# Patient Record
Sex: FEMALE | Race: WHITE | NOT HISPANIC OR LATINO
[De-identification: names, ages, dates, MRNs, and addresses within clinical notes are randomized per-mention and may not be internally consistent; named-entity substitution may affect disease eponyms.]

---

## 2017-01-10 ENCOUNTER — TRANSCRIPTION ENCOUNTER (OUTPATIENT)
Age: 52
End: 2017-01-10

## 2017-01-23 ENCOUNTER — APPOINTMENT (OUTPATIENT)
Dept: UROLOGY | Facility: CLINIC | Age: 52
End: 2017-01-23

## 2017-02-13 ENCOUNTER — APPOINTMENT (OUTPATIENT)
Dept: UROLOGY | Facility: CLINIC | Age: 52
End: 2017-02-13

## 2017-02-13 VITALS
SYSTOLIC BLOOD PRESSURE: 139 MMHG | RESPIRATION RATE: 17 BRPM | HEIGHT: 60 IN | BODY MASS INDEX: 16.88 KG/M2 | HEART RATE: 86 BPM | DIASTOLIC BLOOD PRESSURE: 91 MMHG | TEMPERATURE: 98 F | WEIGHT: 86 LBS

## 2017-02-13 DIAGNOSIS — Z78.9 OTHER SPECIFIED HEALTH STATUS: ICD-10-CM

## 2017-02-13 DIAGNOSIS — Z87.09 PERSONAL HISTORY OF OTHER DISEASES OF THE RESPIRATORY SYSTEM: ICD-10-CM

## 2017-02-13 DIAGNOSIS — R00.0 TACHYCARDIA, UNSPECIFIED: ICD-10-CM

## 2017-02-13 DIAGNOSIS — Z84.1 FAMILY HISTORY OF DISORDERS OF KIDNEY AND URETER: ICD-10-CM

## 2017-02-13 DIAGNOSIS — F17.200 NICOTINE DEPENDENCE, UNSPECIFIED, UNCOMPLICATED: ICD-10-CM

## 2017-02-15 ENCOUNTER — FORM ENCOUNTER (OUTPATIENT)
Age: 52
End: 2017-02-15

## 2017-02-16 ENCOUNTER — APPOINTMENT (OUTPATIENT)
Dept: CT IMAGING | Facility: IMAGING CENTER | Age: 52
End: 2017-02-16

## 2017-02-16 ENCOUNTER — OUTPATIENT (OUTPATIENT)
Dept: OUTPATIENT SERVICES | Facility: HOSPITAL | Age: 52
LOS: 1 days | End: 2017-02-16
Payer: MEDICAID

## 2017-02-16 DIAGNOSIS — N20.0 CALCULUS OF KIDNEY: ICD-10-CM

## 2017-02-16 LAB — BACTERIA UR CULT: NORMAL

## 2017-02-16 PROCEDURE — 74176 CT ABD & PELVIS W/O CONTRAST: CPT

## 2017-03-16 ENCOUNTER — OUTPATIENT (OUTPATIENT)
Dept: OUTPATIENT SERVICES | Facility: HOSPITAL | Age: 52
LOS: 1 days | End: 2017-03-16
Payer: MEDICAID

## 2017-03-16 VITALS
WEIGHT: 82.01 LBS | DIASTOLIC BLOOD PRESSURE: 78 MMHG | HEART RATE: 76 BPM | TEMPERATURE: 98 F | RESPIRATION RATE: 20 BRPM | HEIGHT: 60 IN | OXYGEN SATURATION: 98 % | SYSTOLIC BLOOD PRESSURE: 118 MMHG

## 2017-03-16 DIAGNOSIS — Z01.818 ENCOUNTER FOR OTHER PREPROCEDURAL EXAMINATION: ICD-10-CM

## 2017-03-16 DIAGNOSIS — N20.0 CALCULUS OF KIDNEY: ICD-10-CM

## 2017-03-16 DIAGNOSIS — Z98.891 HISTORY OF UTERINE SCAR FROM PREVIOUS SURGERY: Chronic | ICD-10-CM

## 2017-03-16 DIAGNOSIS — N20.1 CALCULUS OF URETER: ICD-10-CM

## 2017-03-16 DIAGNOSIS — Z90.710 ACQUIRED ABSENCE OF BOTH CERVIX AND UTERUS: Chronic | ICD-10-CM

## 2017-03-16 LAB
HCT VFR BLD CALC: 36.2 % — SIGNIFICANT CHANGE UP (ref 34.5–45)
HGB BLD-MCNC: 12.2 G/DL — SIGNIFICANT CHANGE UP (ref 11.5–15.5)
MCHC RBC-ENTMCNC: 33.7 GM/DL — SIGNIFICANT CHANGE UP (ref 32–36)
MCHC RBC-ENTMCNC: 34.8 PG — HIGH (ref 27–34)
MCV RBC AUTO: 103.1 FL — HIGH (ref 80–100)
PLATELET # BLD AUTO: 203 K/UL — SIGNIFICANT CHANGE UP (ref 150–400)
RBC # BLD: 3.51 M/UL — LOW (ref 3.8–5.2)
RBC # FLD: 13.7 % — SIGNIFICANT CHANGE UP (ref 10.3–14.5)
WBC # BLD: 3.53 K/UL — LOW (ref 3.8–10.5)
WBC # FLD AUTO: 3.53 K/UL — LOW (ref 3.8–10.5)

## 2017-03-16 PROCEDURE — 80048 BASIC METABOLIC PNL TOTAL CA: CPT

## 2017-03-16 PROCEDURE — G0463: CPT

## 2017-03-16 PROCEDURE — 85027 COMPLETE CBC AUTOMATED: CPT

## 2017-03-16 PROCEDURE — 80076 HEPATIC FUNCTION PANEL: CPT

## 2017-03-16 NOTE — H&P PST ADULT - NSANTHOSAYNRD_GEN_A_CORE
No. OBI screening performed.  STOP BANG Legend: 0-2 = LOW Risk; 3-4 = INTERMEDIATE Risk; 5-8 = HIGH Risk

## 2017-03-16 NOTE — H&P PST ADULT - HISTORY OF PRESENT ILLNESS
50 y/o F PMH cholelithiasis, renal calculi, presented to the ER with nausea, vomiting, hematuria and left flank pain.

## 2017-03-17 LAB
ALBUMIN SERPL ELPH-MCNC: 3.7 G/DL — SIGNIFICANT CHANGE UP (ref 3.3–5)
ALP SERPL-CCNC: 104 U/L — SIGNIFICANT CHANGE UP (ref 40–120)
ALT FLD-CCNC: 56 U/L — HIGH (ref 10–45)
ANION GAP SERPL CALC-SCNC: 22 MMOL/L — HIGH (ref 5–17)
AST SERPL-CCNC: 118 U/L — HIGH (ref 10–40)
BILIRUB DIRECT SERPL-MCNC: 0.2 MG/DL — SIGNIFICANT CHANGE UP (ref 0–0.2)
BILIRUB INDIRECT FLD-MCNC: 0.4 MG/DL — SIGNIFICANT CHANGE UP (ref 0.2–1)
BILIRUB SERPL-MCNC: 0.6 MG/DL — SIGNIFICANT CHANGE UP (ref 0.2–1.2)
BUN SERPL-MCNC: 8 MG/DL — SIGNIFICANT CHANGE UP (ref 7–23)
CALCIUM SERPL-MCNC: 9.5 MG/DL — SIGNIFICANT CHANGE UP (ref 8.4–10.5)
CHLORIDE SERPL-SCNC: 97 MMOL/L — SIGNIFICANT CHANGE UP (ref 96–108)
CO2 SERPL-SCNC: 18 MMOL/L — LOW (ref 22–31)
CREAT SERPL-MCNC: 0.6 MG/DL — SIGNIFICANT CHANGE UP (ref 0.5–1.3)
GLUCOSE SERPL-MCNC: 111 MG/DL — HIGH (ref 70–99)
POTASSIUM SERPL-MCNC: 3.4 MMOL/L — LOW (ref 3.5–5.3)
POTASSIUM SERPL-SCNC: 3.4 MMOL/L — LOW (ref 3.5–5.3)
PROT SERPL-MCNC: 7 G/DL — SIGNIFICANT CHANGE UP (ref 6–8.3)
SODIUM SERPL-SCNC: 137 MMOL/L — SIGNIFICANT CHANGE UP (ref 135–145)

## 2017-03-22 ENCOUNTER — INPATIENT (INPATIENT)
Facility: HOSPITAL | Age: 52
LOS: 1 days | Discharge: LEFT BEFORE DISCHARGE | End: 2017-03-24
Attending: INTERNAL MEDICINE | Admitting: INTERNAL MEDICINE
Payer: MEDICAID

## 2017-03-22 VITALS
WEIGHT: 130.07 LBS | DIASTOLIC BLOOD PRESSURE: 86 MMHG | HEIGHT: 66 IN | HEART RATE: 120 BPM | RESPIRATION RATE: 18 BRPM | OXYGEN SATURATION: 98 % | TEMPERATURE: 99 F | SYSTOLIC BLOOD PRESSURE: 127 MMHG

## 2017-03-22 DIAGNOSIS — Z98.891 HISTORY OF UTERINE SCAR FROM PREVIOUS SURGERY: Chronic | ICD-10-CM

## 2017-03-22 DIAGNOSIS — Z90.710 ACQUIRED ABSENCE OF BOTH CERVIX AND UTERUS: Chronic | ICD-10-CM

## 2017-03-22 LAB
ALBUMIN SERPL ELPH-MCNC: 3.8 G/DL — SIGNIFICANT CHANGE UP (ref 3.3–5)
ALP SERPL-CCNC: 129 U/L — HIGH (ref 40–120)
ALT FLD-CCNC: 75 U/L — SIGNIFICANT CHANGE UP (ref 12–78)
ANION GAP SERPL CALC-SCNC: 17 MMOL/L — SIGNIFICANT CHANGE UP (ref 5–17)
APTT BLD: 26.5 SEC — LOW (ref 27.5–37.4)
AST SERPL-CCNC: 153 U/L — HIGH (ref 15–37)
BASOPHILS # BLD AUTO: 0.1 K/UL — SIGNIFICANT CHANGE UP (ref 0–0.2)
BASOPHILS NFR BLD AUTO: 1.2 % — SIGNIFICANT CHANGE UP (ref 0–2)
BILIRUB SERPL-MCNC: 1.4 MG/DL — HIGH (ref 0.2–1.2)
BUN SERPL-MCNC: 4 MG/DL — LOW (ref 7–23)
CALCIUM SERPL-MCNC: 9.1 MG/DL — SIGNIFICANT CHANGE UP (ref 8.5–10.1)
CHLORIDE SERPL-SCNC: 96 MMOL/L — SIGNIFICANT CHANGE UP (ref 96–108)
CO2 SERPL-SCNC: 22 MMOL/L — SIGNIFICANT CHANGE UP (ref 22–31)
CREAT SERPL-MCNC: 0.77 MG/DL — SIGNIFICANT CHANGE UP (ref 0.5–1.3)
EOSINOPHIL # BLD AUTO: 0 K/UL — SIGNIFICANT CHANGE UP (ref 0–0.5)
EOSINOPHIL NFR BLD AUTO: 0.6 % — SIGNIFICANT CHANGE UP (ref 0–6)
ETHANOL SERPL-MCNC: <10 MG/DL — SIGNIFICANT CHANGE UP (ref 0–10)
GLUCOSE SERPL-MCNC: 184 MG/DL — HIGH (ref 70–99)
HCT VFR BLD CALC: 38.1 % — SIGNIFICANT CHANGE UP (ref 34.5–45)
HGB BLD-MCNC: 13 G/DL — SIGNIFICANT CHANGE UP (ref 11.5–15.5)
INR BLD: 1.06 RATIO — SIGNIFICANT CHANGE UP (ref 0.88–1.16)
LYMPHOCYTES # BLD AUTO: 1.8 K/UL — SIGNIFICANT CHANGE UP (ref 1–3.3)
LYMPHOCYTES # BLD AUTO: 25 % — SIGNIFICANT CHANGE UP (ref 13–44)
MCHC RBC-ENTMCNC: 34 GM/DL — SIGNIFICANT CHANGE UP (ref 32–36)
MCHC RBC-ENTMCNC: 35.8 PG — HIGH (ref 27–34)
MCV RBC AUTO: 105.2 FL — HIGH (ref 80–100)
MONOCYTES # BLD AUTO: 0.6 K/UL — SIGNIFICANT CHANGE UP (ref 0–0.9)
MONOCYTES NFR BLD AUTO: 8.2 % — SIGNIFICANT CHANGE UP (ref 2–14)
NEUTROPHILS # BLD AUTO: 4.7 K/UL — SIGNIFICANT CHANGE UP (ref 1.8–7.4)
NEUTROPHILS NFR BLD AUTO: 65 % — SIGNIFICANT CHANGE UP (ref 43–77)
PLATELET # BLD AUTO: 142 K/UL — LOW (ref 150–400)
POTASSIUM SERPL-MCNC: 3.2 MMOL/L — LOW (ref 3.5–5.3)
POTASSIUM SERPL-SCNC: 3.2 MMOL/L — LOW (ref 3.5–5.3)
PROT SERPL-MCNC: 7.7 GM/DL — SIGNIFICANT CHANGE UP (ref 6–8.3)
PROTHROM AB SERPL-ACNC: 11.5 SEC — SIGNIFICANT CHANGE UP (ref 9.8–12.7)
RBC # BLD: 3.62 M/UL — LOW (ref 3.8–5.2)
RBC # FLD: 13.2 % — SIGNIFICANT CHANGE UP (ref 10.3–14.5)
SODIUM SERPL-SCNC: 135 MMOL/L — SIGNIFICANT CHANGE UP (ref 135–145)
WBC # BLD: 7.3 K/UL — SIGNIFICANT CHANGE UP (ref 3.8–10.5)
WBC # FLD AUTO: 7.3 K/UL — SIGNIFICANT CHANGE UP (ref 3.8–10.5)

## 2017-03-22 PROCEDURE — 70450 CT HEAD/BRAIN W/O DYE: CPT | Mod: 26

## 2017-03-22 PROCEDURE — 99285 EMERGENCY DEPT VISIT HI MDM: CPT

## 2017-03-22 PROCEDURE — 93010 ELECTROCARDIOGRAM REPORT: CPT

## 2017-03-22 RX ORDER — SODIUM CHLORIDE 9 MG/ML
1000 INJECTION INTRAMUSCULAR; INTRAVENOUS; SUBCUTANEOUS ONCE
Qty: 0 | Refills: 0 | Status: COMPLETED | OUTPATIENT
Start: 2017-03-22 | End: 2017-03-22

## 2017-03-22 RX ORDER — ACETAMINOPHEN 500 MG
1000 TABLET ORAL ONCE
Qty: 0 | Refills: 0 | Status: DISCONTINUED | OUTPATIENT
Start: 2017-03-22 | End: 2017-03-22

## 2017-03-22 RX ADMIN — Medication 2 MILLIGRAM(S): at 23:03

## 2017-03-22 RX ADMIN — SODIUM CHLORIDE 1000 MILLILITER(S): 9 INJECTION INTRAMUSCULAR; INTRAVENOUS; SUBCUTANEOUS at 22:53

## 2017-03-22 NOTE — ED PROVIDER NOTE - OBJECTIVE STATEMENT
50 y/o female with PMHx of COPD presents to the ED BIBEMS for first time seizure PTA according to . Had a witnessed generalized tonic clonic seizure lasting about a minute with a 10 minute post ictal period. Pt states that she is an alcoholic. Last drink was this afternoon. Currently denies HA, CP, abd pain.

## 2017-03-22 NOTE — ED PROVIDER NOTE - NS ED MD SCRIBE ATTENDING SCRIBE SECTIONS
RESULTS/PROGRESS NOTE/VITAL SIGNS( Pullset)/HISTORY OF PRESENT ILLNESS/REVIEW OF SYSTEMS/DISPOSITION/PHYSICAL EXAM/PAST MEDICAL/SURGICAL/SOCIAL HISTORY

## 2017-03-23 ENCOUNTER — APPOINTMENT (OUTPATIENT)
Dept: UROLOGY | Facility: HOSPITAL | Age: 52
End: 2017-03-23

## 2017-03-23 DIAGNOSIS — Z29.9 ENCOUNTER FOR PROPHYLACTIC MEASURES, UNSPECIFIED: ICD-10-CM

## 2017-03-23 DIAGNOSIS — E87.6 HYPOKALEMIA: ICD-10-CM

## 2017-03-23 DIAGNOSIS — N20.0 CALCULUS OF KIDNEY: ICD-10-CM

## 2017-03-23 DIAGNOSIS — J44.9 CHRONIC OBSTRUCTIVE PULMONARY DISEASE, UNSPECIFIED: ICD-10-CM

## 2017-03-23 DIAGNOSIS — F10.239 ALCOHOL DEPENDENCE WITH WITHDRAWAL, UNSPECIFIED: ICD-10-CM

## 2017-03-23 DIAGNOSIS — K80.80 OTHER CHOLELITHIASIS WITHOUT OBSTRUCTION: ICD-10-CM

## 2017-03-23 DIAGNOSIS — F17.200 NICOTINE DEPENDENCE, UNSPECIFIED, UNCOMPLICATED: ICD-10-CM

## 2017-03-23 DIAGNOSIS — R74.0 NONSPECIFIC ELEVATION OF LEVELS OF TRANSAMINASE AND LACTIC ACID DEHYDROGENASE [LDH]: ICD-10-CM

## 2017-03-23 LAB
ALBUMIN SERPL ELPH-MCNC: 2.7 G/DL — LOW (ref 3.3–5)
ALP SERPL-CCNC: 90 U/L — SIGNIFICANT CHANGE UP (ref 40–120)
ALT FLD-CCNC: 52 U/L — SIGNIFICANT CHANGE UP (ref 12–78)
ANION GAP SERPL CALC-SCNC: 10 MMOL/L — SIGNIFICANT CHANGE UP (ref 5–17)
AST SERPL-CCNC: 83 U/L — HIGH (ref 15–37)
BASOPHILS # BLD AUTO: 0.1 K/UL — SIGNIFICANT CHANGE UP (ref 0–0.2)
BASOPHILS NFR BLD AUTO: 1.2 % — SIGNIFICANT CHANGE UP (ref 0–2)
BILIRUB DIRECT SERPL-MCNC: 0.6 MG/DL — HIGH (ref 0–0.2)
BILIRUB SERPL-MCNC: 1.3 MG/DL — HIGH (ref 0.2–1.2)
BUN SERPL-MCNC: 4 MG/DL — LOW (ref 7–23)
CALCIUM SERPL-MCNC: 7.8 MG/DL — LOW (ref 8.5–10.1)
CHLORIDE SERPL-SCNC: 108 MMOL/L — SIGNIFICANT CHANGE UP (ref 96–108)
CO2 SERPL-SCNC: 24 MMOL/L — SIGNIFICANT CHANGE UP (ref 22–31)
CREAT SERPL-MCNC: 0.56 MG/DL — SIGNIFICANT CHANGE UP (ref 0.5–1.3)
EOSINOPHIL # BLD AUTO: 0 K/UL — SIGNIFICANT CHANGE UP (ref 0–0.5)
EOSINOPHIL NFR BLD AUTO: 0.8 % — SIGNIFICANT CHANGE UP (ref 0–6)
GLUCOSE SERPL-MCNC: 90 MG/DL — SIGNIFICANT CHANGE UP (ref 70–99)
HAV IGM SER-ACNC: SIGNIFICANT CHANGE UP
HBV CORE IGM SER-ACNC: SIGNIFICANT CHANGE UP
HBV SURFACE AG SER-ACNC: SIGNIFICANT CHANGE UP
HCT VFR BLD CALC: 28.1 % — LOW (ref 34.5–45)
HCV AB S/CO SERPL IA: 0.31 S/CO — SIGNIFICANT CHANGE UP
HCV AB SERPL-IMP: SIGNIFICANT CHANGE UP
HGB BLD-MCNC: 9.5 G/DL — LOW (ref 11.5–15.5)
LIDOCAIN IGE QN: 82 U/L — SIGNIFICANT CHANGE UP (ref 73–393)
LYMPHOCYTES # BLD AUTO: 1.9 K/UL — SIGNIFICANT CHANGE UP (ref 1–3.3)
LYMPHOCYTES # BLD AUTO: 33.1 % — SIGNIFICANT CHANGE UP (ref 13–44)
MAGNESIUM SERPL-MCNC: 1.3 MG/DL — LOW (ref 1.8–2.4)
MCHC RBC-ENTMCNC: 33.9 GM/DL — SIGNIFICANT CHANGE UP (ref 32–36)
MCHC RBC-ENTMCNC: 35.6 PG — HIGH (ref 27–34)
MCV RBC AUTO: 105.2 FL — HIGH (ref 80–100)
MONOCYTES # BLD AUTO: 0.5 K/UL — SIGNIFICANT CHANGE UP (ref 0–0.9)
MONOCYTES NFR BLD AUTO: 8.2 % — SIGNIFICANT CHANGE UP (ref 2–14)
NEUTROPHILS # BLD AUTO: 3.3 K/UL — SIGNIFICANT CHANGE UP (ref 1.8–7.4)
NEUTROPHILS NFR BLD AUTO: 56.7 % — SIGNIFICANT CHANGE UP (ref 43–77)
PHOSPHATE SERPL-MCNC: 1.9 MG/DL — LOW (ref 2.5–4.5)
PLATELET # BLD AUTO: 110 K/UL — LOW (ref 150–400)
POTASSIUM SERPL-MCNC: 3.4 MMOL/L — LOW (ref 3.5–5.3)
POTASSIUM SERPL-SCNC: 3.4 MMOL/L — LOW (ref 3.5–5.3)
PROT SERPL-MCNC: 5.8 GM/DL — LOW (ref 6–8.3)
RBC # BLD: 2.67 M/UL — LOW (ref 3.8–5.2)
RBC # FLD: 13.2 % — SIGNIFICANT CHANGE UP (ref 10.3–14.5)
SODIUM SERPL-SCNC: 142 MMOL/L — SIGNIFICANT CHANGE UP (ref 135–145)
WBC # BLD: 5.7 K/UL — SIGNIFICANT CHANGE UP (ref 3.8–10.5)
WBC # FLD AUTO: 5.7 K/UL — SIGNIFICANT CHANGE UP (ref 3.8–10.5)

## 2017-03-23 PROCEDURE — 95812 EEG 41-60 MINUTES: CPT | Mod: 26

## 2017-03-23 PROCEDURE — 74176 CT ABD & PELVIS W/O CONTRAST: CPT | Mod: 26

## 2017-03-23 PROCEDURE — 99222 1ST HOSP IP/OBS MODERATE 55: CPT

## 2017-03-23 RX ORDER — IPRATROPIUM/ALBUTEROL SULFATE 18-103MCG
3 AEROSOL WITH ADAPTER (GRAM) INHALATION EVERY 6 HOURS
Qty: 0 | Refills: 0 | Status: DISCONTINUED | OUTPATIENT
Start: 2017-03-23 | End: 2017-03-24

## 2017-03-23 RX ORDER — FLUTICASONE PROPIONATE AND SALMETEROL 50; 250 UG/1; UG/1
1 POWDER ORAL; RESPIRATORY (INHALATION)
Qty: 0 | Refills: 0 | Status: DISCONTINUED | OUTPATIENT
Start: 2017-03-23 | End: 2017-03-24

## 2017-03-23 RX ORDER — THIAMINE MONONITRATE (VIT B1) 100 MG
100 TABLET ORAL ONCE
Qty: 0 | Refills: 0 | Status: COMPLETED | OUTPATIENT
Start: 2017-03-23 | End: 2017-03-23

## 2017-03-23 RX ORDER — THIAMINE MONONITRATE (VIT B1) 100 MG
100 TABLET ORAL DAILY
Qty: 0 | Refills: 0 | Status: DISCONTINUED | OUTPATIENT
Start: 2017-03-23 | End: 2017-03-24

## 2017-03-23 RX ORDER — SODIUM CHLORIDE 9 MG/ML
500 INJECTION INTRAMUSCULAR; INTRAVENOUS; SUBCUTANEOUS
Qty: 0 | Refills: 0 | Status: DISCONTINUED | OUTPATIENT
Start: 2017-03-23 | End: 2017-03-24

## 2017-03-23 RX ORDER — NICOTINE POLACRILEX 2 MG
1 GUM BUCCAL DAILY
Qty: 0 | Refills: 0 | Status: DISCONTINUED | OUTPATIENT
Start: 2017-03-23 | End: 2017-03-24

## 2017-03-23 RX ORDER — MOXIFLOXACIN HYDROCHLORIDE TABLETS, 400 MG 400 MG/1
1 TABLET, FILM COATED ORAL
Qty: 0 | Refills: 0 | COMMUNITY

## 2017-03-23 RX ORDER — THIAMINE MONONITRATE (VIT B1) 100 MG
100 TABLET ORAL ONCE
Qty: 0 | Refills: 0 | Status: DISCONTINUED | OUTPATIENT
Start: 2017-03-23 | End: 2017-03-23

## 2017-03-23 RX ORDER — FOLIC ACID 0.8 MG
1 TABLET ORAL DAILY
Qty: 0 | Refills: 0 | Status: DISCONTINUED | OUTPATIENT
Start: 2017-03-23 | End: 2017-03-24

## 2017-03-23 RX ORDER — POTASSIUM CHLORIDE 20 MEQ
20 PACKET (EA) ORAL ONCE
Qty: 0 | Refills: 0 | Status: COMPLETED | OUTPATIENT
Start: 2017-03-23 | End: 2017-03-23

## 2017-03-23 RX ORDER — SODIUM CHLORIDE 9 MG/ML
1 INJECTION INTRAMUSCULAR; INTRAVENOUS; SUBCUTANEOUS ONCE
Qty: 0 | Refills: 0 | Status: COMPLETED | OUTPATIENT
Start: 2017-03-23 | End: 2017-03-23

## 2017-03-23 RX ADMIN — Medication 1 PATCH: at 15:30

## 2017-03-23 RX ADMIN — Medication 1 MILLIGRAM(S): at 11:18

## 2017-03-23 RX ADMIN — Medication 1 TABLET(S): at 11:18

## 2017-03-23 RX ADMIN — SODIUM CHLORIDE 0.01 MILLILITER(S): 9 INJECTION INTRAMUSCULAR; INTRAVENOUS; SUBCUTANEOUS at 03:00

## 2017-03-23 RX ADMIN — Medication 100 MILLIGRAM(S): at 01:25

## 2017-03-23 RX ADMIN — Medication 2 MILLIGRAM(S): at 18:42

## 2017-03-23 RX ADMIN — Medication 100 MILLIGRAM(S): at 11:18

## 2017-03-23 RX ADMIN — Medication 20 MILLIEQUIVALENT(S): at 04:45

## 2017-03-23 RX ADMIN — Medication 2 MILLIGRAM(S): at 21:30

## 2017-03-23 RX ADMIN — Medication 3 MILLILITER(S): at 20:55

## 2017-03-23 RX ADMIN — Medication 2 MILLIGRAM(S): at 09:23

## 2017-03-23 RX ADMIN — Medication 2 MILLIGRAM(S): at 15:30

## 2017-03-23 RX ADMIN — Medication 2 MILLIGRAM(S): at 04:45

## 2017-03-23 NOTE — ED ADULT NURSE NOTE - OBJECTIVE STATEMENT
patient's boyfriend states she passed out and "shook" for about a minute he called EMS and then patient was "out of it" for approximately 10 minutes. patient had episode of incontinence of stool and urine at time of episode.  A&Ox4 at time of arrival. no complaints at this time. admits to drinking everyday and stopped in the last 24 hours due to scheduled surgery tomorrow to removed kidney stones. only had "one shot" today.

## 2017-03-23 NOTE — PROGRESS NOTE ADULT - PROBLEM SELECTOR PLAN 2
stable  CT abdomen results noted CT abdomen results - cholelithiasis  elevated LFTs likely related to alcohol use - trending down.

## 2017-03-23 NOTE — CONSULT NOTE ADULT - SUBJECTIVE AND OBJECTIVE BOX
CC: 51y old  Female who presents with a chief complaint of seizure (23 Mar 2017 04:07)      HPI:  50 yo F with PMH of COPD, Etoh abuse, presented to  ED status post seizure-like episode witnessed by . As per the pt she went to the bathroom, had a seizure, then does not recall what happened, she admits she had few drinks that she was not supposed to have as she was schedules for surgery.    As per the history / H&P, patient had been vomiting intermittently since december, has been told she has gallstones and kidney stones and that she needs cholecystectomy, was scheduled for procedure for her kidney stones tomorrow , she had ~4 episodes of non-bloody vomiting, came out from the bathroom after the last episode of vomiting, and fell backwards and had shaking of arms / legs for 3-5 mins, also had fecal incontinence, but no urinary incontinence. Patient's last drink was about 4pm, and seizure-like episode was around 9pm; patient has been drinking more than usual - about 6 beers / day and also hard liquor.     At present pt is alert, back to baseline; denies nausea, abdominal pain, CP, cough, runny nose, CP, SOB, arm / leg weakness, focal deficits.       PAST MEDICAL & SURGICAL HISTORY:  Asthma  Cholelithiasis  Renal calculi  Lyme disease  Tachycardia  S/P hysterectomy:   S/P :     Social Hx: Tobacco - 1 pack every two days, Etoh - 6 beers per day plus hard liquor, drugs - h/o marijuana use    Family Hx: Father - kidney transplant (23 Mar 2017 04:07)    MEDICATIONS  (STANDING):  fluticasone / salmeterol 100-50 MICROgram(s) Diskus 1Dose(s) Inhalation two times a day  sodium chloride 0.9%. 500milliLiter(s) IV Continuous <Continuous>  LORazepam     Tablet milliGRAM(s) Oral   folic acid 1milliGRAM(s) Oral daily  multivitamin 1Tablet(s) Oral daily  LORazepam     Tablet 2milliGRAM(s) Oral every 4 hours  thiamine 100milliGRAM(s) Oral daily       Allergies    amoxicillin (Other)  Ceclor (Other)    Intolerances    ROS: Pertinent positives in HPI, all other ROS were reviewed and are negative.      Vital Signs Last 24 Hrs  T(C): 37.1, Max: 37.1 (03-23 @ 02:52)  T(F): 98.7, Max: 98.8 ( @ 02:52)  HR: 105 (100 - 126)  BP: 93/55 (93/55 - 141/90)  BP(mean): --  RR: 16 (16 - 19)  SpO2: 99% (98% - 100%)      GE:  Constitutional: awake and alert.  HEENT: PERRLA, EOMI,   Neck: Supple.  Respiratory: Breath sounds are clear bilaterally  Cardiovascular: S1 and S2, regular rhythm  Extremities:  no edema  Vascular: Caritid Bruit - no  Musculoskeletal: no joint swelling/tenderness, no abnormal movements  Skin: No rashes    Neurological exam:  HF: A x O x 3. Appropriately interactive, normal affect. Speech fluent, No Aphasia or paraphasic errors. Naming /repetition intact   CN: PRASHANT, EOMI, VFF, facial sensation normal, no NLFD, tongue midline, Palate moves equally, SCM equal bilaterally  Motor: No pronator drift, Strength 5/5 in all 4 ext, decreased muscle bulk, tremulous hands normal tone, no rigidity or bradykinesia.    Sens: Intact to light touch / PP/ VS/ JS    Reflexes: Symmetric and normal . BJ 2+, BR 2+, KJ 2+, AJ 0, downgoing toes b/l  Coord:  No FNFA, dysmetria, TATI intact   Gait/Balance: Not tested      Labs:   23 Mar 2017 05:52    142    |  108    |  4      ----------------------------<  90     3.4     |  24     |  0.56     Ca    7.8        23 Mar 2017 05:52  Phos  1.9       23 Mar 2017 05:52  Mg     1.3       23 Mar 2017 05:52    TPro  5.8    /  Alb  2.7    /  TBili  1.3    /  DBili  0.6    /  AST  83     /  ALT  52     /  AlkPhos  90     23 Mar 2017 05:52                        9.5    5.7   )-----------( 110      ( 23 Mar 2017 05:52 )             28.1       Radiology:  - CT Head:No acute hemorrhage or mass effect.   - MRI brain  -MRA brain/Carotids  - EEG CC: 51y old  Female who presents with a chief complaint of seizure (23 Mar 2017 04:07)      HPI:  52 yo F with PMH of COPD, Etoh abuse, presented to  ED status post seizure-like episode witnessed by . As per the pt she went to the bathroom, had a seizure, then does not recall what happened, she admits she had few drinks that she was not supposed to have as she was schedules for surgery.    As per the history / H&P, patient had been vomiting intermittently since december, has been told she has gallstones and kidney stones and that she needs cholecystectomy, was scheduled for procedure for her kidney stones tomorrow , she had ~4 episodes of non-bloody vomiting, came out from the bathroom after the last episode of vomiting, and fell backwards and had shaking of arms / legs for 3-5 mins, also had fecal incontinence, but no urinary incontinence. Patient's last drink was about 4pm, and seizure-like episode was around 9pm; patient has been drinking more than usual - about 6 beers / day and also hard liquor.     At present pt is alert, back to baseline; denies nausea, abdominal pain, CP, cough, runny nose, CP, SOB, arm / leg weakness, focal deficits. Upon obtaining further history it is not clear if pt has had any seizures previously; she does admit that she drinks ETOH a lot and has pain related issues.      PAST MEDICAL & SURGICAL HISTORY:  Asthma  Cholelithiasis  Renal calculi  Lyme disease  Tachycardia  S/P hysterectomy:   S/P :     Social Hx: Tobacco - 1 pack every two days, Etoh - 6 beers per day plus hard liquor, drugs - h/o marijuana use    Family Hx: Father - kidney transplant (23 Mar 2017 04:07)    MEDICATIONS  (STANDING):  fluticasone / salmeterol 100-50 MICROgram(s) Diskus 1Dose(s) Inhalation two times a day  sodium chloride 0.9%. 500milliLiter(s) IV Continuous <Continuous>  LORazepam     Tablet milliGRAM(s) Oral   folic acid 1milliGRAM(s) Oral daily  multivitamin 1Tablet(s) Oral daily  LORazepam     Tablet 2milliGRAM(s) Oral every 4 hours  thiamine 100milliGRAM(s) Oral daily       Allergies    amoxicillin (Other)  Ceclor (Other)    Intolerances    ROS: Pertinent positives in HPI, all other ROS were reviewed and are negative.      Vital Signs Last 24 Hrs  T(C): 37.1, Max: 37.1 ( @ 02:52)  T(F): 98.7, Max: 98.8 ( @ 02:52)  HR: 105 (100 - 126)  BP: 93/55 (93/55 - 141/90)  BP(mean): --  RR: 16 (16 - 19)  SpO2: 99% (98% - 100%)      GE:  Constitutional: awake and alert.  HEENT: PERRLA, EOMI,   Neck: Supple.  Respiratory: Breath sounds are clear bilaterally  Cardiovascular: S1 and S2, regular rhythm  Extremities:  no edema  Vascular: Caritid Bruit - no  Musculoskeletal: no joint swelling/tenderness, no abnormal movements  Skin: No rashes    Neurological exam:  HF: A x O x 3. Appropriately interactive, normal affect. Speech fluent, No Aphasia or paraphasic errors. Naming /repetition intact   CN: PRASHANT, EOMI, VFF, facial sensation normal, no NLFD, tongue midline, Palate moves equally, SCM equal bilaterally  Motor: No pronator drift, Strength 5/5 in all 4 ext, decreased muscle bulk, tremulous hands normal tone, no rigidity or bradykinesia.    Sens: Intact to light touch / PP/ VS/ JS    Reflexes: Symmetric and normal . BJ 2+, BR 2+, KJ 2+, AJ 0, downgoing toes b/l  Coord:  No FNFA, dysmetria, TATI intact   Gait/Balance: Not tested      Labs:   23 Mar 2017 05:52    142    |  108    |  4      ----------------------------<  90     3.4     |  24     |  0.56     Ca    7.8        23 Mar 2017 05:52  Phos  1.9       23 Mar 2017 05:52  Mg     1.3       23 Mar 2017 05:52    TPro  5.8    /  Alb  2.7    /  TBili  1.3    /  DBili  0.6    /  AST  83     /  ALT  52     /  AlkPhos  90     23 Mar 2017 05:52                        9.5    5.7   )-----------( 110      ( 23 Mar 2017 05:52 )             28.1       Radiology:  - CT Head:No acute hemorrhage or mass effect.   - MRI brain  -MRA brain/Carotids  - EEG

## 2017-03-23 NOTE — PROGRESS NOTE ADULT - PROBLEM SELECTOR PLAN 5
CT results showed hepatomegaly with sever fatty infiltration. Cholelithiasis- non obstructing stones in both kidneys CT results showed hepatomegaly with sever fatty infiltration. Cholelithiasis.  alcohol cessation  [facility] protocol  LFTS trending down

## 2017-03-23 NOTE — PROGRESS NOTE ADULT - PROBLEM SELECTOR PLAN 1
Ct head negative for intracranial pathology  EEG completed- awaiting results  Neurology consult - will hold off on AED until results of EEG is back  seizure precations  fall precautions  Utox  continue CIWA protocol  MVI, thiamine and folate Ct head negative for intracranial pathology  EEG completed- awaiting results  Neurology consult - will hold off on AED until results of EEG is back  seizure precations  fall precautions  Utox  continue standing CIWA protocol  MVI, thiamine and folate  IVFs

## 2017-03-23 NOTE — PROGRESS NOTE ADULT - ASSESSMENT
50 yo F with PMH of COPD, Etoh abuse, p/w seizure and vomiting intermittently since december. She has been told she has gallstones and kidney stones and that she needs cholecystectomy. Patient also scheduled for procedure for her kidney stones on 3/23. Ome day PTAshe had ~4 episodes of non-bloody vomiting. As per significant other noted that patient fell backwards and had shaking of arms / legs for 3-5 mins. Patient had fecal incontinence, but no urinary incontinence. Patient's last drink was about 4pm, and seizure-like episode was around 9pm. As per boyfriend, patient has been drinking more than usual - about 6 beers / day and also hard liquor. Patient admitted with ETOH withdrawal and was started on CIWA  protocol 52 yo F with PMH of COPD, Etoh abuse, p/w seizure and vomiting

## 2017-03-23 NOTE — H&P ADULT - HISTORY OF PRESENT ILLNESS
50 yo F with PMH of COPD, Etoh abuse, p/w seizure. Patient is not very helpful with providing history and just wants to go back to sleep instead, most of the history was given by boyfriend at bedside. States patient has been vomiting intermittently since december. She has been told she has gallstones and kidney stones and that she needs cholecystectomy. Patient also scheduled for procedure for her kidney stones tomorrow. Yesterday she had ~4 episodes of non-bloody vomiting. States she came out from the bathroom after the last episode of vomiting, and boyfriend noted that patient fell backwards and had shaking of arms / legs for 3-5 mins. Patient had fecal incontinence, but no urinary incontinence. Patient's last drink was about 4pm, and seizure-like episode was around 9pm. As per boyfriend, patient has been drinking more than usual - about 6 beers / day and also hard liquor.     Presently patient denies nausea, abdominal pain, CP, cough, runny nose, CP, SOB, arm / leg weakness, focal deficits.     PSH: Partial hysterctomy  Social: Tobacco - 1 pack every two days, Etoh - 6 beers per day plus hard liquor, drugs - h/o marijuana use  Family Hx: Father - kidney transplant

## 2017-03-23 NOTE — ED ADULT NURSE NOTE - ED STAT RN HANDOFF DETAILS
to HEIDI Ricketts. patient sleeping comfortable. no complaints. safety maintained will continue to monitor.

## 2017-03-23 NOTE — PROGRESS NOTE ADULT - ATTENDING COMMENTS
agree with above A/P:  CIWA protocol.  Monitor for seizures.  Await EEG and neuro follow up.  LFTs trending down.

## 2017-03-23 NOTE — ED ADULT NURSE REASSESSMENT NOTE - NS ED NURSE REASSESS COMMENT FT1
pt resting comfortably in bed with no acute distress noted. VSS. CIWA 0 scored. Will cont to monitor for safety and comfort.

## 2017-03-23 NOTE — PROGRESS NOTE ADULT - SUBJECTIVE AND OBJECTIVE BOX
50 yo F with PMH of COPD, Etoh abuse, p/w seizure and vomiting intermittently since december. She has been told she has gallstones and kidney stones and that she needs cholecystectomy. Patient also scheduled for procedure for her kidney stones on 3/23. Ome day Kt had ~4 episodes of non-bloody vomiting. As per significant other noted that patient fell backwards and had shaking of arms / legs for 3-5 mins. Patient had fecal incontinence, but no urinary incontinence. Patient's last drink was about 4pm, and seizure-like episode was around 9pm. As per boyfriend, patient has been drinking more than usual - about 6 beers / day and also hard liquor. Patient admitted with ETOH withdrawal and was started on CIWA  protocol    3/23- Awake, no overnight events. stated that her last dink was yesterday afternoon, no new complaints    PSH: Partial hysterectomy  Social: Tobacco - 1 pack every two days, Etoh - 6 beers per day plus hard liquor, drugs - h/o marijuana use  Family Hx: Father - kidney transplant    Review of Systems:  Review of Systems: See HPI	  Other Review of Systems: All other review of systems negative, except as noted in HPI 	      MEDICATIONS  (STANDING):  fluticasone / salmeterol 100-50 MICROgram(s) Diskus 1Dose(s) Inhalation two times a day  sodium chloride 0.9%. 500milliLiter(s) IV Continuous <Continuous>  LORazepam     Tablet milliGRAM(s) Oral   folic acid 1milliGRAM(s) Oral daily  multivitamin 1Tablet(s) Oral daily  LORazepam     Tablet 2milliGRAM(s) Oral every 4 hours  thiamine 100milliGRAM(s) Oral daily  nicotine -  14 mG/24Hr(s) Patch 1patch Transdermal daily    MEDICATIONS  (PRN):  ALBUTerol/ipratropium for Nebulization 3milliLiter(s) Nebulizer every 6 hours PRN Bronchospasm  LORazepam   Injectable 2milliGRAM(s) IV Push every 2 hours PRN Symptom-triggered: each CIWA -Ar score 8 or GREATER        		          EXAM:  CT ABDOMEN AND PELVIS                       PROCEDURE DATE:  03/23/2017  INTERPRETATION:  CT Abdomen  and Pelvis  CLINICAL INFORMATION:  Vomiting and transaminitis      TECHNIQUE: Contiguous axial 5 mm images were obtained from a single  helical acquisition through the abdomen and pelvis. Oral contrast was not  administered.      FINDINGS:   Prior CT of the abdomen pelvis dated 12/26/2016 is available  for review. The lung bases are clear.      The liver is diffusely enlarged with severe fatty infiltration. No focal  hepatic lesions are seen although this is limited without the use of  intravenous contrast. The gallbladder is physiologically distended and  contains stones. No wall thickening is seen. The pancreas is intact  without ductal dilatation or focal lesion.   The spleen is normal in size  without focal lesions. The adrenal glands are intact.   2 mm nonobstructing stone is seen in the  lower pole of the right kidney. 7 mm stone is seen in the midpole of the  left kidney. A stones are nonobstructing. No hydronephrosis is seen. No  ureteral stones are seen.  The bladder appears unremarkable. There is no evidence of pelvic mass or free pelvic fluid. There is no evidence of lymphadenopathy or ascites.  The bowel appears unremarkable without evidence of bowel obstruction,  perforation or abscess.  Osseous structures are intact.  IMPRESSION: Hepatomegaly with severe fatty infiltration. Cholelithiasis.  Nonobstructing stones in both kidneys.    EXAM:  CT BRAIN                        PROCEDURE DATE:  03/22/2017   INTERPRETATION:  3/22/2017 11:40 PM CLINICAL HISTORY:  First time seizure. TECHNIQUE: Axial CT images are obtained from the cranial vertex to the  skullbase without the administration of IV contrast. COMPARISON: None FINDINGS: There is mild volume loss. White matter hypodensities noted compatible  with mild chronic microvascular ischemic change in this age group. There  is no midline shift, mass effect, extra axial collection, intracranial  hemorrhage, or ventriculomegaly. The calvarium is intact. The visualized paranasal sinuses are aerated.  The mastoid air cells are clear. IMPRESSION: No acute hemorrhage or mass effect.                         9.5   5.7   )-----------( 110      ( 23 Mar 2017 05:52 )            28.1  23 Mar 2017 05:52  142    |  108    |  4     ----------------------------<  90    3.4     |  24     |  0.56   Ca    7.8        23 Mar 2017 05:52 Phos  1.9       23 Mar 2017 05:52 Mg     1.3       23 Mar 2017 05:52  TPro  5.8    /  Alb  2.7    /  TBili  1.3    /  DBili  0.6    /  AST  83     /  ALT  52     /  AlkPhos  90     23 Mar 2017 05:52   Alcohol, Blood: <10 mg/dL (03.22.17 @ 23:05)        	  	    Assessment and Plan:   Assessment:  · Assessment		  50 yo F with PMH of COPD, Etoh abuse, p/w seizure    *Seizure possibly 2/2 etoh withdrawal  -CT head negative for acute findings  -Seizure / fall precautions  -Gentle hydration  -Hold diuretics  -Neuro consult  -Will hold theophylline for now as it may lower seizure threshold  -Utox    *Etoh withdrawal  -Ativan  -MVI / thiamine / folate  -[facility] protocol    *Vomiting / transaminitis  -Possible 2/2 alcoholic gastritis  -Lipase level  -CT abdomen   -No abdominal pain  -Hepatitis panel    *Hypokalemia  -Replete potassium and recheck    *COPD  -C/w symbicort  -Nebs PRN    *DVT ppx  -SCDs 52 yo F with PMH of COPD, Etoh abuse, p/w seizure and vomiting intermittently since december. She has been told she has gallstones and kidney stones and that she needs cholecystectomy. Patient also scheduled for procedure for her kidney stones on 3/23. Ome day Kt had ~4 episodes of non-bloody vomiting. As per significant other noted that patient fell backwards and had shaking of arms / legs for 3-5 mins. Patient had fecal incontinence, but no urinary incontinence. Patient's last drink was about 4pm, and seizure-like episode was around 9pm. As per boyfriend, patient has been drinking more than usual - about 6 beers / day and also hard liquor. Patient admitted with ETOH withdrawal and was started on CIWA  protocol    3/23- Awake, no overnight events. stated that her last dink was yesterday afternoon, no new complaints    PSH: Partial hysterectomy  Social: Tobacco - 1 pack every two days, Etoh - 6 beers per day plus hard liquor, drugs - h/o marijuana use  Family Hx: Father - kidney transplant    Review of Systems:  Review of Systems: See HPI	  Other Review of Systems: All other review of systems negative, except as noted in HPI 	      MEDICATIONS  (STANDING):  fluticasone / salmeterol 100-50 MICROgram(s) Diskus 1Dose(s) Inhalation two times a day  sodium chloride 0.9%. 500milliLiter(s) IV Continuous <Continuous>  LORazepam     Tablet milliGRAM(s) Oral   folic acid 1milliGRAM(s) Oral daily  multivitamin 1Tablet(s) Oral daily  LORazepam     Tablet 2milliGRAM(s) Oral every 4 hours  thiamine 100milliGRAM(s) Oral daily  nicotine -  14 mG/24Hr(s) Patch 1patch Transdermal daily    MEDICATIONS  (PRN):  ALBUTerol/ipratropium for Nebulization 3milliLiter(s) Nebulizer every 6 hours PRN Bronchospasm  LORazepam   Injectable 2milliGRAM(s) IV Push every 2 hours PRN Symptom-triggered: each CIWA -Ar score 8 or GREATER        		          EXAM:  CT ABDOMEN AND PELVIS                       PROCEDURE DATE:  03/23/2017  INTERPRETATION:  CT Abdomen  and Pelvis  CLINICAL INFORMATION:  Vomiting and transaminitis      TECHNIQUE: Contiguous axial 5 mm images were obtained from a single  helical acquisition through the abdomen and pelvis. Oral contrast was not  administered.      FINDINGS:   Prior CT of the abdomen pelvis dated 12/26/2016 is available  for review. The lung bases are clear.      The liver is diffusely enlarged with severe fatty infiltration. No focal  hepatic lesions are seen although this is limited without the use of  intravenous contrast. The gallbladder is physiologically distended and  contains stones. No wall thickening is seen. The pancreas is intact  without ductal dilatation or focal lesion.   The spleen is normal in size  without focal lesions. The adrenal glands are intact.   2 mm nonobstructing stone is seen in the  lower pole of the right kidney. 7 mm stone is seen in the midpole of the  left kidney. A stones are nonobstructing. No hydronephrosis is seen. No  ureteral stones are seen.  The bladder appears unremarkable. There is no evidence of pelvic mass or free pelvic fluid. There is no evidence of lymphadenopathy or ascites.  The bowel appears unremarkable without evidence of bowel obstruction,  perforation or abscess.  Osseous structures are intact.  IMPRESSION: Hepatomegaly with severe fatty infiltration. Cholelithiasis.  Nonobstructing stones in both kidneys.    EXAM:  CT BRAIN                        PROCEDURE DATE:  03/22/2017   INTERPRETATION:  3/22/2017 11:40 PM CLINICAL HISTORY:  First time seizure. TECHNIQUE: Axial CT images are obtained from the cranial vertex to the  skullbase without the administration of IV contrast. COMPARISON: None FINDINGS: There is mild volume loss. White matter hypodensities noted compatible  with mild chronic microvascular ischemic change in this age group. There  is no midline shift, mass effect, extra axial collection, intracranial  hemorrhage, or ventriculomegaly. The calvarium is intact. The visualized paranasal sinuses are aerated.  The mastoid air cells are clear. IMPRESSION: No acute hemorrhage or mass effect.                         9.5   5.7   )-----------( 110      ( 23 Mar 2017 05:52 )            28.1  23 Mar 2017 05:52  142    |  108    |  4     ----------------------------<  90    3.4     |  24     |  0.56   Ca    7.8        23 Mar 2017 05:52 Phos  1.9       23 Mar 2017 05:52 Mg     1.3       23 Mar 2017 05:52  TPro  5.8    /  Alb  2.7    /  TBili  1.3    /  DBili  0.6    /  AST  83     /  ALT  52     /  AlkPhos  90     23 Mar 2017 05:52   Alcohol, Blood: <10 mg/dL (03.22.17 @ 23:05) 50 yo F with PMH of COPD, Etoh abuse, p/w seizure and vomiting intermittently since december. She has been told she has gallstones and kidney stones and that she needs cholecystectomy. Patient also scheduled for procedure for her kidney stones on 3/23. Ome day Kt had ~4 episodes of non-bloody vomiting. As per significant other noted that patient fell backwards and had shaking of arms / legs for 3-5 mins. Patient had fecal incontinence, but no urinary incontinence. Patient's last drink was about 4pm, and seizure-like episode was around 9pm. As per boyfriend, patient has been drinking more than usual - about 6 beers / day and also hard liquor. Patient admitted with ETOH withdrawal and was started on CIWA  protocol    3/23- Awake, no overnight events. stated that her last dink was yesterday afternoon, no new complaints.  Pt admits to heavy alcohol use.  Also looking for nicotine patch for active tobacco use.      Review of Systems:  Review of Systems: See HPI	  Other Review of Systems: All other review of systems negative, except as noted in HPI 	      MEDICATIONS  (STANDING):  fluticasone / salmeterol 100-50 MICROgram(s) Diskus 1Dose(s) Inhalation two times a day  sodium chloride 0.9%. 500milliLiter(s) IV Continuous <Continuous>  LORazepam     Tablet milliGRAM(s) Oral   folic acid 1milliGRAM(s) Oral daily  multivitamin 1Tablet(s) Oral daily  LORazepam     Tablet 2milliGRAM(s) Oral every 4 hours  thiamine 100milliGRAM(s) Oral daily  nicotine -  14 mG/24Hr(s) Patch 1patch Transdermal daily    MEDICATIONS  (PRN):  ALBUTerol/ipratropium for Nebulization 3milliLiter(s) Nebulizer every 6 hours PRN Bronchospasm  LORazepam   Injectable 2milliGRAM(s) IV Push every 2 hours PRN Symptom-triggered: each CIWA -Ar score 8 or GREATER      Vital Signs Last 24 Hrs  T(C): 36.7, Max: 37.1 (03-23 @ 02:52)  T(F): 98, Max: 98.8 (03-23 @ 02:52)  HR: 82 (82 - 126)  BP: 94/64 (93/55 - 141/90)  BP(mean): --  RR: 16 (16 - 19)  SpO2: 100% (98% - 100%)  		        EXAM:  CT ABDOMEN AND PELVIS                       PROCEDURE DATE:  03/23/2017  IMPRESSION: Hepatomegaly with severe fatty infiltration. Cholelithiasis.  Nonobstructing stones in both kidneys.    EXAM:  CT BRAIN                        PROCEDURE DATE:  03/22/2017   INTERPRETATION:  3/22/2017 11:40 PM IMPRESSION: No acute hemorrhage or mass effect.                         9.5   5.7   )-----------( 110      ( 23 Mar 2017 05:52 )            28.1  23 Mar 2017 05:52  142    |  108    |  4     ----------------------------<  90    3.4     |  24     |  0.56   Ca    7.8        23 Mar 2017 05:52 Phos  1.9       23 Mar 2017 05:52 Mg     1.3       23 Mar 2017 05:52  TPro  5.8    /  Alb  2.7    /  TBili  1.3    /  DBili  0.6    /  AST  83     /  ALT  52     /  AlkPhos  90     23 Mar 2017 05:52   Alcohol, Blood: <10 mg/dL (03.22.17 @ 23:05)

## 2017-03-23 NOTE — CONSULT NOTE ADULT - ASSESSMENT
52 yo F with PMH of COPD, Etoh abuse, presented to  ED status post seizure-like episode witnessed by . As per the pt she went to the bathroom, had a seizure, then does not recall what happened, she admits she had few drinks that she was not supposed to have as she was schedules for surgery.    As per the history / H&P, patient had been vomiting intermittently since december, has been told she has gallstones and kidney stones and that she needs cholecystectomy, was scheduled for procedure for her kidney stones tomorrow , she had ~4 episodes of non-bloody vomiting, came out from the bathroom after the last episode of vomiting, and fell backwards and had shaking of arms / legs for 3-5 mins, also had fecal incontinence, but no urinary incontinence. Patient's last drink was about 4pm, and seizure-like episode was around 9pm; patient has been drinking more than usual - about 6 beers / day and also hard liquor.     At present pt is alert, back to baseline; denies nausea, abdominal pain, CP, cough, runny nose, CP, SOB, arm / leg weakness, focal deficits. 50 yo F with PMH of COPD, Etoh abuse, gallstones /renal kidney stones; needs cholecystectomy, was scheduled for  kidney stones today ,presented to  ED status post seizure-like episode witnessed by ; patient had been vomiting intermittently since december, had ~4 episodes of non-bloody vomiting, came out from the bathroom after the last episode of vomiting, and fell backwards and had shaking of arms / legs for 3-5 mins and fecal incontinence. Patient's has been drinking more than usual - about 6 beers in addition to hard liquor.     At present pt is alert, back to baseline; has no cpmplaints; does not recall having had seizures before.     1. Seizure ; could be secondary to metabolic imbalance / dehydration / ETOH use.    - Obtain EEG today, if abnormal / epileptiform activity noted then will consider AED  - Hydration/ETOH protocol  -Thiamine    D/W pt

## 2017-03-23 NOTE — ED ADULT NURSE REASSESSMENT NOTE - NS ED NURSE REASSESS COMMENT FT1
Report taken at the change of shift at bedside. Pt awake alert and oriented x4 resting comfortably in bed with no acute distress noted. VSS. Afebrile. Awaiting for hospitalist. Will cont to monitor for safety and comfort. Report taken at the change of shift at bedside. Pt awake alert and oriented x4 resting comfortably in bed with no acute distress noted. hypotensive noted 96/63. Dr. Drake made aware. 1L NS at 100ml/hr ordered and infusion started. Afebrile. CIWA  0 scored. No tremors noted. Awaiting for hospitalist. Will cont to monitor for safety and comfort.

## 2017-03-23 NOTE — H&P ADULT - ASSESSMENT
50 yo F with PMH of COPD, Etoh abuse, p/w seizure    *Seizure possibly 2/2 etoh withdrawal  -CT head negative for acute findings  -Seizure / fall precautions  -Gentle hydration  -Hold diuretics  -Neuro consult  -Will hold theophylline for now as it may lower seizure threshold    *Etoh withdrawal  -Ativan  -MVI / thiamine / folate  -CIWA protocol    *Vomiting / transaminitis  -Possible 2/2 alcoholic gastritis  -Lipase level  -CT abdomen   -No abdominal pain  -Hepatitis panel    *Hypokalemia  -Replete potassium and recheck    *COPD  -C/w symbicort  -Nebs PRN    *DVT ppx  -SCDs 50 yo F with PMH of COPD, Etoh abuse, p/w seizure    *Seizure possibly 2/2 etoh withdrawal  -CT head negative for acute findings  -Seizure / fall precautions  -Gentle hydration  -Hold diuretics  -Neuro consult  -Will hold theophylline for now as it may lower seizure threshold  -Utox    *Etoh withdrawal  -Ativan  -MVI / thiamine / folate  -CIWA protocol    *Vomiting / transaminitis  -Possible 2/2 alcoholic gastritis  -Lipase level  -CT abdomen   -No abdominal pain  -Hepatitis panel    *Hypokalemia  -Replete potassium and recheck    *COPD  -C/w symbicort  -Nebs PRN    *DVT ppx  -SCDs

## 2017-03-24 VITALS — WEIGHT: 109.79 LBS

## 2017-03-24 LAB
ALBUMIN SERPL ELPH-MCNC: 2.7 G/DL — LOW (ref 3.3–5)
ALP SERPL-CCNC: 93 U/L — SIGNIFICANT CHANGE UP (ref 40–120)
ALT FLD-CCNC: 50 U/L — SIGNIFICANT CHANGE UP (ref 12–78)
AMPHET UR-MCNC: NEGATIVE — SIGNIFICANT CHANGE UP
ANION GAP SERPL CALC-SCNC: 8 MMOL/L — SIGNIFICANT CHANGE UP (ref 5–17)
APPEARANCE UR: CLEAR — SIGNIFICANT CHANGE UP
AST SERPL-CCNC: 84 U/L — HIGH (ref 15–37)
BACTERIA # UR AUTO: (no result)
BARBITURATES UR SCN-MCNC: NEGATIVE — SIGNIFICANT CHANGE UP
BENZODIAZ UR-MCNC: NEGATIVE — SIGNIFICANT CHANGE UP
BILIRUB SERPL-MCNC: 0.8 MG/DL — SIGNIFICANT CHANGE UP (ref 0.2–1.2)
BILIRUB UR-MCNC: NEGATIVE — SIGNIFICANT CHANGE UP
BUN SERPL-MCNC: 2 MG/DL — LOW (ref 7–23)
CALCIUM SERPL-MCNC: 8.5 MG/DL — SIGNIFICANT CHANGE UP (ref 8.5–10.1)
CHLORIDE SERPL-SCNC: 108 MMOL/L — SIGNIFICANT CHANGE UP (ref 96–108)
CO2 SERPL-SCNC: 24 MMOL/L — SIGNIFICANT CHANGE UP (ref 22–31)
COCAINE METAB.OTHER UR-MCNC: NEGATIVE — SIGNIFICANT CHANGE UP
COLOR SPEC: YELLOW — SIGNIFICANT CHANGE UP
CREAT SERPL-MCNC: 0.56 MG/DL — SIGNIFICANT CHANGE UP (ref 0.5–1.3)
DIFF PNL FLD: (no result)
EPI CELLS # UR: SIGNIFICANT CHANGE UP
GLUCOSE SERPL-MCNC: 97 MG/DL — SIGNIFICANT CHANGE UP (ref 70–99)
GLUCOSE UR QL: NEGATIVE MG/DL — SIGNIFICANT CHANGE UP
HCT VFR BLD CALC: 30.9 % — LOW (ref 34.5–45)
HGB BLD-MCNC: 10.1 G/DL — LOW (ref 11.5–15.5)
KETONES UR-MCNC: NEGATIVE — SIGNIFICANT CHANGE UP
LEUKOCYTE ESTERASE UR-ACNC: (no result)
MAGNESIUM SERPL-MCNC: 1.6 MG/DL — LOW (ref 1.8–2.4)
MCHC RBC-ENTMCNC: 32.7 GM/DL — SIGNIFICANT CHANGE UP (ref 32–36)
MCHC RBC-ENTMCNC: 35.3 PG — HIGH (ref 27–34)
MCV RBC AUTO: 108 FL — HIGH (ref 80–100)
METHADONE UR-MCNC: NEGATIVE — SIGNIFICANT CHANGE UP
NITRITE UR-MCNC: POSITIVE
OPIATES UR-MCNC: NEGATIVE — SIGNIFICANT CHANGE UP
PCP SPEC-MCNC: SIGNIFICANT CHANGE UP
PCP UR-MCNC: NEGATIVE — SIGNIFICANT CHANGE UP
PH UR: 7 — SIGNIFICANT CHANGE UP (ref 4.8–8)
PHOSPHATE SERPL-MCNC: 2.9 MG/DL — SIGNIFICANT CHANGE UP (ref 2.5–4.5)
PLATELET # BLD AUTO: 126 K/UL — LOW (ref 150–400)
POTASSIUM SERPL-MCNC: 3.7 MMOL/L — SIGNIFICANT CHANGE UP (ref 3.5–5.3)
POTASSIUM SERPL-SCNC: 3.7 MMOL/L — SIGNIFICANT CHANGE UP (ref 3.5–5.3)
PROT SERPL-MCNC: 5.6 GM/DL — LOW (ref 6–8.3)
PROT UR-MCNC: NEGATIVE MG/DL — SIGNIFICANT CHANGE UP
RBC # BLD: 2.86 M/UL — LOW (ref 3.8–5.2)
RBC # FLD: 13.8 % — SIGNIFICANT CHANGE UP (ref 10.3–14.5)
RBC CASTS # UR COMP ASSIST: NEGATIVE /HPF — SIGNIFICANT CHANGE UP (ref 0–4)
SODIUM SERPL-SCNC: 140 MMOL/L — SIGNIFICANT CHANGE UP (ref 135–145)
SP GR SPEC: 1.01 — SIGNIFICANT CHANGE UP (ref 1.01–1.02)
THC UR QL: POSITIVE — SIGNIFICANT CHANGE UP
UROBILINOGEN FLD QL: NEGATIVE MG/DL — SIGNIFICANT CHANGE UP
WBC # BLD: 4 K/UL — SIGNIFICANT CHANGE UP (ref 3.8–10.5)
WBC # FLD AUTO: 4 K/UL — SIGNIFICANT CHANGE UP (ref 3.8–10.5)
WBC UR QL: SIGNIFICANT CHANGE UP

## 2017-03-24 RX ORDER — MAGNESIUM SULFATE 500 MG/ML
1 VIAL (ML) INJECTION ONCE
Qty: 0 | Refills: 0 | Status: DISCONTINUED | OUTPATIENT
Start: 2017-03-24 | End: 2017-03-24

## 2017-03-24 RX ADMIN — Medication 1.5 MILLIGRAM(S): at 01:15

## 2017-03-24 RX ADMIN — Medication 1.5 MILLIGRAM(S): at 05:46

## 2017-03-24 NOTE — CDI QUERY NOTE - NSCDIOTHERTXTBX_GEN_ALL_CORE_HH
1) Patient admitted with ETOH W/D.  Platelet level= 142 > 110 > 126  Please clarify if the above lab results are indicative of a diagnosis ?  ie.. Thrombocytopenia ?  ......Secondary thrombocytopenia due to ETOH abuse ? Other ? please clarify    2) Patient with ETOH withdrawal. Received thiamine IM x 1, now on thiamine 100mg po x 3 days.  Please clarify a clinical diagnosis associated with the administration of thiamine ?  ie.. Thiamine deficiency due to ETOH use ?  ..... Probable thiamine deficiency due to ETOH use ?  Other ? Please clarify 1) Patient admitted with ETOH W/D.  Platelet level= 142 > 110 > 126. Please clarify if these lab results are indicative of a diagnosis ?  ie.. Thrombocytopenia ?...Secondary thrombocytopenia due to ETOH abuse ? Other ? please clarify    2) Patient with ETOH withdrawal. Received thiamine IM x 1, now on thiamine 100mg po x 3 days.  Please clarify a clinical diagnosis associated with the administration of thiamine ?  ie.. Thiamine deficiency due to ETOH use ? .. Probable thiamine deficiency due to ETOH use ?  Other ? Please clarify    3) Per nutrition: · Nutrient: Malnutrition; Inadequate protein-energy intake; Meets criteria for Moderate protein calorie malnutrition  · Etiology: Decreased PO intake due to pain, ETOH abuse,  wt loss of 20% BW from UBW, Moderate muscle and fat wasting,  Please clarify if you agree or disagree with the clinical diagnosis of moderate protein-calorie malnutrition ?

## 2017-03-24 NOTE — PROGRESS NOTE ADULT - SUBJECTIVE AND OBJECTIVE BOX
52 yo F with PMH of COPD, Etoh abuse, p/w seizure and vomiting intermittently since december. She has been told she has gallstones and kidney stones and that she needs cholecystectomy. Patient also scheduled for procedure for her kidney stones on 3/23. Ome day Kt had ~4 episodes of non-bloody vomiting. As per significant other noted that patient fell backwards and had shaking of arms / legs for 3-5 mins. Patient had fecal incontinence, but no urinary incontinence. Patient's last drink was about 4pm, and seizure-like episode was around 9pm. As per boyfriend, patient has been drinking more than usual - about 6 beers / day and also hard liquor. Patient admitted with ETOH withdrawal and was started on CIWA  protocol    3/23- Awake, no overnight events. stated that her last dink was yesterday afternoon, no new complaints.  Pt admits to heavy alcohol use.  Also looking for nicotine patch for active tobacco use.    3/24: Seen at bedside, pt very eager to be discharged.  She states she can no longer stay in the hospital.  Her EEG was normal.  She is on tapering ativan.  I informed her that the risk of her going prematurely is another seizure, injury or death. Pt understands but insists she wants to leave now so she can make her doctors appointments and take care of her father.  I told her that if she were to leave it would have to be against medical advice (AMA).  Pt is willing to take the risk.      Review of Systems:  Review of Systems: See HPI	  Other Review of Systems: All other review of systems negative, except as noted in HPI 	      MEDICATIONS  (STANDING):  fluticasone / salmeterol 100-50 MICROgram(s) Diskus 1Dose(s) Inhalation two times a day  sodium chloride 0.9%. 500milliLiter(s) IV Continuous <Continuous>  LORazepam     Tablet milliGRAM(s) Oral   folic acid 1milliGRAM(s) Oral daily  multivitamin 1Tablet(s) Oral daily  LORazepam     Tablet 1.5milliGRAM(s) Oral every 4 hours  thiamine 100milliGRAM(s) Oral daily  nicotine -  14 mG/24Hr(s) Patch 1patch Transdermal daily  magnesium sulfate  IVPB 1Gram(s) IV Intermittent once    MEDICATIONS  (PRN):  ALBUTerol/ipratropium for Nebulization 3milliLiter(s) Nebulizer every 6 hours PRN Bronchospasm  LORazepam   Injectable 2milliGRAM(s) IV Push every 2 hours PRN Symptom-triggered: each CIWA -Ar score 8 or GREATER    Vital Signs Last 24 Hrs  T(C): 36.8, Max: 37.1 ( @ 02:15)  T(F): 98.2, Max: 98.7 ( @ 02:15)  HR: 88 (73 - 100)  BP: 113/73 (94/64 - 113/73)  BP(mean): --  RR: 16 (16 - 17)  SpO2: 100% (100% - 100%)  		                                10.1   4.0   )-----------( 126      ( 24 Mar 2017 05:54 )             30.9     24 Mar 2017 05:54    140    |  108    |  2      ----------------------------<  97     3.7     |  24     |  0.56     Ca    8.5        24 Mar 2017 05:54  Phos  2.9       24 Mar 2017 05:54  Mg     1.6       24 Mar 2017 05:54    TPro  5.6    /  Alb  2.7    /  TBili  0.8    /  DBili  x      /  AST  84     /  ALT  50     /  AlkPhos  93     24 Mar 2017 05:54    CAPILLARY BLOOD GLUCOSE    LIVER FUNCTIONS - ( 24 Mar 2017 05:54 )  Alb: 2.7 g/dL / Pro: 5.6 gm/dL / ALK PHOS: 93 U/L / ALT: 50 U/L / AST: 84 U/L / GGT: x           PT/INR - ( 22 Mar 2017 23:05 )   PT: 11.5 sec;   INR: 1.06 ratio         PTT - ( 22 Mar 2017 23:05 )  PTT:26.5 sec  Urinalysis Basic - ( 24 Mar 2017 01:00 )    Color: Yellow / Appearance: Clear / S.010 / pH: x  Gluc: x / Ketone: Negative  / Bili: Negative / Urobili: Negative mg/dL   Blood: x / Protein: Negative mg/dL / Nitrite: Positive   Leuk Esterase: Small / RBC: Negative /HPF / WBC 0-2   Sq Epi: x / Non Sq Epi: Occasional / Bacteria: TNTC    Assessment and Plan:   · Assessment		  52 yo F with PMH of COPD, Etoh abuse, p/w seizure and vomiting  52 yo F with PMH of COPD, Etoh abuse, p/w seizure and vomiting intermittently since december. She has been told she has gallstones and kidney stones and that she needs cholecystectomy. Patient also scheduled for procedure for her kidney stones on 3/23. Ome day Kt had ~4 episodes of non-bloody vomiting. As per significant other noted that patient fell backwards and had shaking of arms / legs for 3-5 mins. Patient had fecal incontinence, but no urinary incontinence. Patient's last drink was about 4pm, and seizure-like episode was around 9pm. As per boyfriend, patient has been drinking more than usual - about 6 beers / day and also hard liquor. Patient admitted with ETOH withdrawal and was started on CIWA  protocol    Problem/Plan - 1:  ·  Problem: Alcohol withdrawal seizure, with unspecified complication.  Plan: Ct head negative for intracranial pathology  EEG completed- Negative.  Neurology consult appreciated.    seizure precations  fall precautions  Utox = + THC.  continue standing CIWA protocol  MVI, thiamine and folate  IVFsCt head negative for intracranial pathology  EEG completed- awaiting results  Neurology consult - will hold off on AED until results of EEG is back  seizure precations  fall precautions  Utox  continue CIWA protocol  MVI, thiamine and folate.     Problem/Plan - 2:  ·  Problem: Biliary calculus of other site without obstruction.  Plan: CT abdomen results - cholelithiasis  elevated LFTs likely related to alcohol use - trending down.stable  CT abdomen results noted-outpatient follow up.     Problem/Plan - 3:  ·  Problem: Renal calculi.  Plan: stable  keep patient hydrated.   outpatient follow up.    Problem/Plan - 4:  ·  Problem: Chronic obstructive pulmonary disease, unspecified COPD type.  Plan: stable.     Problem/Plan - 5:  ·  Problem: Transaminitis.  Plan: CT results showed hepatomegaly with sever fatty infiltration. Cholelithiasis.  alcohol cessation  ciwa protocol  LFTS trending downCT results showed hepatomegaly with sever fatty infiltration. Cholelithiasis- non obstructing stones in both kidneys.     Problem/Plan - 6:  Problem: Hypokalemia. Plan: replete and monitor.    Problem/Plan - 7:  ·  Problem: Prophylactic measure.  Plan: SCD.     Problem/Plan - 8:  ·  Problem: Smoking.  Plan: continue nicotine patch  smoking cessation.     Dispo:  -Pt signed AMA despite risks of repeat seizure activity in setting of withdrawal.  Pt understands and is willing to take that risk.      Attending Statement:  >35 minutes spent on total encounter; more than 50% of the visit was spent counseling and/or coordinating care by the attending physician.

## 2017-03-24 NOTE — DIETITIAN INITIAL EVALUATION ADULT. - NS AS NUTRI DX NUTRIENT
Meets criteria for Moderate protein calorie malnutrition/Malnutrition/Inadequate protein-energy intake

## 2017-03-24 NOTE — DIETITIAN INITIAL EVALUATION ADULT. - OTHER INFO
Consult for wt loss. Pt reports she is leaving and did not want to talk. briefly discussed wt loss and supplement intake at home. Pt did not provide timeline of wt loss.

## 2017-03-28 DIAGNOSIS — E44.0 MODERATE PROTEIN-CALORIE MALNUTRITION: ICD-10-CM

## 2017-03-28 DIAGNOSIS — J44.9 CHRONIC OBSTRUCTIVE PULMONARY DISEASE, UNSPECIFIED: ICD-10-CM

## 2017-03-28 DIAGNOSIS — E87.6 HYPOKALEMIA: ICD-10-CM

## 2017-03-28 DIAGNOSIS — F10.239 ALCOHOL DEPENDENCE WITH WITHDRAWAL, UNSPECIFIED: ICD-10-CM

## 2017-03-28 DIAGNOSIS — E86.0 DEHYDRATION: ICD-10-CM

## 2017-03-28 DIAGNOSIS — Z72.0 TOBACCO USE: ICD-10-CM

## 2017-03-28 DIAGNOSIS — J45.909 UNSPECIFIED ASTHMA, UNCOMPLICATED: ICD-10-CM

## 2017-03-28 DIAGNOSIS — D69.6 THROMBOCYTOPENIA, UNSPECIFIED: ICD-10-CM

## 2017-03-28 DIAGNOSIS — N20.0 CALCULUS OF KIDNEY: ICD-10-CM

## 2017-03-28 DIAGNOSIS — R74.0 NONSPECIFIC ELEVATION OF LEVELS OF TRANSAMINASE AND LACTIC ACID DEHYDROGENASE [LDH]: ICD-10-CM

## 2017-03-28 DIAGNOSIS — K80.20 CALCULUS OF GALLBLADDER WITHOUT CHOLECYSTITIS WITHOUT OBSTRUCTION: ICD-10-CM

## 2017-03-28 DIAGNOSIS — G40.89 OTHER SEIZURES: ICD-10-CM

## 2017-03-30 DIAGNOSIS — D69.59 OTHER SECONDARY THROMBOCYTOPENIA: ICD-10-CM

## 2017-04-19 LAB
APPEARANCE: CLEAR
BACTERIA UR CULT: NORMAL
BACTERIA: ABNORMAL
BILIRUBIN URINE: NEGATIVE
BLOOD URINE: NEGATIVE
COLOR: YELLOW
GLUCOSE QUALITATIVE U: NORMAL MG/DL
HYALINE CASTS: 0 /LPF
KETONES URINE: NEGATIVE
LEUKOCYTE ESTERASE URINE: ABNORMAL
MICROSCOPIC-UA: NORMAL
NITRITE URINE: POSITIVE
PH URINE: 6.5
PROTEIN URINE: NEGATIVE MG/DL
RED BLOOD CELLS URINE: 2 /HPF
SPECIFIC GRAVITY URINE: 1.02
SQUAMOUS EPITHELIAL CELLS: 1 /HPF
UROBILINOGEN URINE: 1 MG/DL
WHITE BLOOD CELLS URINE: 10 /HPF

## 2017-05-21 ENCOUNTER — INPATIENT (INPATIENT)
Facility: HOSPITAL | Age: 52
LOS: 4 days | Discharge: HOME | DRG: 871 | End: 2017-05-26
Attending: HOSPITALIST | Admitting: HOSPITALIST
Payer: MEDICAID

## 2017-05-21 VITALS
HEART RATE: 167 BPM | OXYGEN SATURATION: 99 % | TEMPERATURE: 97 F | DIASTOLIC BLOOD PRESSURE: 62 MMHG | SYSTOLIC BLOOD PRESSURE: 84 MMHG | RESPIRATION RATE: 22 BRPM

## 2017-05-21 DIAGNOSIS — Z98.891 HISTORY OF UTERINE SCAR FROM PREVIOUS SURGERY: Chronic | ICD-10-CM

## 2017-05-21 DIAGNOSIS — Z90.710 ACQUIRED ABSENCE OF BOTH CERVIX AND UTERUS: Chronic | ICD-10-CM

## 2017-05-21 LAB
ALBUMIN SERPL ELPH-MCNC: 3.6 G/DL — SIGNIFICANT CHANGE UP (ref 3.3–5)
ALP SERPL-CCNC: 138 U/L — HIGH (ref 40–120)
ALT FLD-CCNC: 48 U/L RC — HIGH (ref 10–45)
ANION GAP SERPL CALC-SCNC: 20 MMOL/L — HIGH (ref 5–17)
APPEARANCE UR: CLEAR — SIGNIFICANT CHANGE UP
APTT BLD: 27.5 SEC — SIGNIFICANT CHANGE UP (ref 27.5–37.4)
AST SERPL-CCNC: 113 U/L — HIGH (ref 10–40)
BACTERIA # UR AUTO: ABNORMAL /HPF
BASE EXCESS BLDV CALC-SCNC: -3.5 MMOL/L — LOW (ref -2–2)
BASOPHILS # BLD AUTO: 0.1 K/UL — SIGNIFICANT CHANGE UP (ref 0–0.2)
BASOPHILS NFR BLD AUTO: 0.8 % — SIGNIFICANT CHANGE UP (ref 0–2)
BILIRUB SERPL-MCNC: 0.6 MG/DL — SIGNIFICANT CHANGE UP (ref 0.2–1.2)
BILIRUB UR-MCNC: NEGATIVE — SIGNIFICANT CHANGE UP
BUN SERPL-MCNC: 6 MG/DL — LOW (ref 7–23)
CA-I SERPL-SCNC: 1.1 MMOL/L — LOW (ref 1.12–1.3)
CALCIUM SERPL-MCNC: 8.6 MG/DL — SIGNIFICANT CHANGE UP (ref 8.4–10.5)
CHLORIDE BLDV-SCNC: 102 MMOL/L — SIGNIFICANT CHANGE UP (ref 96–108)
CHLORIDE SERPL-SCNC: 95 MMOL/L — LOW (ref 96–108)
CK MB CFR SERPL CALC: 1.4 NG/ML — SIGNIFICANT CHANGE UP (ref 0–3.8)
CK SERPL-CCNC: 67 U/L — SIGNIFICANT CHANGE UP (ref 25–170)
CO2 BLDV-SCNC: 23 MMOL/L — SIGNIFICANT CHANGE UP (ref 22–30)
CO2 SERPL-SCNC: 18 MMOL/L — LOW (ref 22–31)
COLOR SPEC: SIGNIFICANT CHANGE UP
CREAT SERPL-MCNC: 0.69 MG/DL — SIGNIFICANT CHANGE UP (ref 0.5–1.3)
DIFF PNL FLD: ABNORMAL
EOSINOPHIL # BLD AUTO: 0.1 K/UL — SIGNIFICANT CHANGE UP (ref 0–0.5)
EOSINOPHIL NFR BLD AUTO: 1.8 % — SIGNIFICANT CHANGE UP (ref 0–6)
EPI CELLS # UR: SIGNIFICANT CHANGE UP /HPF
ETHANOL SERPL-MCNC: 237 MG/DL — HIGH (ref 0–10)
GAS PNL BLDV: 132 MMOL/L — LOW (ref 136–145)
GAS PNL BLDV: SIGNIFICANT CHANGE UP
GAS PNL BLDV: SIGNIFICANT CHANGE UP
GLUCOSE BLDV-MCNC: 132 MG/DL — HIGH (ref 70–99)
GLUCOSE SERPL-MCNC: 135 MG/DL — HIGH (ref 70–99)
GLUCOSE UR QL: NEGATIVE — SIGNIFICANT CHANGE UP
HCO3 BLDV-SCNC: 22 MMOL/L — SIGNIFICANT CHANGE UP (ref 21–29)
HCT VFR BLD CALC: 35.3 % — SIGNIFICANT CHANGE UP (ref 34.5–45)
HCT VFR BLDA CALC: 38 % — LOW (ref 39–50)
HGB BLD CALC-MCNC: 12.2 G/DL — SIGNIFICANT CHANGE UP (ref 11.5–15.5)
HGB BLD-MCNC: 12.1 G/DL — SIGNIFICANT CHANGE UP (ref 11.5–15.5)
INR BLD: 1.06 RATIO — SIGNIFICANT CHANGE UP (ref 0.88–1.16)
KETONES UR-MCNC: NEGATIVE — SIGNIFICANT CHANGE UP
LACTATE BLDV-MCNC: 4.1 MMOL/L — CRITICAL HIGH (ref 0.7–2)
LEUKOCYTE ESTERASE UR-ACNC: ABNORMAL
LIDOCAIN IGE QN: 41 U/L — SIGNIFICANT CHANGE UP (ref 7–60)
LYMPHOCYTES # BLD AUTO: 4.3 K/UL — HIGH (ref 1–3.3)
LYMPHOCYTES # BLD AUTO: 55.1 % — HIGH (ref 13–44)
MAGNESIUM SERPL-MCNC: 1.7 MG/DL — SIGNIFICANT CHANGE UP (ref 1.6–2.6)
MANUAL DIF COMMENT BLD-IMP: SIGNIFICANT CHANGE UP
MCHC RBC-ENTMCNC: 34.3 GM/DL — SIGNIFICANT CHANGE UP (ref 32–36)
MCHC RBC-ENTMCNC: 38.6 PG — HIGH (ref 27–34)
MCV RBC AUTO: 113 FL — HIGH (ref 80–100)
MONOCYTES # BLD AUTO: 0.8 K/UL — SIGNIFICANT CHANGE UP (ref 0–0.9)
MONOCYTES NFR BLD AUTO: 10.1 % — SIGNIFICANT CHANGE UP (ref 2–14)
NEUTROPHILS # BLD AUTO: 2.5 K/UL — SIGNIFICANT CHANGE UP (ref 1.8–7.4)
NEUTROPHILS NFR BLD AUTO: 32.2 % — LOW (ref 43–77)
NITRITE UR-MCNC: NEGATIVE — SIGNIFICANT CHANGE UP
NT-PROBNP SERPL-SCNC: 1622 PG/ML — HIGH (ref 0–300)
PCO2 BLDV: 43 MMHG — SIGNIFICANT CHANGE UP (ref 35–50)
PH BLDV: 7.32 — LOW (ref 7.35–7.45)
PH UR: 6.5 — SIGNIFICANT CHANGE UP (ref 5–8)
PHOSPHATE SERPL-MCNC: 2.5 MG/DL — SIGNIFICANT CHANGE UP (ref 2.5–4.5)
PLAT MORPH BLD: NORMAL — SIGNIFICANT CHANGE UP
PLATELET # BLD AUTO: 164 K/UL — SIGNIFICANT CHANGE UP (ref 150–400)
PO2 BLDV: 24 MMHG — LOW (ref 25–45)
POTASSIUM BLDV-SCNC: 2.9 MMOL/L — CRITICAL LOW (ref 3.5–5)
POTASSIUM SERPL-MCNC: 3.5 MMOL/L — SIGNIFICANT CHANGE UP (ref 3.5–5.3)
POTASSIUM SERPL-SCNC: 3.5 MMOL/L — SIGNIFICANT CHANGE UP (ref 3.5–5.3)
PROT SERPL-MCNC: 6.8 G/DL — SIGNIFICANT CHANGE UP (ref 6–8.3)
PROT UR-MCNC: SIGNIFICANT CHANGE UP
PROTHROM AB SERPL-ACNC: 11.5 SEC — SIGNIFICANT CHANGE UP (ref 9.8–12.7)
RBC # BLD: 3.13 M/UL — LOW (ref 3.8–5.2)
RBC # FLD: 16 % — HIGH (ref 10.3–14.5)
RBC BLD AUTO: SIGNIFICANT CHANGE UP
RBC CASTS # UR COMP ASSIST: SIGNIFICANT CHANGE UP /HPF (ref 0–2)
SAO2 % BLDV: 29 % — LOW (ref 67–88)
SODIUM SERPL-SCNC: 133 MMOL/L — LOW (ref 135–145)
SP GR SPEC: <1.005 — LOW (ref 1.01–1.02)
TROPONIN T SERPL-MCNC: <0.01 NG/ML — SIGNIFICANT CHANGE UP (ref 0–0.06)
UROBILINOGEN FLD QL: NEGATIVE — SIGNIFICANT CHANGE UP
WBC # BLD: 7.8 K/UL — SIGNIFICANT CHANGE UP (ref 3.8–10.5)
WBC # FLD AUTO: 7.8 K/UL — SIGNIFICANT CHANGE UP (ref 3.8–10.5)
WBC UR QL: SIGNIFICANT CHANGE UP /HPF (ref 0–5)

## 2017-05-21 PROCEDURE — 99285 EMERGENCY DEPT VISIT HI MDM: CPT | Mod: 25

## 2017-05-21 PROCEDURE — 71250 CT THORAX DX C-: CPT | Mod: 26

## 2017-05-21 PROCEDURE — 71010: CPT | Mod: 26

## 2017-05-21 PROCEDURE — 93010 ELECTROCARDIOGRAM REPORT: CPT

## 2017-05-21 RX ORDER — ADENOSINE 3 MG/ML
6 INJECTION INTRAVENOUS ONCE
Qty: 0 | Refills: 0 | Status: COMPLETED | OUTPATIENT
Start: 2017-05-21 | End: 2017-05-21

## 2017-05-21 RX ORDER — ACETAMINOPHEN 500 MG
1000 TABLET ORAL ONCE
Qty: 0 | Refills: 0 | Status: COMPLETED | OUTPATIENT
Start: 2017-05-21 | End: 2017-05-22

## 2017-05-21 RX ORDER — POTASSIUM CHLORIDE 20 MEQ
40 PACKET (EA) ORAL EVERY 4 HOURS
Qty: 0 | Refills: 0 | Status: COMPLETED | OUTPATIENT
Start: 2017-05-21 | End: 2017-05-22

## 2017-05-21 RX ORDER — SODIUM CHLORIDE 9 MG/ML
1000 INJECTION INTRAMUSCULAR; INTRAVENOUS; SUBCUTANEOUS ONCE
Qty: 0 | Refills: 0 | Status: COMPLETED | OUTPATIENT
Start: 2017-05-21 | End: 2017-05-21

## 2017-05-21 RX ORDER — SODIUM CHLORIDE 9 MG/ML
1000 INJECTION, SOLUTION INTRAVENOUS
Qty: 0 | Refills: 0 | Status: DISCONTINUED | OUTPATIENT
Start: 2017-05-21 | End: 2017-05-22

## 2017-05-21 RX ORDER — SODIUM CHLORIDE 9 MG/ML
3 INJECTION INTRAMUSCULAR; INTRAVENOUS; SUBCUTANEOUS ONCE
Qty: 0 | Refills: 0 | Status: COMPLETED | OUTPATIENT
Start: 2017-05-21 | End: 2017-05-21

## 2017-05-21 RX ADMIN — SODIUM CHLORIDE 3 MILLILITER(S): 9 INJECTION INTRAMUSCULAR; INTRAVENOUS; SUBCUTANEOUS at 22:04

## 2017-05-21 RX ADMIN — SODIUM CHLORIDE 1000 MILLILITER(S): 9 INJECTION INTRAMUSCULAR; INTRAVENOUS; SUBCUTANEOUS at 22:05

## 2017-05-21 RX ADMIN — SODIUM CHLORIDE 2000 MILLILITER(S): 9 INJECTION INTRAMUSCULAR; INTRAVENOUS; SUBCUTANEOUS at 23:07

## 2017-05-21 RX ADMIN — ADENOSINE 6 MILLIGRAM(S): 3 INJECTION INTRAVENOUS at 22:05

## 2017-05-21 NOTE — ED PROVIDER NOTE - MEDICAL DECISION MAKING DETAILS
Attending Cook: 53 y/o female smoker h/o alcohol abuse, svt and cholelithiasis as well as current smoker presenting with weakness and palpitations. pt found to be in svt upon arrival. pt with h/o svt in the past. pt cardioverted with adenosine with repeat ekg showing sinus tachycardia pt afebrile upon arrival. denies any current abdominal pain. unclear cause of SVT. on exam pt with diffuse wheezing, h/o COPD. no tachypnia or increased wob. pt placed on cardiac monitor. no pleuritic pain to suggest PE as cause of tachycardia. as concern with withdrawl symptoms pt placed on ciwa. pt additinoally reports a fall with some rib discomfort therefore will ct chest to evalaute for rib fractures or further traumatic injury. pt will likely need admission. consider xopenex if breathing worsens.

## 2017-05-21 NOTE — ED PROVIDER NOTE - OBJECTIVE STATEMENT
Attending Cook: 51 y/o female h/o palpitations, copd presenting with difficulty breathing and palpitations. yesterday had similar symptoms for a couple of hours and then went away. today symptoms constant with assocaited sob. Pt tried to bear down to stop the symptoms but did not work. pt reports some nausea prior to arrival but none currently. symptoms started at rest. denies any caffeine or stimulant use.   Pt reports drinking alcohol daily. last etoh use approxmatly 6 hours ago. has had seizures in the past from not drinking. no abdominal pain. has a h/o gallstones Attending Cook: 51 y/o female h/o palpitations, copd presenting with difficulty breathing and palpitations. yesterday had similar symptoms for a couple of hours and then went away. today symptoms constant with assocaited sob. Pt tried to bear down to stop the symptoms but did not work. pt reports some nausea prior to arrival but none currently. symptoms started at rest. denies any caffeine or stimulant use.   Pt reports drinking alcohol daily. last etoh use approxmatly 6 hours ago. has had seizures in the past from not drinking. no abdominal pain. has a h/o gallstones. additionally pt reports h/o frequent falls. states fell down stairs recently. no head trauma. no loc. fell after drinking alcohol

## 2017-05-21 NOTE — ED PROVIDER NOTE - PROGRESS NOTE DETAILS
Pt informed that she had a fall down stairs a few days ago while she was intoxicated and has been having severe chest wall pains that are causing her a lot of distress. Will obtain imaging to evaluate injury. Ted Vazquez, Resident. Attending Joey: pt feeling improved. eating a sandiwch. on ciwa. awaiting CT scans Attending MD Hugo: patient with HR into 150s, repeat EKG with narrow complex regular tachycardia, SVT v aflutter, broke spontaneously. will give 25mg lopressor PO, maintain on tele

## 2017-05-21 NOTE — ED ADULT NURSE NOTE - OBJECTIVE STATEMENT
53 yo F arrived to the ED c/o palpiations x2 days; HX of "rapid heartrate"; pt reports yesterday had similar symptoms for a "couple of hours" and then went away. today symptoms constant with associated sob. Pt attempted to bear down to stop the symptoms but did not work. +nausea; pt current everyday drinker, beer and southern comfort; last drink 6 hours PTA; ekg completed on arrival, 2 large bore ivs placed, cardiac monitor placed; pt A&Ox3, denies cp; pt given adenosine @2153, pt tolerated well, md @ bedside

## 2017-05-21 NOTE — ED PROVIDER NOTE - CARE PLAN
Principal Discharge DX:	SVT (supraventricular tachycardia) Principal Discharge DX:	SVT (supraventricular tachycardia)  Secondary Diagnosis:	Alcohol abuse  Secondary Diagnosis:	Cholelithiasis

## 2017-05-22 DIAGNOSIS — J44.9 CHRONIC OBSTRUCTIVE PULMONARY DISEASE, UNSPECIFIED: ICD-10-CM

## 2017-05-22 DIAGNOSIS — I47.1 SUPRAVENTRICULAR TACHYCARDIA: ICD-10-CM

## 2017-05-22 DIAGNOSIS — R74.0 NONSPECIFIC ELEVATION OF LEVELS OF TRANSAMINASE AND LACTIC ACID DEHYDROGENASE [LDH]: ICD-10-CM

## 2017-05-22 DIAGNOSIS — F10.230 ALCOHOL DEPENDENCE WITH WITHDRAWAL, UNCOMPLICATED: ICD-10-CM

## 2017-05-22 DIAGNOSIS — J18.1 LOBAR PNEUMONIA, UNSPECIFIED ORGANISM: ICD-10-CM

## 2017-05-22 LAB
CK MB CFR SERPL CALC: 1.4 NG/ML — SIGNIFICANT CHANGE UP (ref 0–3.8)
CK SERPL-CCNC: 53 U/L — SIGNIFICANT CHANGE UP (ref 25–170)
GAS PNL BLDV: SIGNIFICANT CHANGE UP
HCT VFR BLD CALC: 28.1 % — LOW (ref 34.5–45)
HGB BLD-MCNC: 9.4 G/DL — LOW (ref 11.5–15.5)
MCHC RBC-ENTMCNC: 33.5 GM/DL — SIGNIFICANT CHANGE UP (ref 32–36)
MCHC RBC-ENTMCNC: 36.6 PG — HIGH (ref 27–34)
MCV RBC AUTO: 109.3 FL — HIGH (ref 80–100)
PCP SPEC-MCNC: SIGNIFICANT CHANGE UP
PLATELET # BLD AUTO: 144 K/UL — LOW (ref 150–400)
RBC # BLD: 2.57 M/UL — LOW (ref 3.8–5.2)
RBC # FLD: 16.9 % — HIGH (ref 10.3–14.5)
TROPONIN T SERPL-MCNC: <0.01 NG/ML — SIGNIFICANT CHANGE UP (ref 0–0.06)
WBC # BLD: 3.57 K/UL — LOW (ref 3.8–10.5)
WBC # FLD AUTO: 3.57 K/UL — LOW (ref 3.8–10.5)

## 2017-05-22 PROCEDURE — 76705 ECHO EXAM OF ABDOMEN: CPT | Mod: 26

## 2017-05-22 PROCEDURE — 99223 1ST HOSP IP/OBS HIGH 75: CPT

## 2017-05-22 RX ORDER — OXYCODONE HYDROCHLORIDE 5 MG/1
5 TABLET ORAL DAILY
Qty: 0 | Refills: 0 | Status: DISCONTINUED | OUTPATIENT
Start: 2017-05-22 | End: 2017-05-26

## 2017-05-22 RX ORDER — THIAMINE MONONITRATE (VIT B1) 100 MG
100 TABLET ORAL DAILY
Qty: 0 | Refills: 0 | Status: COMPLETED | OUTPATIENT
Start: 2017-05-22 | End: 2017-05-24

## 2017-05-22 RX ORDER — MAGNESIUM SULFATE 500 MG/ML
2 VIAL (ML) INJECTION ONCE
Qty: 0 | Refills: 0 | Status: COMPLETED | OUTPATIENT
Start: 2017-05-22 | End: 2017-05-22

## 2017-05-22 RX ORDER — METOPROLOL TARTRATE 50 MG
25 TABLET ORAL ONCE
Qty: 0 | Refills: 0 | Status: COMPLETED | OUTPATIENT
Start: 2017-05-22 | End: 2017-05-22

## 2017-05-22 RX ORDER — IPRATROPIUM/ALBUTEROL SULFATE 18-103MCG
3 AEROSOL WITH ADAPTER (GRAM) INHALATION EVERY 6 HOURS
Qty: 0 | Refills: 0 | Status: DISCONTINUED | OUTPATIENT
Start: 2017-05-22 | End: 2017-05-26

## 2017-05-22 RX ORDER — BUDESONIDE AND FORMOTEROL FUMARATE DIHYDRATE 160; 4.5 UG/1; UG/1
2 AEROSOL RESPIRATORY (INHALATION)
Qty: 0 | Refills: 0 | COMMUNITY

## 2017-05-22 RX ORDER — METOPROLOL TARTRATE 50 MG
12.5 TABLET ORAL
Qty: 0 | Refills: 0 | Status: DISCONTINUED | OUTPATIENT
Start: 2017-05-22 | End: 2017-05-26

## 2017-05-22 RX ORDER — HEPARIN SODIUM 5000 [USP'U]/ML
5000 INJECTION INTRAVENOUS; SUBCUTANEOUS EVERY 8 HOURS
Qty: 0 | Refills: 0 | Status: DISCONTINUED | OUTPATIENT
Start: 2017-05-22 | End: 2017-05-26

## 2017-05-22 RX ORDER — BUDESONIDE AND FORMOTEROL FUMARATE DIHYDRATE 160; 4.5 UG/1; UG/1
1 AEROSOL RESPIRATORY (INHALATION)
Qty: 0 | Refills: 0 | Status: DISCONTINUED | OUTPATIENT
Start: 2017-05-22 | End: 2017-05-26

## 2017-05-22 RX ORDER — NICOTINE POLACRILEX 2 MG
1 GUM BUCCAL DAILY
Qty: 0 | Refills: 0 | Status: DISCONTINUED | OUTPATIENT
Start: 2017-05-22 | End: 2017-05-26

## 2017-05-22 RX ORDER — FUROSEMIDE 40 MG
1 TABLET ORAL
Qty: 0 | Refills: 0 | COMMUNITY

## 2017-05-22 RX ORDER — ACETAMINOPHEN 500 MG
650 TABLET ORAL EVERY 6 HOURS
Qty: 0 | Refills: 0 | Status: DISCONTINUED | OUTPATIENT
Start: 2017-05-22 | End: 2017-05-26

## 2017-05-22 RX ORDER — DIPYRIDAMOLE 50 MG
0 TABLET ORAL
Qty: 0 | Refills: 0 | COMMUNITY

## 2017-05-22 RX ORDER — FOLIC ACID 0.8 MG
1 TABLET ORAL DAILY
Qty: 0 | Refills: 0 | Status: DISCONTINUED | OUTPATIENT
Start: 2017-05-22 | End: 2017-05-26

## 2017-05-22 RX ORDER — THEOPHYLLINE ANHYDROUS 200 MG
0 TABLET, EXTENDED RELEASE 12 HR ORAL
Qty: 0 | Refills: 0 | COMMUNITY

## 2017-05-22 RX ORDER — SODIUM CHLORIDE 9 MG/ML
1000 INJECTION, SOLUTION INTRAVENOUS
Qty: 0 | Refills: 0 | Status: DISCONTINUED | OUTPATIENT
Start: 2017-05-22 | End: 2017-05-23

## 2017-05-22 RX ORDER — ONDANSETRON 8 MG/1
4 TABLET, FILM COATED ORAL EVERY 6 HOURS
Qty: 0 | Refills: 0 | Status: DISCONTINUED | OUTPATIENT
Start: 2017-05-22 | End: 2017-05-26

## 2017-05-22 RX ADMIN — Medication 1 TABLET(S): at 16:56

## 2017-05-22 RX ADMIN — HEPARIN SODIUM 5000 UNIT(S): 5000 INJECTION INTRAVENOUS; SUBCUTANEOUS at 16:57

## 2017-05-22 RX ADMIN — SODIUM CHLORIDE 100 MILLILITER(S): 9 INJECTION, SOLUTION INTRAVENOUS at 02:53

## 2017-05-22 RX ADMIN — Medication 2 MILLIGRAM(S): at 16:31

## 2017-05-22 RX ADMIN — SODIUM CHLORIDE 100 MILLILITER(S): 9 INJECTION, SOLUTION INTRAVENOUS at 08:37

## 2017-05-22 RX ADMIN — Medication 2 MILLIGRAM(S): at 10:42

## 2017-05-22 RX ADMIN — Medication 50 GRAM(S): at 06:53

## 2017-05-22 RX ADMIN — Medication 25 MILLIGRAM(S): at 08:37

## 2017-05-22 RX ADMIN — SODIUM CHLORIDE 100 MILLILITER(S): 9 INJECTION, SOLUTION INTRAVENOUS at 16:22

## 2017-05-22 RX ADMIN — Medication 1000 MILLIGRAM(S): at 00:41

## 2017-05-22 RX ADMIN — Medication 650 MILLIGRAM(S): at 18:00

## 2017-05-22 RX ADMIN — Medication 1 MILLIGRAM(S): at 16:56

## 2017-05-22 RX ADMIN — SODIUM CHLORIDE 100 MILLILITER(S): 9 INJECTION, SOLUTION INTRAVENOUS at 21:17

## 2017-05-22 RX ADMIN — SODIUM CHLORIDE 1000 MILLILITER(S): 9 INJECTION INTRAMUSCULAR; INTRAVENOUS; SUBCUTANEOUS at 00:40

## 2017-05-22 RX ADMIN — Medication 1 PATCH: at 18:01

## 2017-05-22 RX ADMIN — Medication 100 MILLIGRAM(S): at 21:11

## 2017-05-22 RX ADMIN — BUDESONIDE AND FORMOTEROL FUMARATE DIHYDRATE 1 PUFF(S): 160; 4.5 AEROSOL RESPIRATORY (INHALATION) at 17:00

## 2017-05-22 RX ADMIN — Medication 650 MILLIGRAM(S): at 12:37

## 2017-05-22 RX ADMIN — HEPARIN SODIUM 5000 UNIT(S): 5000 INJECTION INTRAVENOUS; SUBCUTANEOUS at 21:17

## 2017-05-22 RX ADMIN — Medication 2 MILLIGRAM(S): at 21:16

## 2017-05-22 RX ADMIN — Medication 12.5 MILLIGRAM(S): at 18:01

## 2017-05-22 RX ADMIN — Medication 400 MILLIGRAM(S): at 00:40

## 2017-05-22 NOTE — H&P ADULT. - PROBLEM SELECTOR PLAN 4
low suspicion for active exacerbation at this time  treat CAP   symbicort, duonebs, nasal cannula PRN, incentive spriometer

## 2017-05-22 NOTE — H&P ADULT. - PROBLEM SELECTOR PLAN 5
likely 2/2 alcohol abuse, alcohol steatosis  monitor LFTs, if worsening will consider liver ultrasound

## 2017-05-22 NOTE — H&P ADULT. - LAB RESULTS AND INTERPRETATION
labs reviewed:   Na 133  elevated LFTs- , , ALT 48  BNP 1622  cardiac enzymes negative  anion gap metabolic acidosis  lactate 4.1--> 2.9

## 2017-05-22 NOTE — H&P ADULT. - HISTORY OF PRESENT ILLNESS
53 yo F with PMH of alcohol abuse, SVT/palpitations, asthma/copd, tobacco abuse, nephrolithiasis presenting with palpitations x 1 day. pt reports yesterday morning around 9am she began to experience sensation of her heart racing, with mild sob,  without associated chest pain or tightness. pt also reports having fever/chills x 1 yesterday. additionally pt notes experiencing prouductive cough with yellow sputum and increased sob on exertion for the past week. Pt notes feeling generally weak during this time as well. Additionally pt reports daily alcohol abuse, drinks multiple shots of [employer] daily in addition to multiple beers a day. Notes history of withdrawal seizure, last seizure 3/2017 at which time she was hospitalized at [facility]. This past wednesday pt reports she fell 2/2 alcohol intoxication- denies hitting her head or LOC but has been experiencing L. "back/rib" pain since the fall intermittently. pt is active smoker- smokes 1 pack every 3 days.     Pt denies chest pain, sob at rest, nausea/vomiting, diarrhea, constipation, LE swelling, abdominal pain, recent travel, sick contacts. 53 yo F with PMH of alcohol abuse, SVT/palpitations, asthma/copd, tobacco abuse, nephrolithiasis presenting with palpitations x 1 day. pt reports yesterday morning around 9am she began to experience sensation of her heart racing, with mild sob,  without associated chest pain or tightness. pts palpitations continued throughout the day intermittently prompting her to go into the ED for evaluation. pt has known hx of "tachycardia" for which she has been evaluated by a cardiologist - has had normal echocardiogram (according to pt) and has required no intervention. pt also reports having fever/chills x 1 yesterday. additionally pt notes experiencing prouductive cough with yellow sputum and increased sob on exertion for the past week. Pt notes feeling generally weak during this time as well. Additionally pt reports daily alcohol abuse, drinks multiple shots of [employer] daily in addition to multiple beers a day. Notes history of withdrawal seizure, last seizure 3/2017 at which time she was hospitalized at [facility]. This past wednesday pt reports she fell 2/2 alcohol intoxication- denies hitting her head or LOC but has been experiencing L. "back/rib" pain since the fall intermittently. pt is active smoker- smokes 1 pack every 3 days.     Pt denies chest pain, sob at rest, nausea/vomiting, diarrhea, constipation, LE swelling, abdominal pain, recent travel, sick contacts.   In the ED pt was noted to be in SVT given diltiazem 10mg IVP x 1, adenosine 6mg ivp, metoprolol 25mg PO  SVT broke- pt has remained in sinus since   placed on ciwa protocol   evaluated by trauma service as she was noted to have acute L. displaced rib fx on CT imaging 51 yo F with PMH of alcohol abuse, SVT/palpitations, asthma/copd, tobacco abuse, nephrolithiasis presenting with palpitations x 1 day. pt reports yesterday morning around 9am she began to experience sensation of her heart racing, with mild sob,  without associated chest pain or tightness. pts palpitations continued throughout the day intermittently prompting her to go into the ED for evaluation. pt has known hx of "tachycardia" for which she has been evaluated by a cardiologist - has had normal echocardiogram (according to pt) and has required no intervention. pt also reports having fever/chills x 1 yesterday. additionally pt notes experiencing prouductive cough with yellow sputum and increased sob on exertion for the past week. Pt notes feeling generally weak during this time as well. Additionally pt reports daily alcohol abuse, drinks multiple shots of [employer] daily in addition to multiple beers a day. Notes history of withdrawal seizure, last seizure 3/2017 at which time she was hospitalized at [facility]. This past wednesday pt reports she fell 2/2 alcohol intoxication- denies hitting her head or LOC but has been experiencing L. "back/rib" pain since the fall intermittently. pt is active smoker- smokes 1 pack every 3 days.     Pt denies chest pain, sob at rest, nausea/vomiting, diarrhea, constipation, LE swelling, abdominal pain, recent travel, sick contacts.   In the ED pt was noted to be in SVT given diltiazem 10mg IVP x 1, adenosine 6mg ivp, metoprolol 25mg PO. given magnesium and potassium supplementation  SVT broke- pt has remained in sinus since   placed on ciwa protocol   evaluated by trauma service as she was noted to have acute L. displaced rib fx on CT imaging

## 2017-05-22 NOTE — H&P ADULT. - NEUROLOGICAL DETAILS
normal strength/cranial nerves intact/alert and oriented x 3/no spontaneous movement/responds to pain/responds to verbal commands

## 2017-05-22 NOTE — H&P ADULT. - PROBLEM SELECTOR PLAN 2
high risk given svt on presentation and hx of withdrawal seizures. place on ciwa protocol with standing ativan taper and symptom triggered ativan. social work consult.  IVF with multivitamin, thiamine and folate repletion.  check utox high risk given svt on presentation and hx of withdrawal seizures. place on ciwa protocol with standing ativan taper and symptom triggered ativan. social work consult.  IVF with multivitamin, thiamine and folate repletion.  check utox  check repeat lactate s/p IVF

## 2017-05-22 NOTE — H&P ADULT. - RADIOLOGY RESULTS AND INTERPRETATION
CXR reviewed by me: clear lungs, enlarged lung volumes  old L. rib fx    CT chest: acute displaced rib fracture L. posterior 10th rib, chronic old rib fractures. tree in bud nodular opacities RML may be infectious

## 2017-05-22 NOTE — H&P ADULT. - PROBLEM SELECTOR PLAN 3
subjective fever, worsening cough, sob with RML tree in bud opacities seen on CT.   will treat CAP with levaquin IV 750mg daily (pt with amoxicillin allergy) subjective fever, worsening cough, sob with RML tree in bud opacities seen on CT.   will treat CAP with levaquin IV 750mg daily (pt with amoxicillin allergy)  if febrile or change in clinical status send blood cultures

## 2017-05-22 NOTE — H&P ADULT. - PROBLEM SELECTOR PLAN 1
reported history per patient, resolved in ED after initial presentation. likely cause of initial palpiations. per patient has been eval'd by carrdiology in past.   -telemetry monitoring  -cardiac enzymes x 2 - first set negative  -treat alcohol withdrawal and pneumonia  -monitor electrolytes, keep mg >2, K> 4  -check TTE reported history per patient, resolved in ED after initial presentation. likely cause of initial palpiations. per patient has been eval'd by cardiology in past.   -telemetry monitoring  -cardiac enzymes x 2 - first set negative  -treat alcohol withdrawal and pneumonia  -monitor electrolytes, keep mg >2, K> 4  -check TTE  -start low dose B blocker- metoprolol 12.5 q12h

## 2017-05-22 NOTE — H&P ADULT. - RESPIRATORY COMMENTS
normal respiratory effort and rate, lungs with coarse BS (difficult exam given noise in ED) no overt crackles or wheezing appreciated. no accessory muscle use

## 2017-05-22 NOTE — H&P ADULT. - ASSESSMENT
53 yo F with PMH of alcohol abuse, SVT/palpitations, asthma/copd, tobacco abuse, nephrolithiasis presenting with palpitations 2/2 SVT in the setting of subjective fever, worsening cough, sob likely 2/2 community acquired pneumonia as well as lactic acidosis likely 2/2 to alcohol intoxication with risk of withdrawal

## 2017-05-23 LAB
ALBUMIN SERPL ELPH-MCNC: 2.6 G/DL — LOW (ref 3.3–5)
ALP SERPL-CCNC: 91 U/L — SIGNIFICANT CHANGE UP (ref 40–120)
ALT FLD-CCNC: 34 U/L — SIGNIFICANT CHANGE UP (ref 10–45)
ANION GAP SERPL CALC-SCNC: 15 MMOL/L — SIGNIFICANT CHANGE UP (ref 5–17)
ANION GAP SERPL CALC-SCNC: 17 MMOL/L — SIGNIFICANT CHANGE UP (ref 5–17)
AST SERPL-CCNC: 77 U/L — HIGH (ref 10–40)
BILIRUB DIRECT SERPL-MCNC: 0.2 MG/DL — SIGNIFICANT CHANGE UP (ref 0–0.2)
BILIRUB INDIRECT FLD-MCNC: 0.3 MG/DL — SIGNIFICANT CHANGE UP (ref 0.2–1)
BILIRUB SERPL-MCNC: 0.4 MG/DL — SIGNIFICANT CHANGE UP (ref 0.2–1.2)
BUN SERPL-MCNC: <2 MG/DL — LOW (ref 7–23)
BUN SERPL-MCNC: <2 MG/DL — LOW (ref 7–23)
CALCIUM SERPL-MCNC: 7.1 MG/DL — LOW (ref 8.4–10.5)
CALCIUM SERPL-MCNC: 7.7 MG/DL — LOW (ref 8.4–10.5)
CHLORIDE SERPL-SCNC: 104 MMOL/L — SIGNIFICANT CHANGE UP (ref 96–108)
CHLORIDE SERPL-SCNC: 105 MMOL/L — SIGNIFICANT CHANGE UP (ref 96–108)
CO2 SERPL-SCNC: 18 MMOL/L — LOW (ref 22–31)
CO2 SERPL-SCNC: 21 MMOL/L — LOW (ref 22–31)
CREAT SERPL-MCNC: 0.47 MG/DL — LOW (ref 0.5–1.3)
CREAT SERPL-MCNC: 0.5 MG/DL — SIGNIFICANT CHANGE UP (ref 0.5–1.3)
GLUCOSE SERPL-MCNC: 117 MG/DL — HIGH (ref 70–99)
GLUCOSE SERPL-MCNC: 127 MG/DL — HIGH (ref 70–99)
HCT VFR BLD CALC: 24.5 % — LOW (ref 34.5–45)
HCT VFR BLD CALC: 27.1 % — LOW (ref 34.5–45)
HGB BLD-MCNC: 8.2 G/DL — LOW (ref 11.5–15.5)
HGB BLD-MCNC: 9.5 G/DL — LOW (ref 11.5–15.5)
MAGNESIUM SERPL-MCNC: 1.4 MG/DL — LOW (ref 1.6–2.6)
MAGNESIUM SERPL-MCNC: 1.6 MG/DL — SIGNIFICANT CHANGE UP (ref 1.6–2.6)
MCHC RBC-ENTMCNC: 33.5 GM/DL — SIGNIFICANT CHANGE UP (ref 32–36)
MCHC RBC-ENTMCNC: 35 GM/DL — SIGNIFICANT CHANGE UP (ref 32–36)
MCHC RBC-ENTMCNC: 36.4 PG — HIGH (ref 27–34)
MCHC RBC-ENTMCNC: 39.9 PG — HIGH (ref 27–34)
MCV RBC AUTO: 108.9 FL — HIGH (ref 80–100)
MCV RBC AUTO: 114 FL — HIGH (ref 80–100)
PLATELET # BLD AUTO: 125 K/UL — LOW (ref 150–400)
PLATELET # BLD AUTO: 128 K/UL — LOW (ref 150–400)
POTASSIUM SERPL-MCNC: 2.8 MMOL/L — CRITICAL LOW (ref 3.5–5.3)
POTASSIUM SERPL-MCNC: 3.1 MMOL/L — LOW (ref 3.5–5.3)
POTASSIUM SERPL-SCNC: 2.8 MMOL/L — CRITICAL LOW (ref 3.5–5.3)
POTASSIUM SERPL-SCNC: 3.1 MMOL/L — LOW (ref 3.5–5.3)
PROT SERPL-MCNC: 5 G/DL — LOW (ref 6–8.3)
RBC # BLD: 2.25 M/UL — LOW (ref 3.8–5.2)
RBC # BLD: 2.38 M/UL — LOW (ref 3.8–5.2)
RBC # FLD: 15.8 % — HIGH (ref 10.3–14.5)
RBC # FLD: 17 % — HIGH (ref 10.3–14.5)
SODIUM SERPL-SCNC: 140 MMOL/L — SIGNIFICANT CHANGE UP (ref 135–145)
SODIUM SERPL-SCNC: 140 MMOL/L — SIGNIFICANT CHANGE UP (ref 135–145)
WBC # BLD: 2.45 K/UL — LOW (ref 3.8–10.5)
WBC # BLD: 3.3 K/UL — LOW (ref 3.8–10.5)
WBC # FLD AUTO: 2.45 K/UL — LOW (ref 3.8–10.5)
WBC # FLD AUTO: 3.3 K/UL — LOW (ref 3.8–10.5)

## 2017-05-23 RX ORDER — SODIUM CHLORIDE 9 MG/ML
1000 INJECTION INTRAMUSCULAR; INTRAVENOUS; SUBCUTANEOUS
Qty: 0 | Refills: 0 | Status: DISCONTINUED | OUTPATIENT
Start: 2017-05-23 | End: 2017-05-24

## 2017-05-23 RX ORDER — OXYCODONE HYDROCHLORIDE 5 MG/1
5 TABLET ORAL ONCE
Qty: 0 | Refills: 0 | Status: DISCONTINUED | OUTPATIENT
Start: 2017-05-23 | End: 2017-05-23

## 2017-05-23 RX ORDER — POTASSIUM CHLORIDE 20 MEQ
40 PACKET (EA) ORAL EVERY 4 HOURS
Qty: 0 | Refills: 0 | Status: COMPLETED | OUTPATIENT
Start: 2017-05-23 | End: 2017-05-23

## 2017-05-23 RX ORDER — MAGNESIUM SULFATE 500 MG/ML
2 VIAL (ML) INJECTION ONCE
Qty: 0 | Refills: 0 | Status: COMPLETED | OUTPATIENT
Start: 2017-05-23 | End: 2017-05-23

## 2017-05-23 RX ADMIN — Medication 2 MILLIGRAM(S): at 05:30

## 2017-05-23 RX ADMIN — BUDESONIDE AND FORMOTEROL FUMARATE DIHYDRATE 1 PUFF(S): 160; 4.5 AEROSOL RESPIRATORY (INHALATION) at 05:30

## 2017-05-23 RX ADMIN — Medication 40 MILLIEQUIVALENT(S): at 10:59

## 2017-05-23 RX ADMIN — Medication 40 MILLIEQUIVALENT(S): at 15:56

## 2017-05-23 RX ADMIN — Medication 1 TABLET(S): at 12:39

## 2017-05-23 RX ADMIN — OXYCODONE HYDROCHLORIDE 5 MILLIGRAM(S): 5 TABLET ORAL at 20:50

## 2017-05-23 RX ADMIN — OXYCODONE HYDROCHLORIDE 5 MILLIGRAM(S): 5 TABLET ORAL at 20:20

## 2017-05-23 RX ADMIN — Medication 50 GRAM(S): at 18:18

## 2017-05-23 RX ADMIN — Medication 100 MILLIGRAM(S): at 15:57

## 2017-05-23 RX ADMIN — Medication 2 MILLIGRAM(S): at 10:35

## 2017-05-23 RX ADMIN — Medication 12.5 MILLIGRAM(S): at 18:22

## 2017-05-23 RX ADMIN — HEPARIN SODIUM 5000 UNIT(S): 5000 INJECTION INTRAVENOUS; SUBCUTANEOUS at 05:30

## 2017-05-23 RX ADMIN — Medication 2 MILLIGRAM(S): at 01:14

## 2017-05-23 RX ADMIN — OXYCODONE HYDROCHLORIDE 5 MILLIGRAM(S): 5 TABLET ORAL at 13:17

## 2017-05-23 RX ADMIN — Medication 1.5 MILLIGRAM(S): at 18:22

## 2017-05-23 RX ADMIN — BUDESONIDE AND FORMOTEROL FUMARATE DIHYDRATE 1 PUFF(S): 160; 4.5 AEROSOL RESPIRATORY (INHALATION) at 18:23

## 2017-05-23 RX ADMIN — SODIUM CHLORIDE 75 MILLILITER(S): 9 INJECTION INTRAMUSCULAR; INTRAVENOUS; SUBCUTANEOUS at 18:01

## 2017-05-23 RX ADMIN — Medication 1 MILLIGRAM(S): at 12:39

## 2017-05-23 RX ADMIN — SODIUM CHLORIDE 100 MILLILITER(S): 9 INJECTION, SOLUTION INTRAVENOUS at 05:31

## 2017-05-23 RX ADMIN — Medication 1 PATCH: at 12:38

## 2017-05-23 RX ADMIN — ONDANSETRON 4 MILLIGRAM(S): 8 TABLET, FILM COATED ORAL at 16:49

## 2017-05-23 RX ADMIN — HEPARIN SODIUM 5000 UNIT(S): 5000 INJECTION INTRAVENOUS; SUBCUTANEOUS at 15:57

## 2017-05-23 RX ADMIN — HEPARIN SODIUM 5000 UNIT(S): 5000 INJECTION INTRAVENOUS; SUBCUTANEOUS at 22:36

## 2017-05-23 RX ADMIN — Medication 1 PATCH: at 12:39

## 2017-05-23 RX ADMIN — Medication 12.5 MILLIGRAM(S): at 05:31

## 2017-05-23 RX ADMIN — OXYCODONE HYDROCHLORIDE 5 MILLIGRAM(S): 5 TABLET ORAL at 13:38

## 2017-05-23 RX ADMIN — Medication 2 MILLIGRAM(S): at 12:39

## 2017-05-23 RX ADMIN — Medication 1.5 MILLIGRAM(S): at 22:35

## 2017-05-24 LAB
-  AMIKACIN: SIGNIFICANT CHANGE UP
-  AMPICILLIN/SULBACTAM: SIGNIFICANT CHANGE UP
-  AMPICILLIN: SIGNIFICANT CHANGE UP
-  AZTREONAM: SIGNIFICANT CHANGE UP
-  CEFAZOLIN: SIGNIFICANT CHANGE UP
-  CEFEPIME: SIGNIFICANT CHANGE UP
-  CEFOXITIN: SIGNIFICANT CHANGE UP
-  CEFTAZIDIME: SIGNIFICANT CHANGE UP
-  CEFTRIAXONE: SIGNIFICANT CHANGE UP
-  CIPROFLOXACIN: SIGNIFICANT CHANGE UP
-  ERTAPENEM: SIGNIFICANT CHANGE UP
-  GENTAMICIN: SIGNIFICANT CHANGE UP
-  IMIPENEM: SIGNIFICANT CHANGE UP
-  LEVOFLOXACIN: SIGNIFICANT CHANGE UP
-  MEROPENEM: SIGNIFICANT CHANGE UP
-  NITROFURANTOIN: SIGNIFICANT CHANGE UP
-  PIPERACILLIN/TAZOBACTAM: SIGNIFICANT CHANGE UP
-  TOBRAMYCIN: SIGNIFICANT CHANGE UP
-  TRIMETHOPRIM/SULFAMETHOXAZOLE: SIGNIFICANT CHANGE UP
ANION GAP SERPL CALC-SCNC: 15 MMOL/L — SIGNIFICANT CHANGE UP (ref 5–17)
BUN SERPL-MCNC: 3 MG/DL — LOW (ref 7–23)
CALCIUM SERPL-MCNC: 8.3 MG/DL — LOW (ref 8.4–10.5)
CHLORIDE SERPL-SCNC: 102 MMOL/L — SIGNIFICANT CHANGE UP (ref 96–108)
CO2 SERPL-SCNC: 23 MMOL/L — SIGNIFICANT CHANGE UP (ref 22–31)
CREAT SERPL-MCNC: 0.41 MG/DL — LOW (ref 0.5–1.3)
CULTURE RESULTS: SIGNIFICANT CHANGE UP
FERRITIN SERPL-MCNC: 868 NG/ML — HIGH (ref 15–150)
FOLATE SERPL-MCNC: >20 NG/ML — SIGNIFICANT CHANGE UP (ref 4.8–24.2)
GLUCOSE SERPL-MCNC: 87 MG/DL — SIGNIFICANT CHANGE UP (ref 70–99)
HCT VFR BLD CALC: 27.2 % — LOW (ref 34.5–45)
HGB BLD-MCNC: 9.3 G/DL — LOW (ref 11.5–15.5)
IRON SATN MFR SERPL: 44 % — SIGNIFICANT CHANGE UP (ref 14–50)
IRON SATN MFR SERPL: 52 UG/DL — SIGNIFICANT CHANGE UP (ref 30–160)
MAGNESIUM SERPL-MCNC: 1.8 MG/DL — SIGNIFICANT CHANGE UP (ref 1.6–2.6)
MCHC RBC-ENTMCNC: 34.2 GM/DL — SIGNIFICANT CHANGE UP (ref 32–36)
MCHC RBC-ENTMCNC: 36.5 PG — HIGH (ref 27–34)
MCV RBC AUTO: 106.7 FL — HIGH (ref 80–100)
METHOD TYPE: SIGNIFICANT CHANGE UP
ORGANISM # SPEC MICROSCOPIC CNT: SIGNIFICANT CHANGE UP
ORGANISM # SPEC MICROSCOPIC CNT: SIGNIFICANT CHANGE UP
PLATELET # BLD AUTO: 148 K/UL — LOW (ref 150–400)
POTASSIUM SERPL-MCNC: 3.4 MMOL/L — LOW (ref 3.5–5.3)
POTASSIUM SERPL-SCNC: 3.4 MMOL/L — LOW (ref 3.5–5.3)
RBC # BLD: 2.55 M/UL — LOW (ref 3.8–5.2)
RBC # FLD: 16.7 % — HIGH (ref 10.3–14.5)
SODIUM SERPL-SCNC: 140 MMOL/L — SIGNIFICANT CHANGE UP (ref 135–145)
SPECIMEN SOURCE: SIGNIFICANT CHANGE UP
TIBC SERPL-MCNC: 117 UG/DL — LOW (ref 220–430)
UIBC SERPL-MCNC: 65 UG/DL — LOW (ref 110–370)
VIT B12 SERPL-MCNC: 1005 PG/ML — HIGH (ref 243–894)
VIT D25+D1,25 OH+D1,25 PNL SERPL-MCNC: 121 PG/ML — HIGH (ref 19.9–79.3)
WBC # BLD: 2.87 K/UL — LOW (ref 3.8–10.5)
WBC # FLD AUTO: 2.87 K/UL — LOW (ref 3.8–10.5)

## 2017-05-24 RX ORDER — POTASSIUM CHLORIDE 20 MEQ
40 PACKET (EA) ORAL ONCE
Qty: 0 | Refills: 0 | Status: COMPLETED | OUTPATIENT
Start: 2017-05-24 | End: 2017-05-24

## 2017-05-24 RX ORDER — OXYCODONE HYDROCHLORIDE 5 MG/1
5 TABLET ORAL ONCE
Qty: 0 | Refills: 0 | Status: DISCONTINUED | OUTPATIENT
Start: 2017-05-24 | End: 2017-05-24

## 2017-05-24 RX ADMIN — Medication 1 MILLIGRAM(S): at 12:11

## 2017-05-24 RX ADMIN — Medication 100 MILLIGRAM(S): at 12:11

## 2017-05-24 RX ADMIN — Medication 1.5 MILLIGRAM(S): at 14:12

## 2017-05-24 RX ADMIN — Medication 1 PATCH: at 12:11

## 2017-05-24 RX ADMIN — Medication 1.5 MILLIGRAM(S): at 18:35

## 2017-05-24 RX ADMIN — SODIUM CHLORIDE 75 MILLILITER(S): 9 INJECTION INTRAMUSCULAR; INTRAVENOUS; SUBCUTANEOUS at 08:08

## 2017-05-24 RX ADMIN — Medication 1 PATCH: at 12:10

## 2017-05-24 RX ADMIN — OXYCODONE HYDROCHLORIDE 5 MILLIGRAM(S): 5 TABLET ORAL at 16:20

## 2017-05-24 RX ADMIN — Medication 1.5 MILLIGRAM(S): at 02:37

## 2017-05-24 RX ADMIN — OXYCODONE HYDROCHLORIDE 5 MILLIGRAM(S): 5 TABLET ORAL at 15:49

## 2017-05-24 RX ADMIN — Medication 12.5 MILLIGRAM(S): at 18:36

## 2017-05-24 RX ADMIN — BUDESONIDE AND FORMOTEROL FUMARATE DIHYDRATE 1 PUFF(S): 160; 4.5 AEROSOL RESPIRATORY (INHALATION) at 05:21

## 2017-05-24 RX ADMIN — HEPARIN SODIUM 5000 UNIT(S): 5000 INJECTION INTRAVENOUS; SUBCUTANEOUS at 05:21

## 2017-05-24 RX ADMIN — BUDESONIDE AND FORMOTEROL FUMARATE DIHYDRATE 1 PUFF(S): 160; 4.5 AEROSOL RESPIRATORY (INHALATION) at 18:36

## 2017-05-24 RX ADMIN — HEPARIN SODIUM 5000 UNIT(S): 5000 INJECTION INTRAVENOUS; SUBCUTANEOUS at 21:12

## 2017-05-24 RX ADMIN — Medication 1 MILLIGRAM(S): at 21:12

## 2017-05-24 RX ADMIN — OXYCODONE HYDROCHLORIDE 5 MILLIGRAM(S): 5 TABLET ORAL at 08:18

## 2017-05-24 RX ADMIN — Medication 1 TABLET(S): at 12:11

## 2017-05-24 RX ADMIN — ONDANSETRON 4 MILLIGRAM(S): 8 TABLET, FILM COATED ORAL at 08:06

## 2017-05-24 RX ADMIN — Medication 40 MILLIEQUIVALENT(S): at 15:25

## 2017-05-24 RX ADMIN — Medication 1.5 MILLIGRAM(S): at 05:20

## 2017-05-24 RX ADMIN — HEPARIN SODIUM 5000 UNIT(S): 5000 INJECTION INTRAVENOUS; SUBCUTANEOUS at 15:27

## 2017-05-24 RX ADMIN — OXYCODONE HYDROCHLORIDE 5 MILLIGRAM(S): 5 TABLET ORAL at 08:40

## 2017-05-24 RX ADMIN — Medication 12.5 MILLIGRAM(S): at 05:21

## 2017-05-24 NOTE — DIETITIAN INITIAL EVALUATION ADULT. - PROBLEM SELECTOR PLAN 1
reported history per patient, resolved in ED after initial presentation. likely cause of initial palpiations. per patient has been eval'd by cardiology in past.   -telemetry monitoring  -cardiac enzymes x 2 - first set negative  -treat alcohol withdrawal and pneumonia  -monitor electrolytes, keep mg >2, K> 4  -check TTE  -start low dose B blocker- metoprolol 12.5 q12h

## 2017-05-24 NOTE — DIETITIAN INITIAL EVALUATION ADULT. - NS FNS WEIGHT CHANGE REASON
Pt reports weighing over 100 pounds and estimates ~105 pounds 6 months ago. Noted per RD note on 3/24/17, pt weighed 81.7 pounds. pt reports her wt was 78 pounds about 1 week PTA. Noted dosing wt of 88.8 pounds- pt questions accuracy. Per discussion with PCA will obtain standing wt today to confirm current wt. Based on pt's stated wt of 78 pounds, pt has lost 27 pounds (~26%), based on measured wt of 88.8 pounds, pt has lost 16.2 pounds (~15%) based on either wt, pt has lost a significant amount of wt in 6 months that meets the criteria for severe malnutrition./unintentional

## 2017-05-24 NOTE — DIETITIAN INITIAL EVALUATION ADULT. - NS AS NUTRI INTERV MEALS SNACK
Continue regular diet. Encourage po intake with nutrient dense foods. Provide food preferences as able. Monitor weight, lab values, po intake and GI tolerance. RD to remain available for further nutrition interventions as indicated. Recommend Soft texture diet.. Encourage po intake with nutrient dense foods. Provide food preferences as able. Monitor weight, lab values, po intake and GI tolerance. RD to remain available for further nutrition interventions as indicated.

## 2017-05-24 NOTE — DIETITIAN INITIAL EVALUATION ADULT. - ENERGY NEEDS
Ht: 60“, Wt: 88.8 lbs-dosing, BMI: 17.3 kg/m2, IBW: 100 lbs (+/-10%), %IBW: 89%  Pertinent Information: Pt presented with palpitations x1 day, SVT on admission. Additionally pt reports daily alcohol abuse, drinks multiple shots of [employer] daily in addition to multiple beers a day. Notes history of withdrawal seizure, last seizure 3/2017 at which time she was hospitalized at [facility]. This past Wednesday pt reports she fell 2/2 alcohol intoxication- denies hitting her head or LOC. Pt found to have acute L displaced rib fracture. ETOH on CIWA.   no edema or pressure injury

## 2017-05-24 NOTE — DIETITIAN INITIAL EVALUATION ADULT. - PROBLEM SELECTOR PLAN 2
high risk given svt on presentation and hx of withdrawal seizures. place on ciwa protocol with standing ativan taper and symptom triggered ativan. social work consult.  IVF with multivitamin, thiamine and folate repletion.  check utox  check repeat lactate s/p IVF

## 2017-05-24 NOTE — DIETITIAN INITIAL EVALUATION ADULT. - OTHER INFO
Nutrition consult for assessment. Pt reports continued poor po intake. Pt reports nausea and receives medication, pt reports vomiting and diarrhea PTA but none at this time. Pt reports chewing difficulty and denies swallowing difficulty with thin liquids but states foods must be soft. Pt willing to try soft texture diet, refuses mechanical soft or pureed diet- states she will select softer options. RD reviewed alternate menu options, discussed softer food options and menu ordering procedure. Pt willing to try Ensure Enlive. RD obtained food preferences. No known food allergies.

## 2017-05-24 NOTE — DIETITIAN INITIAL EVALUATION ADULT. - PROBLEM SELECTOR PLAN 3
subjective fever, worsening cough, sob with RML tree in bud opacities seen on CT.   will treat CAP with levaquin IV 750mg daily (pt with amoxicillin allergy)  if febrile or change in clinical status send blood cultures

## 2017-05-24 NOTE — DIETITIAN INITIAL EVALUATION ADULT. - NUTRITION INTERVENTION
Vitamin/Meals and Snack/Medical Food Supplements/Nutrition Education/Collaboration and Referral of Nutrition Care

## 2017-05-24 NOTE — DIETITIAN INITIAL EVALUATION ADULT. - NS AS NUTRI INTERV ED CONTENT
RD encouraged po intake with nutrient rich foods, small frequent meals. RD discussed the importance of supplementation of prescribed vitamins (MVI, Thiamine and folic acid). RD encouraged po intake with nutrient rich foods, small frequent meals. RD discussed the importance of supplementation of prescribed vitamins (MVI, Thiamine and folic acid). Reviewed alternate menu options and menu ordering procedure. Provided pt with nutrient rich soft food options/alternatives.

## 2017-05-24 NOTE — DIETITIAN INITIAL EVALUATION ADULT. - ORAL INTAKE PTA
Pt reports she has 1-2 meals per day sometimes and other days none. Pt states at times she can go days without eating. Pt reports having loss of appetite, vomiting and diarrhea PTA. Pt also noted c ETOH abuse on CIWA protocol. Pt reports taking iron pills PTA./poor

## 2017-05-25 LAB
ANION GAP SERPL CALC-SCNC: 16 MMOL/L — SIGNIFICANT CHANGE UP (ref 5–17)
BUN SERPL-MCNC: <2 MG/DL — LOW (ref 7–23)
CALCIUM SERPL-MCNC: 8.8 MG/DL — SIGNIFICANT CHANGE UP (ref 8.4–10.5)
CHLORIDE SERPL-SCNC: 100 MMOL/L — SIGNIFICANT CHANGE UP (ref 96–108)
CO2 SERPL-SCNC: 22 MMOL/L — SIGNIFICANT CHANGE UP (ref 22–31)
CREAT SERPL-MCNC: 0.48 MG/DL — LOW (ref 0.5–1.3)
FOLATE SERPL-MCNC: >20 NG/ML — SIGNIFICANT CHANGE UP (ref 4.8–24.2)
GLUCOSE SERPL-MCNC: 99 MG/DL — SIGNIFICANT CHANGE UP (ref 70–99)
MAGNESIUM SERPL-MCNC: 1.7 MG/DL — SIGNIFICANT CHANGE UP (ref 1.6–2.6)
POTASSIUM SERPL-MCNC: 4.3 MMOL/L — SIGNIFICANT CHANGE UP (ref 3.5–5.3)
POTASSIUM SERPL-SCNC: 4.3 MMOL/L — SIGNIFICANT CHANGE UP (ref 3.5–5.3)
SODIUM SERPL-SCNC: 138 MMOL/L — SIGNIFICANT CHANGE UP (ref 135–145)
VIT B12 SERPL-MCNC: 1011 PG/ML — HIGH (ref 243–894)

## 2017-05-25 PROCEDURE — 99233 SBSQ HOSP IP/OBS HIGH 50: CPT

## 2017-05-25 RX ADMIN — Medication 650 MILLIGRAM(S): at 19:36

## 2017-05-25 RX ADMIN — Medication 1 MILLIGRAM(S): at 02:57

## 2017-05-25 RX ADMIN — Medication 12.5 MILLIGRAM(S): at 18:12

## 2017-05-25 RX ADMIN — Medication 1 TABLET(S): at 11:23

## 2017-05-25 RX ADMIN — BUDESONIDE AND FORMOTEROL FUMARATE DIHYDRATE 1 PUFF(S): 160; 4.5 AEROSOL RESPIRATORY (INHALATION) at 18:12

## 2017-05-25 RX ADMIN — Medication 12.5 MILLIGRAM(S): at 05:58

## 2017-05-25 RX ADMIN — Medication 650 MILLIGRAM(S): at 20:36

## 2017-05-25 RX ADMIN — Medication 1 PATCH: at 11:22

## 2017-05-25 RX ADMIN — Medication 0.5 MILLIGRAM(S): at 21:15

## 2017-05-25 RX ADMIN — BUDESONIDE AND FORMOTEROL FUMARATE DIHYDRATE 1 PUFF(S): 160; 4.5 AEROSOL RESPIRATORY (INHALATION) at 05:58

## 2017-05-25 RX ADMIN — OXYCODONE HYDROCHLORIDE 5 MILLIGRAM(S): 5 TABLET ORAL at 10:11

## 2017-05-25 RX ADMIN — Medication 1 MILLIGRAM(S): at 18:12

## 2017-05-25 RX ADMIN — Medication 1 MILLIGRAM(S): at 13:16

## 2017-05-25 RX ADMIN — Medication 1 MILLIGRAM(S): at 10:11

## 2017-05-25 RX ADMIN — HEPARIN SODIUM 5000 UNIT(S): 5000 INJECTION INTRAVENOUS; SUBCUTANEOUS at 21:15

## 2017-05-25 RX ADMIN — OXYCODONE HYDROCHLORIDE 5 MILLIGRAM(S): 5 TABLET ORAL at 11:00

## 2017-05-25 RX ADMIN — HEPARIN SODIUM 5000 UNIT(S): 5000 INJECTION INTRAVENOUS; SUBCUTANEOUS at 13:16

## 2017-05-25 RX ADMIN — HEPARIN SODIUM 5000 UNIT(S): 5000 INJECTION INTRAVENOUS; SUBCUTANEOUS at 05:58

## 2017-05-25 RX ADMIN — Medication 1 PATCH: at 11:11

## 2017-05-25 RX ADMIN — Medication 1 MILLIGRAM(S): at 11:23

## 2017-05-25 RX ADMIN — Medication 1 MILLIGRAM(S): at 05:58

## 2017-05-25 NOTE — PHYSICAL THERAPY INITIAL EVALUATION ADULT - PERTINENT HX OF CURRENT PROBLEM, REHAB EVAL
53 yo F w/ PMH of alcohol abuse, SVT/palpitations, asthma/copd, tobacco abuse, nephrolithiasis presents w/ palpitations x1 day. Reports experiencing sensation of heart racing, w/mild sob, without associated CP or tightness yesterday ~9am. Palpitations continued throughout day intermittently prompting her to go to ED. Pt w/known hx of "tachycardia", has been evaluated by a cardiologist. Normal echocardiogram (according to pt) and w/no intervention. CONTINUED

## 2017-05-25 NOTE — PHYSICAL THERAPY INITIAL EVALUATION ADULT - ADDITIONAL COMMENTS
Patient resides in an apartment in [facility] with her boyfriend/emergency contact Sylvester Denney ([phone]). Prior to admission. patient was fully independent in ADL's and ambulation. Patient reports 8 steps to enter residence and none inside; no difficulty managing prior to  hospitalization.

## 2017-05-25 NOTE — PHYSICAL THERAPY INITIAL EVALUATION ADULT - PRECAUTIONS/LIMITATIONS, REHAB EVAL
fall precautions/Reports fever/chills & experiencing prouductive cough w/yellow sputum, incr. DOEx1 week and general weakness. Additionally pt reports daily alcohol abuse, multiple shots of [employer] in addition to multiple beers daily. Reports hx of withdrawal seizure, last seizure 3/2017 at which time she was hospitalized at [facility]. Pt reports fall 2/2 alcohol intoxication this past wed - Denies hitting her head or LOC but has been experiencing Left "back/rib" pain since the fall intermittently. pt is active smoker- 1 pack every 3 days. Upon admission denies CP/sob at rest/nausea/vomiting/diarrhea/constipation/LE swelling/abdominal pain/recent travel/sick contacts. In the ED pt was noted to be in SVT given diltiazem 10mg IVP x 1, adenosine 6mg ivp, metoprolol 25mg PO. given magnesium and potassium supplementation, SVT broke- remained in sinus since. Placed on ciwa protocol. CT revewed acute L. displaced 10th rib fx, Evaluated by trauma.

## 2017-05-26 ENCOUNTER — TRANSCRIPTION ENCOUNTER (OUTPATIENT)
Age: 52
End: 2017-05-26

## 2017-05-26 VITALS
DIASTOLIC BLOOD PRESSURE: 76 MMHG | TEMPERATURE: 99 F | OXYGEN SATURATION: 99 % | SYSTOLIC BLOOD PRESSURE: 110 MMHG | HEART RATE: 92 BPM | RESPIRATION RATE: 19 BRPM

## 2017-05-26 LAB
ANION GAP SERPL CALC-SCNC: 15 MMOL/L — SIGNIFICANT CHANGE UP (ref 5–17)
BUN SERPL-MCNC: 2 MG/DL — LOW (ref 7–23)
CALCIUM SERPL-MCNC: 9.1 MG/DL — SIGNIFICANT CHANGE UP (ref 8.4–10.5)
CHLORIDE SERPL-SCNC: 100 MMOL/L — SIGNIFICANT CHANGE UP (ref 96–108)
CO2 SERPL-SCNC: 23 MMOL/L — SIGNIFICANT CHANGE UP (ref 22–31)
CREAT SERPL-MCNC: 0.54 MG/DL — SIGNIFICANT CHANGE UP (ref 0.5–1.3)
GLUCOSE SERPL-MCNC: 95 MG/DL — SIGNIFICANT CHANGE UP (ref 70–99)
HCT VFR BLD CALC: 26.9 % — LOW (ref 34.5–45)
HGB BLD-MCNC: 9 G/DL — LOW (ref 11.5–15.5)
MCHC RBC-ENTMCNC: 33.5 GM/DL — SIGNIFICANT CHANGE UP (ref 32–36)
MCHC RBC-ENTMCNC: 36.1 PG — HIGH (ref 27–34)
MCV RBC AUTO: 108 FL — HIGH (ref 80–100)
PLATELET # BLD AUTO: 180 K/UL — SIGNIFICANT CHANGE UP (ref 150–400)
POTASSIUM SERPL-MCNC: 4 MMOL/L — SIGNIFICANT CHANGE UP (ref 3.5–5.3)
POTASSIUM SERPL-SCNC: 4 MMOL/L — SIGNIFICANT CHANGE UP (ref 3.5–5.3)
RBC # BLD: 2.49 M/UL — LOW (ref 3.8–5.2)
RBC # FLD: 17 % — HIGH (ref 10.3–14.5)
SODIUM SERPL-SCNC: 138 MMOL/L — SIGNIFICANT CHANGE UP (ref 135–145)
WBC # BLD: 3.21 K/UL — LOW (ref 3.8–10.5)
WBC # FLD AUTO: 3.21 K/UL — LOW (ref 3.8–10.5)

## 2017-05-26 PROCEDURE — 93306 TTE W/DOPPLER COMPLETE: CPT | Mod: 26

## 2017-05-26 PROCEDURE — 99239 HOSP IP/OBS DSCHRG MGMT >30: CPT

## 2017-05-26 RX ORDER — NICOTINE POLACRILEX 2 MG
1 GUM BUCCAL
Qty: 30 | Refills: 0 | OUTPATIENT
Start: 2017-05-26 | End: 2017-06-25

## 2017-05-26 RX ORDER — ACETAMINOPHEN 500 MG
2 TABLET ORAL
Qty: 0 | Refills: 0 | COMMUNITY
Start: 2017-05-26

## 2017-05-26 RX ORDER — METOPROLOL TARTRATE 50 MG
1 TABLET ORAL
Qty: 60 | Refills: 0 | OUTPATIENT
Start: 2017-05-26 | End: 2017-06-25

## 2017-05-26 RX ADMIN — HEPARIN SODIUM 5000 UNIT(S): 5000 INJECTION INTRAVENOUS; SUBCUTANEOUS at 05:09

## 2017-05-26 RX ADMIN — Medication 12.5 MILLIGRAM(S): at 05:09

## 2017-05-26 RX ADMIN — Medication 1 PATCH: at 10:31

## 2017-05-26 RX ADMIN — Medication 0.5 MILLIGRAM(S): at 05:09

## 2017-05-26 RX ADMIN — HEPARIN SODIUM 5000 UNIT(S): 5000 INJECTION INTRAVENOUS; SUBCUTANEOUS at 13:13

## 2017-05-26 RX ADMIN — BUDESONIDE AND FORMOTEROL FUMARATE DIHYDRATE 1 PUFF(S): 160; 4.5 AEROSOL RESPIRATORY (INHALATION) at 10:28

## 2017-05-26 RX ADMIN — Medication 1 MILLIGRAM(S): at 10:29

## 2017-05-26 RX ADMIN — Medication 12.5 MILLIGRAM(S): at 17:57

## 2017-05-26 RX ADMIN — BUDESONIDE AND FORMOTEROL FUMARATE DIHYDRATE 1 PUFF(S): 160; 4.5 AEROSOL RESPIRATORY (INHALATION) at 17:57

## 2017-05-26 RX ADMIN — Medication 0.5 MILLIGRAM(S): at 10:29

## 2017-05-26 RX ADMIN — Medication 0.5 MILLIGRAM(S): at 17:57

## 2017-05-26 RX ADMIN — Medication 1 TABLET(S): at 10:29

## 2017-05-26 NOTE — DISCHARGE NOTE ADULT - PLAN OF CARE
abstain from alcohol You completed treatment for alcohol withdrawal.   You need to stop drinking alcohol. You completed antibiotics for pneumonia.  F/u with your PMD You can take tylenol as needed for pain. Stop smoking cigarettes.  Continue to you nicotine patch You had fast heart rate. It was likely due to alcohol withdrawal.   It was controlled with metoprolol, please continue to take this medication.

## 2017-05-26 NOTE — PROVIDER CONTACT NOTE (OTHER) - BACKGROUND
Pt admitted for SVT on admission and ETOH abuse (CIWA high risk). Last CIWA score of 1. Currently on ativan taper

## 2017-05-26 NOTE — DISCHARGE NOTE ADULT - HOSPITAL COURSE
Patient admitted for alcohol withdrawal. She completed ativan taper, without any complications. She was found with rib fracture on admission. She finished 5 days of levaquin for PNA. She initially presented with SVT, and was started on metoprolol. Likely due to alcohol withdrawal. She was monitored on tele, and was in NSR for the rest of hospitalization. She had a normal TTE. She is stable for d/c today.

## 2017-05-26 NOTE — DISCHARGE NOTE ADULT - CARE PLAN
Principal Discharge DX:	Alcohol withdrawal syndrome without complication  Goal:	abstain from alcohol  Instructions for follow-up, activity and diet:	You completed treatment for alcohol withdrawal.   You need to stop drinking alcohol.  Secondary Diagnosis:	PNA (pneumonia)  Instructions for follow-up, activity and diet:	You completed antibiotics for pneumonia.  F/u with your PMD  Secondary Diagnosis:	Rib fracture  Instructions for follow-up, activity and diet:	You can take tylenol as needed for pain.  Secondary Diagnosis:	Smoker  Instructions for follow-up, activity and diet:	Stop smoking cigarettes.  Continue to you nicotine patch  Secondary Diagnosis:	SVT (supraventricular tachycardia) Principal Discharge DX:	Alcohol withdrawal syndrome without complication  Goal:	abstain from alcohol  Instructions for follow-up, activity and diet:	You completed treatment for alcohol withdrawal.   You need to stop drinking alcohol.  Secondary Diagnosis:	PNA (pneumonia)  Instructions for follow-up, activity and diet:	You completed antibiotics for pneumonia.  F/u with your PMD  Secondary Diagnosis:	Rib fracture  Instructions for follow-up, activity and diet:	You can take tylenol as needed for pain.  Secondary Diagnosis:	Smoker  Instructions for follow-up, activity and diet:	Stop smoking cigarettes.  Continue to you nicotine patch  Secondary Diagnosis:	SVT (supraventricular tachycardia)  Instructions for follow-up, activity and diet:	You had fast heart rate. It was likely due to alcohol withdrawal.   It was controlled with metoprolol, please continue to take this medication.

## 2017-05-26 NOTE — DISCHARGE NOTE ADULT - PATIENT PORTAL LINK FT
“You can access the Follow[employer] Patient Portal, offered by [facility], by registering with the following website: [email or web address]”

## 2017-05-26 NOTE — DISCHARGE NOTE ADULT - MEDICATION SUMMARY - MEDICATIONS TO TAKE
I will START or STAY ON the medications listed below when I get home from the hospital:    acetaminophen 325 mg oral tablet  -- 2 tab(s) by mouth every 6 hours, As needed, mild pain or fever  -- Indication: For Pain    metoprolol tartrate 25 mg oral tablet  -- 1 tab(s) by mouth 2 times a day  -- It is very important that you take or use this exactly as directed.  Do not skip doses or discontinue unless directed by your doctor.  May cause drowsiness.  Alcohol may intensify this effect.  Use care when operating dangerous machinery.  Some non-prescription drugs may aggravate your condition.  Read all labels carefully.  If a warning appears, check with your doctor before taking.  Take with food or milk.  This drug may impair the ability to drive or operate machinery.  Use care until you become familiar with its effects.    -- Indication: For Supraventricular tachycardia    Ventolin Nebules 2.5 mg/3 mL (0.083%) inhalation solution  -- 3 milliliter(s) inhaled 3 times a day  -- Indication: For COPD    Symbicort 160 mcg-4.5 mcg/inh inhalation aerosol  -- 2 puff(s) inhaled 2 times a day  -- Indication: For COPD    nicotine 7 mg/24 hr transdermal film, extended release  -- 1 patch by transdermal patch once a day  -- Indication: For Smoking cessation

## 2017-05-26 NOTE — PROVIDER CONTACT NOTE (OTHER) - ASSESSMENT
VS: /64; HR 89; RR 17; spO2 98% on room air. Last CIWA score of 1. Denies headache; slight tremor noted.

## 2017-05-30 ENCOUNTER — APPOINTMENT (OUTPATIENT)
Dept: UROLOGY | Facility: HOSPITAL | Age: 52
End: 2017-05-30

## 2017-06-05 ENCOUNTER — APPOINTMENT (OUTPATIENT)
Dept: INTERNAL MEDICINE | Facility: CLINIC | Age: 52
End: 2017-06-05

## 2017-06-05 VITALS
SYSTOLIC BLOOD PRESSURE: 100 MMHG | RESPIRATION RATE: 14 BRPM | DIASTOLIC BLOOD PRESSURE: 70 MMHG | OXYGEN SATURATION: 95 % | TEMPERATURE: 97.8 F | WEIGHT: 90 LBS | HEIGHT: 60 IN | BODY MASS INDEX: 17.67 KG/M2 | HEART RATE: 86 BPM

## 2017-06-05 RX ORDER — CIPROFLOXACIN HYDROCHLORIDE 500 MG/1
500 TABLET, FILM COATED ORAL
Qty: 6 | Refills: 0 | Status: DISCONTINUED | COMMUNITY
Start: 2017-04-13 | End: 2017-06-05

## 2017-06-05 RX ORDER — BUDESONIDE AND FORMOTEROL FUMARATE DIHYDRATE 160; 4.5 UG/1; UG/1
160-4.5 AEROSOL RESPIRATORY (INHALATION) TWICE DAILY
Qty: 1 | Refills: 3 | Status: ACTIVE | COMMUNITY
Start: 2017-06-05

## 2017-06-07 ENCOUNTER — APPOINTMENT (OUTPATIENT)
Dept: INTERNAL MEDICINE | Facility: CLINIC | Age: 52
End: 2017-06-07

## 2017-06-07 VITALS
HEART RATE: 112 BPM | HEIGHT: 60 IN | SYSTOLIC BLOOD PRESSURE: 130 MMHG | BODY MASS INDEX: 17.87 KG/M2 | TEMPERATURE: 98.8 F | DIASTOLIC BLOOD PRESSURE: 80 MMHG | RESPIRATION RATE: 14 BRPM | OXYGEN SATURATION: 98 % | WEIGHT: 91 LBS

## 2017-06-09 ENCOUNTER — APPOINTMENT (OUTPATIENT)
Dept: INTERNAL MEDICINE | Facility: CLINIC | Age: 52
End: 2017-06-09

## 2017-06-09 VITALS
OXYGEN SATURATION: 99 % | WEIGHT: 92 LBS | SYSTOLIC BLOOD PRESSURE: 120 MMHG | HEIGHT: 60 IN | HEART RATE: 107 BPM | BODY MASS INDEX: 18.06 KG/M2 | DIASTOLIC BLOOD PRESSURE: 78 MMHG | RESPIRATION RATE: 14 BRPM | TEMPERATURE: 98.7 F

## 2017-06-09 VITALS — HEART RATE: 100 BPM

## 2017-06-09 RX ORDER — ALPRAZOLAM 0.25 MG/1
0.25 TABLET ORAL
Qty: 5 | Refills: 0 | Status: DISCONTINUED | COMMUNITY
Start: 2017-05-30

## 2017-06-09 RX ORDER — ONDANSETRON 4 MG/1
4 TABLET, ORALLY DISINTEGRATING ORAL
Qty: 2 | Refills: 0 | Status: DISCONTINUED | COMMUNITY
Start: 2017-05-01

## 2017-06-09 RX ORDER — PROMETHAZINE HYDROCHLORIDE AND DEXTROMETHORPHAN HYDROBROMIDE ORAL SOLUTION 15; 6.25 MG/5ML; MG/5ML
6.25-15 SOLUTION ORAL
Qty: 240 | Refills: 0 | Status: DISCONTINUED | COMMUNITY
Start: 2017-01-19

## 2017-06-09 RX ORDER — DOXYCYCLINE HYCLATE 100 MG/1
100 CAPSULE ORAL
Qty: 10 | Refills: 0 | Status: DISCONTINUED | COMMUNITY
Start: 2017-02-02

## 2017-06-09 RX ORDER — THEOPHYLLINE 300 MG/1
300 TABLET, EXTENDED RELEASE ORAL
Qty: 60 | Refills: 0 | Status: DISCONTINUED | COMMUNITY
Start: 2017-01-19

## 2017-06-12 ENCOUNTER — MEDICATION RENEWAL (OUTPATIENT)
Age: 52
End: 2017-06-12

## 2017-07-23 PROCEDURE — 82652 VIT D 1 25-DIHYDROXY: CPT

## 2017-07-23 PROCEDURE — 85027 COMPLETE CBC AUTOMATED: CPT

## 2017-07-23 PROCEDURE — 82803 BLOOD GASES ANY COMBINATION: CPT

## 2017-07-23 PROCEDURE — 96374 THER/PROPH/DIAG INJ IV PUSH: CPT

## 2017-07-23 PROCEDURE — 82550 ASSAY OF CK (CPK): CPT

## 2017-07-23 PROCEDURE — 82947 ASSAY GLUCOSE BLOOD QUANT: CPT

## 2017-07-23 PROCEDURE — 84100 ASSAY OF PHOSPHORUS: CPT

## 2017-07-23 PROCEDURE — 83550 IRON BINDING TEST: CPT

## 2017-07-23 PROCEDURE — 94640 AIRWAY INHALATION TREATMENT: CPT

## 2017-07-23 PROCEDURE — 83690 ASSAY OF LIPASE: CPT

## 2017-07-23 PROCEDURE — 96375 TX/PRO/DX INJ NEW DRUG ADDON: CPT

## 2017-07-23 PROCEDURE — 81001 URINALYSIS AUTO W/SCOPE: CPT

## 2017-07-23 PROCEDURE — 83735 ASSAY OF MAGNESIUM: CPT

## 2017-07-23 PROCEDURE — 82553 CREATINE MB FRACTION: CPT

## 2017-07-23 PROCEDURE — 85730 THROMBOPLASTIN TIME PARTIAL: CPT

## 2017-07-23 PROCEDURE — 80053 COMPREHEN METABOLIC PANEL: CPT

## 2017-07-23 PROCEDURE — 82435 ASSAY OF BLOOD CHLORIDE: CPT

## 2017-07-23 PROCEDURE — 85014 HEMATOCRIT: CPT

## 2017-07-23 PROCEDURE — 85610 PROTHROMBIN TIME: CPT

## 2017-07-23 PROCEDURE — 80076 HEPATIC FUNCTION PANEL: CPT

## 2017-07-23 PROCEDURE — 93306 TTE W/DOPPLER COMPLETE: CPT

## 2017-07-23 PROCEDURE — 82728 ASSAY OF FERRITIN: CPT

## 2017-07-23 PROCEDURE — 83880 ASSAY OF NATRIURETIC PEPTIDE: CPT

## 2017-07-23 PROCEDURE — 87186 SC STD MICRODIL/AGAR DIL: CPT

## 2017-07-23 PROCEDURE — 80307 DRUG TEST PRSMV CHEM ANLYZR: CPT

## 2017-07-23 PROCEDURE — 80048 BASIC METABOLIC PNL TOTAL CA: CPT

## 2017-07-23 PROCEDURE — 87086 URINE CULTURE/COLONY COUNT: CPT

## 2017-07-23 PROCEDURE — 84295 ASSAY OF SERUM SODIUM: CPT

## 2017-07-23 PROCEDURE — 84132 ASSAY OF SERUM POTASSIUM: CPT

## 2017-07-23 PROCEDURE — 97161 PT EVAL LOW COMPLEX 20 MIN: CPT

## 2017-07-23 PROCEDURE — 82330 ASSAY OF CALCIUM: CPT

## 2017-07-23 PROCEDURE — 83605 ASSAY OF LACTIC ACID: CPT

## 2017-07-23 PROCEDURE — 99285 EMERGENCY DEPT VISIT HI MDM: CPT | Mod: 25

## 2017-07-23 PROCEDURE — 82607 VITAMIN B-12: CPT

## 2017-07-23 PROCEDURE — 93005 ELECTROCARDIOGRAM TRACING: CPT

## 2017-07-23 PROCEDURE — 84484 ASSAY OF TROPONIN QUANT: CPT

## 2017-07-23 PROCEDURE — 82746 ASSAY OF FOLIC ACID SERUM: CPT

## 2017-07-23 PROCEDURE — 71250 CT THORAX DX C-: CPT

## 2017-07-23 PROCEDURE — 71045 X-RAY EXAM CHEST 1 VIEW: CPT

## 2017-07-23 PROCEDURE — 76705 ECHO EXAM OF ABDOMEN: CPT

## 2017-08-09 ENCOUNTER — APPOINTMENT (OUTPATIENT)
Dept: INTERNAL MEDICINE | Facility: CLINIC | Age: 52
End: 2017-08-09
Payer: MEDICAID

## 2017-08-09 VITALS
TEMPERATURE: 98 F | WEIGHT: 82 LBS | BODY MASS INDEX: 16.1 KG/M2 | DIASTOLIC BLOOD PRESSURE: 80 MMHG | HEART RATE: 58 BPM | SYSTOLIC BLOOD PRESSURE: 120 MMHG | RESPIRATION RATE: 14 BRPM | HEIGHT: 60 IN

## 2017-08-09 PROCEDURE — 99214 OFFICE O/P EST MOD 30 MIN: CPT

## 2017-09-14 ENCOUNTER — INPATIENT (INPATIENT)
Facility: HOSPITAL | Age: 52
LOS: 3 days | Discharge: HOME | DRG: 896 | End: 2017-09-18
Attending: HOSPITALIST | Admitting: INTERNAL MEDICINE
Payer: MEDICAID

## 2017-09-14 VITALS
DIASTOLIC BLOOD PRESSURE: 99 MMHG | SYSTOLIC BLOOD PRESSURE: 144 MMHG | RESPIRATION RATE: 16 BRPM | OXYGEN SATURATION: 100 % | TEMPERATURE: 98 F | HEART RATE: 98 BPM

## 2017-09-14 DIAGNOSIS — Z90.710 ACQUIRED ABSENCE OF BOTH CERVIX AND UTERUS: Chronic | ICD-10-CM

## 2017-09-14 DIAGNOSIS — R10.9 UNSPECIFIED ABDOMINAL PAIN: ICD-10-CM

## 2017-09-14 DIAGNOSIS — R79.89 OTHER SPECIFIED ABNORMAL FINDINGS OF BLOOD CHEMISTRY: ICD-10-CM

## 2017-09-14 DIAGNOSIS — R11.10 VOMITING, UNSPECIFIED: ICD-10-CM

## 2017-09-14 DIAGNOSIS — F10.230 ALCOHOL DEPENDENCE WITH WITHDRAWAL, UNCOMPLICATED: ICD-10-CM

## 2017-09-14 DIAGNOSIS — N20.0 CALCULUS OF KIDNEY: ICD-10-CM

## 2017-09-14 DIAGNOSIS — J44.9 CHRONIC OBSTRUCTIVE PULMONARY DISEASE, UNSPECIFIED: ICD-10-CM

## 2017-09-14 DIAGNOSIS — Z29.9 ENCOUNTER FOR PROPHYLACTIC MEASURES, UNSPECIFIED: ICD-10-CM

## 2017-09-14 DIAGNOSIS — R30.0 DYSURIA: ICD-10-CM

## 2017-09-14 DIAGNOSIS — Z98.891 HISTORY OF UTERINE SCAR FROM PREVIOUS SURGERY: Chronic | ICD-10-CM

## 2017-09-14 DIAGNOSIS — I47.1 SUPRAVENTRICULAR TACHYCARDIA: ICD-10-CM

## 2017-09-14 DIAGNOSIS — E87.2 ACIDOSIS: ICD-10-CM

## 2017-09-14 LAB
ALBUMIN SERPL ELPH-MCNC: 3.9 G/DL — SIGNIFICANT CHANGE UP (ref 3.3–5)
ALP SERPL-CCNC: 136 U/L — HIGH (ref 40–120)
ALT FLD-CCNC: 63 U/L RC — HIGH (ref 10–45)
ANION GAP SERPL CALC-SCNC: 22 MMOL/L — HIGH (ref 5–17)
AST SERPL-CCNC: 200 U/L — HIGH (ref 10–40)
BASE EXCESS BLDV CALC-SCNC: -1 MMOL/L — SIGNIFICANT CHANGE UP (ref -2–2)
BASOPHILS # BLD AUTO: 0 K/UL — SIGNIFICANT CHANGE UP (ref 0–0.2)
BASOPHILS NFR BLD AUTO: 0.7 % — SIGNIFICANT CHANGE UP (ref 0–2)
BILIRUB SERPL-MCNC: 0.7 MG/DL — SIGNIFICANT CHANGE UP (ref 0.2–1.2)
BUN SERPL-MCNC: 6 MG/DL — LOW (ref 7–23)
CA-I SERPL-SCNC: 1.11 MMOL/L — LOW (ref 1.12–1.3)
CALCIUM SERPL-MCNC: 9.1 MG/DL — SIGNIFICANT CHANGE UP (ref 8.4–10.5)
CHLORIDE BLDV-SCNC: 98 MMOL/L — SIGNIFICANT CHANGE UP (ref 96–108)
CHLORIDE SERPL-SCNC: 95 MMOL/L — LOW (ref 96–108)
CO2 BLDV-SCNC: 24 MMOL/L — SIGNIFICANT CHANGE UP (ref 22–30)
CO2 SERPL-SCNC: 19 MMOL/L — LOW (ref 22–31)
CREAT SERPL-MCNC: 0.7 MG/DL — SIGNIFICANT CHANGE UP (ref 0.5–1.3)
EOSINOPHIL # BLD AUTO: 0 K/UL — SIGNIFICANT CHANGE UP (ref 0–0.5)
EOSINOPHIL NFR BLD AUTO: 0.6 % — SIGNIFICANT CHANGE UP (ref 0–6)
ETHANOL SERPL-MCNC: 95 MG/DL — HIGH (ref 0–10)
GAS PNL BLDV: 133 MMOL/L — LOW (ref 136–145)
GAS PNL BLDV: SIGNIFICANT CHANGE UP
GAS PNL BLDV: SIGNIFICANT CHANGE UP
GLUCOSE BLDV-MCNC: 95 MG/DL — SIGNIFICANT CHANGE UP (ref 70–99)
GLUCOSE SERPL-MCNC: 94 MG/DL — SIGNIFICANT CHANGE UP (ref 70–99)
HCO3 BLDV-SCNC: 22 MMOL/L — SIGNIFICANT CHANGE UP (ref 21–29)
HCT VFR BLD CALC: 36.2 % — SIGNIFICANT CHANGE UP (ref 34.5–45)
HCT VFR BLDA CALC: 38 % — LOW (ref 39–50)
HGB BLD CALC-MCNC: 12.2 G/DL — SIGNIFICANT CHANGE UP (ref 11.5–15.5)
HGB BLD-MCNC: 12.4 G/DL — SIGNIFICANT CHANGE UP (ref 11.5–15.5)
LACTATE BLDV-MCNC: 3.9 MMOL/L — HIGH (ref 0.7–2)
LIDOCAIN IGE QN: 72 U/L — HIGH (ref 7–60)
LYMPHOCYTES # BLD AUTO: 1.7 K/UL — SIGNIFICANT CHANGE UP (ref 1–3.3)
LYMPHOCYTES # BLD AUTO: 31.8 % — SIGNIFICANT CHANGE UP (ref 13–44)
MCHC RBC-ENTMCNC: 34.2 GM/DL — SIGNIFICANT CHANGE UP (ref 32–36)
MCHC RBC-ENTMCNC: 35.4 PG — HIGH (ref 27–34)
MCV RBC AUTO: 104 FL — HIGH (ref 80–100)
MONOCYTES # BLD AUTO: 0.4 K/UL — SIGNIFICANT CHANGE UP (ref 0–0.9)
MONOCYTES NFR BLD AUTO: 8.1 % — SIGNIFICANT CHANGE UP (ref 2–14)
NEUTROPHILS # BLD AUTO: 3.1 K/UL — SIGNIFICANT CHANGE UP (ref 1.8–7.4)
NEUTROPHILS NFR BLD AUTO: 58.7 % — SIGNIFICANT CHANGE UP (ref 43–77)
PCO2 BLDV: 35 MMHG — SIGNIFICANT CHANGE UP (ref 35–50)
PH BLDV: 7.42 — SIGNIFICANT CHANGE UP (ref 7.35–7.45)
PLATELET # BLD AUTO: 255 K/UL — SIGNIFICANT CHANGE UP (ref 150–400)
PO2 BLDV: 34 MMHG — SIGNIFICANT CHANGE UP (ref 25–45)
POTASSIUM BLDV-SCNC: 4.2 MMOL/L — SIGNIFICANT CHANGE UP (ref 3.5–5)
POTASSIUM SERPL-MCNC: 4.5 MMOL/L — SIGNIFICANT CHANGE UP (ref 3.5–5.3)
POTASSIUM SERPL-SCNC: 4.5 MMOL/L — SIGNIFICANT CHANGE UP (ref 3.5–5.3)
PROT SERPL-MCNC: 7.5 G/DL — SIGNIFICANT CHANGE UP (ref 6–8.3)
RBC # BLD: 3.49 M/UL — LOW (ref 3.8–5.2)
RBC # FLD: 15.3 % — HIGH (ref 10.3–14.5)
SAO2 % BLDV: 58 % — LOW (ref 67–88)
SODIUM SERPL-SCNC: 136 MMOL/L — SIGNIFICANT CHANGE UP (ref 135–145)
WBC # BLD: 5.3 K/UL — SIGNIFICANT CHANGE UP (ref 3.8–10.5)
WBC # FLD AUTO: 5.3 K/UL — SIGNIFICANT CHANGE UP (ref 3.8–10.5)

## 2017-09-14 PROCEDURE — 93010 ELECTROCARDIOGRAM REPORT: CPT

## 2017-09-14 PROCEDURE — 74177 CT ABD & PELVIS W/CONTRAST: CPT | Mod: 26

## 2017-09-14 PROCEDURE — 99223 1ST HOSP IP/OBS HIGH 75: CPT

## 2017-09-14 PROCEDURE — 99285 EMERGENCY DEPT VISIT HI MDM: CPT | Mod: 25

## 2017-09-14 PROCEDURE — 71010: CPT | Mod: 26

## 2017-09-14 RX ORDER — ENOXAPARIN SODIUM 100 MG/ML
40 INJECTION SUBCUTANEOUS DAILY
Qty: 0 | Refills: 0 | Status: DISCONTINUED | OUTPATIENT
Start: 2017-09-14 | End: 2017-09-18

## 2017-09-14 RX ORDER — SODIUM CHLORIDE 9 MG/ML
1000 INJECTION, SOLUTION INTRAVENOUS
Qty: 0 | Refills: 0 | Status: DISCONTINUED | OUTPATIENT
Start: 2017-09-14 | End: 2017-09-17

## 2017-09-14 RX ORDER — DILTIAZEM HCL 120 MG
120 CAPSULE, EXT RELEASE 24 HR ORAL DAILY
Qty: 0 | Refills: 0 | Status: DISCONTINUED | OUTPATIENT
Start: 2017-09-14 | End: 2017-09-18

## 2017-09-14 RX ORDER — ASPIRIN/CALCIUM CARB/MAGNESIUM 324 MG
81 TABLET ORAL DAILY
Qty: 0 | Refills: 0 | Status: DISCONTINUED | OUTPATIENT
Start: 2017-09-14 | End: 2017-09-18

## 2017-09-14 RX ORDER — ONDANSETRON 8 MG/1
4 TABLET, FILM COATED ORAL ONCE
Qty: 0 | Refills: 0 | Status: COMPLETED | OUTPATIENT
Start: 2017-09-14 | End: 2017-09-14

## 2017-09-14 RX ORDER — SODIUM CHLORIDE 9 MG/ML
1000 INJECTION INTRAMUSCULAR; INTRAVENOUS; SUBCUTANEOUS ONCE
Qty: 0 | Refills: 0 | Status: COMPLETED | OUTPATIENT
Start: 2017-09-14 | End: 2017-09-14

## 2017-09-14 RX ORDER — THIAMINE MONONITRATE (VIT B1) 100 MG
100 TABLET ORAL DAILY
Qty: 0 | Refills: 0 | Status: DISCONTINUED | OUTPATIENT
Start: 2017-09-14 | End: 2017-09-18

## 2017-09-14 RX ORDER — ONDANSETRON 8 MG/1
4 TABLET, FILM COATED ORAL EVERY 8 HOURS
Qty: 0 | Refills: 0 | Status: DISCONTINUED | OUTPATIENT
Start: 2017-09-14 | End: 2017-09-18

## 2017-09-14 RX ORDER — INFLUENZA VIRUS VACCINE 15; 15; 15; 15 UG/.5ML; UG/.5ML; UG/.5ML; UG/.5ML
0.5 SUSPENSION INTRAMUSCULAR ONCE
Qty: 0 | Refills: 0 | Status: COMPLETED | OUTPATIENT
Start: 2017-09-14 | End: 2017-09-14

## 2017-09-14 RX ORDER — IPRATROPIUM/ALBUTEROL SULFATE 18-103MCG
3 AEROSOL WITH ADAPTER (GRAM) INHALATION EVERY 6 HOURS
Qty: 0 | Refills: 0 | Status: DISCONTINUED | OUTPATIENT
Start: 2017-09-14 | End: 2017-09-18

## 2017-09-14 RX ORDER — PANTOPRAZOLE SODIUM 20 MG/1
40 TABLET, DELAYED RELEASE ORAL
Qty: 0 | Refills: 0 | Status: DISCONTINUED | OUTPATIENT
Start: 2017-09-14 | End: 2017-09-18

## 2017-09-14 RX ORDER — NICOTINE POLACRILEX 2 MG
1 GUM BUCCAL DAILY
Qty: 0 | Refills: 0 | Status: DISCONTINUED | OUTPATIENT
Start: 2017-09-14 | End: 2017-09-18

## 2017-09-14 RX ORDER — BUDESONIDE AND FORMOTEROL FUMARATE DIHYDRATE 160; 4.5 UG/1; UG/1
2 AEROSOL RESPIRATORY (INHALATION)
Qty: 0 | Refills: 0 | Status: DISCONTINUED | OUTPATIENT
Start: 2017-09-14 | End: 2017-09-18

## 2017-09-14 RX ORDER — FOLIC ACID 0.8 MG
1 TABLET ORAL DAILY
Qty: 0 | Refills: 0 | Status: DISCONTINUED | OUTPATIENT
Start: 2017-09-14 | End: 2017-09-18

## 2017-09-14 RX ADMIN — Medication 1 PATCH: at 21:27

## 2017-09-14 RX ADMIN — ENOXAPARIN SODIUM 40 MILLIGRAM(S): 100 INJECTION SUBCUTANEOUS at 21:28

## 2017-09-14 RX ADMIN — Medication 2 MILLIGRAM(S): at 19:55

## 2017-09-14 RX ADMIN — SODIUM CHLORIDE 4000 MILLILITER(S): 9 INJECTION INTRAMUSCULAR; INTRAVENOUS; SUBCUTANEOUS at 12:37

## 2017-09-14 RX ADMIN — SODIUM CHLORIDE 125 MILLILITER(S): 9 INJECTION, SOLUTION INTRAVENOUS at 14:08

## 2017-09-14 RX ADMIN — Medication 81 MILLIGRAM(S): at 21:28

## 2017-09-14 RX ADMIN — SODIUM CHLORIDE 125 MILLILITER(S): 9 INJECTION, SOLUTION INTRAVENOUS at 17:16

## 2017-09-14 RX ADMIN — Medication 100 MILLIGRAM(S): at 21:27

## 2017-09-14 RX ADMIN — SODIUM CHLORIDE 125 MILLILITER(S): 9 INJECTION, SOLUTION INTRAVENOUS at 18:13

## 2017-09-14 RX ADMIN — ONDANSETRON 4 MILLIGRAM(S): 8 TABLET, FILM COATED ORAL at 15:28

## 2017-09-14 RX ADMIN — Medication 1 MILLIGRAM(S): at 21:27

## 2017-09-14 RX ADMIN — Medication 2 MILLIGRAM(S): at 22:29

## 2017-09-14 RX ADMIN — Medication 120 MILLIGRAM(S): at 22:29

## 2017-09-14 RX ADMIN — SODIUM CHLORIDE 125 MILLILITER(S): 9 INJECTION, SOLUTION INTRAVENOUS at 15:27

## 2017-09-14 NOTE — H&P ADULT - PROBLEM SELECTOR PLAN 7
ct shows nonobstructive 8mm stone, outpatient urology followup hx of sVT, now rate controlled  c/w diltizaem 120mg ER daily  c/w aspirin 81mg daily

## 2017-09-14 NOTE — ED ADULT NURSE NOTE - OBJECTIVE STATEMENT
52F comes to ED c/o abdominal pain, nausea, vomiting, dark diarrhea, alcohol withdrawal, headache with dizziness and left flank pain. Patient states she has diagnosed gallstones and kidney stones that need to be surgically removed. She states she has had complications with each time she tries to get them removed. Patient drinks 1 pint of liquor and 4-5 beers/day. Last drink last night. Patient feels she is withdrawing. Last withdrawal patient had seizure. She presents to ED visibly anxious. She is a&ox4. She states she has been dry heaving and also vomited in triage today. States she has dark diarrhea. Can not tolerate PO intake. PMH COPD and asthma. She denies chest pain/SOB/urinary symptoms/syncope. States she has been having recent falls with unknown head injury. She has bruising to left arm and right knee. She is ambulatory. States she wishes to go for detox. Patient placed on cardiac monitor. Patient has bedrails up x2. Family is at bedside. Will continue to monitor.

## 2017-09-14 NOTE — H&P ADULT - PROBLEM SELECTOR PLAN 6
hx of sVT, now rate controlled  c/w diltizaem 120mg ER daily  c/w aspirin 81mg daily reports dysuria, no fevers  send UA, urine cx  if UA positive treat with ceftriaxone

## 2017-09-14 NOTE — ED ADULT NURSE REASSESSMENT NOTE - NS ED NURSE REASSESS COMMENT FT1
5mg IVP given in emergency for seizure prophylaxis/withdrawl symptom. MD Priest wanted 5mg stat for patient. 5mg valium given IVP with MD Priest at bedside. Wasted 5mg valium with RN Cheyenne.
Called pharmacy for patients nutrient bag.
Patient is laying in bed with significant other at bedside. She remains on cardiac monitor. IV fluids infusing. Awaiting supplement bag from pharmacy. She appears less anxious, states she feels less anxious but still has abdominal pain. Patient still has tremors in upper extremities. Will continue to monitor.
Patient sitting up in bed. She states she "cant handle the tremors". Her tremors have returned back to how they were on ED arrival. Headache and nausea are gone, but patient more agitated and states "tingling" up arms. Will continue to monitor.
s/w vera in pharmacy states nutrient bag will be tubed shortly.

## 2017-09-14 NOTE — ED PROVIDER NOTE - OBJECTIVE STATEMENT
52 y.o. female coming in alcohol withdraw.  Pt has a history of drinking liquor and beers on a daily basis.  Has had seizures secondary to withdraw in the past.  Pt's last drinking was last night.  Awoke this morning but was unable to drink secondary to vomiting prompting her to go into withdraw and come here today.  Does report some abd pain after eating.  Has a history of gallstones and left sided kidney stone.  Has not been bale to address either secondary to the alcohol abuse.  Is interested in detox.  No cough, no fevers, no chills.

## 2017-09-14 NOTE — ED PROVIDER NOTE - MEDICAL DECISION MAKING DETAILS
Attending MD Priest: 52 y.o. female coming in alcohol withdraw.  Pt has a history of drinking liquor and beers on a daily basis.  Has had seizures secondary to withdraw in the past.  Pt's last drinking was last night.  Awoke this morning but was unable to drink secondary to vomiting prompting her to go into withdraw and come here today.  Does report some abd pain after eating.   Is interested in detox.  No cough, no fevers, no chills.  Last drink several hours ago.  Attending MD Priest: A & O x 3, NAD, HEENT WNL and no facial asymmetry; lungs CTAB, heart with reg rhythm without murmur; abdomen soft NTND; extremities with no edema; skin with no rashes, neuro exam non focal with no motor or sensory deficits. Plan: labs, IV fluids, thiamine and folate. CIWA initiation, social work consult and consider admission.

## 2017-09-14 NOTE — ED PROVIDER NOTE - ATTENDING CONTRIBUTION TO CARE
Attending MD Priest:   I personally have seen and examined this patient.  Physician assistant note reviewed and agree on plan of care and except where noted.  See MDM for details.

## 2017-09-14 NOTE — H&P ADULT - ASSESSMENT
53 yo F with PMH of alcohol abuse with hx of withdrawal seizure x1, SVT, asthma/copd current smoker, tobacco use, nephrolithiasis, gallstones, presents with 1 day of tremors, anxiety with abdominal pain, vomiting, diarrhea admitted for alcohol withdrawal and further workup of GI symptoms.

## 2017-09-14 NOTE — H&P ADULT - PROBLEM SELECTOR PLAN 4
metabolic acidosis with increased anion gap likely due to lactic acidosis  likely due vomiting and dehydration  c/w banana bag and IVF once banana bag finishes  repeat VBG and BMP now

## 2017-09-14 NOTE — H&P ADULT - PROBLEM SELECTOR PLAN 5
likely due to alcoholic hepatic steatosis vs less likely alcoholic hepatitis or symptomatic gallstones vs cholecytisis  trend lfts  ruq sono

## 2017-09-14 NOTE — H&P ADULT - NSHPLABSRESULTS_GEN_ALL_CORE
personally reviewed labs:  Hgb 12.4, mcv 104  CMP notable for bicarb of 19, AG 22  alk phos 136, AST//63, Tbili 0.7  Mg 1.6, phos 3.3  VBG wnl  lactate 3.9  Alcohol level 95    personally reviewed CXR: IMPRESSION: The lungs are clear but hyperinflated consistent with known history of   COPD.      personally reviewed CT abd/pelvis: IMPRESSION: The etiology of the abdominal pain is not elucidated.  Nonobstructing left renal calculus. Gallstones.      personally reviewed EKG personally reviewed labs:  Hgb 12.4, mcv 104  CMP notable for bicarb of 19, AG 22  alk phos 136, AST//63, Tbili 0.7  Mg 1.6, phos 3.3  VBG wnl  lactate 3.9  Alcohol level 95    personally reviewed CXR: IMPRESSION: The lungs are clear but hyperinflated consistent with known history of   COPD.      personally reviewed CT abd/pelvis: IMPRESSION: The etiology of the abdominal pain is not elucidated.  Nonobstructing left renal calculus. Gallstones.      personally reviewed EKG: NSR @ 98, tall T waves in v3-v5 personally reviewed labs:  Hgb 12.4, mcv 104  CMP notable for bicarb of 19, AG 22  alk phos 136, AST//63, Tbili 0.7  Mg 1.6, phos 3.3  VBG wnl  lactate 3.9  Alcohol level 95    personally reviewed CXR: IMPRESSION: The lungs are clear but hyperinflated consistent with known history of   COPD.      personally reviewed CT abd/pelvis: IMPRESSION: The etiology of the abdominal pain is not elucidated.  Nonobstructing left renal calculus. Gallstones.      personally reviewed EKG: NSR @ 98, tall T waves in v3-v5. old Q waves in III, AVF

## 2017-09-14 NOTE — ED PROVIDER NOTE - CONSTITUTIONAL, MLM
Well appearing, well nourished, awake, alert, oriented to person, place, time/situation and in no apparent distress.  + tremors normal...

## 2017-09-14 NOTE — H&P ADULT - PROBLEM SELECTOR PLAN 8
stable  c/w symbicort 2puff bid  albuterol prn ct shows nonobstructive 8mm stone, outpatient urology followup

## 2017-09-14 NOTE — H&P ADULT - PROBLEM SELECTOR PLAN 1
-CIWA score most recently 8 appearing anxious on exam with tremors  -will start high risk Ativan taper  -monitor on CIWA overnight  -if CIWA worsens significantly despite Ativan,  -c/w banana bag, thiamine, folate

## 2017-09-14 NOTE — H&P ADULT - PROBLEM SELECTOR PLAN 2
both RUQ and LLQ with AST/ALT elevation ratio 3:1: pain most likely due to alcoholic liver steatosis vs alcoholic hepatitis (however tbili wnl), less likely symptomatic gallstones or nephrolithiases based on CT findings  -will get RUQ sono  -consider GI consult in AM  -trend LFTS  -c/w ppi daily

## 2017-09-14 NOTE — H&P ADULT - PROBLEM SELECTOR PLAN 3
DDx includes viral gastroenteritis, chronic alcoholism and malabsorption issues vs alcoholic hepatitis  -zofran prn  -if diarrhea continues, send stool studies

## 2017-09-14 NOTE — ED ADULT NURSE NOTE - MUSCULOSKELETAL WDL
Full range of motion of upper and lower extremities, no joint tenderness/swelling. Patient has tremors of upper extremities.

## 2017-09-14 NOTE — H&P ADULT - HISTORY OF PRESENT ILLNESS
53 yo F with PMH of alcohol abuse with hx of withdrawal seizures, SVT, asthma/copd, tobacco abuse, nephrolithiasis presents with        ED vitals: 98.4, 98, 141/99, 16, 100% on RA  She was given valium 5mg x2, zofran, 1L NS bolus 53 yo F with PMH of alcohol abuse with hx of withdrawal seizure x1, SVT, asthma/copd, tobacco abuse, nephrolithiasis, gallstones, presents with 1 day of alcohol withdrawal abdominal pain and vomiting. Her last drink was 11pm last night and she could not drink this morning due to vomiting. Since this she has been very shaky, with tremors and anxiety. No hallucination's Hx of seizure x 1 in past, denies MICU stay or intubation from withdrawss in past. Had suffered from SVT due to withdrawal Drinks about a pint of southern comfort and 5-6 beers for >20 years. Patient endorses both RUQ pain, 7/10 in severity, sharp, no alleviating or exacerbating factors and also Left flank pain which she believes in from a large kidney stone she had previously. +dysuria, no urgency or frequent. No fever or back pain.  +also endorses wattery diarrhea for past 1 weeks anytime she eats anything. Denies any blood in stool, melena. No recent antibitoics or sick contacts. Denies chest pain, sob, palpitations, syncope, cough.        ED vitals: 98.4, 98, 141/99, 16, 100% on RA  She was given valium 5mg x2, zofran, 1L NS bolus 53 yo F with PMH of alcohol abuse with hx of withdrawal seizure x1, SVT, asthma/copd current smoker, tobacco use, nephrolithiasis, gallstones, presents with 1 day of alcohol withdrawal abdominal pain and vomiting. Her last drink was 11pm last night and she could not drink this morning due to vomiting. Since this she has been very shaky, with tremors and anxiety. No hallucination's Hx of seizure x 1 in past, denies MICU stay or intubation from withdrawss in past. Had suffered from SVT due to withdrawal Drinks about a pint of southern comfort and 5-6 beers for >20 years. Patient endorses both RUQ pain, 7/10 in severity, sharp, no alleviating or exacerbating factors and also Left flank pain which she believes in from a large kidney stone she had previously. +dysuria, no urgency or frequent. No fever or back pain.  +also endorses wattery diarrhea for past 1 weeks anytime she eats anything. Denies any blood in stool, melena. No recent antibitoics or sick contacts. Denies chest pain, sob, palpitations, syncope, cough.        ED vitals: 98.4, 98, 141/99, 16, 100% on RA  She was given valium 5mg x2, zofran, 1L NS bolus

## 2017-09-14 NOTE — ED ADULT NURSE NOTE - PERIPHERAL VASCULAR WDL
Normal rate, regular rhythm.  Heart sounds S1, S2.  No murmurs, rubs or gallops.
Pulses equal bilaterally, no edema present.

## 2017-09-15 LAB
ALBUMIN SERPL ELPH-MCNC: 3.2 G/DL — LOW (ref 3.3–5)
ALP SERPL-CCNC: 115 U/L — SIGNIFICANT CHANGE UP (ref 40–120)
ALT FLD-CCNC: 55 U/L RC — HIGH (ref 10–45)
ANION GAP SERPL CALC-SCNC: 15 MMOL/L — SIGNIFICANT CHANGE UP (ref 5–17)
ANION GAP SERPL CALC-SCNC: 16 MMOL/L — SIGNIFICANT CHANGE UP (ref 5–17)
APPEARANCE UR: ABNORMAL
AST SERPL-CCNC: 150 U/L — HIGH (ref 10–40)
BILIRUB SERPL-MCNC: 1 MG/DL — SIGNIFICANT CHANGE UP (ref 0.2–1.2)
BILIRUB UR-MCNC: NEGATIVE — SIGNIFICANT CHANGE UP
BUN SERPL-MCNC: 4 MG/DL — LOW (ref 7–23)
BUN SERPL-MCNC: 6 MG/DL — LOW (ref 7–23)
CALCIUM SERPL-MCNC: 7.5 MG/DL — LOW (ref 8.4–10.5)
CALCIUM SERPL-MCNC: 8.1 MG/DL — LOW (ref 8.4–10.5)
CHLORIDE SERPL-SCNC: 101 MMOL/L — SIGNIFICANT CHANGE UP (ref 96–108)
CHLORIDE SERPL-SCNC: 102 MMOL/L — SIGNIFICANT CHANGE UP (ref 96–108)
CO2 SERPL-SCNC: 22 MMOL/L — SIGNIFICANT CHANGE UP (ref 22–31)
CO2 SERPL-SCNC: 22 MMOL/L — SIGNIFICANT CHANGE UP (ref 22–31)
COLOR SPEC: YELLOW — SIGNIFICANT CHANGE UP
CREAT SERPL-MCNC: 0.58 MG/DL — SIGNIFICANT CHANGE UP (ref 0.5–1.3)
CREAT SERPL-MCNC: 0.64 MG/DL — SIGNIFICANT CHANGE UP (ref 0.5–1.3)
DIFF PNL FLD: NEGATIVE — SIGNIFICANT CHANGE UP
EPI CELLS # UR: SIGNIFICANT CHANGE UP /HPF
GAS PNL BLDV: SIGNIFICANT CHANGE UP
GLUCOSE SERPL-MCNC: 103 MG/DL — HIGH (ref 70–99)
GLUCOSE SERPL-MCNC: 92 MG/DL — SIGNIFICANT CHANGE UP (ref 70–99)
GLUCOSE UR QL: NEGATIVE — SIGNIFICANT CHANGE UP
HCT VFR BLD CALC: 29.6 % — LOW (ref 34.5–45)
HGB BLD-MCNC: 10.4 G/DL — LOW (ref 11.5–15.5)
KETONES UR-MCNC: NEGATIVE — SIGNIFICANT CHANGE UP
LEUKOCYTE ESTERASE UR-ACNC: ABNORMAL
MCHC RBC-ENTMCNC: 35.1 GM/DL — SIGNIFICANT CHANGE UP (ref 32–36)
MCHC RBC-ENTMCNC: 36.7 PG — HIGH (ref 27–34)
MCV RBC AUTO: 105 FL — HIGH (ref 80–100)
NITRITE UR-MCNC: NEGATIVE — SIGNIFICANT CHANGE UP
PH UR: 6.5 — SIGNIFICANT CHANGE UP (ref 5–8)
PLATELET # BLD AUTO: 186 K/UL — SIGNIFICANT CHANGE UP (ref 150–400)
POTASSIUM SERPL-MCNC: 3.3 MMOL/L — LOW (ref 3.5–5.3)
POTASSIUM SERPL-MCNC: 3.3 MMOL/L — LOW (ref 3.5–5.3)
POTASSIUM SERPL-SCNC: 3.3 MMOL/L — LOW (ref 3.5–5.3)
POTASSIUM SERPL-SCNC: 3.3 MMOL/L — LOW (ref 3.5–5.3)
PROT SERPL-MCNC: 6 G/DL — SIGNIFICANT CHANGE UP (ref 6–8.3)
PROT UR-MCNC: SIGNIFICANT CHANGE UP
RBC # BLD: 2.83 M/UL — LOW (ref 3.8–5.2)
RBC # FLD: 15.2 % — HIGH (ref 10.3–14.5)
SODIUM SERPL-SCNC: 139 MMOL/L — SIGNIFICANT CHANGE UP (ref 135–145)
SODIUM SERPL-SCNC: 139 MMOL/L — SIGNIFICANT CHANGE UP (ref 135–145)
SP GR SPEC: >1.03 — HIGH (ref 1.01–1.02)
UROBILINOGEN FLD QL: NEGATIVE — SIGNIFICANT CHANGE UP
WBC # BLD: 3.5 K/UL — LOW (ref 3.8–10.5)
WBC # FLD AUTO: 3.5 K/UL — LOW (ref 3.8–10.5)
WBC UR QL: SIGNIFICANT CHANGE UP /HPF (ref 0–5)

## 2017-09-15 PROCEDURE — 99233 SBSQ HOSP IP/OBS HIGH 50: CPT | Mod: GC

## 2017-09-15 PROCEDURE — 76705 ECHO EXAM OF ABDOMEN: CPT | Mod: 26,RT

## 2017-09-15 RX ORDER — CEFTRIAXONE 500 MG/1
INJECTION, POWDER, FOR SOLUTION INTRAMUSCULAR; INTRAVENOUS
Qty: 0 | Refills: 0 | Status: DISCONTINUED | OUTPATIENT
Start: 2017-09-15 | End: 2017-09-15

## 2017-09-15 RX ORDER — POTASSIUM CHLORIDE 20 MEQ
40 PACKET (EA) ORAL
Qty: 0 | Refills: 0 | Status: COMPLETED | OUTPATIENT
Start: 2017-09-15 | End: 2017-09-15

## 2017-09-15 RX ORDER — POTASSIUM CHLORIDE 20 MEQ
40 PACKET (EA) ORAL ONCE
Qty: 0 | Refills: 0 | Status: DISCONTINUED | OUTPATIENT
Start: 2017-09-15 | End: 2017-09-15

## 2017-09-15 RX ORDER — MAGNESIUM SULFATE 500 MG/ML
2 VIAL (ML) INJECTION ONCE
Qty: 0 | Refills: 0 | Status: COMPLETED | OUTPATIENT
Start: 2017-09-15 | End: 2017-09-15

## 2017-09-15 RX ORDER — CEFTRIAXONE 500 MG/1
1 INJECTION, POWDER, FOR SOLUTION INTRAMUSCULAR; INTRAVENOUS ONCE
Qty: 0 | Refills: 0 | Status: DISCONTINUED | OUTPATIENT
Start: 2017-09-15 | End: 2017-09-15

## 2017-09-15 RX ORDER — NICOTINE POLACRILEX 2 MG
4 GUM BUCCAL THREE TIMES A DAY
Qty: 0 | Refills: 0 | Status: DISCONTINUED | OUTPATIENT
Start: 2017-09-15 | End: 2017-09-18

## 2017-09-15 RX ORDER — MAGNESIUM SULFATE 500 MG/ML
1 VIAL (ML) INJECTION ONCE
Qty: 0 | Refills: 0 | Status: DISCONTINUED | OUTPATIENT
Start: 2017-09-15 | End: 2017-09-15

## 2017-09-15 RX ADMIN — Medication 2 MILLIGRAM(S): at 05:56

## 2017-09-15 RX ADMIN — PANTOPRAZOLE SODIUM 40 MILLIGRAM(S): 20 TABLET, DELAYED RELEASE ORAL at 05:57

## 2017-09-15 RX ADMIN — BUDESONIDE AND FORMOTEROL FUMARATE DIHYDRATE 2 PUFF(S): 160; 4.5 AEROSOL RESPIRATORY (INHALATION) at 06:51

## 2017-09-15 RX ADMIN — Medication 2 MILLIGRAM(S): at 01:34

## 2017-09-15 RX ADMIN — Medication 40 MILLIEQUIVALENT(S): at 14:28

## 2017-09-15 RX ADMIN — Medication 1.5 MILLIGRAM(S): at 22:02

## 2017-09-15 RX ADMIN — Medication 81 MILLIGRAM(S): at 11:11

## 2017-09-15 RX ADMIN — Medication 50 GRAM(S): at 11:13

## 2017-09-15 RX ADMIN — Medication 0.5 MILLIGRAM(S): at 08:34

## 2017-09-15 RX ADMIN — Medication 1 PATCH: at 12:35

## 2017-09-15 RX ADMIN — Medication 40 MILLIEQUIVALENT(S): at 11:11

## 2017-09-15 RX ADMIN — Medication 2 MILLIGRAM(S): at 15:24

## 2017-09-15 RX ADMIN — Medication 1.5 MILLIGRAM(S): at 18:23

## 2017-09-15 RX ADMIN — Medication 4 MILLIGRAM(S): at 22:05

## 2017-09-15 RX ADMIN — ONDANSETRON 4 MILLIGRAM(S): 8 TABLET, FILM COATED ORAL at 08:39

## 2017-09-15 RX ADMIN — Medication 100 MILLIGRAM(S): at 11:11

## 2017-09-15 RX ADMIN — Medication 1 MILLIGRAM(S): at 11:11

## 2017-09-15 RX ADMIN — Medication 120 MILLIGRAM(S): at 11:11

## 2017-09-15 RX ADMIN — ENOXAPARIN SODIUM 40 MILLIGRAM(S): 100 INJECTION SUBCUTANEOUS at 11:19

## 2017-09-15 RX ADMIN — Medication 2 MILLIGRAM(S): at 11:18

## 2017-09-15 RX ADMIN — BUDESONIDE AND FORMOTEROL FUMARATE DIHYDRATE 2 PUFF(S): 160; 4.5 AEROSOL RESPIRATORY (INHALATION) at 17:03

## 2017-09-15 NOTE — PROGRESS NOTE ADULT - PROBLEM SELECTOR PLAN 2
both RUQ and LLQ with AST/ALT elevation ratio 3:1: pain most likely due to alcoholic liver steatosis vs alcoholic hepatitis (however tbili wnl), less likely symptomatic gallstones or nephrolithiases based on CT findings  - RUQ US w/ evidence of hepatic steatosis. No evidence of cholecystitis  - trend LFTS  - c/w ppi daily both RUQ and LLQ with AST/ALT elevation ratio 3:1: pain most likely due to alcoholic liver steatosis vs alcoholic hepatitis (however tbili wnl), less likely symptomatic gallstones or nephrolithiases based on CT findings  - RUQ US w/ evidence of hepatic steatosis. No evidence of cholecystitis/ pos cholelithiasis   - trend LFTS  - c/w ppi daily

## 2017-09-15 NOTE — DIETITIAN INITIAL EVALUATION ADULT. - NS AS NUTRI INTERV MEDICAL AND FOOD SUPPLEMENTS
Commercial beverage/Provide Ensure Enlive three times daily (provides additional 1050kcal, 60g protein)

## 2017-09-15 NOTE — PROGRESS NOTE ADULT - PROBLEM SELECTOR PLAN 1
-CIWA score most recently 8 appearing anxious on exam with tremors  -will start high risk Ativan taper  -monitor on CIWA overnight  -c/w banana bag, thiamine, folate

## 2017-09-15 NOTE — PROGRESS NOTE ADULT - PROBLEM SELECTOR PLAN 3
DDx includes viral gastroenteritis, chronic alcoholism and malabsorption issues vs alcoholic hepatitis. Diarrhea is improving.  -zofran prn  -if diarrhea continues, send stool studies

## 2017-09-15 NOTE — PROGRESS NOTE ADULT - PROBLEM SELECTOR PLAN 4
- metabolic acidosis with increased anion gap likely due to lactic acidosis  likely due vomiting and dehydration  - Improving. Gap is closed and lac is wnl  - s/p 1 banana bag - metabolic acidosis with increased anion gap likely due to lactic acidosis  likely due vomiting and dehydration and alcoholic ketosis.    - Improving. Gap is closed and lac is wnl  - s/p 1 banana bag  cont Zofran

## 2017-09-15 NOTE — PROGRESS NOTE ADULT - PROBLEM SELECTOR PLAN 6
- Pt has dysuria w/ mild L CVA tenderness, and nonobstructing 8mm L renal calculi on CT. UA + for leuks. No fevers, chills. No evidence of pyelo on CT.  - Allergic to cephalosporins.  - Started levaquin 750mg po daily.  - f/u UCx  if UA positive treat with ceftriaxone - Pt has dysuria w/ mild L CVA tenderness, and nonobstructing 8mm L renal calculi on CT. UA + for leuks. No fevers, chills. No evidence of pyelo on CT.  - Allergic to cephalosporins.  - Started levaquin 750mg po daily.  - f/u UCx - Pt has dysuria w/ mild L CVA tenderness, and nonobstructing 8mm L renal calculi on CT. UA + for leuks. No fevers, chills. No evidence of pyelo on CT.  - Allergic to cephalosporins.  - Started levaquin 750mg po daily/ monitor QTC ( admission QTC= 439)   - f/u UCx

## 2017-09-15 NOTE — DIETITIAN INITIAL EVALUATION ADULT. - PHYSICAL APPEARANCE
underweight/emaciated/Nutrition Focused Physical Exam performed. Pt with moderate fat loss on triceps, severe fat loss on ribs. Severe muscle loss on interosseous, scapula, temporal, deltoidal and clavicle regions.

## 2017-09-15 NOTE — DIETITIAN INITIAL EVALUATION ADULT. - NS AS NUTRI INTERV MEALS SNACK
1) Encourage good PO intake  2) Provide food preferences within therapeutic diet when requested.   3) Monitor po intake, wt, labs, GI tolerance, skin integrity

## 2017-09-15 NOTE — DIETITIAN INITIAL EVALUATION ADULT. - ORAL INTAKE PTA
Pt states she usually doesn't eat anything during the day PTA. If she does it she will eat pizza, french fries or something she makes at home (would not elaborate). Pt drinks 1 pint of southern comfort and 5-6 beers daily for >20 years./poor

## 2017-09-15 NOTE — PROGRESS NOTE ADULT - ASSESSMENT
51 yo F with PMH of alcohol abuse with hx of withdrawal seizure x1, SVT, asthma/copd current smoker, tobacco use, nephrolithiasis, gallstones, presents with 1 day of tremors, anxiety with abdominal pain, vomiting, diarrhea admitted for alcohol withdrawal and further workup of GI symptoms.

## 2017-09-15 NOTE — CHART NOTE - NSCHARTNOTEFT_GEN_A_CORE
Upon Nutritional Assessment by the Registered Dietitian your patient was determined to meet criteria / has evidence of the following diagnosis/diagnoses:          [ ]  Mild Protein Calorie Malnutrition        [ ]  Moderate Protein Calorie Malnutrition        [x] Severe Protein Calorie Malnutrition        [ ] Unspecified Protein Calorie Malnutrition        [x] Underweight / BMI <19        [ ] Morbid Obesity / BMI > 40      Findings as based on:  [x] Comprehensive nutrition assessment   [x] Nutrition Focused Physical Exam performed. Pt with moderate fat loss on triceps, severe fat loss on ribs. Severe muscle loss on interosseous, scapula, temporal, deltoidal and clavicle regions.  [x] Other: unintentional 9% Wt loss in 4 months. poor PO intake PTA. BMI 15.7kg/m^2      Nutrition Plan/Recommendations:    1) Provide Ensure Enlive three times daily (provides additional 1050kcal, 60g protein)   2) Encourage po intake w/ snacks ordered at meals.   3) Provide food preferences within diet restrictions as feasible.   4) Education on increasing nutritional intake and abstaining from EtOH use.  5) Daily MVI, thiamine and folic acid supplementation.      PROVIDER Section:     By signing this assessment you are acknowledging and agree with the diagnosis/diagnoses assigned by the Registered Dietitian    Comments:

## 2017-09-15 NOTE — DIETITIAN INITIAL EVALUATION ADULT. - ENERGY NEEDS
ht: 60 inches, wt: 80.6 pounds, BMI: 15.7 kg/m2, IBW: 100 pounds (+/- 10%), 81 %IBW  Edema: none noted. Skin: intact.  Other pertinent information: 53 y/o female presented c alcohol withdrawal and vomiting, diarrhea after eating x1 week.

## 2017-09-15 NOTE — DIETITIAN INITIAL EVALUATION ADULT. - OTHER INFO
Nutrition consult for BMI <18. Per previous RD notes Pt weighed 81.5lbs in March 2017, 88.8lbs in May 2017, current Wt 80.6lbs. Pt states she used to weigh >100lbs at the beginning of the year. Repotrs eating >50% at meals, noted c 50% and 100% consumed since admission. Pt had vomiting and diarrhea PTA but states she has no abdominal pain, diarrhea, constipation, or vomiting since admission but has been feeling nauseas. States she wears partial lower dentures which are ill-fitting. Amenable to ensure enlive supplements. Of note Pt states she lives with her fiance who also drinks but does not drink as much as her.

## 2017-09-16 ENCOUNTER — TRANSCRIPTION ENCOUNTER (OUTPATIENT)
Age: 52
End: 2017-09-16

## 2017-09-16 LAB
ALBUMIN SERPL ELPH-MCNC: 3.1 G/DL — LOW (ref 3.3–5)
ALP SERPL-CCNC: 101 U/L — SIGNIFICANT CHANGE UP (ref 40–120)
ALT FLD-CCNC: 44 U/L RC — SIGNIFICANT CHANGE UP (ref 10–45)
ANION GAP SERPL CALC-SCNC: 15 MMOL/L — SIGNIFICANT CHANGE UP (ref 5–17)
AST SERPL-CCNC: 75 U/L — HIGH (ref 10–40)
BASOPHILS # BLD AUTO: 0 K/UL — SIGNIFICANT CHANGE UP (ref 0–0.2)
BASOPHILS NFR BLD AUTO: 0.9 % — SIGNIFICANT CHANGE UP (ref 0–2)
BILIRUB SERPL-MCNC: 0.5 MG/DL — SIGNIFICANT CHANGE UP (ref 0.2–1.2)
BUN SERPL-MCNC: 3 MG/DL — LOW (ref 7–23)
CALCIUM SERPL-MCNC: 8.6 MG/DL — SIGNIFICANT CHANGE UP (ref 8.4–10.5)
CHLORIDE SERPL-SCNC: 102 MMOL/L — SIGNIFICANT CHANGE UP (ref 96–108)
CO2 SERPL-SCNC: 20 MMOL/L — LOW (ref 22–31)
CREAT SERPL-MCNC: 0.54 MG/DL — SIGNIFICANT CHANGE UP (ref 0.5–1.3)
EOSINOPHIL # BLD AUTO: 0 K/UL — SIGNIFICANT CHANGE UP (ref 0–0.5)
EOSINOPHIL NFR BLD AUTO: 0.8 % — SIGNIFICANT CHANGE UP (ref 0–6)
GLUCOSE SERPL-MCNC: 106 MG/DL — HIGH (ref 70–99)
HCT VFR BLD CALC: 26.3 % — LOW (ref 34.5–45)
HGB BLD-MCNC: 9.1 G/DL — LOW (ref 11.5–15.5)
INR BLD: 1.14 RATIO — SIGNIFICANT CHANGE UP (ref 0.88–1.16)
LYMPHOCYTES # BLD AUTO: 0.9 K/UL — LOW (ref 1–3.3)
LYMPHOCYTES # BLD AUTO: 22.8 % — SIGNIFICANT CHANGE UP (ref 13–44)
MAGNESIUM SERPL-MCNC: 1.8 MG/DL — SIGNIFICANT CHANGE UP (ref 1.6–2.6)
MCHC RBC-ENTMCNC: 34.5 GM/DL — SIGNIFICANT CHANGE UP (ref 32–36)
MCHC RBC-ENTMCNC: 36.2 PG — HIGH (ref 27–34)
MCV RBC AUTO: 105 FL — HIGH (ref 80–100)
MONOCYTES # BLD AUTO: 0.4 K/UL — SIGNIFICANT CHANGE UP (ref 0–0.9)
MONOCYTES NFR BLD AUTO: 9 % — SIGNIFICANT CHANGE UP (ref 2–14)
NEUTROPHILS # BLD AUTO: 2.7 K/UL — SIGNIFICANT CHANGE UP (ref 1.8–7.4)
NEUTROPHILS NFR BLD AUTO: 66.6 % — SIGNIFICANT CHANGE UP (ref 43–77)
PHOSPHATE SERPL-MCNC: 2.2 MG/DL — LOW (ref 2.5–4.5)
PLATELET # BLD AUTO: 148 K/UL — LOW (ref 150–400)
POTASSIUM SERPL-MCNC: 4.9 MMOL/L — SIGNIFICANT CHANGE UP (ref 3.5–5.3)
POTASSIUM SERPL-SCNC: 4.9 MMOL/L — SIGNIFICANT CHANGE UP (ref 3.5–5.3)
PROT SERPL-MCNC: 5.8 G/DL — LOW (ref 6–8.3)
PROTHROM AB SERPL-ACNC: 12.4 SEC — SIGNIFICANT CHANGE UP (ref 9.8–12.7)
RBC # BLD: 2.51 M/UL — LOW (ref 3.8–5.2)
RBC # FLD: 15 % — HIGH (ref 10.3–14.5)
SODIUM SERPL-SCNC: 137 MMOL/L — SIGNIFICANT CHANGE UP (ref 135–145)
WBC # BLD: 4.1 K/UL — SIGNIFICANT CHANGE UP (ref 3.8–10.5)
WBC # FLD AUTO: 4.1 K/UL — SIGNIFICANT CHANGE UP (ref 3.8–10.5)

## 2017-09-16 PROCEDURE — 99233 SBSQ HOSP IP/OBS HIGH 50: CPT | Mod: GC

## 2017-09-16 RX ORDER — MAGNESIUM OXIDE 400 MG ORAL TABLET 241.3 MG
400 TABLET ORAL
Qty: 0 | Refills: 0 | Status: COMPLETED | OUTPATIENT
Start: 2017-09-16 | End: 2017-09-17

## 2017-09-16 RX ORDER — SODIUM,POTASSIUM PHOSPHATES 278-250MG
1 POWDER IN PACKET (EA) ORAL
Qty: 0 | Refills: 0 | Status: COMPLETED | OUTPATIENT
Start: 2017-09-16 | End: 2017-09-17

## 2017-09-16 RX ADMIN — Medication 4 MILLIGRAM(S): at 21:50

## 2017-09-16 RX ADMIN — Medication 81 MILLIGRAM(S): at 11:40

## 2017-09-16 RX ADMIN — Medication 1 PATCH: at 11:40

## 2017-09-16 RX ADMIN — Medication 1 MILLIGRAM(S): at 21:49

## 2017-09-16 RX ADMIN — Medication 1 PATCH: at 11:41

## 2017-09-16 RX ADMIN — Medication 1 MILLIGRAM(S): at 11:40

## 2017-09-16 RX ADMIN — Medication 1.5 MILLIGRAM(S): at 02:14

## 2017-09-16 RX ADMIN — Medication 4 MILLIGRAM(S): at 06:16

## 2017-09-16 RX ADMIN — ENOXAPARIN SODIUM 40 MILLIGRAM(S): 100 INJECTION SUBCUTANEOUS at 11:40

## 2017-09-16 RX ADMIN — Medication 1 MILLIGRAM(S): at 17:54

## 2017-09-16 RX ADMIN — BUDESONIDE AND FORMOTEROL FUMARATE DIHYDRATE 2 PUFF(S): 160; 4.5 AEROSOL RESPIRATORY (INHALATION) at 17:43

## 2017-09-16 RX ADMIN — Medication 1 TABLET(S): at 11:39

## 2017-09-16 RX ADMIN — Medication 100 MILLIGRAM(S): at 11:40

## 2017-09-16 RX ADMIN — Medication 4 MILLIGRAM(S): at 14:01

## 2017-09-16 RX ADMIN — Medication 1 TABLET(S): at 21:54

## 2017-09-16 RX ADMIN — Medication 1.5 MILLIGRAM(S): at 10:20

## 2017-09-16 RX ADMIN — BUDESONIDE AND FORMOTEROL FUMARATE DIHYDRATE 2 PUFF(S): 160; 4.5 AEROSOL RESPIRATORY (INHALATION) at 06:23

## 2017-09-16 RX ADMIN — Medication 1.5 MILLIGRAM(S): at 06:16

## 2017-09-16 RX ADMIN — PANTOPRAZOLE SODIUM 40 MILLIGRAM(S): 20 TABLET, DELAYED RELEASE ORAL at 06:16

## 2017-09-16 RX ADMIN — Medication 120 MILLIGRAM(S): at 06:16

## 2017-09-16 RX ADMIN — Medication 1.5 MILLIGRAM(S): at 14:01

## 2017-09-16 RX ADMIN — MAGNESIUM OXIDE 400 MG ORAL TABLET 400 MILLIGRAM(S): 241.3 TABLET ORAL at 21:49

## 2017-09-16 NOTE — DISCHARGE NOTE ADULT - PATIENT PORTAL LINK FT
“You can access the Follow[employer] Patient Portal, offered by [facility], by registering with the following website: [email or web address]”

## 2017-09-16 NOTE — PROGRESS NOTE ADULT - PROBLEM SELECTOR PLAN 3
DDx includes viral gastroenteritis, chronic alcoholism and malabsorption issues vs alcoholic hepatitis.   - Resolving. No diarrhea for 24h.  - zofran prn  - if diarrhea continues, send stool studies

## 2017-09-16 NOTE — PROGRESS NOTE ADULT - SUBJECTIVE AND OBJECTIVE BOX
Avila Greer MD; PGY1  Medicine Care Model B  Pager: [phone]  After 7PM on weekdays or 12PM on weekends, please page [phone]    Patient is a 52y old  Female who presents with a chief complaint of alcohol withdrawal and vomiting (14 Sep 2017 18:10)        SUBJECTIVE / OVERNIGHT EVENTS: No acute events overnight. b/l UE tremors decreased. HA improving. No N/V and no diarrhea. Pt denies hemoptysis, hematochezia. No dizziness, CP, SOB, abd pain    ROS  CONSTITUTIONAL: No fevers, no chills  EYES: No visual changes; No double vision,  No vertigo, eye pain  Ears: no otalgia, no otorhea, no hearing loss, tinnitus  Nose: no epistaxis, rhinorrhea, post-discharge, sinus pressure  Throat: no throat pain, no oral lesions, tooth pain   NECK: No pain or stiffness  RESPIRATORY: No cough (productive or dry), wheezing, hemoptysis; No shortness of breath, orthnopnea, TALBOT  CARDIOVASCULAR: No chest pain or palpitations, no leg edema, no claudication    GASTROINTESTINAL: +melena, RUQ and L flank pain. +nausea, +vomiting. +diarrhea. No hematemesis, no constipation, no hematochezia.  GENITOURINARY: +dysuria; No frequency, no urgency, no hematuria, no pelvic pain, no urinary incontinence  Muscloskeletal: no joints or muscle pain or edema.  NEUROLOGICAL: +headache, +b/l UE tremors, no ataxia, no confusion  SKIN: No pruritis, rashes, lesions, no stigmata of liver disease.  Psych: +sadness, +anxiety, no SI/HI  Endocrine: No Heat or Cold intolerance, polydipsia, polyphagia  Heme/Lymph: no LN enlargement, no easy bruising or bleeding    MEDICATIONS  (STANDING):  multivitamin/thiamine/folic acid in sodium chloride 0.9% 1000 milliLiter(s) (125 mL/Hr) IV Continuous <Continuous>  enoxaparin Injectable 40 milliGRAM(s) SubCutaneous daily  LORazepam   Injectable   IV Push   LORazepam   Injectable 1.5 milliGRAM(s) IV Push every 4 hours  aspirin enteric coated 81 milliGRAM(s) Oral daily  LORazepam   Injectable 1 milliGRAM(s) IV Push every 4 hours  diltiazem    milliGRAM(s) Oral daily  buDESOnide 160 MICROgram(s)/formoterol 4.5 MICROgram(s) Inhaler 2 Puff(s) Inhalation two times a day  pantoprazole    Tablet 40 milliGRAM(s) Oral before breakfast  folic acid 1 milliGRAM(s) Oral daily  nicotine - 21 mG/24Hr(s) Patch 1 patch Transdermal daily  thiamine 100 milliGRAM(s) Oral daily  influenza   Vaccine 0.5 milliLiter(s) IntraMuscular once  levoFLOXacin  Tablet 750 milliGRAM(s) Oral every 24 hours  multivitamin 1 Tablet(s) Oral daily  nicotine  Polacrilex Gum 4 milliGRAM(s) Oral three times a day    MEDICATIONS  (PRN):  ALBUTerol/ipratropium for Nebulization 3 milliLiter(s) Nebulizer every 6 hours PRN Shortness of Breath and/or Wheezing  ondansetron Injectable 4 milliGRAM(s) IV Push every 8 hours PRN Nausea and/or Vomiting      T(C): 36.8 (09-16-17 @ 06:10), Max: 37.1 (09-15-17 @ 14:04)  T(F): 98.3 (09-16-17 @ 06:10), Max: 98.7 (09-15-17 @ 14:04)  HR: 85 (09-16-17 @ 06:10) (73 - 99)  BP: 100/62 (09-16-17 @ 06:10) (93/60 - 122/88)  ABP: --  ABP(mean): --  RR: 20 (09-16-17 @ 06:10) (16 - 20)  SpO2: 100% (09-16-17 @ 06:10) (94% - 100%)    CAPILLARY BLOOD GLUCOSE        I&O's Summary    15 Sep 2017 07:01  -  16 Sep 2017 07:00  --------------------------------------------------------  IN: 550 mL / OUT: 1500 mL / NET: -950 mL        PHYSICAL EXAM  GENERAL: NAD, well-developed  HEAD:  Atraumatic, Normocephalic, Poor dentition, normal oral mucosa  EYES: EOMI, PERRLA, conjunctiva and sclera clear  NECK: Supple, No JVD, no LAD  CHEST/LUNG: Clear to auscultation bilaterally; No wheeze  HEART: Regular rate and rhythm; No murmurs, rubs, or gallops  ABDOMEN: Hepatomegaly. TTP RUQ and L CVA; Soft, Nondistended; Bowel sounds present  EXTREMITIES:  2+ Peripheral Pulses, No clubbing, cyanosis, or edema  NEURO:  No focal deficits, CN II-XII intact  PSYCH: AAOx3, Appropriate mood and affect  SKIN: No rashes or lesions,    LABS:                        9.1    4.1   )-----------( 148      ( 16 Sep 2017 06:20 )             26.3     09-16    137  |  102  |  3<L>  ----------------------------<  106<H>  4.9   |  20<L>  |  0.54    Ca    8.6      16 Sep 2017 06:20  Phos  2.2     09-16  Mg     1.8     09-16    TPro  5.8<L>  /  Alb  3.1<L>  /  TBili  0.5  /  DBili  x   /  AST  75<H>  /  ALT  44  /  AlkPhos  101  09-16    PT/INR - ( 16 Sep 2017 06:20 )   PT: 12.4 sec;   INR: 1.14 ratio               Urinalysis Basic - ( 15 Sep 2017 06:29 )    Color: Yellow / Appearance: x / SG: >1.030 / pH: x  Gluc: x / Ketone: Negative  / Bili: Negative / Urobili: Negative   Blood: x / Protein: Trace / Nitrite: Negative   Leuk Esterase: Large / RBC: x / WBC 11-25 /HPF   Sq Epi: x / Non Sq Epi: OCC /HPF / Bacteria: x        RADIOLOGY & ADDITIONAL TESTS:    Imaging Personally Reviewed:  Consultant(s) Notes Reviewed:    Care Discussed with Consultants/Other Providers: Avila Greer MD; PGY1  Medicine Care Model B  Pager: [phone]  After 7PM on weekdays or 12PM on weekends, please page [phone]    Patient is a 52y old  Female who presents with a chief complaint of alcohol withdrawal and vomiting (14 Sep 2017 18:10)        SUBJECTIVE / OVERNIGHT EVENTS: No acute events overnight. b/l UE tremors decreased. HA improving. No N/V and no diarrhea. Pt denies hemoptysis, hematochezia. No dizziness, CP, SOB, abd pain.  Patient is requesting to go home.      ROS  CONSTITUTIONAL: No fevers, no chills  EYES: No visual changes; No double vision,  No vertigo, eye pain  Ears: no otalgia, no otorhea, no hearing loss, tinnitus  Nose: no epistaxis, rhinorrhea, post-discharge, sinus pressure  Throat: no throat pain, no oral lesions, tooth pain   NECK: No pain or stiffness  RESPIRATORY: No cough (productive or dry), wheezing, hemoptysis; No shortness of breath, orthnopnea, TALBOT  CARDIOVASCULAR: No chest pain or palpitations, no leg edema, no claudication    GASTROINTESTINAL: +melena history and none currently , RUQ and L flank pain. no nausea, no vomiting. no diarrhea. No hematemesis, no constipation, no hematochezia.  GENITOURINARY: +dysuria; No frequency, no urgency, no hematuria, no pelvic pain, no urinary incontinence  Muscloskeletal: no joints or muscle pain or edema.  NEUROLOGICAL: +headache, +b/l UE tremors, no ataxia, no confusion  SKIN: No pruritis, rashes, lesions, no stigmata of liver disease.  Psych: +sadness, +anxiety, no SI/HI  Endocrine: No Heat or Cold intolerance, polydipsia, polyphagia  Heme/Lymph: no LN enlargement, no easy bruising or bleeding    MEDICATIONS  (STANDING):  multivitamin/thiamine/folic acid in sodium chloride 0.9% 1000 milliLiter(s) (125 mL/Hr) IV Continuous <Continuous>  enoxaparin Injectable 40 milliGRAM(s) SubCutaneous daily  LORazepam   Injectable   IV Push   LORazepam   Injectable 1.5 milliGRAM(s) IV Push every 4 hours  aspirin enteric coated 81 milliGRAM(s) Oral daily  LORazepam   Injectable 1 milliGRAM(s) IV Push every 4 hours  diltiazem    milliGRAM(s) Oral daily  buDESOnide 160 MICROgram(s)/formoterol 4.5 MICROgram(s) Inhaler 2 Puff(s) Inhalation two times a day  pantoprazole    Tablet 40 milliGRAM(s) Oral before breakfast  folic acid 1 milliGRAM(s) Oral daily  nicotine - 21 mG/24Hr(s) Patch 1 patch Transdermal daily  thiamine 100 milliGRAM(s) Oral daily  influenza   Vaccine 0.5 milliLiter(s) IntraMuscular once  levoFLOXacin  Tablet 750 milliGRAM(s) Oral every 24 hours  multivitamin 1 Tablet(s) Oral daily  nicotine  Polacrilex Gum 4 milliGRAM(s) Oral three times a day    MEDICATIONS  (PRN):  ALBUTerol/ipratropium for Nebulization 3 milliLiter(s) Nebulizer every 6 hours PRN Shortness of Breath and/or Wheezing  ondansetron Injectable 4 milliGRAM(s) IV Push every 8 hours PRN Nausea and/or Vomiting      T(C): 36.8 (09-16-17 @ 06:10), Max: 37.1 (09-15-17 @ 14:04)  T(F): 98.3 (09-16-17 @ 06:10), Max: 98.7 (09-15-17 @ 14:04)  HR: 85 (09-16-17 @ 06:10) (73 - 99)  BP: 100/62 (09-16-17 @ 06:10) (93/60 - 122/88)  ABP: --  ABP(mean): --  RR: 20 (09-16-17 @ 06:10) (16 - 20)  SpO2: 100% (09-16-17 @ 06:10) (94% - 100%)    CAPILLARY BLOOD GLUCOSE        I&O's Summary    15 Sep 2017 07:01  -  16 Sep 2017 07:00  --------------------------------------------------------  IN: 550 mL / OUT: 1500 mL / NET: -950 mL        PHYSICAL EXAM  GENERAL: NAD, well-developed  HEAD:  Atraumatic, Normocephalic, Poor dentition, normal oral mucosa  EYES: EOMI, PERRLA, conjunctiva and sclera clear  NECK: Supple, No JVD, no LAD  CHEST/LUNG: Clear to auscultation bilaterally; No wheeze  HEART: Regular rate and rhythm; No murmurs, rubs, or gallops  ABDOMEN: Hepatomegaly. TTP RUQ and L CVA; Soft, Nondistended; Bowel sounds present  EXTREMITIES:  2+ Peripheral Pulses, No clubbing, cyanosis, or edema  NEURO:  No focal deficits, CN II-XII intact  PSYCH: AAOx3, Appropriate mood and affect  SKIN: No rashes or lesions,    LABS:                        9.1    4.1   )-----------( 148      ( 16 Sep 2017 06:20 )             26.3     09-16    137  |  102  |  3<L>  ----------------------------<  106<H>  4.9   |  20<L>  |  0.54    Ca    8.6      16 Sep 2017 06:20  Phos  2.2     09-16  Mg     1.8     09-16    TPro  5.8<L>  /  Alb  3.1<L>  /  TBili  0.5  /  DBili  x   /  AST  75<H>  /  ALT  44  /  AlkPhos  101  09-16    PT/INR - ( 16 Sep 2017 06:20 )   PT: 12.4 sec;   INR: 1.14 ratio               Urinalysis Basic - ( 15 Sep 2017 06:29 )    Color: Yellow / Appearance: x / SG: >1.030 / pH: x  Gluc: x / Ketone: Negative  / Bili: Negative / Urobili: Negative   Blood: x / Protein: Trace / Nitrite: Negative   Leuk Esterase: Large / RBC: x / WBC 11-25 /HPF   Sq Epi: x / Non Sq Epi: OCC /HPF / Bacteria: x        RADIOLOGY & ADDITIONAL TESTS:    Imaging Personally Reviewed:  Consultant(s) Notes Reviewed:    Care Discussed with Consultants/Other Providers:

## 2017-09-16 NOTE — DISCHARGE NOTE ADULT - HOSPITAL COURSE
Patient is a 52 year old female who was admitted to the hospital for treatment of alcohol withdrawal. She had fevers, chills, abdominal pain, nausea, vomitting, diarrhea, and bilateral upper extremity tremors. She was placed on CIWA protocol with high risk ativan taper, along with vitamin/mineral replacements, and her symptoms improved during her hospital course. She was found to have symptomatic UTI and treated with Levaquin due to her allergy to cephalosporins. Patient is a 52 year old female who was admitted to the hospital for treatment of alcohol withdrawal. She had fevers, chills, abdominal pain, nausea, vomitting, diarrhea, and bilateral upper extremity tremors. She was placed on CIWA protocol with high risk ativan taper, along with vitamin/mineral replacements, and her symptoms improved during her hospital course. She eventually completed Ativan taper and was discharged without ativan. She was found to have symptomatic UTI and treated with Levaquin due to her allergy to cephalosporins for four days. She was found to have stones in gallbladder and kidney, she will need to follow up with a urologist for the kidney stone.

## 2017-09-16 NOTE — PROGRESS NOTE ADULT - PROBLEM SELECTOR PLAN 1
- CIWA score most recently 5 appearing anxious on exam with tremors  - c/w high risk Ativan taper  - c/w banana bag, thiamine, folate

## 2017-09-16 NOTE — DISCHARGE NOTE ADULT - CARE PLAN
Principal Discharge DX:	Alcohol withdrawal syndrome without complication  Goal:	Treatment  Instructions for follow-up, activity and diet:	You were admitted to the hospital due to alcohol withdrawal symptoms. You were monitored closely and given ativan. Your symptoms improved over your hospital stay and you were cleared for discharge. Please refrain from further alcohol consumption. If you are experiencing symptoms of alcohol withdrawal (headaches, confusion, chills, nausea, vomiting, diarrhea, chest pain, palpitations) please return to the hospital immediately.  Secondary Diagnosis:	Metabolic acidosis, increased anion gap  Goal:	Treatment  Instructions for follow-up, activity and diet:	Your blood acid levels were elevated when you were admitted likely due to dehydration from vomiting and diarrhea. You were given fluid and nutrition and improved.  Secondary Diagnosis:	Dysuria  Goal:	Treatment  Instructions for follow-up, activity and diet:	You had urine tests that showed a urinary tract infection along with discomfort during urination. You were given levaquin to treat your infection. Please continue levaquin until completion. If you begin to experience burning or pain with urination, please go to an urgent care center for tests and possible antibiotics.  Secondary Diagnosis:	Elevated LFTs  Goal:	Treatment  Instructions for follow-up, activity and diet:	Your liver enzymes were elevated likely due to your alcohol consumption. During your detoxification, your enzymes trended down toward normal levels. CT scan and abdominal ultrasound showed stones in your gallbladder but no infection/inflammation. If you begin to have worsening abdominal pain, nausea, vomiting, yellowing of the skin or eyes, please come to the emergency room immediately.  Secondary Diagnosis:	Calculus of gallbladder without cholecystitis without obstruction  Goal:	Management  Instructions for follow-up, activity and diet:	CT scan of your abdomen showed stones in your gallbladder without inflammation or obstruction. If you begin to have abdominal pain, nausea, vomiting along with yellowing of the eyes or skin, please come to the emergency department immediately.  Secondary Diagnosis:	Renal calculi  Goal:	Treatment  Instructions for follow-up, activity and diet:	CT scan showed a small non-obstructing stone in your left kidney. You will need follow up with urology as an outpatient for further management/treatment.  Secondary Diagnosis:	Chronic obstructive pulmonary disease, unspecified COPD type  Goal:	Management  Instructions for follow-up, activity and diet:	You have a history of asthma and COPD. Your breathing was stable throughout your hospital course. Please follow up with your primary care physician upon discharge. Principal Discharge DX:	Alcohol withdrawal syndrome without complication  Goal:	Treatment  Instructions for follow-up, activity and diet:	You were admitted to the hospital due to alcohol withdrawal symptoms. You were monitored closely and given ativan. Your symptoms improved over your hospital stay and you were cleared for discharge. Please refrain from further alcohol consumption. If you are experiencing symptoms of alcohol withdrawal (headaches, confusion, chills, nausea, vomiting, diarrhea, chest pain, palpitations) please return to the hospital immediately. Please follow up with your primary care doctor.  Secondary Diagnosis:	Metabolic acidosis, increased anion gap  Goal:	Treatment  Instructions for follow-up, activity and diet:	Your blood acid levels were elevated when you were admitted likely due to dehydration from vomiting and diarrhea. You were given fluid and nutrition and improved.  Secondary Diagnosis:	Dysuria  Goal:	Treatment  Instructions for follow-up, activity and diet:	You had urine tests that showed a urinary tract infection along with discomfort during urination. You were given levaquin to treat your infection. You completed four days of levaquin and do not need further antibiotics. If you begin to experience burning or pain with urination, please go to an urgent care center or your primary care doctor for tests and possible antibiotics.  Secondary Diagnosis:	Elevated LFTs  Goal:	Treatment  Instructions for follow-up, activity and diet:	Your liver enzymes were elevated likely due to your alcohol consumption. During your detoxification, your enzymes trended down toward normal levels. CT scan and abdominal ultrasound showed stones in your gallbladder but no infection/inflammation. If you begin to have worsening abdominal pain, nausea, vomiting, yellowing of the skin or eyes, please come to the emergency room immediately. Please follow up with your primary care doctor.  Secondary Diagnosis:	Calculus of gallbladder without cholecystitis without obstruction  Goal:	Management  Instructions for follow-up, activity and diet:	CT scan of your abdomen showed stones in your gallbladder without inflammation or obstruction. If you begin to have abdominal pain, nausea, vomiting along with yellowing of the eyes or skin, please come to the emergency department immediately.  Secondary Diagnosis:	Renal calculi  Goal:	Treatment  Instructions for follow-up, activity and diet:	CT scan showed a small non-obstructing stone in your left kidney. You will need follow up with urology as an outpatient for further management/treatment. The phone number to call is [phone].  Secondary Diagnosis:	Chronic obstructive pulmonary disease, unspecified COPD type  Goal:	Management  Instructions for follow-up, activity and diet:	You have a history of asthma and COPD. Your breathing was stable throughout your hospital course. Please follow up with your primary care physician upon discharge.

## 2017-09-16 NOTE — DISCHARGE NOTE ADULT - PLAN OF CARE
Treatment You were admitted to the hospital due to alcohol withdrawal symptoms. You were monitored closely and given ativan. Your symptoms improved over your hospital stay and you were cleared for discharge. Please refrain from further alcohol consumption. If you are experiencing symptoms of alcohol withdrawal (headaches, confusion, chills, nausea, vomiting, diarrhea, chest pain, palpitations) please return to the hospital immediately. Your blood acid levels were elevated when you were admitted likely due to dehydration from vomiting and diarrhea. You were given fluid and nutrition and improved. You had urine tests that showed a urinary tract infection along with discomfort during urination. You were given levaquin to treat your infection. Please continue levaquin until completion. If you begin to experience burning or pain with urination, please go to an urgent care center for tests and possible antibiotics. Your liver enzymes were elevated likely due to your alcohol consumption. During your detoxification, your enzymes trended down toward normal levels. CT scan and abdominal ultrasound showed stones in your gallbladder but no infection/inflammation. If you begin to have worsening abdominal pain, nausea, vomiting, yellowing of the skin or eyes, please come to the emergency room immediately. Management CT scan of your abdomen showed stones in your gallbladder without inflammation or obstruction. If you begin to have abdominal pain, nausea, vomiting along with yellowing of the eyes or skin, please come to the emergency department immediately. CT scan showed a small non-obstructing stone in your left kidney. You will need follow up with urology as an outpatient for further management/treatment. You have a history of asthma and COPD. Your breathing was stable throughout your hospital course. Please follow up with your primary care physician upon discharge. You were admitted to the hospital due to alcohol withdrawal symptoms. You were monitored closely and given ativan. Your symptoms improved over your hospital stay and you were cleared for discharge. Please refrain from further alcohol consumption. If you are experiencing symptoms of alcohol withdrawal (headaches, confusion, chills, nausea, vomiting, diarrhea, chest pain, palpitations) please return to the hospital immediately. Please follow up with your primary care doctor. You had urine tests that showed a urinary tract infection along with discomfort during urination. You were given levaquin to treat your infection. You completed four days of levaquin and do not need further antibiotics. If you begin to experience burning or pain with urination, please go to an urgent care center or your primary care doctor for tests and possible antibiotics. Your liver enzymes were elevated likely due to your alcohol consumption. During your detoxification, your enzymes trended down toward normal levels. CT scan and abdominal ultrasound showed stones in your gallbladder but no infection/inflammation. If you begin to have worsening abdominal pain, nausea, vomiting, yellowing of the skin or eyes, please come to the emergency room immediately. Please follow up with your primary care doctor. CT scan showed a small non-obstructing stone in your left kidney. You will need follow up with urology as an outpatient for further management/treatment. The phone number to call is [phone].

## 2017-09-16 NOTE — PROGRESS NOTE ADULT - PROBLEM SELECTOR PLAN 2
both RUQ and LLQ with AST/ALT elevation ratio 3:1: pain most likely due to alcoholic liver steatosis vs alcoholic hepatitis (however tbili wnl), less likely symptomatic gallstones or nephrolithiases based on CT findings  - RUQ US w/ evidence of hepatic steatosis. No evidence of cholecystitis/ pos - RUQ and L CVA tenderness. Pt has hepatomegaly and L non-obstructing renal calculi. She also reports h/o melena.  - trend LFTS  - c/w ppi daily both RUQ and LLQ with AST/ALT elevation ratio 3:1: pain most likely due to alcoholic liver steatosis vs alcoholic hepatitis (however tbili wnl), less likely symptomatic gallstones or nephrolithiases based on CT findings  - RUQ US w/ evidence of hepatic steatosis. No evidence of cholecystitis/ pos - RUQ and L CVA tenderness. Pt has hepatomegaly and L non-obstructing renal calculi. She also reports h/o melena.  - trend LFTS  - c/w ppi daily  will get stool hemoccults/ might need GI workup as outpatient if there is no current issues

## 2017-09-16 NOTE — PROGRESS NOTE ADULT - PROBLEM SELECTOR PLAN 4
- metabolic acidosis with increased anion gap likely due to lactic acidosis  likely due vomiting and dehydration and alcoholic ketosis.    - Improving. Gap is closed and lac is wnl  - s/p 1 banana bag  - c/w cont Zofran

## 2017-09-16 NOTE — DISCHARGE NOTE ADULT - SECONDARY DIAGNOSIS.
Metabolic acidosis, increased anion gap Dysuria Elevated LFTs Calculus of gallbladder without cholecystitis without obstruction Renal calculi Chronic obstructive pulmonary disease, unspecified COPD type

## 2017-09-16 NOTE — PROGRESS NOTE ADULT - PROBLEM SELECTOR PLAN 6
- Pt has dysuria w/ mild L CVA tenderness, and nonobstructing 8mm L renal calculi on CT. UA + for leuks. No fevers, chills. No evidence of pyelo on CT.  - Allergic to cephalosporins.  - Started levaquin 750mg po daily/ monitor QTC ( admission QTC= 439). (9/15- )  - f/u UCx - Pt has dysuria w/ mild L CVA tenderness, and nonobstructing 8mm L renal calculi on CT. UA + for leuks. No fevers, chills. No evidence of pyelo on CT.  - Allergic to cephalosporins.  - Started levaquin 750mg po daily/ monitor QTC ( admission QTC= 439). (9/15- )  - f/u UCx ( Ecoli/ sensitivity is pending)

## 2017-09-16 NOTE — DISCHARGE NOTE ADULT - MEDICATION SUMMARY - MEDICATIONS TO TAKE
I will START or STAY ON the medications listed below when I get home from the hospital:    Aspirin Enteric Coated 81 mg oral delayed release tablet  -- 1 tab(s) by mouth once a day  -- Indication: For Need for prophylactic measure    dilTIAZem 120 mg/24 hours oral capsule, extended release  -- 1 cap(s) by mouth once a day  -- Indication: For SVT (supraventricular tachycardia)    Symbicort 160 mcg-4.5 mcg/inh inhalation aerosol  -- 2 puff(s) inhaled 2 times a day  -- Indication: For Chronic obstructive pulmonary disease, unspecified COPD type    Ventolin Nebules 2.5 mg/3 mL (0.083%) inhalation solution  -- 3 milliliter(s) inhaled 3 times a day  -- Indication: For Chronic obstructive pulmonary disease, unspecified COPD type    pantoprazole 40 mg oral delayed release tablet  -- 1 tab(s) by mouth once a day  -- Indication: For GERD    Multiple Vitamins oral tablet  -- 1 tab(s) by mouth once a day  -- Indication: For Need for prophylactic measure    folic acid 0.4 mg oral tablet  -- 1 tab(s) by mouth once a day  -- Indication: For Need for prophylactic measure I will START or STAY ON the medications listed below when I get home from the hospital:    Aspirin Enteric Coated 81 mg oral delayed release tablet  -- 1 tab(s) by mouth once a day  -- Indication: For heart protection    dilTIAZem 120 mg/24 hours oral capsule, extended release  -- 1 cap(s) by mouth once a day  -- Indication: For SVT (supraventricular tachycardia)    Symbicort 160 mcg-4.5 mcg/inh inhalation aerosol  -- 2 puff(s) inhaled 2 times a day  -- Indication: For Inhaler    Ventolin HFA 90 mcg/inh inhalation aerosol  -- 2 puff(s) inhaled 2 times a day   -- For inhalation only.  It is very important that you take or use this exactly as directed.  Do not skip doses or discontinue unless directed by your doctor.  Obtain medical advice before taking any non-prescription drugs as some may affect the action of this medication.  Shake well before use.    -- Indication: For Inhaler    pantoprazole 40 mg oral delayed release tablet  -- 1 tab(s) by mouth once a day  -- Indication: For Antacid    Multiple Vitamins oral tablet  -- 1 tab(s) by mouth once a day  -- Indication: For Vitamin    Allbee Plus oral tablet  -- 1 tab(s) by mouth once a day   -- Indication: For Vitmain    folic acid 0.4 mg oral tablet  -- 1 tab(s) by mouth once a day  -- Indication: For Vitamin

## 2017-09-17 DIAGNOSIS — N30.00 ACUTE CYSTITIS WITHOUT HEMATURIA: ICD-10-CM

## 2017-09-17 LAB
-  AMIKACIN: SIGNIFICANT CHANGE UP
-  AMPICILLIN/SULBACTAM: SIGNIFICANT CHANGE UP
-  AMPICILLIN: SIGNIFICANT CHANGE UP
-  AZTREONAM: SIGNIFICANT CHANGE UP
-  CEFAZOLIN: SIGNIFICANT CHANGE UP
-  CEFEPIME: SIGNIFICANT CHANGE UP
-  CEFOXITIN: SIGNIFICANT CHANGE UP
-  CEFTAZIDIME: SIGNIFICANT CHANGE UP
-  CEFTRIAXONE: SIGNIFICANT CHANGE UP
-  CIPROFLOXACIN: SIGNIFICANT CHANGE UP
-  ERTAPENEM: SIGNIFICANT CHANGE UP
-  GENTAMICIN: SIGNIFICANT CHANGE UP
-  IMIPENEM: SIGNIFICANT CHANGE UP
-  LEVOFLOXACIN: SIGNIFICANT CHANGE UP
-  MEROPENEM: SIGNIFICANT CHANGE UP
-  NITROFURANTOIN: SIGNIFICANT CHANGE UP
-  PIPERACILLIN/TAZOBACTAM: SIGNIFICANT CHANGE UP
-  TOBRAMYCIN: SIGNIFICANT CHANGE UP
-  TRIMETHOPRIM/SULFAMETHOXAZOLE: SIGNIFICANT CHANGE UP
ALBUMIN SERPL ELPH-MCNC: 3.4 G/DL — SIGNIFICANT CHANGE UP (ref 3.3–5)
ALP SERPL-CCNC: 112 U/L — SIGNIFICANT CHANGE UP (ref 40–120)
ALT FLD-CCNC: 42 U/L RC — SIGNIFICANT CHANGE UP (ref 10–45)
ANION GAP SERPL CALC-SCNC: 12 MMOL/L — SIGNIFICANT CHANGE UP (ref 5–17)
AST SERPL-CCNC: 75 U/L — HIGH (ref 10–40)
BILIRUB SERPL-MCNC: 0.6 MG/DL — SIGNIFICANT CHANGE UP (ref 0.2–1.2)
BUN SERPL-MCNC: 3 MG/DL — LOW (ref 7–23)
CALCIUM SERPL-MCNC: 9.3 MG/DL — SIGNIFICANT CHANGE UP (ref 8.4–10.5)
CHLORIDE SERPL-SCNC: 96 MMOL/L — SIGNIFICANT CHANGE UP (ref 96–108)
CO2 SERPL-SCNC: 26 MMOL/L — SIGNIFICANT CHANGE UP (ref 22–31)
CREAT SERPL-MCNC: 0.66 MG/DL — SIGNIFICANT CHANGE UP (ref 0.5–1.3)
CULTURE RESULTS: SIGNIFICANT CHANGE UP
GLUCOSE SERPL-MCNC: 122 MG/DL — HIGH (ref 70–99)
HCT VFR BLD CALC: 28.4 % — LOW (ref 34.5–45)
HGB BLD-MCNC: 10 G/DL — LOW (ref 11.5–15.5)
MAGNESIUM SERPL-MCNC: 1.7 MG/DL — SIGNIFICANT CHANGE UP (ref 1.6–2.6)
MCHC RBC-ENTMCNC: 35.1 GM/DL — SIGNIFICANT CHANGE UP (ref 32–36)
MCHC RBC-ENTMCNC: 37.4 PG — HIGH (ref 27–34)
MCV RBC AUTO: 107 FL — HIGH (ref 80–100)
METHOD TYPE: SIGNIFICANT CHANGE UP
OB PNL STL IA: NEGATIVE — SIGNIFICANT CHANGE UP
ORGANISM # SPEC MICROSCOPIC CNT: SIGNIFICANT CHANGE UP
ORGANISM # SPEC MICROSCOPIC CNT: SIGNIFICANT CHANGE UP
PHOSPHATE SERPL-MCNC: 3.2 MG/DL — SIGNIFICANT CHANGE UP (ref 2.5–4.5)
PLATELET # BLD AUTO: 147 K/UL — LOW (ref 150–400)
POTASSIUM SERPL-MCNC: 4.2 MMOL/L — SIGNIFICANT CHANGE UP (ref 3.5–5.3)
POTASSIUM SERPL-SCNC: 4.2 MMOL/L — SIGNIFICANT CHANGE UP (ref 3.5–5.3)
PROT SERPL-MCNC: 6.1 G/DL — SIGNIFICANT CHANGE UP (ref 6–8.3)
RBC # BLD: 2.67 M/UL — LOW (ref 3.8–5.2)
RBC # FLD: 15 % — HIGH (ref 10.3–14.5)
SODIUM SERPL-SCNC: 134 MMOL/L — LOW (ref 135–145)
SPECIMEN SOURCE: SIGNIFICANT CHANGE UP
WBC # BLD: 3.6 K/UL — LOW (ref 3.8–10.5)
WBC # FLD AUTO: 3.6 K/UL — LOW (ref 3.8–10.5)

## 2017-09-17 PROCEDURE — 99233 SBSQ HOSP IP/OBS HIGH 50: CPT | Mod: GC

## 2017-09-17 RX ADMIN — BUDESONIDE AND FORMOTEROL FUMARATE DIHYDRATE 2 PUFF(S): 160; 4.5 AEROSOL RESPIRATORY (INHALATION) at 17:24

## 2017-09-17 RX ADMIN — Medication 0.5 MILLIGRAM(S): at 10:24

## 2017-09-17 RX ADMIN — PANTOPRAZOLE SODIUM 40 MILLIGRAM(S): 20 TABLET, DELAYED RELEASE ORAL at 05:47

## 2017-09-17 RX ADMIN — Medication 4 MILLIGRAM(S): at 14:40

## 2017-09-17 RX ADMIN — Medication 1 TABLET(S): at 11:42

## 2017-09-17 RX ADMIN — Medication 1 PATCH: at 11:43

## 2017-09-17 RX ADMIN — Medication 4 MILLIGRAM(S): at 22:36

## 2017-09-17 RX ADMIN — Medication 1 TABLET(S): at 09:50

## 2017-09-17 RX ADMIN — MAGNESIUM OXIDE 400 MG ORAL TABLET 400 MILLIGRAM(S): 241.3 TABLET ORAL at 11:42

## 2017-09-17 RX ADMIN — Medication 100 MILLIGRAM(S): at 11:42

## 2017-09-17 RX ADMIN — MAGNESIUM OXIDE 400 MG ORAL TABLET 400 MILLIGRAM(S): 241.3 TABLET ORAL at 09:50

## 2017-09-17 RX ADMIN — Medication 1 MILLIGRAM(S): at 01:45

## 2017-09-17 RX ADMIN — Medication 1 TABLET(S): at 18:33

## 2017-09-17 RX ADMIN — Medication 81 MILLIGRAM(S): at 11:42

## 2017-09-17 RX ADMIN — Medication 4 MILLIGRAM(S): at 05:46

## 2017-09-17 RX ADMIN — Medication 1 MILLIGRAM(S): at 05:47

## 2017-09-17 RX ADMIN — Medication 1 MILLIGRAM(S): at 11:42

## 2017-09-17 RX ADMIN — BUDESONIDE AND FORMOTEROL FUMARATE DIHYDRATE 2 PUFF(S): 160; 4.5 AEROSOL RESPIRATORY (INHALATION) at 06:05

## 2017-09-17 RX ADMIN — Medication 0.5 MILLIGRAM(S): at 22:36

## 2017-09-17 RX ADMIN — ENOXAPARIN SODIUM 40 MILLIGRAM(S): 100 INJECTION SUBCUTANEOUS at 11:42

## 2017-09-17 RX ADMIN — Medication 120 MILLIGRAM(S): at 05:47

## 2017-09-17 NOTE — PROGRESS NOTE ADULT - ASSESSMENT
53 yo F with PMH of alcohol abuse with hx of withdrawal seizure x1, SVT, asthma/copd current smoker, tobacco use, nephrolithiasis, gallstones, presents with 1 day of tremors, anxiety with abdominal pain, vomiting, diarrhea admitted for alcohol withdrawal and further workup of GI symptoms. 51 yo F with PMH of alcohol abuse with hx of withdrawal seizure x1, SVT, asthma/copd current smoker, tobacco use, nephrolithiasis, gallstones, presents with 1 day of tremors, anxiety with abdominal pain, vomiting, diarrhea admitted for alcohol withdrawal .  Patient wishes to be discharged.

## 2017-09-17 NOTE — PROGRESS NOTE ADULT - PROBLEM SELECTOR PLAN 2
both RUQ and LLQ with AST/ALT elevation ratio 3:1: pain most likely due to alcoholic liver steatosis vs alcoholic hepatitis (however tbili wnl), less likely symptomatic gallstones or nephrolithiases based on CT findings  - RUQ US w/ evidence of hepatic steatosis. No evidence of cholecystitis/ pos - RUQ and L CVA tenderness. Pt has hepatomegaly and L non-obstructing renal calculi. She also reports h/o melena.  - trend LFTS  - c/w ppi daily  will get stool hemoccults/ might need GI workup as outpatient if there is no current issues -RUQ and LLQ with AST/ALT elevation ratio 3:1: pain most likely due to alcoholic liver steatosis vs alcoholic hepatitis (however t-bili wnl), less likely symptomatic gallstones or nephrolithiases based on CT findings  - RUQ US w/ evidence of hepatic steatosis. No evidence of cholecystitis/ pos - RUQ and L CVA tenderness. Pt has hepatomegaly and L non-obstructing renal calculi. She also reports h/o melena.  - Trend LFTs daily  - c/w ppi daily  - FOBT today

## 2017-09-17 NOTE — PROGRESS NOTE ADULT - SUBJECTIVE AND OBJECTIVE BOX
Patient is a 52y old  Female who presents with a chief complaint of alcohol withdrawal and vomiting (16 Sep 2017 11:32)        SUBJECTIVE / OVERNIGHT EVENTS:      MEDICATIONS  (STANDING):  multivitamin/thiamine/folic acid in sodium chloride 0.9% 1000 milliLiter(s) (125 mL/Hr) IV Continuous <Continuous>  enoxaparin Injectable 40 milliGRAM(s) SubCutaneous daily  LORazepam   Injectable   IV Push   aspirin enteric coated 81 milliGRAM(s) Oral daily  LORazepam   Injectable 1 milliGRAM(s) IV Push every 4 hours  LORazepam   Injectable 0.5 milliGRAM(s) IV Push every 4 hours  diltiazem    milliGRAM(s) Oral daily  buDESOnide 160 MICROgram(s)/formoterol 4.5 MICROgram(s) Inhaler 2 Puff(s) Inhalation two times a day  pantoprazole    Tablet 40 milliGRAM(s) Oral before breakfast  folic acid 1 milliGRAM(s) Oral daily  nicotine - 21 mG/24Hr(s) Patch 1 patch Transdermal daily  thiamine 100 milliGRAM(s) Oral daily  levoFLOXacin  Tablet 750 milliGRAM(s) Oral every 24 hours  multivitamin 1 Tablet(s) Oral daily  nicotine  Polacrilex Gum 4 milliGRAM(s) Oral three times a day  potassium acid phosphate/sodium acid phosphate tablet (K-PHOS No. 2) 1 Tablet(s) Oral four times a day with meals  magnesium oxide 400 milliGRAM(s) Oral three times a day with meals    MEDICATIONS  (PRN):  ALBUTerol/ipratropium for Nebulization 3 milliLiter(s) Nebulizer every 6 hours PRN Shortness of Breath and/or Wheezing  ondansetron Injectable 4 milliGRAM(s) IV Push every 8 hours PRN Nausea and/or Vomiting        CAPILLARY BLOOD GLUCOSE        I&O's Summary    16 Sep 2017 07:01  -  17 Sep 2017 07:00  --------------------------------------------------------  IN: 840 mL / OUT: 200 mL / NET: 640 mL    Vital Signs Last 24 Hrs  T(C): 36.8 (17 Sep 2017 04:29), Max: 36.9 (16 Sep 2017 13:01)  T(F): 98.3 (17 Sep 2017 04:29), Max: 98.5 (16 Sep 2017 13:01)  HR: 99 (17 Sep 2017 04:29) (88 - 118)  BP: 93/58 (17 Sep 2017 04:29) (92/64 - 108/76)  BP(mean): --  RR: 18 (17 Sep 2017 04:29) (16 - 19)  SpO2: 100% (17 Sep 2017 04:29) (98% - 100%)    PHYSICAL EXAM  GENERAL: NAD, well-developed  HEAD:  Atraumatic, Normocephalic  EYES: EOMI, PERRLA, conjunctiva and sclera clear  NECK: Supple, No JVD  CHEST/LUNG: Clear to auscultation bilaterally; No wheeze  HEART: Regular rate and rhythm; No murmurs, rubs, or gallops  ABDOMEN: Soft, Nontender, Nondistended; Bowel sounds present  EXTREMITIES:  2+ Peripheral Pulses, No clubbing, cyanosis, or edema  PSYCH: AAOx3  SKIN: No rashes or lesions    LABS:                        10.0   3.6   )-----------( 147      ( 17 Sep 2017 07:46 )             28.4     09-17    134<L>  |  96  |  3<L>  ----------------------------<  122<H>  4.2   |  26  |  0.66    Ca    9.3      17 Sep 2017 07:46  Phos  3.2     09-17  Mg     1.7     09-17    TPro  6.1  /  Alb  3.4  /  TBili  0.6  /  DBili  x   /  AST  75<H>  /  ALT  42  /  AlkPhos  112  09-17    PT/INR - ( 16 Sep 2017 06:20 )   PT: 12.4 sec;   INR: 1.14 ratio                   RADIOLOGY & ADDITIONAL TESTS:    Imaging Personally Reviewed:  Consultant(s) Notes Reviewed:    Care Discussed with Consultants/Other Providers: Patient is a 52y old  Female who presents with a chief complaint of alcohol withdrawal and vomiting (16 Sep 2017 11:32)        SUBJECTIVE / OVERNIGHT EVENTS:  Patient feels well, no overnight events. Denies fever, chills, chest pain, palpitations ore diarrhea. States she wants "to go home"      MEDICATIONS  (STANDING):  multivitamin/thiamine/folic acid in sodium chloride 0.9% 1000 milliLiter(s) (125 mL/Hr) IV Continuous <Continuous>  enoxaparin Injectable 40 milliGRAM(s) SubCutaneous daily  LORazepam   Injectable   IV Push   aspirin enteric coated 81 milliGRAM(s) Oral daily  LORazepam   Injectable 1 milliGRAM(s) IV Push every 4 hours  LORazepam   Injectable 0.5 milliGRAM(s) IV Push every 4 hours  diltiazem    milliGRAM(s) Oral daily  buDESOnide 160 MICROgram(s)/formoterol 4.5 MICROgram(s) Inhaler 2 Puff(s) Inhalation two times a day  pantoprazole    Tablet 40 milliGRAM(s) Oral before breakfast  folic acid 1 milliGRAM(s) Oral daily  nicotine - 21 mG/24Hr(s) Patch 1 patch Transdermal daily  thiamine 100 milliGRAM(s) Oral daily  levoFLOXacin  Tablet 750 milliGRAM(s) Oral every 24 hours  multivitamin 1 Tablet(s) Oral daily  nicotine  Polacrilex Gum 4 milliGRAM(s) Oral three times a day  potassium acid phosphate/sodium acid phosphate tablet (K-PHOS No. 2) 1 Tablet(s) Oral four times a day with meals  magnesium oxide 400 milliGRAM(s) Oral three times a day with meals    MEDICATIONS  (PRN):  ALBUTerol/ipratropium for Nebulization 3 milliLiter(s) Nebulizer every 6 hours PRN Shortness of Breath and/or Wheezing  ondansetron Injectable 4 milliGRAM(s) IV Push every 8 hours PRN Nausea and/or Vomiting        CAPILLARY BLOOD GLUCOSE        I&O's Summary    16 Sep 2017 07:01  -  17 Sep 2017 07:00  --------------------------------------------------------  IN: 840 mL / OUT: 200 mL / NET: 640 mL    Vital Signs Last 24 Hrs  T(C): 36.8 (17 Sep 2017 04:29), Max: 36.9 (16 Sep 2017 13:01)  T(F): 98.3 (17 Sep 2017 04:29), Max: 98.5 (16 Sep 2017 13:01)  HR: 99 (17 Sep 2017 04:29) (88 - 118)  BP: 93/58 (17 Sep 2017 04:29) (92/64 - 108/76)  BP(mean): --  RR: 18 (17 Sep 2017 04:29) (16 - 19)  SpO2: 100% (17 Sep 2017 04:29) (98% - 100%)    PHYSICAL EXAM  GENERAL: NAD, underweight  HEAD:  Atraumatic, Normocephalic  EYES: EOMI, PERRLA, conjunctiva and sclera clear  NECK: Supple, No JVD  CHEST/LUNG: Clear to auscultation bilaterally; No wheeze  HEART: Regular rate and rhythm; No murmurs, rubs, or gallops  ABDOMEN: Soft, Nontender, Nondistended; Bowel sounds present  EXTREMITIES:  2+ Peripheral Pulses, No clubbing, cyanosis, or edema  PSYCH: AAOx3, no tremors or fasciculations  SKIN: No rashes or lesions    LABS:                        10.0   3.6   )-----------( 147      ( 17 Sep 2017 07:46 )             28.4     09-17    134<L>  |  96  |  3<L>  ----------------------------<  122<H>  4.2   |  26  |  0.66    Ca    9.3      17 Sep 2017 07:46  Phos  3.2     09-17  Mg     1.7     09-17    TPro  6.1  /  Alb  3.4  /  TBili  0.6  /  DBili  x   /  AST  75<H>  /  ALT  42  /  AlkPhos  112  09-17    PT/INR - ( 16 Sep 2017 06:20 )   PT: 12.4 sec;   INR: 1.14 ratio                   RADIOLOGY & ADDITIONAL TESTS:    Imaging Personally Reviewed:  Consultant(s) Notes Reviewed:    Care Discussed with Consultants/Other Providers: Patient is a 52y old  Female who presents with a chief complaint of alcohol withdrawal and vomiting (16 Sep 2017 11:32)        SUBJECTIVE / OVERNIGHT EVENTS:  Patient feels well, no overnight events. Denies fever, chills, chest pain, palpitations ore diarrhea. States she wants "to go home"    ROS   CONSTITUTIONAL: No fevers, no chills  EYES: No visual changes; No double vision,  No vertigo, eye pain  Ears: no otalgia, no otorhea, no hearing loss, tinnitus  Nose: no epistaxis, rhinorrhea, post-discharge, sinus pressure  Throat: no throat pain, no oral lesions, tooth pain   NECK: No pain or stiffness  RESPIRATORY: No cough (productive or dry), wheezing, hemoptysis; No shortness of breath, orthnopnea, TALBOT  CARDIOVASCULAR: No chest pain or palpitations, no leg edema, no claudication    GASTROINTESTINAL: +melena history and none currently , RUQ and L flank pain. no nausea, no vomiting. no diarrhea. No hematemesis, no constipation, no hematochezia.  GENITOURINARY: +dysuria; No frequency, no urgency, no hematuria, no pelvic pain, no urinary incontinence  Muscloskeletal: no joints or muscle pain or edema.  NEUROLOGICAL: No headache, +b/l UE tremors with some improvement, no ataxia, no confusion  SKIN: No pruritis, rashes, lesions, no stigmata of liver disease.  Psych: +sadness, +anxiety, no SI/HI  Endocrine: No Heat or Cold intolerance, polydipsia, polyphagia  Heme/Lymph: no LN enlargement, no easy bruising or bleeding        MEDICATIONS  (STANDING):  multivitamin/thiamine/folic acid in sodium chloride 0.9% 1000 milliLiter(s) (125 mL/Hr) IV Continuous <Continuous>  enoxaparin Injectable 40 milliGRAM(s) SubCutaneous daily  LORazepam   Injectable   IV Push   aspirin enteric coated 81 milliGRAM(s) Oral daily  LORazepam   Injectable 1 milliGRAM(s) IV Push every 4 hours  LORazepam   Injectable 0.5 milliGRAM(s) IV Push every 4 hours  diltiazem    milliGRAM(s) Oral daily  buDESOnide 160 MICROgram(s)/formoterol 4.5 MICROgram(s) Inhaler 2 Puff(s) Inhalation two times a day  pantoprazole    Tablet 40 milliGRAM(s) Oral before breakfast  folic acid 1 milliGRAM(s) Oral daily  nicotine - 21 mG/24Hr(s) Patch 1 patch Transdermal daily  thiamine 100 milliGRAM(s) Oral daily  levoFLOXacin  Tablet 750 milliGRAM(s) Oral every 24 hours  multivitamin 1 Tablet(s) Oral daily  nicotine  Polacrilex Gum 4 milliGRAM(s) Oral three times a day  potassium acid phosphate/sodium acid phosphate tablet (K-PHOS No. 2) 1 Tablet(s) Oral four times a day with meals  magnesium oxide 400 milliGRAM(s) Oral three times a day with meals    MEDICATIONS  (PRN):  ALBUTerol/ipratropium for Nebulization 3 milliLiter(s) Nebulizer every 6 hours PRN Shortness of Breath and/or Wheezing  ondansetron Injectable 4 milliGRAM(s) IV Push every 8 hours PRN Nausea and/or Vomiting        CAPILLARY BLOOD GLUCOSE        I&O's Summary    16 Sep 2017 07:01  -  17 Sep 2017 07:00  --------------------------------------------------------  IN: 840 mL / OUT: 200 mL / NET: 640 mL    Vital Signs Last 24 Hrs  T(C): 36.8 (17 Sep 2017 04:29), Max: 36.9 (16 Sep 2017 13:01)  T(F): 98.3 (17 Sep 2017 04:29), Max: 98.5 (16 Sep 2017 13:01)  HR: 99 (17 Sep 2017 04:29) (88 - 118)  BP: 93/58 (17 Sep 2017 04:29) (92/64 - 108/76)  BP(mean): --  RR: 18 (17 Sep 2017 04:29) (16 - 19)  SpO2: 100% (17 Sep 2017 04:29) (98% - 100%)    PHYSICAL EXAM  GENERAL: NAD, underweight  HEAD:  Atraumatic, Normocephalic  EYES: EOMI, PERRLA, conjunctiva and sclera clear  NECK: Supple, No JVD  CHEST/LUNG: Clear to auscultation bilaterally; No wheeze  HEART: Regular rate and rhythm; No murmurs, rubs, or gallops  ABDOMEN: Soft, Nontender, Nondistended; Bowel sounds present  EXTREMITIES:  2+ Peripheral Pulses, No clubbing, cyanosis, or edema  PSYCH: AAOx3, improved tremors or fasciculations  Neuro    :  AAOX3 ,improved tremor.    SKIN: No rashes or lesions    LABS:                        10.0   3.6   )-----------( 147      ( 17 Sep 2017 07:46 )             28.4     09-17    134<L>  |  96  |  3<L>  ----------------------------<  122<H>  4.2   |  26  |  0.66    Ca    9.3      17 Sep 2017 07:46  Phos  3.2     09-17  Mg     1.7     09-17    TPro  6.1  /  Alb  3.4  /  TBili  0.6  /  DBili  x   /  AST  75<H>  /  ALT  42  /  AlkPhos  112  09-17    PT/INR - ( 16 Sep 2017 06:20 )   PT: 12.4 sec;   INR: 1.14 ratio           RADIOLOGY & ADDITIONAL TESTS:    Imaging Personally Reviewed:  Consultant(s) Notes Reviewed:    Care Discussed with Consultants/Other Providers:

## 2017-09-17 NOTE — PROGRESS NOTE ADULT - PROBLEM SELECTOR PLAN 9
- stable  - c/w symbicort 2puff bid  - albuterol prn

## 2017-09-17 NOTE — PROGRESS NOTE ADULT - PROBLEM SELECTOR PLAN 7
- Rate controlled  - c/w diltizaem 120mg ER daily  - c/w aspirin 81mg daily - stable  - c/w symbicort 2puff bid  - albuterol prn

## 2017-09-17 NOTE — PROGRESS NOTE ADULT - PROBLEM SELECTOR PLAN 1
- CIWA score most recently 5 appearing anxious on exam with tremors  - c/w high risk Ativan taper  - c/w banana bag, thiamine, folate - CIWA = 2's , mild anxiety  - Changing ativan taper to 0.5mg PO q12 hrs, and will monitor clinically  - c/w banana bag, thiamine, folate  -Social work following.

## 2017-09-17 NOTE — PROGRESS NOTE ADULT - PROBLEM SELECTOR PLAN 5
- likely due to alcoholic hepatic steatosis  - trend lfts  - RUQ US as above - Rate controlled  - c/w diltizaem 120mg ER daily  - c/w aspirin 81mg daily

## 2017-09-17 NOTE — PROGRESS NOTE ADULT - PROBLEM SELECTOR PLAN 6
- Pt has dysuria w/ mild L CVA tenderness, and nonobstructing 8mm L renal calculi on CT. UA + for leuks. No fevers, chills. No evidence of pyelo on CT.  - Allergic to cephalosporins.  - Started levaquin 750mg po daily/ monitor QTC ( admission QTC= 439). (9/15- )  - f/u UCx ( Ecoli/ sensitivity is pending) - CT shows nonobstructive 8mm stone, outpatient urology followup

## 2017-09-17 NOTE — PROGRESS NOTE ADULT - PROBLEM SELECTOR PLAN 3
DDx includes viral gastroenteritis, chronic alcoholism and malabsorption issues vs alcoholic hepatitis.   - Resolving. No diarrhea for 24h.  - zofran prn  - if diarrhea continues, send stool studies - likely due to alcoholic hepatic steatosis  - trend lfts  - RUQ US as above

## 2017-09-17 NOTE — PROGRESS NOTE ADULT - PROBLEM SELECTOR PLAN 4
- metabolic acidosis with increased anion gap likely due to lactic acidosis  likely due vomiting and dehydration and alcoholic ketosis.    - Improving. Gap is closed and lac is wnl  - s/p 1 banana bag  - c/w cont Zofran - Pt has dysuria w/ mild L CVA tenderness, and nonobstructing 8mm L renal calculi on CT. UA + for leuks. No fevers, chills. No evidence of pyelo on CT.  - Allergic to cephalosporins.  - Started levaquin 750mg po daily/ monitor QTC ( admission QTC= 439). (9/15- )  - f/u UCx ( Ecoli/ sensitivity is pending) - Pt has dysuria w/ mild L CVA tenderness, and nonobstructing 8mm L renal calculi on CT. UA + for leuks. No fevers, chills. No evidence of pyelo on CT.  - Allergic to cephalosporins.  - C/w levaquin 750mg po daily, day 3/5, pan-sensitive E. coli

## 2017-09-18 VITALS
RESPIRATION RATE: 16 BRPM | DIASTOLIC BLOOD PRESSURE: 71 MMHG | HEART RATE: 93 BPM | TEMPERATURE: 98 F | OXYGEN SATURATION: 100 % | SYSTOLIC BLOOD PRESSURE: 100 MMHG

## 2017-09-18 LAB
ALBUMIN SERPL ELPH-MCNC: 3.2 G/DL — LOW (ref 3.3–5)
ALP SERPL-CCNC: 103 U/L — SIGNIFICANT CHANGE UP (ref 40–120)
ALT FLD-CCNC: 38 U/L RC — SIGNIFICANT CHANGE UP (ref 10–45)
ANION GAP SERPL CALC-SCNC: 11 MMOL/L — SIGNIFICANT CHANGE UP (ref 5–17)
AST SERPL-CCNC: 62 U/L — HIGH (ref 10–40)
BASOPHILS # BLD AUTO: 0 K/UL — SIGNIFICANT CHANGE UP (ref 0–0.2)
BASOPHILS NFR BLD AUTO: 0.3 % — SIGNIFICANT CHANGE UP (ref 0–2)
BILIRUB SERPL-MCNC: 0.5 MG/DL — SIGNIFICANT CHANGE UP (ref 0.2–1.2)
BUN SERPL-MCNC: 4 MG/DL — LOW (ref 7–23)
CALCIUM SERPL-MCNC: 9.5 MG/DL — SIGNIFICANT CHANGE UP (ref 8.4–10.5)
CHLORIDE SERPL-SCNC: 99 MMOL/L — SIGNIFICANT CHANGE UP (ref 96–108)
CO2 SERPL-SCNC: 27 MMOL/L — SIGNIFICANT CHANGE UP (ref 22–31)
CREAT SERPL-MCNC: 0.74 MG/DL — SIGNIFICANT CHANGE UP (ref 0.5–1.3)
EOSINOPHIL # BLD AUTO: 0.1 K/UL — SIGNIFICANT CHANGE UP (ref 0–0.5)
EOSINOPHIL NFR BLD AUTO: 2.1 % — SIGNIFICANT CHANGE UP (ref 0–6)
GLUCOSE SERPL-MCNC: 94 MG/DL — SIGNIFICANT CHANGE UP (ref 70–99)
HCT VFR BLD CALC: 27.7 % — LOW (ref 34.5–45)
HGB BLD-MCNC: 9.6 G/DL — LOW (ref 11.5–15.5)
LYMPHOCYTES # BLD AUTO: 1.2 K/UL — SIGNIFICANT CHANGE UP (ref 1–3.3)
LYMPHOCYTES # BLD AUTO: 35.9 % — SIGNIFICANT CHANGE UP (ref 13–44)
MAGNESIUM SERPL-MCNC: 1.7 MG/DL — SIGNIFICANT CHANGE UP (ref 1.6–2.6)
MCHC RBC-ENTMCNC: 34.7 GM/DL — SIGNIFICANT CHANGE UP (ref 32–36)
MCHC RBC-ENTMCNC: 37.2 PG — HIGH (ref 27–34)
MCV RBC AUTO: 107 FL — HIGH (ref 80–100)
MONOCYTES # BLD AUTO: 0.5 K/UL — SIGNIFICANT CHANGE UP (ref 0–0.9)
MONOCYTES NFR BLD AUTO: 15 % — HIGH (ref 2–14)
NEUTROPHILS # BLD AUTO: 1.6 K/UL — LOW (ref 1.8–7.4)
NEUTROPHILS NFR BLD AUTO: 46.7 % — SIGNIFICANT CHANGE UP (ref 43–77)
PHOSPHATE SERPL-MCNC: 4.5 MG/DL — SIGNIFICANT CHANGE UP (ref 2.5–4.5)
PLATELET # BLD AUTO: 154 K/UL — SIGNIFICANT CHANGE UP (ref 150–400)
POTASSIUM SERPL-MCNC: 4.2 MMOL/L — SIGNIFICANT CHANGE UP (ref 3.5–5.3)
POTASSIUM SERPL-SCNC: 4.2 MMOL/L — SIGNIFICANT CHANGE UP (ref 3.5–5.3)
PROT SERPL-MCNC: 6 G/DL — SIGNIFICANT CHANGE UP (ref 6–8.3)
RBC # BLD: 2.58 M/UL — LOW (ref 3.8–5.2)
RBC # FLD: 15.5 % — HIGH (ref 10.3–14.5)
SODIUM SERPL-SCNC: 137 MMOL/L — SIGNIFICANT CHANGE UP (ref 135–145)
WBC # BLD: 3.3 K/UL — LOW (ref 3.8–10.5)
WBC # FLD AUTO: 3.3 K/UL — LOW (ref 3.8–10.5)

## 2017-09-18 PROCEDURE — 96376 TX/PRO/DX INJ SAME DRUG ADON: CPT

## 2017-09-18 PROCEDURE — 96374 THER/PROPH/DIAG INJ IV PUSH: CPT | Mod: XU

## 2017-09-18 PROCEDURE — 83605 ASSAY OF LACTIC ACID: CPT

## 2017-09-18 PROCEDURE — 82330 ASSAY OF CALCIUM: CPT

## 2017-09-18 PROCEDURE — 96375 TX/PRO/DX INJ NEW DRUG ADDON: CPT

## 2017-09-18 PROCEDURE — 83735 ASSAY OF MAGNESIUM: CPT

## 2017-09-18 PROCEDURE — 87186 SC STD MICRODIL/AGAR DIL: CPT

## 2017-09-18 PROCEDURE — 93005 ELECTROCARDIOGRAM TRACING: CPT

## 2017-09-18 PROCEDURE — 82803 BLOOD GASES ANY COMBINATION: CPT

## 2017-09-18 PROCEDURE — 80048 BASIC METABOLIC PNL TOTAL CA: CPT

## 2017-09-18 PROCEDURE — 76705 ECHO EXAM OF ABDOMEN: CPT

## 2017-09-18 PROCEDURE — 82435 ASSAY OF BLOOD CHLORIDE: CPT

## 2017-09-18 PROCEDURE — 82947 ASSAY GLUCOSE BLOOD QUANT: CPT

## 2017-09-18 PROCEDURE — 85610 PROTHROMBIN TIME: CPT

## 2017-09-18 PROCEDURE — 71045 X-RAY EXAM CHEST 1 VIEW: CPT

## 2017-09-18 PROCEDURE — 85027 COMPLETE CBC AUTOMATED: CPT

## 2017-09-18 PROCEDURE — 94640 AIRWAY INHALATION TREATMENT: CPT

## 2017-09-18 PROCEDURE — 87086 URINE CULTURE/COLONY COUNT: CPT

## 2017-09-18 PROCEDURE — 74177 CT ABD & PELVIS W/CONTRAST: CPT

## 2017-09-18 PROCEDURE — 84295 ASSAY OF SERUM SODIUM: CPT

## 2017-09-18 PROCEDURE — 81001 URINALYSIS AUTO W/SCOPE: CPT

## 2017-09-18 PROCEDURE — 84100 ASSAY OF PHOSPHORUS: CPT

## 2017-09-18 PROCEDURE — 83690 ASSAY OF LIPASE: CPT

## 2017-09-18 PROCEDURE — 80307 DRUG TEST PRSMV CHEM ANLYZR: CPT

## 2017-09-18 PROCEDURE — 82274 ASSAY TEST FOR BLOOD FECAL: CPT

## 2017-09-18 PROCEDURE — 99285 EMERGENCY DEPT VISIT HI MDM: CPT | Mod: 25

## 2017-09-18 PROCEDURE — 80053 COMPREHEN METABOLIC PANEL: CPT

## 2017-09-18 PROCEDURE — 85014 HEMATOCRIT: CPT

## 2017-09-18 PROCEDURE — 84132 ASSAY OF SERUM POTASSIUM: CPT

## 2017-09-18 RX ORDER — PANTOPRAZOLE SODIUM 20 MG/1
1 TABLET, DELAYED RELEASE ORAL
Qty: 31 | Refills: 0 | OUTPATIENT
Start: 2017-09-18 | End: 2017-10-19

## 2017-09-18 RX ORDER — ASPIRIN/CALCIUM CARB/MAGNESIUM 324 MG
1 TABLET ORAL
Qty: 14 | Refills: 0 | OUTPATIENT
Start: 2017-09-18 | End: 2017-10-02

## 2017-09-18 RX ORDER — ASPIRIN/CALCIUM CARB/MAGNESIUM 324 MG
1 TABLET ORAL
Qty: 31 | Refills: 0 | OUTPATIENT
Start: 2017-09-18 | End: 2017-10-19

## 2017-09-18 RX ORDER — ALBUTEROL 90 UG/1
3 AEROSOL, METERED ORAL
Qty: 14 | Refills: 0 | OUTPATIENT
Start: 2017-09-18

## 2017-09-18 RX ORDER — ASPIRIN/CALCIUM CARB/MAGNESIUM 324 MG
1 TABLET ORAL
Qty: 0 | Refills: 0 | COMMUNITY

## 2017-09-18 RX ORDER — DILTIAZEM HCL 120 MG
1 CAPSULE, EXT RELEASE 24 HR ORAL
Qty: 0 | Refills: 0 | COMMUNITY

## 2017-09-18 RX ORDER — PANTOPRAZOLE SODIUM 20 MG/1
1 TABLET, DELAYED RELEASE ORAL
Qty: 14 | Refills: 0 | OUTPATIENT
Start: 2017-09-18 | End: 2017-10-02

## 2017-09-18 RX ORDER — BUDESONIDE AND FORMOTEROL FUMARATE DIHYDRATE 160; 4.5 UG/1; UG/1
2 AEROSOL RESPIRATORY (INHALATION)
Qty: 1 | Refills: 0 | OUTPATIENT
Start: 2017-09-18

## 2017-09-18 RX ORDER — DILTIAZEM HCL 120 MG
1 CAPSULE, EXT RELEASE 24 HR ORAL
Qty: 14 | Refills: 0 | OUTPATIENT
Start: 2017-09-18 | End: 2017-10-02

## 2017-09-18 RX ORDER — ALBUTEROL 90 UG/1
2 AEROSOL, METERED ORAL
Qty: 1 | Refills: 0 | OUTPATIENT
Start: 2017-09-18

## 2017-09-18 RX ORDER — FOLIC ACID 0.8 MG
1 TABLET ORAL
Qty: 31 | Refills: 0 | OUTPATIENT
Start: 2017-09-18 | End: 2017-10-19

## 2017-09-18 RX ORDER — ALBUTEROL 90 UG/1
3 AEROSOL, METERED ORAL
Qty: 14 | Refills: 0 | OUTPATIENT
Start: 2017-09-18 | End: 2017-10-19

## 2017-09-18 RX ORDER — BUDESONIDE AND FORMOTEROL FUMARATE DIHYDRATE 160; 4.5 UG/1; UG/1
2 AEROSOL RESPIRATORY (INHALATION)
Qty: 4 | Refills: 0 | OUTPATIENT
Start: 2017-09-18 | End: 2017-10-19

## 2017-09-18 RX ORDER — DILTIAZEM HCL 120 MG
1 CAPSULE, EXT RELEASE 24 HR ORAL
Qty: 31 | Refills: 0 | OUTPATIENT
Start: 2017-09-18 | End: 2017-10-19

## 2017-09-18 RX ORDER — BUDESONIDE AND FORMOTEROL FUMARATE DIHYDRATE 160; 4.5 UG/1; UG/1
2 AEROSOL RESPIRATORY (INHALATION)
Qty: 0 | Refills: 0 | COMMUNITY

## 2017-09-18 RX ORDER — ALBUTEROL 90 UG/1
3 AEROSOL, METERED ORAL
Qty: 0 | Refills: 0 | COMMUNITY

## 2017-09-18 RX ORDER — PANTOPRAZOLE SODIUM 20 MG/1
1 TABLET, DELAYED RELEASE ORAL
Qty: 0 | Refills: 0 | COMMUNITY

## 2017-09-18 RX ORDER — FOLIC ACID 0.8 MG
1 TABLET ORAL
Qty: 0 | Refills: 0 | COMMUNITY

## 2017-09-18 RX ADMIN — Medication 100 MILLIGRAM(S): at 11:49

## 2017-09-18 RX ADMIN — Medication 1 PATCH: at 11:48

## 2017-09-18 RX ADMIN — Medication 1 MILLIGRAM(S): at 11:49

## 2017-09-18 RX ADMIN — Medication 1 PATCH: at 11:52

## 2017-09-18 RX ADMIN — PANTOPRAZOLE SODIUM 40 MILLIGRAM(S): 20 TABLET, DELAYED RELEASE ORAL at 06:33

## 2017-09-18 RX ADMIN — Medication 1 TABLET(S): at 11:49

## 2017-09-18 RX ADMIN — Medication 81 MILLIGRAM(S): at 11:49

## 2017-09-18 RX ADMIN — Medication 4 MILLIGRAM(S): at 06:33

## 2017-09-18 RX ADMIN — Medication 0.5 MILLIGRAM(S): at 06:33

## 2017-09-18 RX ADMIN — Medication 120 MILLIGRAM(S): at 06:32

## 2017-09-18 RX ADMIN — BUDESONIDE AND FORMOTEROL FUMARATE DIHYDRATE 2 PUFF(S): 160; 4.5 AEROSOL RESPIRATORY (INHALATION) at 05:16

## 2017-09-18 NOTE — PROGRESS NOTE ADULT - PROBLEM SELECTOR PLAN 3
- Pt has dysuria w/ mild L CVA tenderness, and nonobstructing 8mm L renal calculi on CT. UA + for leuks. No fevers, chills. No evidence of pyelo on CT.  - Allergic to cephalosporins.  - C/w levaquin 750mg po daily, day 4/5, pan-sensitive E. coli - Pt has dysuria w/ mild L CVA tenderness, and nonobstructing 8mm L renal calculi on CT. UA + for leuks. No fevers, chills. No evidence of pyelo on CT.  - Allergic to cephalosporins.  - C/w levaquin 750mg po daily, day 4/4, pan-sensitive E. coli. d/c levaquin on d/c

## 2017-09-18 NOTE — PROGRESS NOTE ADULT - PROBLEM SELECTOR PROBLEM 1
Alcohol withdrawal syndrome without complication

## 2017-09-18 NOTE — PROGRESS NOTE ADULT - PROBLEM SELECTOR PROBLEM 2
Elevated LFTs
Abdominal pain, unspecified location

## 2017-09-18 NOTE — PROGRESS NOTE ADULT - ASSESSMENT
51 yo F with PMH of alcohol abuse with hx of withdrawal seizure x1, SVT, asthma/copd current smoker, tobacco use, nephrolithiasis, gallstones, presents with 1 day of tremors, anxiety with abdominal pain, vomiting, diarrhea admitted for alcohol withdrawal .  Patient wishes to be discharged.

## 2017-09-18 NOTE — PROGRESS NOTE ADULT - PROBLEM SELECTOR PLAN 1
- CIWA = 1's and 0's  - C/W ativan  0.5mg PO q12 hrs,  - c/w banana bag, thiamine, folate  -Social work following. - CIWA = 1's and 0's  - C/W ativan  0.5mg PO q12 hrs, d/c on discharge  - c/w banana bag, thiamine, folate  -Social work following. pt declining rehab services.

## 2017-09-18 NOTE — PROGRESS NOTE ADULT - PROBLEM SELECTOR PLAN 4
-RUQ and LLQ with AST/ALT elevation ratio 3:1: pain most likely due to alcoholic liver steatosis vs alcoholic hepatitis (however t-bili wnl), less likely symptomatic gallstones or nephrolithiases based on CT findings  -Resolved  - Trend LFTs daily  - c/w ppi daily

## 2017-09-18 NOTE — PROGRESS NOTE ADULT - ATTENDING COMMENTS
alcochol withdrawal clinically improved. stop ativan today. declining rehab at this time.   UTI- completed 4 days of levaquin. d/c today.  gallstones, kidney stones-- needs outpatient follow up.  pt medically stable for d/c today. d/c time 45 mins.

## 2017-09-18 NOTE — PROGRESS NOTE ADULT - SUBJECTIVE AND OBJECTIVE BOX
Patient is a 52y old  Female who presents with a chief complaint of alcohol withdrawal and vomiting (16 Sep 2017 11:32)      SUBJECTIVE / OVERNIGHT EVENTS: No events overnight. Patient tolerating PO, afebrile. Patient is denying chest pain, SOB, abdominal pain, N/V, weakness, headaches, chills, and vision changes.    MEDICATIONS  (STANDING):  enoxaparin Injectable 40 milliGRAM(s) SubCutaneous daily  aspirin enteric coated 81 milliGRAM(s) Oral daily  diltiazem    milliGRAM(s) Oral daily  buDESOnide 160 MICROgram(s)/formoterol 4.5 MICROgram(s) Inhaler 2 Puff(s) Inhalation two times a day  pantoprazole    Tablet 40 milliGRAM(s) Oral before breakfast  folic acid 1 milliGRAM(s) Oral daily  nicotine - 21 mG/24Hr(s) Patch 1 patch Transdermal daily  thiamine 100 milliGRAM(s) Oral daily  levoFLOXacin  Tablet 750 milliGRAM(s) Oral every 24 hours  multivitamin 1 Tablet(s) Oral daily  nicotine  Polacrilex Gum 4 milliGRAM(s) Oral three times a day  LORazepam     Tablet 0.5 milliGRAM(s) Oral every 12 hours    MEDICATIONS  (PRN):  ALBUTerol/ipratropium for Nebulization 3 milliLiter(s) Nebulizer every 6 hours PRN Shortness of Breath and/or Wheezing  ondansetron Injectable 4 milliGRAM(s) IV Push every 8 hours PRN Nausea and/or Vomiting      T(F): 98.8 (09-18 @ 06:29), Max: 98.8 (09-17 @ 14:09)  HR: 103 (09-18 @ 06:29) (94 - 118)  BP: 108/74 (09-18 @ 06:29) (92/69 - 110/74)  RR: 18 (09-18 @ 06:29) (16 - 18)  SpO2: 98% (09-18 @ 06:29) (98% - 100%)  CAPILLARY BLOOD GLUCOSE        I&O's Summary    17 Sep 2017 07:01  -  18 Sep 2017 07:00  --------------------------------------------------------  IN: 520 mL / OUT: 0 mL / NET: 520 mL        PHYSICAL EXAM:  GEN: Appears in no acute distress  HEENT: PERRLA, EOMI and accommodate, neck supple, no lymphadenopathy, no JVD  PULM: Clear to auscultation bilaterally, no wheezes, rales, rhonchi  CV: RRR, S1S2, no murmurs, rubs or gallops  Abdominal: Soft, nontender to palpation, non-distended, normoactive bowel sounds  Extremities: WWP, No edema, + peripheral pulses  NEURO: AAOx3, moving all four extremities spontaneously  Skin: No rashes, lesions, hematomas, ecchymosis      LABS:  Labs personally reviewed.                        9.6    3.3   )-----------( 154      ( 18 Sep 2017 07:19 )             27.7     Hgb Trend: 9.6<--, 10.0<--, 9.1<--, 10.4<--, 12.4<--  09-18    137  |  99  |  4<L>  ----------------------------<  94  4.2   |  27  |  0.74    Ca    9.5      18 Sep 2017 07:18  Phos  4.5     09-18  Mg     1.7     09-18    TPro  6.0  /  Alb  3.2<L>  /  TBili  0.5  /  DBili  x   /  AST  62<H>  /  ALT  38  /  AlkPhos  103  09-18    Creatinine Trend: 0.74<--, 0.66<--, 0.54<--, 0.64<--, 0.58<--, 0.70<--        RADIOLOGY & ADDITIONAL TESTS:  Imaging Personally Reviewed.    Consultants:      [place] PGY-1 Pager: [phone]  [place] Float: [phone]

## 2017-09-18 NOTE — CHART NOTE - NSCHARTNOTEFT_GEN_A_CORE
NUTRITION FOLLOW UP NOTE   Pt seen for length of stay.     Source: Patient [ ]    Family [ ]     other [x ] RN, comprehensive chart review; Pt wants to go home    Diet : Regular, Provide Ensure Enlive three times daily (provides additional 1050kcal, 60g protein)       Patient reports [ ] nausea  [ ] vomiting [ ] diarrhea [ ] constipation  [ ]chewing problems [ ] swallowing issues  [ ] other:      PO intake:  < 50% [ ] 50-75% [ ]   % [ ]  other : 30-80%, wasn't drinking ensure but RN added ice and Pt now accepting supplement.     Source for PO intake [ ] Patient [ ] family [ x] chart [ x] staff [ ] other     Enteral /Parenteral Nutrition: N/A      Weight (kg): 36.6 (09-14 @ 19:19). No new Wt.    Pertinent Medications: MEDICATIONS  (STANDING):  enoxaparin Injectable 40 milliGRAM(s) SubCutaneous daily  aspirin enteric coated 81 milliGRAM(s) Oral daily  diltiazem    milliGRAM(s) Oral daily  buDESOnide 160 MICROgram(s)/formoterol 4.5 MICROgram(s) Inhaler 2 Puff(s) Inhalation two times a day  pantoprazole    Tablet 40 milliGRAM(s) Oral before breakfast  folic acid 1 milliGRAM(s) Oral daily  nicotine - 21 mG/24Hr(s) Patch 1 patch Transdermal daily  thiamine 100 milliGRAM(s) Oral daily  levoFLOXacin  Tablet 750 milliGRAM(s) Oral every 24 hours  multivitamin 1 Tablet(s) Oral daily  nicotine  Polacrilex Gum 4 milliGRAM(s) Oral three times a day  LORazepam     Tablet 0.5 milliGRAM(s) Oral every 12 hours    MEDICATIONS  (PRN):  ALBUTerol/ipratropium for Nebulization 3 milliLiter(s) Nebulizer every 6 hours PRN Shortness of Breath and/or Wheezing  ondansetron Injectable 4 milliGRAM(s) IV Push every 8 hours PRN Nausea and/or Vomiting    Pertinent Labs:  09-18 Na137 mmol/L Glu 94 mg/dL K+ 4.2 mmol/L Cr  0.74 mg/dL BUN 4 mg/dL<L> Phos 4.5 mg/dL Alb 3.2 g/dL<L> PAB n/a         Skin: intact. No edema noted.    Estimated Needs:   [ x] no change since previous assessment  [ ] recalculated:       Previous Nutrition Diagnosis:     [ ] Inadequate Energy Intake [ ]Inadequate Oral Intake [ ] Excessive Energy Intake     [ ] Underweight [ ] Increased Nutrient Needs [ ] Overweight/Obesity     [ ] Altered GI Function [ ] Unintended Weight Loss [ ] Food & Nutrition Related Knowledge Deficit [ x] Severe Malnutrition          Nutrition Diagnosis is [ x] ongoing  [ ] resolved [ ] not applicable          New Nutrition Diagnosis: [x ] not applicable         Interventions:     Recommend    [ ] Change Diet To:    [x ] Nutrition Supplement: continue ensure enlive daily. continue MVI, thiamine, folic acid supplementation.    [x ] Nutrition Support: Continue to provide food preferences within diet restrictions as feasible. Continue to encourage good po intake at meals     [x ] Other: RD remains available for review/reinforcement of diet education upon request.     Monitoring and Evaluation:     [ x] PO intake [ x] Tolerance to diet prescription [x ] weights [ x] follow up per protocol    RD to remain available for further nutritional interventions as indicated/requested by medical team/pt.   Tristan Bernal, RD, CDN. Pager: [phone]

## 2017-09-18 NOTE — PROGRESS NOTE ADULT - PROBLEM SELECTOR PLAN 2
- likely due to alcoholic hepatic steatosis  - trend lfts  - RUQ US as above - likely due to alcoholic hepatic steatosis  - trend lfts

## 2017-09-29 ENCOUNTER — APPOINTMENT (OUTPATIENT)
Dept: UROLOGY | Facility: CLINIC | Age: 52
End: 2017-09-29
Payer: MEDICAID

## 2017-09-29 VITALS
WEIGHT: 92 LBS | RESPIRATION RATE: 17 BRPM | TEMPERATURE: 98.2 F | DIASTOLIC BLOOD PRESSURE: 75 MMHG | OXYGEN SATURATION: 97 % | SYSTOLIC BLOOD PRESSURE: 113 MMHG | HEART RATE: 81 BPM | HEIGHT: 60 IN | BODY MASS INDEX: 18.06 KG/M2

## 2017-09-29 DIAGNOSIS — J44.9 CHRONIC OBSTRUCTIVE PULMONARY DISEASE, UNSPECIFIED: ICD-10-CM

## 2017-09-29 PROCEDURE — 99214 OFFICE O/P EST MOD 30 MIN: CPT

## 2017-10-02 LAB — BACTERIA UR CULT: NORMAL

## 2017-10-18 PROBLEM — J44.9 CHRONIC OBSTRUCTIVE PULMONARY DISEASE: Status: ACTIVE | Noted: 2017-02-13

## 2017-10-26 ENCOUNTER — OUTPATIENT (OUTPATIENT)
Dept: OUTPATIENT SERVICES | Facility: HOSPITAL | Age: 52
LOS: 1 days | End: 2017-10-26
Payer: MEDICAID

## 2017-10-26 VITALS
TEMPERATURE: 98 F | DIASTOLIC BLOOD PRESSURE: 91 MMHG | HEIGHT: 60 IN | SYSTOLIC BLOOD PRESSURE: 137 MMHG | HEART RATE: 87 BPM | WEIGHT: 91.05 LBS | RESPIRATION RATE: 20 BRPM | OXYGEN SATURATION: 100 %

## 2017-10-26 DIAGNOSIS — Z01.818 ENCOUNTER FOR OTHER PREPROCEDURAL EXAMINATION: ICD-10-CM

## 2017-10-26 DIAGNOSIS — N20.0 CALCULUS OF KIDNEY: ICD-10-CM

## 2017-10-26 DIAGNOSIS — F10.230 ALCOHOL DEPENDENCE WITH WITHDRAWAL, UNCOMPLICATED: ICD-10-CM

## 2017-10-26 DIAGNOSIS — K21.9 GASTRO-ESOPHAGEAL REFLUX DISEASE WITHOUT ESOPHAGITIS: ICD-10-CM

## 2017-10-26 DIAGNOSIS — D64.9 ANEMIA, UNSPECIFIED: ICD-10-CM

## 2017-10-26 DIAGNOSIS — Z98.891 HISTORY OF UTERINE SCAR FROM PREVIOUS SURGERY: Chronic | ICD-10-CM

## 2017-10-26 DIAGNOSIS — R00.0 TACHYCARDIA, UNSPECIFIED: ICD-10-CM

## 2017-10-26 DIAGNOSIS — J44.9 CHRONIC OBSTRUCTIVE PULMONARY DISEASE, UNSPECIFIED: ICD-10-CM

## 2017-10-26 DIAGNOSIS — Z90.710 ACQUIRED ABSENCE OF BOTH CERVIX AND UTERUS: Chronic | ICD-10-CM

## 2017-10-26 LAB
ANION GAP SERPL CALC-SCNC: 18 MMOL/L — HIGH (ref 5–17)
BUN SERPL-MCNC: 11 MG/DL — SIGNIFICANT CHANGE UP (ref 7–23)
CALCIUM SERPL-MCNC: 10 MG/DL — SIGNIFICANT CHANGE UP (ref 8.4–10.5)
CHLORIDE SERPL-SCNC: 96 MMOL/L — SIGNIFICANT CHANGE UP (ref 96–108)
CO2 SERPL-SCNC: 21 MMOL/L — LOW (ref 22–31)
CREAT SERPL-MCNC: 0.63 MG/DL — SIGNIFICANT CHANGE UP (ref 0.5–1.3)
GLUCOSE SERPL-MCNC: 106 MG/DL — HIGH (ref 70–99)
HCT VFR BLD CALC: 37.6 % — SIGNIFICANT CHANGE UP (ref 34.5–45)
HGB BLD-MCNC: 12.8 G/DL — SIGNIFICANT CHANGE UP (ref 11.5–15.5)
MCHC RBC-ENTMCNC: 34 GM/DL — SIGNIFICANT CHANGE UP (ref 32–36)
MCHC RBC-ENTMCNC: 34 PG — SIGNIFICANT CHANGE UP (ref 27–34)
MCV RBC AUTO: 99.7 FL — SIGNIFICANT CHANGE UP (ref 80–100)
PLATELET # BLD AUTO: 218 K/UL — SIGNIFICANT CHANGE UP (ref 150–400)
POTASSIUM SERPL-MCNC: 4.1 MMOL/L — SIGNIFICANT CHANGE UP (ref 3.5–5.3)
POTASSIUM SERPL-SCNC: 4.1 MMOL/L — SIGNIFICANT CHANGE UP (ref 3.5–5.3)
RBC # BLD: 3.77 M/UL — LOW (ref 3.8–5.2)
RBC # FLD: 13.6 % — SIGNIFICANT CHANGE UP (ref 10.3–14.5)
SODIUM SERPL-SCNC: 135 MMOL/L — SIGNIFICANT CHANGE UP (ref 135–145)
WBC # BLD: 6.09 K/UL — SIGNIFICANT CHANGE UP (ref 3.8–10.5)
WBC # FLD AUTO: 6.09 K/UL — SIGNIFICANT CHANGE UP (ref 3.8–10.5)

## 2017-10-26 PROCEDURE — G0463: CPT

## 2017-10-26 PROCEDURE — 87086 URINE CULTURE/COLONY COUNT: CPT

## 2017-10-26 PROCEDURE — 85027 COMPLETE CBC AUTOMATED: CPT

## 2017-10-26 PROCEDURE — 80048 BASIC METABOLIC PNL TOTAL CA: CPT

## 2017-10-26 PROCEDURE — 87186 SC STD MICRODIL/AGAR DIL: CPT

## 2017-10-26 RX ORDER — SODIUM CHLORIDE 9 MG/ML
3 INJECTION INTRAMUSCULAR; INTRAVENOUS; SUBCUTANEOUS EVERY 8 HOURS
Qty: 0 | Refills: 0 | Status: DISCONTINUED | OUTPATIENT
Start: 2017-11-02 | End: 2017-11-02

## 2017-10-26 RX ORDER — CIPROFLOXACIN LACTATE 400MG/40ML
400 VIAL (ML) INTRAVENOUS ONCE
Qty: 0 | Refills: 0 | Status: DISCONTINUED | OUTPATIENT
Start: 2017-11-02 | End: 2017-11-17

## 2017-10-26 RX ORDER — LIDOCAINE HCL 20 MG/ML
0.2 VIAL (ML) INJECTION ONCE
Qty: 0 | Refills: 0 | Status: DISCONTINUED | OUTPATIENT
Start: 2017-11-02 | End: 2017-11-02

## 2017-10-26 NOTE — H&P PST ADULT - PMH
Asthma    Asthma with COPD  last PFT within the year  Cholelithiasis    GERD (gastroesophageal reflux disease)    Lyme disease    Renal calculi    Tachycardia AA (alcohol abuse)  sober since September 2017  Alcohol withdrawal seizure without complication  x 1  patient unable to recall date  Alcohol-induced anxiety disorder    Asthma with COPD  last PFT within the year  no recent respiratory infection   on symbicort daily  have not used ventolin for months  Cholelithiasis    GERD (gastroesophageal reflux disease)    Lyme disease    Renal calculi    Tachycardia AA (alcohol abuse)  sober since September 2017  Alcohol withdrawal seizure without complication  x 1  patient unable to recall date  Alcohol-induced anxiety disorder    Anemia  h/o blood transfusion 6 months ago  Asthma with COPD  last PFT within the year  no recent respiratory infection   on symbicort daily  have not used ventolin for months  Cholelithiasis    GERD (gastroesophageal reflux disease)    Lyme disease    Renal calculi    Tachycardia

## 2017-10-26 NOTE — H&P PST ADULT - HISTORY OF PRESENT ILLNESS
52 year old female with h/o alcohol abuse with hx withdrawal seizure x 1, alcohol withdrawal abdominal pain s/p admission 9/2017, SVT, COPD/asthma, smoker, gallstones, calculus of kidney, scheduled for left ureteroscopy/ laser lithotripsy/ stent insertion. 52 year old female with h/o alcohol abuse with hx withdrawal seizure x 1 (last drink one month ago), alcohol withdrawal abdominal pain s/p admission 9/2017, SVT, COPD/asthma, smoker, gallstones, calculus of kidney, scheduled for left ureteroscopy/ laser lithotripsy/ stent insertion.

## 2017-10-28 LAB
-  AMIKACIN: SIGNIFICANT CHANGE UP
-  AMIKACIN: SIGNIFICANT CHANGE UP
-  AMPICILLIN/SULBACTAM: SIGNIFICANT CHANGE UP
-  AMPICILLIN/SULBACTAM: SIGNIFICANT CHANGE UP
-  AMPICILLIN: SIGNIFICANT CHANGE UP
-  AMPICILLIN: SIGNIFICANT CHANGE UP
-  AZTREONAM: SIGNIFICANT CHANGE UP
-  AZTREONAM: SIGNIFICANT CHANGE UP
-  CEFAZOLIN: SIGNIFICANT CHANGE UP
-  CEFAZOLIN: SIGNIFICANT CHANGE UP
-  CEFEPIME: SIGNIFICANT CHANGE UP
-  CEFEPIME: SIGNIFICANT CHANGE UP
-  CEFOXITIN: SIGNIFICANT CHANGE UP
-  CEFOXITIN: SIGNIFICANT CHANGE UP
-  CEFTAZIDIME: SIGNIFICANT CHANGE UP
-  CEFTAZIDIME: SIGNIFICANT CHANGE UP
-  CEFTRIAXONE: SIGNIFICANT CHANGE UP
-  CEFTRIAXONE: SIGNIFICANT CHANGE UP
-  CIPROFLOXACIN: SIGNIFICANT CHANGE UP
-  CIPROFLOXACIN: SIGNIFICANT CHANGE UP
-  ERTAPENEM: SIGNIFICANT CHANGE UP
-  ERTAPENEM: SIGNIFICANT CHANGE UP
-  GENTAMICIN: SIGNIFICANT CHANGE UP
-  GENTAMICIN: SIGNIFICANT CHANGE UP
-  IMIPENEM: SIGNIFICANT CHANGE UP
-  IMIPENEM: SIGNIFICANT CHANGE UP
-  LEVOFLOXACIN: SIGNIFICANT CHANGE UP
-  LEVOFLOXACIN: SIGNIFICANT CHANGE UP
-  MEROPENEM: SIGNIFICANT CHANGE UP
-  MEROPENEM: SIGNIFICANT CHANGE UP
-  NITROFURANTOIN: SIGNIFICANT CHANGE UP
-  NITROFURANTOIN: SIGNIFICANT CHANGE UP
-  PIPERACILLIN/TAZOBACTAM: SIGNIFICANT CHANGE UP
-  PIPERACILLIN/TAZOBACTAM: SIGNIFICANT CHANGE UP
-  TOBRAMYCIN: SIGNIFICANT CHANGE UP
-  TOBRAMYCIN: SIGNIFICANT CHANGE UP
-  TRIMETHOPRIM/SULFAMETHOXAZOLE: SIGNIFICANT CHANGE UP
-  TRIMETHOPRIM/SULFAMETHOXAZOLE: SIGNIFICANT CHANGE UP
CULTURE RESULTS: SIGNIFICANT CHANGE UP
METHOD TYPE: SIGNIFICANT CHANGE UP
METHOD TYPE: SIGNIFICANT CHANGE UP
ORGANISM # SPEC MICROSCOPIC CNT: SIGNIFICANT CHANGE UP
SPECIMEN SOURCE: SIGNIFICANT CHANGE UP

## 2017-11-02 ENCOUNTER — APPOINTMENT (OUTPATIENT)
Dept: UROLOGY | Facility: HOSPITAL | Age: 52
End: 2017-11-02

## 2017-11-02 ENCOUNTER — TRANSCRIPTION ENCOUNTER (OUTPATIENT)
Age: 52
End: 2017-11-02

## 2017-11-02 ENCOUNTER — RESULT REVIEW (OUTPATIENT)
Age: 52
End: 2017-11-02

## 2017-11-02 ENCOUNTER — OUTPATIENT (OUTPATIENT)
Dept: OUTPATIENT SERVICES | Facility: HOSPITAL | Age: 52
LOS: 1 days | End: 2017-11-02
Payer: MEDICAID

## 2017-11-02 VITALS
DIASTOLIC BLOOD PRESSURE: 81 MMHG | HEART RATE: 78 BPM | OXYGEN SATURATION: 100 % | TEMPERATURE: 98 F | SYSTOLIC BLOOD PRESSURE: 143 MMHG | RESPIRATION RATE: 16 BRPM

## 2017-11-02 VITALS
WEIGHT: 91.05 LBS | RESPIRATION RATE: 18 BRPM | DIASTOLIC BLOOD PRESSURE: 74 MMHG | SYSTOLIC BLOOD PRESSURE: 113 MMHG | HEART RATE: 96 BPM | OXYGEN SATURATION: 100 % | TEMPERATURE: 98 F

## 2017-11-02 DIAGNOSIS — N20.0 CALCULUS OF KIDNEY: ICD-10-CM

## 2017-11-02 DIAGNOSIS — Z90.710 ACQUIRED ABSENCE OF BOTH CERVIX AND UTERUS: Chronic | ICD-10-CM

## 2017-11-02 DIAGNOSIS — Z01.818 ENCOUNTER FOR OTHER PREPROCEDURAL EXAMINATION: ICD-10-CM

## 2017-11-02 DIAGNOSIS — Z98.891 HISTORY OF UTERINE SCAR FROM PREVIOUS SURGERY: Chronic | ICD-10-CM

## 2017-11-02 PROCEDURE — 74420 UROGRAPHY RTRGR +-KUB: CPT | Mod: 26

## 2017-11-02 PROCEDURE — 52356 CYSTO/URETERO W/LITHOTRIPSY: CPT | Mod: LT

## 2017-11-02 PROCEDURE — 88300 SURGICAL PATH GROSS: CPT | Mod: 26

## 2017-11-02 RX ORDER — SODIUM CHLORIDE 9 MG/ML
1000 INJECTION, SOLUTION INTRAVENOUS
Qty: 0 | Refills: 0 | Status: DISCONTINUED | OUTPATIENT
Start: 2017-11-02 | End: 2017-11-17

## 2017-11-02 RX ORDER — CIPROFLOXACIN LACTATE 400MG/40ML
1 VIAL (ML) INTRAVENOUS
Qty: 7 | Refills: 0 | OUTPATIENT
Start: 2017-11-02 | End: 2017-11-09

## 2017-11-02 RX ORDER — ONDANSETRON 8 MG/1
4 TABLET, FILM COATED ORAL ONCE
Qty: 0 | Refills: 0 | Status: DISCONTINUED | OUTPATIENT
Start: 2017-11-02 | End: 2017-11-02

## 2017-11-02 RX ORDER — HYDROMORPHONE HYDROCHLORIDE 2 MG/ML
0.5 INJECTION INTRAMUSCULAR; INTRAVENOUS; SUBCUTANEOUS
Qty: 0 | Refills: 0 | Status: DISCONTINUED | OUTPATIENT
Start: 2017-11-02 | End: 2017-11-02

## 2017-11-02 RX ORDER — TAMSULOSIN HYDROCHLORIDE 0.4 MG/1
1 CAPSULE ORAL
Qty: 30 | Refills: 0 | OUTPATIENT
Start: 2017-11-02 | End: 2017-12-02

## 2017-11-02 RX ADMIN — HYDROMORPHONE HYDROCHLORIDE 0.5 MILLIGRAM(S): 2 INJECTION INTRAMUSCULAR; INTRAVENOUS; SUBCUTANEOUS at 09:37

## 2017-11-02 RX ADMIN — SODIUM CHLORIDE 100 MILLILITER(S): 9 INJECTION, SOLUTION INTRAVENOUS at 09:39

## 2017-11-02 NOTE — ASU DISCHARGE PLAN (ADULT/PEDIATRIC). - NOTIFY
Inability to Tolerate Liquids or Foods/Unable to Urinate/Persistent Nausea and Vomiting/Pain not relieved by Medications/Fever greater than 101/Bleeding that does not stop

## 2017-11-02 NOTE — ASU DISCHARGE PLAN (ADULT/PEDIATRIC). - MEDICATION SUMMARY - MEDICATIONS TO TAKE
I will START or STAY ON the medications listed below when I get home from the hospital:    Aspirin Enteric Coated 81 mg oral delayed release tablet  -- 1 tab(s) by mouth once a day  -- Indication: For pain medication     Percocet 5/325 oral tablet  -- 1 tab(s) by mouth every 4 hours MDD:6  -- Caution federal law prohibits the transfer of this drug to any person other  than the person for whom it was prescribed.  May cause drowsiness.  Alcohol may intensify this effect.  Use care when operating dangerous machinery.  This prescription cannot be refilled.  This product contains acetaminophen.  Do not use  with any other product containing acetaminophen to prevent possible liver damage.  Using more of this medication than prescribed may cause serious breathing problems.    -- Indication: For pain medication     Flomax 0.4 mg oral capsule  -- 1 cap(s) by mouth once a day   -- It is very important that you take or use this exactly as directed.  Do not skip doses or discontinue unless directed by your doctor.  May cause drowsiness.  Alcohol may intensify this effect.  Use care when operating dangerous machinery.  Some non-prescription drugs may aggravate your condition.  Read all labels carefully.  If a warning appears, check with your doctor before taking.  Swallow whole.  Do not crush.  Take with food or milk.    -- Indication: For to help with stent discomfort     dilTIAZem 120 mg/24 hours oral capsule, extended release  -- 1 cap(s) by mouth once a day  -- Indication: For home medication     hydrOXYzine hydrochloride 25 mg oral tablet  -- PRN  -- Indication: For home medication     Ventolin HFA 90 mcg/inh inhalation aerosol  -- 2 puff(s) inhaled 2 times a day   -- For inhalation only.  It is very important that you take or use this exactly as directed.  Do not skip doses or discontinue unless directed by your doctor.  Obtain medical advice before taking any non-prescription drugs as some may affect the action of this medication.  Shake well before use.    -- Indication: For home medication     Symbicort 160 mcg-4.5 mcg/inh inhalation aerosol  -- 2 puff(s) inhaled 2 times a day  -- Indication: For home medication     pantoprazole 40 mg oral delayed release tablet  -- 1 tab(s) by mouth once a day  -- Indication: For home medication     Levaquin 500 mg oral tablet  -- 1 tab(s) by mouth every 24 hours   -- Avoid prolonged or excessive exposure to direct and/or artificial sunlight while taking this medication.  Do not take dairy products, antacids, or iron preparations within one hour of this medication.  Finish all this medication unless otherwise directed by prescriber.  May cause drowsiness or dizziness.  Medication should be taken with plenty of water.    -- Indication: For antibiotic     folic acid 0.4 mg oral tablet  -- 1 tab(s) by mouth once a day  -- Indication: For home medication

## 2017-11-02 NOTE — ASU DISCHARGE PLAN (ADULT/PEDIATRIC). - ITEMS TO FOLLOWUP WITH YOUR PHYSICIAN'S
Your ureteral stent is on a string attached with an adhesive bandage. Do not tug on or pull on string, do not remove bandage. The stent will be removed at your follow up appointment.  See Dr. Forrest in one week for stent removal

## 2017-11-03 PROCEDURE — 52356 CYSTO/URETERO W/LITHOTRIPSY: CPT | Mod: LT

## 2017-11-03 PROCEDURE — C1769: CPT

## 2017-11-03 PROCEDURE — C2617: CPT

## 2017-11-03 PROCEDURE — C1758: CPT

## 2017-11-03 PROCEDURE — 76000 FLUOROSCOPY <1 HR PHYS/QHP: CPT

## 2017-11-03 PROCEDURE — 82365 CALCULUS SPECTROSCOPY: CPT

## 2017-11-03 PROCEDURE — C1889: CPT

## 2017-11-03 PROCEDURE — 88300 SURGICAL PATH GROSS: CPT

## 2017-11-06 LAB — SURGICAL PATHOLOGY STUDY: SIGNIFICANT CHANGE UP

## 2017-11-07 LAB — COMPN STONE: SIGNIFICANT CHANGE UP

## 2017-11-10 ENCOUNTER — APPOINTMENT (OUTPATIENT)
Dept: UROLOGY | Facility: CLINIC | Age: 52
End: 2017-11-10

## 2017-11-10 ENCOUNTER — EMERGENCY (EMERGENCY)
Facility: HOSPITAL | Age: 52
LOS: 1 days | Discharge: HOME | End: 2017-11-10
Attending: EMERGENCY MEDICINE | Admitting: EMERGENCY MEDICINE
Payer: MEDICAID

## 2017-11-10 VITALS
DIASTOLIC BLOOD PRESSURE: 86 MMHG | TEMPERATURE: 98 F | OXYGEN SATURATION: 99 % | SYSTOLIC BLOOD PRESSURE: 126 MMHG | HEART RATE: 99 BPM | RESPIRATION RATE: 20 BRPM

## 2017-11-10 VITALS
TEMPERATURE: 98 F | WEIGHT: 89.95 LBS | SYSTOLIC BLOOD PRESSURE: 143 MMHG | DIASTOLIC BLOOD PRESSURE: 82 MMHG | HEART RATE: 137 BPM | RESPIRATION RATE: 18 BRPM | OXYGEN SATURATION: 100 %

## 2017-11-10 DIAGNOSIS — Z90.710 ACQUIRED ABSENCE OF BOTH CERVIX AND UTERUS: Chronic | ICD-10-CM

## 2017-11-10 DIAGNOSIS — Z98.891 HISTORY OF UTERINE SCAR FROM PREVIOUS SURGERY: Chronic | ICD-10-CM

## 2017-11-10 LAB
ALBUMIN SERPL ELPH-MCNC: 4 G/DL — SIGNIFICANT CHANGE UP (ref 3.3–5)
ALP SERPL-CCNC: 140 U/L — HIGH (ref 40–120)
ALT FLD-CCNC: 19 U/L RC — SIGNIFICANT CHANGE UP (ref 10–45)
ANION GAP SERPL CALC-SCNC: 16 MMOL/L — SIGNIFICANT CHANGE UP (ref 5–17)
APTT BLD: 28.1 SEC — SIGNIFICANT CHANGE UP (ref 27.5–37.4)
AST SERPL-CCNC: 44 U/L — HIGH (ref 10–40)
BASOPHILS # BLD AUTO: 0.1 K/UL — SIGNIFICANT CHANGE UP (ref 0–0.2)
BASOPHILS NFR BLD AUTO: 1.3 % — SIGNIFICANT CHANGE UP (ref 0–2)
BILIRUB SERPL-MCNC: 0.4 MG/DL — SIGNIFICANT CHANGE UP (ref 0.2–1.2)
BUN SERPL-MCNC: 7 MG/DL — SIGNIFICANT CHANGE UP (ref 7–23)
CALCIUM SERPL-MCNC: 9.3 MG/DL — SIGNIFICANT CHANGE UP (ref 8.4–10.5)
CHLORIDE SERPL-SCNC: 98 MMOL/L — SIGNIFICANT CHANGE UP (ref 96–108)
CO2 SERPL-SCNC: 22 MMOL/L — SIGNIFICANT CHANGE UP (ref 22–31)
CREAT SERPL-MCNC: 0.7 MG/DL — SIGNIFICANT CHANGE UP (ref 0.5–1.3)
EOSINOPHIL # BLD AUTO: 0.1 K/UL — SIGNIFICANT CHANGE UP (ref 0–0.5)
EOSINOPHIL NFR BLD AUTO: 1.4 % — SIGNIFICANT CHANGE UP (ref 0–6)
ETHANOL SERPL-MCNC: SIGNIFICANT CHANGE UP MG/DL (ref 0–10)
GLUCOSE SERPL-MCNC: 183 MG/DL — HIGH (ref 70–99)
HCT VFR BLD CALC: 34 % — LOW (ref 34.5–45)
HGB BLD-MCNC: 11.8 G/DL — SIGNIFICANT CHANGE UP (ref 11.5–15.5)
INR BLD: 1.09 RATIO — SIGNIFICANT CHANGE UP (ref 0.88–1.16)
LYMPHOCYTES # BLD AUTO: 1.7 K/UL — SIGNIFICANT CHANGE UP (ref 1–3.3)
LYMPHOCYTES # BLD AUTO: 22.6 % — SIGNIFICANT CHANGE UP (ref 13–44)
MAGNESIUM SERPL-MCNC: 1.6 MG/DL — SIGNIFICANT CHANGE UP (ref 1.6–2.6)
MCHC RBC-ENTMCNC: 34.8 GM/DL — SIGNIFICANT CHANGE UP (ref 32–36)
MCHC RBC-ENTMCNC: 35.7 PG — HIGH (ref 27–34)
MCV RBC AUTO: 103 FL — HIGH (ref 80–100)
MONOCYTES # BLD AUTO: 0.7 K/UL — SIGNIFICANT CHANGE UP (ref 0–0.9)
MONOCYTES NFR BLD AUTO: 8.8 % — SIGNIFICANT CHANGE UP (ref 2–14)
NEUTROPHILS # BLD AUTO: 5.1 K/UL — SIGNIFICANT CHANGE UP (ref 1.8–7.4)
NEUTROPHILS NFR BLD AUTO: 66 % — SIGNIFICANT CHANGE UP (ref 43–77)
PHOSPHATE SERPL-MCNC: 3.2 MG/DL — SIGNIFICANT CHANGE UP (ref 2.5–4.5)
PLATELET # BLD AUTO: 278 K/UL — SIGNIFICANT CHANGE UP (ref 150–400)
POTASSIUM SERPL-MCNC: 3.8 MMOL/L — SIGNIFICANT CHANGE UP (ref 3.5–5.3)
POTASSIUM SERPL-SCNC: 3.8 MMOL/L — SIGNIFICANT CHANGE UP (ref 3.5–5.3)
PROT SERPL-MCNC: 7.1 G/DL — SIGNIFICANT CHANGE UP (ref 6–8.3)
PROTHROM AB SERPL-ACNC: 11.9 SEC — SIGNIFICANT CHANGE UP (ref 9.8–12.7)
RBC # BLD: 3.31 M/UL — LOW (ref 3.8–5.2)
RBC # FLD: 12.9 % — SIGNIFICANT CHANGE UP (ref 10.3–14.5)
SODIUM SERPL-SCNC: 136 MMOL/L — SIGNIFICANT CHANGE UP (ref 135–145)
TSH SERPL-MCNC: 0.68 UIU/ML — SIGNIFICANT CHANGE UP (ref 0.27–4.2)
WBC # BLD: 7.7 K/UL — SIGNIFICANT CHANGE UP (ref 3.8–10.5)
WBC # FLD AUTO: 7.7 K/UL — SIGNIFICANT CHANGE UP (ref 3.8–10.5)

## 2017-11-10 PROCEDURE — 93005 ELECTROCARDIOGRAM TRACING: CPT

## 2017-11-10 PROCEDURE — 73130 X-RAY EXAM OF HAND: CPT

## 2017-11-10 PROCEDURE — 73030 X-RAY EXAM OF SHOULDER: CPT | Mod: 26,RT

## 2017-11-10 PROCEDURE — 99284 EMERGENCY DEPT VISIT MOD MDM: CPT | Mod: 25

## 2017-11-10 PROCEDURE — 73030 X-RAY EXAM OF SHOULDER: CPT

## 2017-11-10 PROCEDURE — 84100 ASSAY OF PHOSPHORUS: CPT

## 2017-11-10 PROCEDURE — 84443 ASSAY THYROID STIM HORMONE: CPT

## 2017-11-10 PROCEDURE — 80053 COMPREHEN METABOLIC PANEL: CPT

## 2017-11-10 PROCEDURE — 85610 PROTHROMBIN TIME: CPT

## 2017-11-10 PROCEDURE — 73130 X-RAY EXAM OF HAND: CPT | Mod: 26,LT

## 2017-11-10 PROCEDURE — 73060 X-RAY EXAM OF HUMERUS: CPT | Mod: 26,RT

## 2017-11-10 PROCEDURE — 93010 ELECTROCARDIOGRAM REPORT: CPT

## 2017-11-10 PROCEDURE — 85730 THROMBOPLASTIN TIME PARTIAL: CPT

## 2017-11-10 PROCEDURE — 80307 DRUG TEST PRSMV CHEM ANLYZR: CPT

## 2017-11-10 PROCEDURE — 85027 COMPLETE CBC AUTOMATED: CPT

## 2017-11-10 PROCEDURE — 83735 ASSAY OF MAGNESIUM: CPT

## 2017-11-10 PROCEDURE — 73060 X-RAY EXAM OF HUMERUS: CPT

## 2017-11-10 RX ORDER — ACETAMINOPHEN 500 MG
975 TABLET ORAL ONCE
Qty: 0 | Refills: 0 | Status: COMPLETED | OUTPATIENT
Start: 2017-11-10 | End: 2017-11-10

## 2017-11-10 RX ORDER — OXYCODONE HYDROCHLORIDE 5 MG/1
1 TABLET ORAL
Qty: 12 | Refills: 0 | OUTPATIENT
Start: 2017-11-10 | End: 2017-11-13

## 2017-11-10 RX ADMIN — Medication 975 MILLIGRAM(S): at 14:30

## 2017-11-10 NOTE — ED PROVIDER NOTE - PROGRESS NOTE DETAILS
The patient wishes to be discharged against medical advice. I have assessed the patient's mental status and  the patient has capacity to make this decision. I have explained the risks of leaving without full treatment, including severe disability and death, which the patient understands and is willing to accept. I have answered all of the patient's questions. I reiterated my medical opinion and advised the patient to return at any time. We discussed the further workup outside of the current visit and return precautions. The patient wishes to be discharged against medical advice. I have assessed the patient's mental status and  the patient has capacity to make this decision. I have explained the risks of leaving without full treatment, including severe disability and death, which the patient understands and is willing to accept. I have answered all of the patient's questions. I reiterated my medical opinion and advised the patient to return at any time. We discussed the further workup outside of the current visit and return precautions. -ZR

## 2017-11-10 NOTE — ED ADULT NURSE NOTE - OBJECTIVE STATEMENT
pt presents with rt shoulder pain s/p fall dn 7steps x 5days ago, denies head injury, pt with large ecchymotic region to rt. upper arm, decreased ROM to LUE, pt A;Ox3, no acute resp distress noted, denies chest pain or SOB, no URI symptoms noted HR elevated, MD made aware, no abd pain, voids freely, ambulates independently without difficulty, MD ramirez, cardiac monitoring with sinus tachycardia HR at 132bpm, EKG done, fam.mem at bedside, will continue to monitor

## 2017-11-10 NOTE — ED PROVIDER NOTE - SKIN, MLM
large ecchymosis over the medial aspect of the right arm from the elbow to 10 cm above the elbow in an annular fashion

## 2017-11-10 NOTE — ED PROCEDURE NOTE - NS ED PERI VASCULAR NEG
capillary refill time < 2 seconds/no paresthesia/no swelling/no cyanosis of extremity/fingers/toes warm to touch

## 2017-11-10 NOTE — ED PROVIDER NOTE - OBJECTIVE STATEMENT
53 yo female presenting after fall on sunday complaining of right shoulder pain.  states that she slid down 5 steps with her arm stuck on the rail, lots of wet leaves.  also has left  digit pain.  sharp pain in the right shoulder, worse with movement, improve with rest.  took advil three hours ago which didn't help with the pain.  no loc, ambulating at baseline.  had kidney surgery last thursday, supposed to get stent out today.    pcp- adelfo marie  uro- vasu 51 yo female presenting after fall on sunday complaining of right shoulder pain.  states that she slid down 5 steps with her arm stuck on the rail, lots of wet leaves.  also has left  digit pain.  sharp pain in the right shoulder, worse with movement, improve with rest.  took advil three hours ago which didn't help with the pain.  no loc, ambulating at baseline.  had kidney surgery last thursday, supposed to get stent out today.  endorses palpitations that lasted two hours yesterday.  has had them in the past.      pcp- adelfo marie  uro- vasu 51 yo female presenting after fall on sunday complaining of right shoulder pain.  states that she slid down 5 steps with her arm stuck on the rail, lots of wet leaves.  also has left  digit pain.  sharp pain in the right shoulder, worse with movement, improve with rest.  took advil three hours ago which didn't help with the pain.  no loc, ambulating at baseline.  had kidney surgery last thursday, supposed to get stent out today.  endorses palpitations that lasted two hours yesterday.  has had them in the past.  last beer was at 10 today.  had 3-4 shots of whiskey yesterday evening.    pcp- adelfo leone

## 2017-11-10 NOTE — ED PROVIDER NOTE - NS ED ROS FT
Constitutional: no fever  Eyes: no conjunctivitis  Ears: no ear pain   Nose: no nasal congestion, Mouth/Throat: no throat pain, Neck: no stiffness  Cardiovascular: no chest pain  Chest: no cough  Gastrointestinal: no abdominal pain, no vomiting  MSK: +shoulder pain  : no dysuria  Skin: no rash  Neuro: no LOC

## 2017-11-10 NOTE — ED PROVIDER NOTE - CARE PLAN
Principal Discharge DX:	Humerus fracture  Instructions for follow-up, activity and diet:	Please follow up with Dr. Simons in one week in regards to your humerus fracture.  The number for his office is [phone].    Take Tylenol 1g every six hours and supplement with ibuprofen 600mg, with food, every six hours which can be taken three hours apart from the Tylenol to have a layered effect.  Please take oxycodone as needed every 6 hours for breakthrough pain.  Drink at least 2 Liters or 64 Ounces of water each day.  Return for any persistent, worsening symptoms, or ANY concerns at all. Principal Discharge DX:	Other closed displaced fracture of proximal end of right humerus, initial encounter  Instructions for follow-up, activity and diet:	Please follow up with Dr. Simons in one week in regards to your humerus fracture.  The number for his office is [phone].    Take Tylenol 1g every six hours and supplement with ibuprofen 600mg, with food, every six hours which can be taken three hours apart from the Tylenol to have a layered effect.  Please take oxycodone as needed every 6 hours for breakthrough pain.  Drink at least 2 Liters or 64 Ounces of water each day.  Return for any persistent, worsening symptoms, or ANY concerns at all.

## 2017-11-10 NOTE — ED ADULT NURSE NOTE - PMH
AA (alcohol abuse)  sober since September 2017  Alcohol withdrawal seizure without complication  x 1  patient unable to recall date  Alcohol-induced anxiety disorder    Anemia  h/o blood transfusion 6 months ago  Asthma with COPD  last PFT within the year  no recent respiratory infection   on symbicort daily  have not used ventolin for months  Cholelithiasis    GERD (gastroesophageal reflux disease)    Lyme disease    Renal calculi    Tachycardia

## 2017-11-10 NOTE — ED PROVIDER NOTE - ATTENDING CONTRIBUTION TO CARE
Nemes - 51yo F w hx of EtOH abuse/anxiety in the ER for a mechanical fall 5 days ago and injury to R shoulder. Also c/o L 4th finger pain. No head injury, no HA, no neuro stx. Admits to be very anxious, drank for the past 2 days, last drink last night. Diffuse edema and tenderness to R shoulder, decr ROM, NV intact. L 4th finger w mild swelling, ring loose but cannot remove (pt states the swelling is getting better, doesn't want the ring to be cut). HR in the 130s on presentation, mild tremors on exam. Likely shoulder fx and withdrawal stx. Will get labs, alcohol level, xrays, pain control, reevaluate, likely admit

## 2017-11-10 NOTE — ED ADULT NURSE NOTE - EXPLANATION OF PATIENT'S REASON FOR LEAVING
declined admission or further medical evaluation, despite constant encouragement to complete medical evaluation, pt declined. pt informed of disadvantages of leaving against medical advice

## 2017-11-10 NOTE — ED PROVIDER NOTE - PLAN OF CARE
Please follow up with Dr. Simons in one week in regards to your humerus fracture.  The number for his office is [phone].    Take Tylenol 1g every six hours and supplement with ibuprofen 600mg, with food, every six hours which can be taken three hours apart from the Tylenol to have a layered effect.  Please take oxycodone as needed every 6 hours for breakthrough pain.  Drink at least 2 Liters or 64 Ounces of water each day.  Return for any persistent, worsening symptoms, or ANY concerns at all.

## 2017-11-10 NOTE — ED PROVIDER NOTE - PRINCIPAL DIAGNOSIS
Humerus fracture Other closed displaced fracture of proximal end of right humerus, initial encounter

## 2017-11-13 ENCOUNTER — APPOINTMENT (OUTPATIENT)
Dept: UROLOGY | Facility: CLINIC | Age: 52
End: 2017-11-13
Payer: MEDICAID

## 2017-11-13 DIAGNOSIS — J44.9 CHRONIC OBSTRUCTIVE PULMONARY DISEASE, UNSPECIFIED: ICD-10-CM

## 2017-11-13 DIAGNOSIS — Z87.440 PERSONAL HISTORY OF URINARY (TRACT) INFECTIONS: ICD-10-CM

## 2017-11-13 DIAGNOSIS — K21.9 GASTRO-ESOPHAGEAL REFLUX DISEASE W/OUT ESOPHAGITIS: ICD-10-CM

## 2017-11-13 DIAGNOSIS — F10.20 ALCOHOL DEPENDENCE, UNCOMPLICATED: ICD-10-CM

## 2017-11-13 DIAGNOSIS — F41.9 ANXIETY DISORDER, UNSPECIFIED: ICD-10-CM

## 2017-11-13 DIAGNOSIS — N20.0 CALCULUS OF KIDNEY: ICD-10-CM

## 2017-11-13 PROCEDURE — 99214 OFFICE O/P EST MOD 30 MIN: CPT

## 2017-11-14 PROBLEM — F10.20 ALCOHOL DEPENDENCE: Status: ACTIVE | Noted: 2017-06-05

## 2017-11-14 PROBLEM — N20.0 NEPHROLITHIASIS: Status: ACTIVE | Noted: 2017-02-13

## 2017-11-14 PROBLEM — K21.9 GERD (GASTROESOPHAGEAL REFLUX DISEASE): Status: ACTIVE | Noted: 2017-06-05

## 2017-11-14 PROBLEM — J44.9 COPD (CHRONIC OBSTRUCTIVE PULMONARY DISEASE): Status: ACTIVE | Noted: 2017-06-05

## 2017-11-14 PROBLEM — F41.9 ANXIETY: Status: ACTIVE | Noted: 2017-06-12

## 2017-11-22 ENCOUNTER — APPOINTMENT (OUTPATIENT)
Dept: ORTHOPEDIC SURGERY | Facility: CLINIC | Age: 52
End: 2017-11-22

## 2017-12-10 DIAGNOSIS — R33.9 RETENTION OF URINE, UNSPECIFIED: ICD-10-CM

## 2017-12-13 PROBLEM — R33.9 INCOMPLETE EMPTYING OF BLADDER: Status: ACTIVE | Noted: 2017-12-13

## 2018-01-09 ENCOUNTER — RX RENEWAL (OUTPATIENT)
Age: 53
End: 2018-01-09

## 2018-01-16 NOTE — ED ADULT NURSE NOTE - NS ED NURSE DISCH DISPOSITION
January 23, 2018    Diane Moya  [address]  [facility] [address]             [facility]  [address]  [facility] [address]  Phone: [phone] Dear Mrs. Moya:    We have tried to reach you by mychart unsuccessfully.    [facility] is committed to your overall health.  To help you get the most out of each of your visits, we will review your information to make sure you are up to date on all of your recommended tests and/or procedures.       MARISOL Alcala-BC has found that your chart shows you may be due for your annual diabetic eye exam.     If you have not had an eye exam in the past year, we now have a  Retinal Camera at the [facility].  If we do this exam the same day you see a member of your Primary Care team, we can save you from having to pay a second co pay.       If you have had any of the above done at another facility, please bring the records or information with you so that your record at [facility] will be complete.  If you would like to schedule any of these, please contact me.     If you are currently taking medication, please bring it with you to your appointment for review.      If you have any questions or concerns, please don't hesitate to call.    Thank you for letting us care for you,  Brittney Thakur LPN Clinical Care Coordinator  [facility] [place] [facility] [place]  [phone]        AMA (saw a physician/midlevel provider and clinician was able to provide reasons for staying for treatment & form is signed)

## 2018-01-23 ENCOUNTER — APPOINTMENT (OUTPATIENT)
Dept: INTERNAL MEDICINE | Facility: CLINIC | Age: 53
End: 2018-01-23

## 2018-01-24 ENCOUNTER — APPOINTMENT (OUTPATIENT)
Dept: UROLOGY | Facility: CLINIC | Age: 53
End: 2018-01-24

## 2018-01-25 ENCOUNTER — RX RENEWAL (OUTPATIENT)
Age: 53
End: 2018-01-25

## 2018-02-05 ENCOUNTER — RX RENEWAL (OUTPATIENT)
Age: 53
End: 2018-02-05

## 2018-02-27 NOTE — DISCHARGE NOTE ADULT - VISION (WITH CORRECTIVE LENSES IF THE PATIENT USUALLY WEARS THEM):
Addended by: ASTON RODRIGUEZ on: 2/27/2018 09:20 AM     Modules accepted: Orders     Partially impaired: cannot see medication labels or newsprint, but can see obstacles in path, and the surrounding layout; can count fingers at arm's length

## 2018-04-01 ENCOUNTER — OUTPATIENT (OUTPATIENT)
Dept: OUTPATIENT SERVICES | Facility: HOSPITAL | Age: 53
LOS: 1 days | End: 2018-04-01
Payer: MEDICAID

## 2018-04-01 DIAGNOSIS — Z90.710 ACQUIRED ABSENCE OF BOTH CERVIX AND UTERUS: Chronic | ICD-10-CM

## 2018-04-01 DIAGNOSIS — Z98.891 HISTORY OF UTERINE SCAR FROM PREVIOUS SURGERY: Chronic | ICD-10-CM

## 2018-04-01 PROCEDURE — G9001: CPT

## 2018-04-14 ENCOUNTER — INPATIENT (INPATIENT)
Facility: HOSPITAL | Age: 53
LOS: 4 days | Discharge: HOME | DRG: 897 | End: 2018-04-19
Attending: HOSPITALIST | Admitting: HOSPITALIST
Payer: MEDICAID

## 2018-04-14 VITALS
RESPIRATION RATE: 18 BRPM | OXYGEN SATURATION: 99 % | HEART RATE: 172 BPM | DIASTOLIC BLOOD PRESSURE: 81 MMHG | SYSTOLIC BLOOD PRESSURE: 100 MMHG | TEMPERATURE: 98 F

## 2018-04-14 DIAGNOSIS — Z90.710 ACQUIRED ABSENCE OF BOTH CERVIX AND UTERUS: Chronic | ICD-10-CM

## 2018-04-14 DIAGNOSIS — Z98.891 HISTORY OF UTERINE SCAR FROM PREVIOUS SURGERY: Chronic | ICD-10-CM

## 2018-04-14 LAB
ALBUMIN SERPL ELPH-MCNC: 4.2 G/DL — SIGNIFICANT CHANGE UP (ref 3.3–5)
ALP SERPL-CCNC: 205 U/L — HIGH (ref 40–120)
ALT FLD-CCNC: 84 U/L RC — HIGH (ref 10–45)
ANION GAP SERPL CALC-SCNC: 28 MMOL/L — HIGH (ref 5–17)
APTT BLD: 31.5 SEC — SIGNIFICANT CHANGE UP (ref 27.5–37.4)
AST SERPL-CCNC: 240 U/L — HIGH (ref 10–40)
BASOPHILS # BLD AUTO: 0.1 K/UL — SIGNIFICANT CHANGE UP (ref 0–0.2)
BASOPHILS NFR BLD AUTO: 1.1 % — SIGNIFICANT CHANGE UP (ref 0–2)
BILIRUB SERPL-MCNC: 2.3 MG/DL — HIGH (ref 0.2–1.2)
BUN SERPL-MCNC: 7 MG/DL — SIGNIFICANT CHANGE UP (ref 7–23)
CALCIUM SERPL-MCNC: 9.4 MG/DL — SIGNIFICANT CHANGE UP (ref 8.4–10.5)
CHLORIDE SERPL-SCNC: 84 MMOL/L — LOW (ref 96–108)
CO2 SERPL-SCNC: 20 MMOL/L — LOW (ref 22–31)
CREAT SERPL-MCNC: 0.67 MG/DL — SIGNIFICANT CHANGE UP (ref 0.5–1.3)
EOSINOPHIL # BLD AUTO: 0.1 K/UL — SIGNIFICANT CHANGE UP (ref 0–0.5)
EOSINOPHIL NFR BLD AUTO: 0.5 % — SIGNIFICANT CHANGE UP (ref 0–6)
ETHANOL SERPL-MCNC: 177 MG/DL — HIGH (ref 0–10)
GAS PNL BLDV: SIGNIFICANT CHANGE UP
GAS PNL BLDV: SIGNIFICANT CHANGE UP
GLUCOSE SERPL-MCNC: 102 MG/DL — HIGH (ref 70–99)
HCT VFR BLD CALC: 41.1 % — SIGNIFICANT CHANGE UP (ref 34.5–45)
HGB BLD-MCNC: 14 G/DL — SIGNIFICANT CHANGE UP (ref 11.5–15.5)
INR BLD: 1.16 RATIO — SIGNIFICANT CHANGE UP (ref 0.88–1.16)
LIDOCAIN IGE QN: 30 U/L — SIGNIFICANT CHANGE UP (ref 7–60)
LYMPHOCYTES # BLD AUTO: 39.2 % — SIGNIFICANT CHANGE UP (ref 13–44)
LYMPHOCYTES # BLD AUTO: 4.2 K/UL — HIGH (ref 1–3.3)
MAGNESIUM SERPL-MCNC: 1.5 MG/DL — LOW (ref 1.6–2.6)
MCHC RBC-ENTMCNC: 34 GM/DL — SIGNIFICANT CHANGE UP (ref 32–36)
MCHC RBC-ENTMCNC: 36.4 PG — HIGH (ref 27–34)
MCV RBC AUTO: 107 FL — HIGH (ref 80–100)
MONOCYTES # BLD AUTO: 1.1 K/UL — HIGH (ref 0–0.9)
MONOCYTES NFR BLD AUTO: 10 % — SIGNIFICANT CHANGE UP (ref 2–14)
NEUTROPHILS # BLD AUTO: 5.3 K/UL — SIGNIFICANT CHANGE UP (ref 1.8–7.4)
NEUTROPHILS NFR BLD AUTO: 49.2 % — SIGNIFICANT CHANGE UP (ref 43–77)
PHOSPHATE SERPL-MCNC: 2.9 MG/DL — SIGNIFICANT CHANGE UP (ref 2.5–4.5)
PLATELET # BLD AUTO: 176 K/UL — SIGNIFICANT CHANGE UP (ref 150–400)
POTASSIUM SERPL-MCNC: 3.5 MMOL/L — SIGNIFICANT CHANGE UP (ref 3.5–5.3)
POTASSIUM SERPL-SCNC: 3.5 MMOL/L — SIGNIFICANT CHANGE UP (ref 3.5–5.3)
PROT SERPL-MCNC: 8 G/DL — SIGNIFICANT CHANGE UP (ref 6–8.3)
PROTHROM AB SERPL-ACNC: 12.7 SEC — SIGNIFICANT CHANGE UP (ref 9.8–12.7)
RBC # BLD: 3.85 M/UL — SIGNIFICANT CHANGE UP (ref 3.8–5.2)
RBC # FLD: 12.9 % — SIGNIFICANT CHANGE UP (ref 10.3–14.5)
SODIUM SERPL-SCNC: 132 MMOL/L — LOW (ref 135–145)
TROPONIN T SERPL-MCNC: 0.01 NG/ML — SIGNIFICANT CHANGE UP (ref 0–0.06)
WBC # BLD: 10.8 K/UL — HIGH (ref 3.8–10.5)
WBC # FLD AUTO: 10.8 K/UL — HIGH (ref 3.8–10.5)

## 2018-04-14 PROCEDURE — 71046 X-RAY EXAM CHEST 2 VIEWS: CPT | Mod: 26

## 2018-04-14 PROCEDURE — 99291 CRITICAL CARE FIRST HOUR: CPT

## 2018-04-14 PROCEDURE — 74177 CT ABD & PELVIS W/CONTRAST: CPT | Mod: 26

## 2018-04-14 PROCEDURE — 93010 ELECTROCARDIOGRAM REPORT: CPT

## 2018-04-14 RX ORDER — SODIUM CHLORIDE 9 MG/ML
1000 INJECTION, SOLUTION INTRAVENOUS
Qty: 0 | Refills: 0 | Status: DISCONTINUED | OUTPATIENT
Start: 2018-04-14 | End: 2018-04-15

## 2018-04-14 RX ORDER — SODIUM CHLORIDE 9 MG/ML
1000 INJECTION, SOLUTION INTRAVENOUS
Qty: 0 | Refills: 0 | Status: DISCONTINUED | OUTPATIENT
Start: 2018-04-14 | End: 2018-04-14

## 2018-04-14 RX ORDER — SODIUM CHLORIDE 9 MG/ML
1000 INJECTION INTRAMUSCULAR; INTRAVENOUS; SUBCUTANEOUS ONCE
Qty: 0 | Refills: 0 | Status: COMPLETED | OUTPATIENT
Start: 2018-04-14 | End: 2018-04-14

## 2018-04-14 RX ADMIN — SODIUM CHLORIDE 2000 MILLILITER(S): 9 INJECTION INTRAMUSCULAR; INTRAVENOUS; SUBCUTANEOUS at 21:52

## 2018-04-14 RX ADMIN — SODIUM CHLORIDE 1000 MILLILITER(S): 9 INJECTION INTRAMUSCULAR; INTRAVENOUS; SUBCUTANEOUS at 21:13

## 2018-04-14 RX ADMIN — SODIUM CHLORIDE 500 MILLILITER(S): 9 INJECTION, SOLUTION INTRAVENOUS at 22:40

## 2018-04-14 NOTE — ED PROVIDER NOTE - OBJECTIVE STATEMENT
52 yo F with a history of alcohol abuse with hx of withdrawal seizures, SVT, asthma/copd, current smoker, nephrolithiasis with recent stone removal, and gallstones presenting with palpitations. Pt admits to daily alcohol use, usually 4 shots of [employer] [place] with 3-4 beers daily for many years. For the past 5 days pt has been nausea, vomiting and diarrhea with black stools. Also feels SOB at times. Today around 6 PM pt drank alcohol and afterward started to have chest pain and palpitations with worsening SOB. Pt tried to bear down several times to stop her palpitations as this had worked in the past, however it did not work this time and pt's  drove her to the ER. Pt also smokes 1/3 PPD and smokes marijuana daily to try to help with her nausea.

## 2018-04-14 NOTE — ED PROVIDER NOTE - ATTENDING CONTRIBUTION TO CARE
52 yo F presents with complaints of tachycardia that started at 3:30 pm while sitting in the car. she reports associated stabbing chest pain and sob that started with the palpitations. she tried to do vagal maneuvers at home with no improvement. she states that she has had these tachycardic episodes in the past. she reports that she stll feels the palpitations at this time with mild chest pain. she reports that for the past 2 weeks she has had about 4 episodes of daily vomiting and black diarrhea. she reports decreased po intake, and inability to tolerate any po secondary to the vomiting. she feels dehydrated. not on any blood thinners. she states that she "gallbladder problems".   PE extremely thin. lungs CTA. tachycardic  EKG on arrival with SVT, .  I had patient perform vagal maneuvers, and it broke her SVT --> repeat EKG with sinus tachycardia, HR 110s. 52 yo F presents with complaints of tachycardia that started at 3:30 pm while sitting in the car. she reports associated stabbing chest pain and sob that started with the palpitations. she tried to do vagal maneuvers at home with no improvement. she states that she has had these tachycardic episodes in the past. she reports that she stll feels the palpitations at this time with mild chest pain. she reports that for the past 2 weeks she has had about 4 episodes of daily vomiting and black diarrhea. she reports decreased po intake, and inability to tolerate any po secondary to the vomiting. she feels dehydrated. not on any blood thinners. she states that she "gallbladder problems".   PE extremely thin. lungs CTA. tachycardic. abdomen nontender to palpation. GUIAC NEGATIVE. neuro intact.   EKG on arrival with SVT, .  I had patient perform vagal maneuvers, and it broke her SVT --> repeat EKG with sinus tachycardia, HR 110s. 54 yo F presents with complaints of tachycardia that started at 3:30 pm while sitting in the car. she reports associated stabbing chest pain and sob that started with the palpitations. she tried to do vagal maneuvers at home with no improvement. she states that she has had these tachycardic episodes in the past. she reports that she stll feels the palpitations at this time with mild chest pain. she reports that for the past 2 weeks she has had about 4 episodes of daily vomiting and black diarrhea. she reports decreased po intake, and inability to tolerate any po secondary to the vomiting. she feels dehydrated. not on any blood thinners. she states that she has had "gallbladder problems" in the past. Pt admits to daily alcohol use, usually 4 shots of [employer] [place] with 3-4 beers daily for many years. denies f/ch, cough, abd pain, urinary complaints. no known PE risk factors.  she has a h/o several admissions in the past due to alcohol related complaints  PE extremely thin. lungs CTA. tachycardic. abdomen nontender to palpation. GUIAC NEGATIVE. neuro intact.   EKG on arrival with SVT, .  I had patient perform vagal maneuvers, and it broke her SVT --> repeat EKG with sinus tachycardia, HR 110s.  ed workup showed initial lactate elevated 8.8. elevated AG 28 metabolic acidosis with bicarb 20, ph . mg 1.5.   elevated LFTs 2.3, with elevated  and , ALT 84. alcohol level 177.  delta trop negative. no leukocytosis. CXR with NAD. CT a/p with dilated CBD to 8mm, with no choledocholithiasis visualized, + hepatic steatosis. mild L hydro with no obstructing stone visualized.  patient was treated with IVFS in the ER. magnesium and K was repleted and she was given a banana bag.   repeat lact improved 5.1. with improved AG 18, and bicarb 18. patient reporting feeling better than initially, vomiting resolved.   patient remained in sinus rhythm throughout rest of ED stay, HR improved. she was admitted in stable condition. hospitalist to consult GI.    Based on patient's HPI as well as the results of today's workup, I felt that patient warranted admission to the hospital for further workup/evaluation and continued management. I discussed the findings of today's workup with the patient and addressed the patient's questions and concerns. The patient was agreeable with admission. I spoke with the hospitalist who accepted the patient for admission and subsequently took over the patient's care.

## 2018-04-14 NOTE — ED ADULT NURSE NOTE - OBJECTIVE STATEMENT
7 y/o female PMH SVT on cardizem presents to ED c/o palpitations since 3pm today, abdominal pain, n/v 3-4 daily for weeks with black stool. Says she felt SOB and chest pain at home when she felt heart racing. Pt says she tried vagal maneuvers at home with no relief of symptoms. Currently denies chest pain, SOB. Lungs clear b/l. Skin warm, dry, intact. Gross motor and neuro intact. Sinus tach on cardiac monitor to 170s. Pt did vagal maneuvers with MD Juarez at bedside and HR decreased to 110s. 18G IV in LAC, 20G IV in RAC. Safety and comfort provided. Family at bedside.

## 2018-04-14 NOTE — ED PROVIDER NOTE - CARE PLAN
Principal Discharge DX:	Elevated lactic acid level  Secondary Diagnosis:	AA (alcohol abuse)  Secondary Diagnosis:	Non-intractable vomiting with nausea, unspecified vomiting type

## 2018-04-15 DIAGNOSIS — F10.10 ALCOHOL ABUSE, UNCOMPLICATED: ICD-10-CM

## 2018-04-15 DIAGNOSIS — K92.1 MELENA: ICD-10-CM

## 2018-04-15 DIAGNOSIS — D50.0 IRON DEFICIENCY ANEMIA SECONDARY TO BLOOD LOSS (CHRONIC): ICD-10-CM

## 2018-04-15 DIAGNOSIS — R74.0 NONSPECIFIC ELEVATION OF LEVELS OF TRANSAMINASE AND LACTIC ACID DEHYDROGENASE [LDH]: ICD-10-CM

## 2018-04-15 DIAGNOSIS — E87.2 ACIDOSIS: ICD-10-CM

## 2018-04-15 DIAGNOSIS — I47.1 SUPRAVENTRICULAR TACHYCARDIA: ICD-10-CM

## 2018-04-15 DIAGNOSIS — Z29.9 ENCOUNTER FOR PROPHYLACTIC MEASURES, UNSPECIFIED: ICD-10-CM

## 2018-04-15 DIAGNOSIS — F17.210 NICOTINE DEPENDENCE, CIGARETTES, UNCOMPLICATED: ICD-10-CM

## 2018-04-15 DIAGNOSIS — R79.89 OTHER SPECIFIED ABNORMAL FINDINGS OF BLOOD CHEMISTRY: ICD-10-CM

## 2018-04-15 DIAGNOSIS — J44.9 CHRONIC OBSTRUCTIVE PULMONARY DISEASE, UNSPECIFIED: ICD-10-CM

## 2018-04-15 LAB
ALBUMIN SERPL ELPH-MCNC: 2.9 G/DL — LOW (ref 3.3–5)
ALP SERPL-CCNC: 134 U/L — HIGH (ref 40–120)
ALT FLD-CCNC: 52 U/L RC — HIGH (ref 10–45)
ANION GAP SERPL CALC-SCNC: 14 MMOL/L — SIGNIFICANT CHANGE UP (ref 5–17)
ANION GAP SERPL CALC-SCNC: 18 MMOL/L — HIGH (ref 5–17)
AST SERPL-CCNC: 136 U/L — HIGH (ref 10–40)
BASOPHILS # BLD AUTO: 0.02 K/UL — SIGNIFICANT CHANGE UP (ref 0–0.2)
BASOPHILS NFR BLD AUTO: 0.5 % — SIGNIFICANT CHANGE UP (ref 0–2)
BILIRUB SERPL-MCNC: 1.9 MG/DL — HIGH (ref 0.2–1.2)
BLD GP AB SCN SERPL QL: NEGATIVE — SIGNIFICANT CHANGE UP
BUN SERPL-MCNC: 4 MG/DL — LOW (ref 7–23)
BUN SERPL-MCNC: 5 MG/DL — LOW (ref 7–23)
CALCIUM SERPL-MCNC: 7.2 MG/DL — LOW (ref 8.4–10.5)
CALCIUM SERPL-MCNC: 7.4 MG/DL — LOW (ref 8.4–10.5)
CHLORIDE SERPL-SCNC: 98 MMOL/L — SIGNIFICANT CHANGE UP (ref 96–108)
CHLORIDE SERPL-SCNC: 98 MMOL/L — SIGNIFICANT CHANGE UP (ref 96–108)
CO2 SERPL-SCNC: 18 MMOL/L — LOW (ref 22–31)
CO2 SERPL-SCNC: 21 MMOL/L — LOW (ref 22–31)
CREAT SERPL-MCNC: 0.52 MG/DL — SIGNIFICANT CHANGE UP (ref 0.5–1.3)
CREAT SERPL-MCNC: 0.6 MG/DL — SIGNIFICANT CHANGE UP (ref 0.5–1.3)
EOSINOPHIL # BLD AUTO: 0.08 K/UL — SIGNIFICANT CHANGE UP (ref 0–0.5)
EOSINOPHIL NFR BLD AUTO: 1.8 % — SIGNIFICANT CHANGE UP (ref 0–6)
FERRITIN SERPL-MCNC: 1419 NG/ML — HIGH (ref 15–150)
FOLATE SERPL-MCNC: 14.4 NG/ML — SIGNIFICANT CHANGE UP (ref 4.8–24.2)
GLUCOSE SERPL-MCNC: 71 MG/DL — SIGNIFICANT CHANGE UP (ref 70–99)
GLUCOSE SERPL-MCNC: 80 MG/DL — SIGNIFICANT CHANGE UP (ref 70–99)
HAPTOGLOB SERPL-MCNC: 62 MG/DL — SIGNIFICANT CHANGE UP (ref 34–200)
HCT VFR BLD CALC: 28.3 % — LOW (ref 34.5–45)
HCT VFR BLD CALC: 28.5 % — LOW (ref 34.5–45)
HCT VFR BLD CALC: 29.9 % — LOW (ref 34.5–45)
HGB BLD-MCNC: 10.5 G/DL — LOW (ref 11.5–15.5)
HGB BLD-MCNC: 9.7 G/DL — LOW (ref 11.5–15.5)
HGB BLD-MCNC: 9.8 G/DL — LOW (ref 11.5–15.5)
IMM GRANULOCYTES NFR BLD AUTO: 0.5 % — SIGNIFICANT CHANGE UP (ref 0–1.5)
IRON SATN MFR SERPL: 155 UG/DL — SIGNIFICANT CHANGE UP (ref 30–160)
IRON SATN MFR SERPL: 94 % — HIGH (ref 14–50)
LACTATE BLDV-MCNC: 1.2 MMOL/L — SIGNIFICANT CHANGE UP (ref 0.7–2)
LDH SERPL L TO P-CCNC: 207 U/L — SIGNIFICANT CHANGE UP (ref 50–242)
LYMPHOCYTES # BLD AUTO: 1.71 K/UL — SIGNIFICANT CHANGE UP (ref 1–3.3)
LYMPHOCYTES # BLD AUTO: 38.8 % — SIGNIFICANT CHANGE UP (ref 13–44)
MAGNESIUM SERPL-MCNC: 1.6 MG/DL — SIGNIFICANT CHANGE UP (ref 1.6–2.6)
MCHC RBC-ENTMCNC: 34.4 GM/DL — SIGNIFICANT CHANGE UP (ref 32–36)
MCHC RBC-ENTMCNC: 34.4 GM/DL — SIGNIFICANT CHANGE UP (ref 32–36)
MCHC RBC-ENTMCNC: 35.1 GM/DL — SIGNIFICANT CHANGE UP (ref 32–36)
MCHC RBC-ENTMCNC: 35.8 PG — HIGH (ref 27–34)
MCHC RBC-ENTMCNC: 37 PG — HIGH (ref 27–34)
MCHC RBC-ENTMCNC: 37.6 PG — HIGH (ref 27–34)
MCV RBC AUTO: 104 FL — HIGH (ref 80–100)
MCV RBC AUTO: 107 FL — HIGH (ref 80–100)
MCV RBC AUTO: 107 FL — HIGH (ref 80–100)
MONOCYTES # BLD AUTO: 0.45 K/UL — SIGNIFICANT CHANGE UP (ref 0–0.9)
MONOCYTES NFR BLD AUTO: 10.2 % — SIGNIFICANT CHANGE UP (ref 2–14)
NEUTROPHILS # BLD AUTO: 2.13 K/UL — SIGNIFICANT CHANGE UP (ref 1.8–7.4)
NEUTROPHILS NFR BLD AUTO: 48.2 % — SIGNIFICANT CHANGE UP (ref 43–77)
PCP SPEC-MCNC: SIGNIFICANT CHANGE UP
PHOSPHATE SERPL-MCNC: 2.4 MG/DL — LOW (ref 2.5–4.5)
PLATELET # BLD AUTO: 106 K/UL — LOW (ref 150–400)
PLATELET # BLD AUTO: 107 K/UL — LOW (ref 150–400)
PLATELET # BLD AUTO: 122 K/UL — LOW (ref 150–400)
POTASSIUM SERPL-MCNC: 3.2 MMOL/L — LOW (ref 3.5–5.3)
POTASSIUM SERPL-MCNC: 4.2 MMOL/L — SIGNIFICANT CHANGE UP (ref 3.5–5.3)
POTASSIUM SERPL-SCNC: 3.2 MMOL/L — LOW (ref 3.5–5.3)
POTASSIUM SERPL-SCNC: 4.2 MMOL/L — SIGNIFICANT CHANGE UP (ref 3.5–5.3)
PROT SERPL-MCNC: 5.3 G/DL — LOW (ref 6–8.3)
RBC # BLD: 2.63 M/UL — LOW (ref 3.8–5.2)
RBC # BLD: 2.74 M/UL — LOW (ref 3.8–5.2)
RBC # BLD: 2.78 M/UL — LOW (ref 3.8–5.2)
RBC # FLD: 12.7 % — SIGNIFICANT CHANGE UP (ref 10.3–14.5)
RBC # FLD: 12.7 % — SIGNIFICANT CHANGE UP (ref 10.3–14.5)
RBC # FLD: 13.5 % — SIGNIFICANT CHANGE UP (ref 10.3–14.5)
RH IG SCN BLD-IMP: POSITIVE — SIGNIFICANT CHANGE UP
SODIUM SERPL-SCNC: 133 MMOL/L — LOW (ref 135–145)
SODIUM SERPL-SCNC: 134 MMOL/L — LOW (ref 135–145)
TIBC SERPL-MCNC: 165 UG/DL — LOW (ref 220–430)
TROPONIN T SERPL-MCNC: <0.01 NG/ML — SIGNIFICANT CHANGE UP (ref 0–0.06)
UIBC SERPL-MCNC: 10 UG/DL — LOW (ref 110–370)
VIT B12 SERPL-MCNC: 999 PG/ML — SIGNIFICANT CHANGE UP (ref 232–1245)
WBC # BLD: 3.8 K/UL — SIGNIFICANT CHANGE UP (ref 3.8–10.5)
WBC # BLD: 4.3 K/UL — SIGNIFICANT CHANGE UP (ref 3.8–10.5)
WBC # BLD: 4.41 K/UL — SIGNIFICANT CHANGE UP (ref 3.8–10.5)
WBC # FLD AUTO: 3.8 K/UL — SIGNIFICANT CHANGE UP (ref 3.8–10.5)
WBC # FLD AUTO: 4.3 K/UL — SIGNIFICANT CHANGE UP (ref 3.8–10.5)
WBC # FLD AUTO: 4.41 K/UL — SIGNIFICANT CHANGE UP (ref 3.8–10.5)

## 2018-04-15 PROCEDURE — 12345: CPT | Mod: GC,NC

## 2018-04-15 PROCEDURE — 99223 1ST HOSP IP/OBS HIGH 75: CPT | Mod: 25,GC

## 2018-04-15 PROCEDURE — 99406 BEHAV CHNG SMOKING 3-10 MIN: CPT

## 2018-04-15 RX ORDER — THIAMINE MONONITRATE (VIT B1) 100 MG
100 TABLET ORAL DAILY
Qty: 0 | Refills: 0 | Status: DISCONTINUED | OUTPATIENT
Start: 2018-04-15 | End: 2018-04-18

## 2018-04-15 RX ORDER — ONDANSETRON 8 MG/1
4 TABLET, FILM COATED ORAL EVERY 6 HOURS
Qty: 0 | Refills: 0 | Status: DISCONTINUED | OUTPATIENT
Start: 2018-04-15 | End: 2018-04-19

## 2018-04-15 RX ORDER — SODIUM CHLORIDE 9 MG/ML
1000 INJECTION, SOLUTION INTRAVENOUS
Qty: 0 | Refills: 0 | Status: DISCONTINUED | OUTPATIENT
Start: 2018-04-15 | End: 2018-04-15

## 2018-04-15 RX ORDER — PANTOPRAZOLE SODIUM 20 MG/1
40 TABLET, DELAYED RELEASE ORAL
Qty: 0 | Refills: 0 | Status: DISCONTINUED | OUTPATIENT
Start: 2018-04-15 | End: 2018-04-16

## 2018-04-15 RX ORDER — SODIUM CHLORIDE 9 MG/ML
1000 INJECTION INTRAMUSCULAR; INTRAVENOUS; SUBCUTANEOUS
Qty: 0 | Refills: 0 | Status: DISCONTINUED | OUTPATIENT
Start: 2018-04-15 | End: 2018-04-17

## 2018-04-15 RX ORDER — CALCIUM GLUCONATE 100 MG/ML
1 VIAL (ML) INTRAVENOUS ONCE
Qty: 0 | Refills: 0 | Status: COMPLETED | OUTPATIENT
Start: 2018-04-15 | End: 2018-04-15

## 2018-04-15 RX ORDER — PANTOPRAZOLE SODIUM 20 MG/1
40 TABLET, DELAYED RELEASE ORAL ONCE
Qty: 0 | Refills: 0 | Status: COMPLETED | OUTPATIENT
Start: 2018-04-15 | End: 2018-04-15

## 2018-04-15 RX ORDER — POTASSIUM CHLORIDE 20 MEQ
10 PACKET (EA) ORAL
Qty: 0 | Refills: 0 | Status: COMPLETED | OUTPATIENT
Start: 2018-04-15 | End: 2018-04-15

## 2018-04-15 RX ORDER — ACETAMINOPHEN 500 MG
650 TABLET ORAL EVERY 6 HOURS
Qty: 0 | Refills: 0 | Status: DISCONTINUED | OUTPATIENT
Start: 2018-04-15 | End: 2018-04-19

## 2018-04-15 RX ORDER — SODIUM CHLORIDE 9 MG/ML
1000 INJECTION INTRAMUSCULAR; INTRAVENOUS; SUBCUTANEOUS
Qty: 0 | Refills: 0 | Status: DISCONTINUED | OUTPATIENT
Start: 2018-04-15 | End: 2018-04-15

## 2018-04-15 RX ORDER — FOLIC ACID 0.8 MG
1 TABLET ORAL DAILY
Qty: 0 | Refills: 0 | Status: DISCONTINUED | OUTPATIENT
Start: 2018-04-15 | End: 2018-04-19

## 2018-04-15 RX ORDER — MAGNESIUM SULFATE 500 MG/ML
1 VIAL (ML) INJECTION ONCE
Qty: 0 | Refills: 0 | Status: COMPLETED | OUTPATIENT
Start: 2018-04-15 | End: 2018-04-15

## 2018-04-15 RX ADMIN — Medication 100 MILLIEQUIVALENT(S): at 03:11

## 2018-04-15 RX ADMIN — Medication 200 GRAM(S): at 02:30

## 2018-04-15 RX ADMIN — Medication 2 MILLIGRAM(S): at 07:54

## 2018-04-15 RX ADMIN — SODIUM CHLORIDE 75 MILLILITER(S): 9 INJECTION INTRAMUSCULAR; INTRAVENOUS; SUBCUTANEOUS at 06:19

## 2018-04-15 RX ADMIN — Medication 100 MILLIGRAM(S): at 16:23

## 2018-04-15 RX ADMIN — Medication 1 MILLIGRAM(S): at 16:22

## 2018-04-15 RX ADMIN — Medication 62.5 MILLIMOLE(S): at 16:23

## 2018-04-15 RX ADMIN — ONDANSETRON 4 MILLIGRAM(S): 8 TABLET, FILM COATED ORAL at 07:53

## 2018-04-15 RX ADMIN — ONDANSETRON 4 MILLIGRAM(S): 8 TABLET, FILM COATED ORAL at 16:22

## 2018-04-15 RX ADMIN — Medication 1 MILLIGRAM(S): at 01:51

## 2018-04-15 RX ADMIN — PANTOPRAZOLE SODIUM 40 MILLIGRAM(S): 20 TABLET, DELAYED RELEASE ORAL at 06:21

## 2018-04-15 RX ADMIN — PANTOPRAZOLE SODIUM 40 MILLIGRAM(S): 20 TABLET, DELAYED RELEASE ORAL at 02:16

## 2018-04-15 RX ADMIN — SODIUM CHLORIDE 250 MILLILITER(S): 9 INJECTION INTRAMUSCULAR; INTRAVENOUS; SUBCUTANEOUS at 01:44

## 2018-04-15 RX ADMIN — Medication 100 MILLIEQUIVALENT(S): at 04:28

## 2018-04-15 RX ADMIN — PANTOPRAZOLE SODIUM 40 MILLIGRAM(S): 20 TABLET, DELAYED RELEASE ORAL at 19:46

## 2018-04-15 RX ADMIN — Medication 2 MILLIGRAM(S): at 16:22

## 2018-04-15 RX ADMIN — Medication 100 MILLIEQUIVALENT(S): at 01:41

## 2018-04-15 RX ADMIN — Medication 100 GRAM(S): at 01:39

## 2018-04-15 NOTE — PROGRESS NOTE ADULT - PROBLEM SELECTOR PLAN 2
PT daily drinker   -CIWA with symptoms triggered ativan   -Fall precautions  -Thiamine and Folic acid  -Trend LFTs   -SW consult  -Monitor Mg ,K+, and Phos  -Will c/w IVF.

## 2018-04-15 NOTE — H&P ADULT - PROBLEM SELECTOR PLAN 1
Unclear precipitant. Likely related to withdrawal but will need to r/o   -Consider cardiology consult for further management  -Telemetry monitoring  -Repeat TTE to r/o structural causes of SVT  -Repeat troponin Unclear precipitant. Likely related to withdrawal but will need to r/o   -Consider cardiology consult for further management  -Telemetry monitoring  -Repeat TTE to r/o structural causes of SVT  -Repeat troponin  -check U tox Unclear precipitant. Likely related to withdrawal but will need to r/o primary cardiac etiologies   -Telemetry monitoring  -Repeat TTE to r/o structural causes of SVT  -Repeat troponin   -check U tox

## 2018-04-15 NOTE — H&P ADULT - NEGATIVE NEUROLOGICAL SYMPTOMS
no weakness/no paresthesias/no transient paralysis/no focal seizures/no generalized seizures no loss of consciousness/no transient paralysis/no paresthesias/no generalized seizures/no focal seizures

## 2018-04-15 NOTE — H&P ADULT - PROBLEM SELECTOR PLAN 3
Differential includes choledocholithiasis vs alcoholic hepatitis   -Trend LFTs   -Avoid hepatotoxins   -Consider MRCP to evalaute biliary tree given CBD dilatation Differential includes alcohol abuse vs choledocholithiasis vs alcoholic hepatitis. Less likely alcoholic hepatitis based on Maddrey score.   -Trend LFTs and coags   -Avoid hepatotoxins   -Consider MRCP to evaluate biliary tree given CBD dilatation; no signs of cholangitis

## 2018-04-15 NOTE — H&P ADULT - RS GEN PE MLT RESP DETAILS PC
no subcutaneous emphysema/chest wall tenderness/breath sounds equal/clear to auscultation bilaterally/no rales/airway patent/normal/good air movement no rales/chest wall tenderness/airway patent/no subcutaneous emphysema/breath sounds equal/good air movement/clear to auscultation bilaterally

## 2018-04-15 NOTE — H&P ADULT - ATTENDING COMMENTS
Patient assigned to me by night hospitalist in charge for management and care for patient for this evening only. Care to be resumed by day hospitalist in the morning and thereafter.   Patient seen and examined at bedside. Agree with the resident note above. Changes made to note above where appropriate.

## 2018-04-15 NOTE — PROGRESS NOTE ADULT - ASSESSMENT
53 yo F with PMH of alcohol abuse with hx of withdrawal seizure x1, SVT, asthma/copd current smoker, tobacco use, nephrolithiasis, gallstones, presents with palpitations. Found to be in SVT which corrected with vagal maneuvers

## 2018-04-15 NOTE — PROGRESS NOTE ADULT - PROBLEM SELECTOR PLAN 8
DVT PPX:SCDs in setting of possible bleed  Diet :NPO until GI bleed r/o DVT PPX:SCDs in setting of possible bleed  Diet :NPO until tonight's CBC at 7pm.

## 2018-04-15 NOTE — H&P ADULT - PROBLEM SELECTOR PLAN 7
DVT PPX:SCDs in setting of possible bleed  Diet:NPO until GI bleed r/o  Dispo:Full code. PEnding medical w/u -Spent 4-minutes attempting to  patient on tobacco cessation but she does not want to hear about it.

## 2018-04-15 NOTE — H&P ADULT - PROBLEM SELECTOR PLAN 2
PT daily drinker   -CIWA with symptoms triggered ativan   -Fall precautions  -Thiamine and Folic acid  -Trend LFTs   -SW consult  -Monitor Mg,K+, and Phos  -Will start d5 1/2NS PT daily drinker   -CIWA with symptoms triggered ativan   -Fall precautions  -Thiamine and Folic acid  -Trend LFTs   -SW consult  -Monitor Mg,K+, and Phos  -Will c/w IVF.

## 2018-04-15 NOTE — H&P ADULT - NEUROLOGICAL DETAILS
alert and oriented x 3 responds to verbal commands/responds to pain/alert and oriented x 3/cranial nerves intact

## 2018-04-15 NOTE — PROGRESS NOTE ADULT - PROBLEM SELECTOR PLAN 4
-Patient reports melena-like stool but hemoglobin wnl so unlikely to be GI bleed but hard to definitively rule out given the labs could be hemoconcentrated so for now will keep NPO, c/w IV 40mg BID protonix and repeat CBC. If trend and hemoglobin is stabilized then unlikely to be GI bleed. -Patient reports melena-like stool but hemoglobin wnl so unlikely to be GI bleed but hard to definitively rule out given the labs could be hemoconcentrated so for now will keep NPO, c/w IV 40mg BID protonix  - repeat CBC.   - Will send anemia w/u, including B12, folate, and iron studies. -Patient reports melena-like stool but hemoglobin wnl so unlikely to be GI bleed but hard to definitively rule out given the labs could be hemoconcentrated so for now will keep NPO, c/w IV 40mg BID protonix  - repeat CBC tonight 7pm, if stable will start w/ clear diet  - Will send anemia w/u, including B12, folate, and iron studies.

## 2018-04-15 NOTE — ED ADULT NURSE REASSESSMENT NOTE - NS ED NURSE REASSESS COMMENT FT1
2315- patient repeat labs back. All IV meds as ordered carried out. 2 IV access intact to AC. No infiltrations noted. Denies pain. ADmitted to telemetry. On continuous cardiac monitoring. NSR. Denies pain, dizziness or sob. Awaiting for Ct scan results then RTM.

## 2018-04-15 NOTE — H&P ADULT - PROBLEM SELECTOR PLAN 6
Pt reports 2 week history of melena. Guiac neg here in the ED. Low suspicion given HD stability   -Will trend CBC  -Protonix 40mg IV push BID   -Maintain active type and screen stable. No wheezing on exam  -c/w inhaler regimen

## 2018-04-15 NOTE — PROGRESS NOTE ADULT - SUBJECTIVE AND OBJECTIVE BOX
CONTACT INFO:  Rosendo Dodd MD  PGY-1 | Internal Medicine  Spectra: [phone]    Patient is a 53y old  Female who presents with a chief complaint of palpitations (15 Apr 2018 03:49)      SUBJECTIVE / OVERNIGHT EVENTS: No overnight events. No complaints this AM. Patient denies CP, SOB.  On tele:    REVIEW OF SYSTEMS:  14-point ROS was conducted with the patient and is negative except for those listed above.      MEDICATIONS  (STANDING):  folic acid 1 milliGRAM(s) Oral daily  pantoprazole  Injectable 40 milliGRAM(s) IV Push two times a day  sodium chloride 0.9%. 1000 milliLiter(s) (75 mL/Hr) IV Continuous <Continuous>  sodium phosphate IVPB 15 milliMole(s) IV Intermittent once  thiamine 100 milliGRAM(s) Oral daily    MEDICATIONS  (PRN):  acetaminophen   Tablet 650 milliGRAM(s) Oral every 6 hours PRN For Temp greater than 38 C (100.4 F)  LORazepam     Tablet 2 milliGRAM(s) Oral every 2 hours PRN CIWA-Ar score increase by 2 points and a total score of 7 or less  LORazepam     Tablet 2 milliGRAM(s) Oral every 2 hours PRN Symptom-triggered 2 point increase in CIWA-Ar  LORazepam   Injectable 2 milliGRAM(s) IV Push every 1 hour PRN CIWA-Ar score 8 or greater  ondansetron Injectable 4 milliGRAM(s) IV Push every 6 hours PRN Nausea      T(C): 36.7 (04-15-18 @ 07:49), Max: 36.7 (04-14-18 @ 20:48)  HR: 86 (04-15-18 @ 07:49) (79 - 172)  BP: 100/61 (04-15-18 @ 07:49) (100/61 - 138/83)  RR: 20 (04-15-18 @ 07:49) (18 - 20)  SpO2: 98% (04-15-18 @ 07:49) (98% - 100%)    PHYSICAL EXAM  GENERAL: NAD, well-developed  NEURO: AO x3, PERRLA, EOMI, motor strength in tact in 4/4 extremities, sensation in tact  HEAD:  Atraumatic, Normocephalic  EYES: conjunctiva and sclera clear  NECK: Supple, No JVD, no lymphadenopathy, no thyromegaly  CHEST/LUNG: Clear to auscultation bilaterally; No wheezes, rales or rhonchi  HEART: Regular rate and rhythm; No murmurs, rubs, or gallops  ABDOMEN: Soft, Nontender, Nondistended; Bowel sounds present, no masses.  EXTREMITIES:  2+ Peripheral Pulses, No clubbing, cyanosis, or edema  SKIN: Warm, dry, in tact, no rashes or lesions  PSYCH: affect appropriate    LABS:                        14.0   10.8  )-----------( 176      ( 14 Apr 2018 21:32 )             41.1     04-15    133<L>  |  98  |  4<L>  ----------------------------<  80  4.2   |  21<L>  |  0.52    Ca    7.4<L>      15 Apr 2018 06:32  Phos  2.4     04-15  Mg     1.6     04-15    TPro  5.3<L>  /  Alb  2.9<L>  /  TBili  1.9<H>  /  DBili  x   /  AST  136<H>  /  ALT  52<H>  /  AlkPhos  134<H>  04-15    PT/INR - ( 14 Apr 2018 21:32 )   PT: 12.7 sec;   INR: 1.16 ratio         PTT - ( 14 Apr 2018 21:32 )  PTT:31.5 sec  CARDIAC MARKERS ( 15 Apr 2018 06:32 )  x     / <0.01 ng/mL / x     / x     / x      CARDIAC MARKERS ( 14 Apr 2018 21:32 )  x     / 0.01 ng/mL / x     / x     / x            I&O's Summary      MICROBIOLOGY:    RADIOLOGY: CONTACT INFO:  Rosendo Dodd MD  PGY-1 | Internal Medicine  Spectra: [phone]    Patient is a 53y old  Female who presents with a chief complaint of palpitations (15 Apr 2018 03:49)      SUBJECTIVE / OVERNIGHT EVENTS: No overnight events. No complaints this AM. Patient denies fevers, chills, nausea, CP, SOB, abd pain.  On tele:    REVIEW OF SYSTEMS:  14-point ROS was conducted with the patient and is negative except for those listed above.      MEDICATIONS  (STANDING):  folic acid 1 milliGRAM(s) Oral daily  pantoprazole  Injectable 40 milliGRAM(s) IV Push two times a day  sodium chloride 0.9%. 1000 milliLiter(s) (75 mL/Hr) IV Continuous <Continuous>  sodium phosphate IVPB 15 milliMole(s) IV Intermittent once  thiamine 100 milliGRAM(s) Oral daily    MEDICATIONS  (PRN):  acetaminophen   Tablet 650 milliGRAM(s) Oral every 6 hours PRN For Temp greater than 38 C (100.4 F)  LORazepam     Tablet 2 milliGRAM(s) Oral every 2 hours PRN CIWA-Ar score increase by 2 points and a total score of 7 or less  LORazepam     Tablet 2 milliGRAM(s) Oral every 2 hours PRN Symptom-triggered 2 point increase in CIWA-Ar  LORazepam   Injectable 2 milliGRAM(s) IV Push every 1 hour PRN CIWA-Ar score 8 or greater  ondansetron Injectable 4 milliGRAM(s) IV Push every 6 hours PRN Nausea      T(C): 36.7 (04-15-18 @ 07:49), Max: 36.7 (04-14-18 @ 20:48)  HR: 86 (04-15-18 @ 07:49) (79 - 172)  BP: 100/61 (04-15-18 @ 07:49) (100/61 - 138/83)  RR: 20 (04-15-18 @ 07:49) (18 - 20)  SpO2: 98% (04-15-18 @ 07:49) (98% - 100%)    PHYSICAL EXAM  GENERAL: NAD, sleepy, cooperative  NEURO: AO x3, PERRLA, EOMI, motor strength in tact in 4/4 extremities, sensation in tact. No tremor.  HEAD:  Atraumatic, Normocephalic  EYES: conjunctiva and sclera clear  NECK: Supple, No JVD  CHEST/LUNG: Clear to auscultation anteriorly  HEART: Regular rate and rhythm; No murmurs, rubs, or gallops  ABDOMEN: Soft, Nontender, Nondistended; Bowel sounds present, no masses.  EXTREMITIES:  2+ Peripheral Pulses, No clubbing, cyanosis, or edema  SKIN: Warm, dry, in tact, no rashes or lesions  PSYCH: affect appropriate, cooperative    LABS:                        14.0   10.8  )-----------( 176      ( 14 Apr 2018 21:32 )             41.1     04-15    133<L>  |  98  |  4<L>  ----------------------------<  80  4.2   |  21<L>  |  0.52    Ca    7.4<L>      15 Apr 2018 06:32  Phos  2.4     04-15  Mg     1.6     04-15    TPro  5.3<L>  /  Alb  2.9<L>  /  TBili  1.9<H>  /  DBili  x   /  AST  136<H>  /  ALT  52<H>  /  AlkPhos  134<H>  04-15    PT/INR - ( 14 Apr 2018 21:32 )   PT: 12.7 sec;   INR: 1.16 ratio         PTT - ( 14 Apr 2018 21:32 )  PTT:31.5 sec  CARDIAC MARKERS ( 15 Apr 2018 06:32 )  x     / <0.01 ng/mL / x     / x     / x      CARDIAC MARKERS ( 14 Apr 2018 21:32 )  x     / 0.01 ng/mL / x     / x     / x            I&O's Summary      MICROBIOLOGY:    RADIOLOGY:

## 2018-04-15 NOTE — H&P ADULT - PROBLEM SELECTOR PLAN 4
Likely related to profound dehydration vs possible GI bleed  -Trend lactate   -c/w IV hdyration Pt reports 2 week history of melena. Guiac neg here in the ED. Low suspicion given HD stability   -Will trend CBC  -Protonix 40mg IV push BID   -Maintain active type and screen -Patient reports melena-like stool but hemoglobin wnl so unlikely to be GI bleed but hard to definitively rule out given the labs could be hemoconcentrated so for now will keep NPO, c/w IV 40mg BID protonix and repeat CBC. If trend and hemoglobin is stabilized then unlikely to be GI bleed.

## 2018-04-15 NOTE — CONSULT NOTE ADULT - SUBJECTIVE AND OBJECTIVE BOX
Chief Complaint:  Patient is a 53y old  Female who presents with a chief complaint of palpitations (15 Apr 2018 03:49)      HPI:  52 year old female with history of alcohol abuse (with withdrawal seizure x1) SVT, asthma/COPD, current smoker, nephrolithiasis, gallstones, presented to the emergency room with chief complain of palpitations, she was found to be in SVT and improved with vagal maneuvers.     GI consulted as CT suggestive of CBD stone.   Patient reports nausea, vomiting, inability to tolerate po for 2 weeks. She has been trying to manage her nausea with marijuana. She denies abdominal pain, changes in bowel movements.     In the ED T:98 P:170 BP:100/81 RR:18 O2:99 %.   Given IV protonix 40mg, magnesium 1g, Ativan 1mg IV push and Potassium 10meq       Allergies:  amoxicillin (Other)  Ceclor (Other)      Home Medications:  * Patient Currently Takes Medications as of 10-Nov-2017 17:17 documented in Structured Notes  · 	oxyCODONE 5 mg oral tablet: 1 tab(s) orally every 6 hours x 3 days, As Needed MDD:no more than 4 per day   · 	Percocet 5/325 oral tablet: 1 tab(s) orally every 4 hours MDD:6  · 	Flomax 0.4 mg oral capsule: 1 cap(s) orally once a day   · 	Levaquin 500 mg oral tablet: 1 tab(s) orally every 24 hours   · 	Ventolin HFA 90 mcg/inh inhalation aerosol: 2 puff(s) inhaled 2 times a day   · 	folic acid 0.4 mg oral tablet: 1 tab(s) orally once a day  · 	pantoprazole 40 mg oral delayed release tablet: 1 tab(s) orally once a day  · 	dilTIAZem 120 mg/24 hours oral capsule, extended release: 1 cap(s) orally once a day  · 	Symbicort 160 mcg-4.5 mcg/inh inhalation aerosol: 2 puff(s) inhaled 2 times a day  · 	Aspirin Enteric Coated 81 mg oral delayed release tablet: 1 tab(s) orally once a day  · 	hydrOXYzine hydrochloride 25 mg oral tablet: PRN    Hospital Medications:  acetaminophen   Tablet 650 milliGRAM(s) Oral every 6 hours PRN  folic acid 1 milliGRAM(s) Oral daily  LORazepam     Tablet 2 milliGRAM(s) Oral every 2 hours PRN  LORazepam     Tablet 2 milliGRAM(s) Oral every 2 hours PRN  LORazepam   Injectable 2 milliGRAM(s) IV Push every 1 hour PRN  ondansetron Injectable 4 milliGRAM(s) IV Push every 6 hours PRN  pantoprazole  Injectable 40 milliGRAM(s) IV Push two times a day  sodium chloride 0.9%. 1000 milliLiter(s) IV Continuous <Continuous>  sodium phosphate IVPB 15 milliMole(s) IV Intermittent once  thiamine 100 milliGRAM(s) Oral daily      PMHX/PSHX:  Anemia  Alcohol-induced anxiety disorder  Alcohol withdrawal seizure without complication  AA (alcohol abuse)  GERD (gastroesophageal reflux disease)  Asthma with COPD  Asthma  Cholelithiasis  Renal calculi  Lyme disease  Tachycardia  S/P hysterectomy  S/P       Family history:  No pertinent family history in first degree relatives      Social History: daily smoker 25 pack year history, daily drinker, drinks hard liquor, last drink around 630PM yesterday, smokes marijuana daily. Denies use of cocaine, amphetamine, or heroin    ROS:     General:  No wt loss, fevers, chills, night sweats, fatigue,   Eyes:  Good vision, no reported pain  ENT:  No sore throat, pain, runny nose, dysphagia  CV:  No pain, palpitations, hypo/hypertension  Resp:  No dyspnea, cough, tachypnea, wheezing  GI:  See HPI  :  No pain, bleeding, incontinence, nocturia  Muscle:  No pain, weakness  Neuro:  No weakness, tingling, memory problems  Psych:  No fatigue, insomnia, mood problems, depression  Endocrine:  No polyuria, polydipsia, cold/heat intolerance  Heme:  No petechiae, ecchymosis, easy bruisability  Skin:  No rash, edema      PHYSICAL EXAM:     GENERAL:  Appears stated age, well-groomed, well-nourished, no distress  HEENT:  NC/AT,  conjunctivae clear and pink,  no JVD  CHEST:  Full & symmetric excursion, no increased effort, breath sounds clear  HEART:  Regular rhythm, S1, S2, no murmur/rub/S3/S4, no abdominal bruit, no edema  ABDOMEN:  Soft, non-tender, non-distended, normoactive bowel sounds,  no masses ,  EXTREMITIES:  no cyanosis,clubbing or edema  SKIN:  No rash/erythema/ecchymoses/petechiae/wounds/abscess/warm/dry  NEURO:  Alert, oriented    Vital Signs:  Vital Signs Last 24 Hrs  T(C): 36.7 (15 Apr 2018 07:49), Max: 36.7 (2018 20:48)  T(F): 98.1 (15 Apr 2018 07:49), Max: 98.1 (15 Apr 2018 02:17)  HR: 86 (15 Apr 2018 07:49) (79 - 172)  BP: 100/61 (15 Apr 2018 07:49) (100/61 - 138/83)  BP(mean): --  RR: 20 (15 Apr 2018 07:49) (18 - 20)  SpO2: 98% (15 Apr 2018 07:49) (98% - 100%)  Daily     Daily     LABS:                        9.7    3.8   )-----------( 106      ( 15 Apr 2018 11:26 )             28.3     04-15    133<L>  |  98  |  4<L>  ----------------------------<  80  4.2   |  21<L>  |  0.52    Ca    7.4<L>      15 Apr 2018 06:32  Phos  2.4     -15  Mg     1.6     -15    TPro  5.3<L>  /  Alb  2.9<L>  /  TBili  1.9<H>  /  DBili  x   /  AST  136<H>  /  ALT  52<H>  /  AlkPhos  134<H>  15    LIVER FUNCTIONS - ( 15 Apr 2018 06:32 )  Alb: 2.9 g/dL / Pro: 5.3 g/dL / ALK PHOS: 134 U/L / ALT: 52 U/L RC / AST: 136 U/L / GGT: x           PT/INR - ( 2018 21:32 )   PT: 12.7 sec;   INR: 1.16 ratio         PTT - ( 2018 21:32 )  PTT:31.5 sec    Amylase Serum--      Lipase serum30       Ammonia--      Imaging:      < from: CT Abdomen and Pelvis w/ IV Cont (18 @ 23:07) >  IMPRESSION:     1. No bowel obstruction or inflammation.  2. New common bile duct dilatation to 8 mm. No discrete   choledocholithiasis. Correlate with LFTs and if clinically warranted an   MRCP.  3. Mild left hydronephrosis without evidence of an obstructing calculus   likely due to the dilated urinary bladder.     < end of copied text > Chief Complaint:  Patient is a 53y old  Female who presents with a chief complaint of palpitations (15 Apr 2018 03:49)      HPI:  52 year old female with history of alcohol abuse (with withdrawal seizure x1) SVT, asthma/COPD, current smoker, nephrolithiasis, gallstones, presented to the emergency room with chief complain of palpitations, she was found to be in SVT and improved with vagal maneuvers.     GI consulted as CT suggestive of CBD stone.   Patient reports nausea, vomiting, inability to tolerate po for 2 weeks. She has been trying to manage her nausea with marijuana. She denies abdominal pain, changes in bowel movements. She had an EGD 6 months ago which was normal but has not had a colonoscopy.     In the ED T:98 P:170 BP:100/81 RR:18 O2:99 %.   Given IV protonix 40mg, magnesium 1g, Ativan 1mg IV push and Potassium 10meq       Allergies:  amoxicillin (Other)  Ceclor (Other)      Home Medications:  * Patient Currently Takes Medications as of 10-Nov-2017 17:17 documented in Structured Notes  · 	oxyCODONE 5 mg oral tablet: 1 tab(s) orally every 6 hours x 3 days, As Needed MDD:no more than 4 per day   · 	Percocet 5/325 oral tablet: 1 tab(s) orally every 4 hours MDD:6  · 	Flomax 0.4 mg oral capsule: 1 cap(s) orally once a day   · 	Levaquin 500 mg oral tablet: 1 tab(s) orally every 24 hours   · 	Ventolin HFA 90 mcg/inh inhalation aerosol: 2 puff(s) inhaled 2 times a day   · 	folic acid 0.4 mg oral tablet: 1 tab(s) orally once a day  · 	pantoprazole 40 mg oral delayed release tablet: 1 tab(s) orally once a day  · 	dilTIAZem 120 mg/24 hours oral capsule, extended release: 1 cap(s) orally once a day  · 	Symbicort 160 mcg-4.5 mcg/inh inhalation aerosol: 2 puff(s) inhaled 2 times a day  · 	Aspirin Enteric Coated 81 mg oral delayed release tablet: 1 tab(s) orally once a day  · 	hydrOXYzine hydrochloride 25 mg oral tablet: PRN    Hospital Medications:  acetaminophen   Tablet 650 milliGRAM(s) Oral every 6 hours PRN  folic acid 1 milliGRAM(s) Oral daily  LORazepam     Tablet 2 milliGRAM(s) Oral every 2 hours PRN  LORazepam     Tablet 2 milliGRAM(s) Oral every 2 hours PRN  LORazepam   Injectable 2 milliGRAM(s) IV Push every 1 hour PRN  ondansetron Injectable 4 milliGRAM(s) IV Push every 6 hours PRN  pantoprazole  Injectable 40 milliGRAM(s) IV Push two times a day  sodium chloride 0.9%. 1000 milliLiter(s) IV Continuous <Continuous>  sodium phosphate IVPB 15 milliMole(s) IV Intermittent once  thiamine 100 milliGRAM(s) Oral daily      PMHX/PSHX:  Anemia  Alcohol-induced anxiety disorder  Alcohol withdrawal seizure without complication  AA (alcohol abuse)  GERD (gastroesophageal reflux disease)  Asthma with COPD  Asthma  Cholelithiasis  Renal calculi  Lyme disease  Tachycardia  S/P hysterectomy  S/P       Family history:  No pertinent family history in first degree relatives      Social History: daily smoker 25 pack year history, daily drinker, drinks hard liquor, last drink around 630PM yesterday, smokes marijuana daily. Denies use of cocaine, amphetamine, or heroin    ROS:     General:  No wt loss, fevers, chills, night sweats, fatigue,   Eyes:  Good vision, no reported pain  ENT:  No sore throat, pain, runny nose, dysphagia  CV:  No pain, palpitations, hypo/hypertension  Resp:  No dyspnea, cough, tachypnea, wheezing  GI:  See HPI  :  No pain, bleeding, incontinence, nocturia  Muscle:  No pain, weakness  Neuro:  No weakness, tingling, memory problems  Psych:  No fatigue, insomnia, mood problems, depression  Endocrine:  No polyuria, polydipsia, cold/heat intolerance  Heme:  No petechiae, ecchymosis, easy bruisability  Skin:  No rash, edema      PHYSICAL EXAM:     GENERAL:  Appears stated age, well-groomed, well-nourished, no distress  HEENT:  NC/AT,  conjunctivae clear and pink,  no JVD  CHEST:  Full & symmetric excursion, no increased effort, breath sounds clear  HEART:  Regular rhythm, S1, S2, no murmur/rub/S3/S4, no abdominal bruit, no edema  ABDOMEN:  Soft, non-tender, non-distended, normoactive bowel sounds,  no masses ,  EXTREMITIES:  no cyanosis,clubbing or edema  SKIN:  No rash/erythema/ecchymoses/petechiae/wounds/abscess/warm/dry  NEURO:  Alert, oriented    Vital Signs:  Vital Signs Last 24 Hrs  T(C): 36.7 (15 Apr 2018 07:49), Max: 36.7 (2018 20:48)  T(F): 98.1 (15 Apr 2018 07:49), Max: 98.1 (15 Apr 2018 02:17)  HR: 86 (15 Apr 2018 07:49) (79 - 172)  BP: 100/61 (15 Apr 2018 07:49) (100/61 - 138/83)  BP(mean): --  RR: 20 (15 Apr 2018 07:49) (18 - 20)  SpO2: 98% (15 Apr 2018 07:49) (98% - 100%)  Daily     Daily     LABS:                        9.7    3.8   )-----------( 106      ( 15 Apr 2018 11:26 )             28.3     04-15    133<L>  |  98  |  4<L>  ----------------------------<  80  4.2   |  21<L>  |  0.52    Ca    7.4<L>      15 Apr 2018 06:32  Phos  2.4     04-15  Mg     1.6     04-15    TPro  5.3<L>  /  Alb  2.9<L>  /  TBili  1.9<H>  /  DBili  x   /  AST  136<H>  /  ALT  52<H>  /  AlkPhos  134<H>  -15    LIVER FUNCTIONS - ( 15 Apr 2018 06:32 )  Alb: 2.9 g/dL / Pro: 5.3 g/dL / ALK PHOS: 134 U/L / ALT: 52 U/L RC / AST: 136 U/L / GGT: x           PT/INR - ( 2018 21:32 )   PT: 12.7 sec;   INR: 1.16 ratio         PTT - ( 2018 21:32 )  PTT:31.5 sec    Amylase Serum--      Lipase serum30       Ammonia--      Imaging:      < from: CT Abdomen and Pelvis w/ IV Cont (18 @ 23:07) >  IMPRESSION:     1. No bowel obstruction or inflammation.  2. New common bile duct dilatation to 8 mm. No discrete   choledocholithiasis. Correlate with LFTs and if clinically warranted an   MRCP.  3. Mild left hydronephrosis without evidence of an obstructing calculus   likely due to the dilated urinary bladder.     < end of copied text >

## 2018-04-15 NOTE — H&P ADULT - NEGATIVE ENMT SYMPTOMS
no vertigo/no sinus symptoms/no nasal congestion/no recurrent cold sores/no nasal discharge/no hearing difficulty

## 2018-04-15 NOTE — H&P ADULT - MUSCULOSKELETAL
details… detailed exam no joint erythema/ROM intact/normal/no joint swelling no joint swelling/ROM intact/no joint erythema

## 2018-04-15 NOTE — PROGRESS NOTE ADULT - PROBLEM SELECTOR PLAN 1
Unclear precipitant. Likely related to withdrawal but will need to r/o primary cardiac etiologies   -Telemetry monitoring  -Repeat TTE to r/o structural causes of SVT  -Trops neg x 2, stop trending  -F/u urine tox  - Serum EtOH elevated

## 2018-04-15 NOTE — H&P ADULT - NSHPLABSRESULTS_GEN_ALL_CORE
CBC:Mild leukocytosis,macrocytosis,  CMP: Hyponatremia, hypokalemia,anion gap acidosis, lactemia,hypomagnesemia  CXR:unremarkable  CT ABdomen:CBD dilatation, mild left hydronephrosis 14.0   10.8  )-----------( 176      ( 14 Apr 2018 21:32 )             41.1       04-14    134<L>  |  98  |  5<L>  ----------------------------<  71  3.2<L>   |  18<L>  |  0.60    Ca    7.2<L>      14 Apr 2018 23:41  Phos  2.9     04-14  Mg     1.5     04-14    TPro  8.0  /  Alb  4.2  /  TBili  2.3<H>  /  DBili  x   /  AST  240<H>  /  ALT  84<H>  /  AlkPhos  205<H>  04-14        PT/INR - ( 14 Apr 2018 21:32 )   PT: 12.7 sec;   INR: 1.16 ratio         PTT - ( 14 Apr 2018 21:32 )  PTT:31.5 sec    Lactate Trend      CARDIAC MARKERS ( 14 Apr 2018 21:32 )  x     / 0.01 ng/mL / x     / x     / x        I personally reviewed & interpreted the lab findings above; CBC c/w mild leukocytosis and macrocytosis. CMP c/w hyponatremia that improved, hypokalemia, mild transaminitis, and lactate of 8s down to 5.1. Blood alcohol level positive.   I personally reviewed & interpreted the radiographic findings; CT abdomen shows 8-mm common bile duct dilatation and mild left hydronephrosis.   I personally reviewed & interpreted the EKG findings; Sinus tachycardia. 14.0   10.8  )-----------( 176      ( 14 Apr 2018 21:32 )             41.1       04-14    134<L>  |  98  |  5<L>  ----------------------------<  71  3.2<L>   |  18<L>  |  0.60    Ca    7.2<L>      14 Apr 2018 23:41  Phos  2.9     04-14  Mg     1.5     04-14    TPro  8.0  /  Alb  4.2  /  TBili  2.3<H>  /  DBili  x   /  AST  240<H>  /  ALT  84<H>  /  AlkPhos  205<H>  04-14        PT/INR - ( 14 Apr 2018 21:32 )   PT: 12.7 sec;   INR: 1.16 ratio         PTT - ( 14 Apr 2018 21:32 )  PTT:31.5 sec    Lactate Trend      CARDIAC MARKERS ( 14 Apr 2018 21:32 )  x     / 0.01 ng/mL / x     / x     / x        I personally reviewed & interpreted the lab findings above; CBC c/w mild leukocytosis and macrocytosis. CMP c/w hyponatremia that improved, hypokalemia, mild transaminitis, and lactate of 8s down to 5.1. Blood alcohol level positive.   I personally reviewed & interpreted the radiographic findings; CT abdomen shows 8-mm common bile duct dilatation and mild left hydronephrosis.   I personally reviewed & interpreted the EKG findings; Sinus tachycardia on one EKG and SVT on other EKG

## 2018-04-15 NOTE — H&P ADULT - NSHPSOCIALHISTORY_GEN_ALL_CORE
daily smoker 25 pack year history,daily drinker, smokes marijuana daily daily smoker 25 pack year history, daily drinker, drinks hard liquor, last drink around 630PM yesterday, smokes marijuana daily. Denies use of cocaine, amphetamine, or heroin

## 2018-04-15 NOTE — PROGRESS NOTE ADULT - PROBLEM SELECTOR PLAN 3
Differential includes alcohol abuse vs choledocholithiasis vs alcoholic hepatitis. Less likely alcoholic hepatitis based on Maddrey score.   -Trend LFTs and coags   -Avoid hepatotoxins   -Consider MRCP to evaluate biliary tree given CBD dilatation; no signs of cholangitis Differential includes alcohol abuse vs choledocholithiasis vs alcoholic hepatitis. Less likely alcoholic hepatitis based on Maddrey score.   -Trend LFTs and coags   -Avoid hepatotoxins   -Will c/s GI for findings of 8mm CBD dilatation on CTAP Differential includes alcohol abuse vs choledocholithiasis vs alcoholic hepatitis. Less likely alcoholic hepatitis based on Maddrey score.   -Trend LFTs and coags   -Avoid hepatotoxins   -Will c/s GI for findings of 8mm CBD dilatation on CTAP  -Ordering MRCP for now

## 2018-04-15 NOTE — CONSULT NOTE ADULT - ASSESSMENT
52 year old female with history of alcohol abuse (with withdrawal seizure x1) SVT, asthma/COPD, current smoker, nephrolithiasis, gallstones, presented to the emergency room with chief complain of palpitations, she was found to be in SVT and improved with vagal maneuvers.     Impression:   - Elevated liver enzymes, could be 2/2 choledocholithiasis vs alcohol abuse   - Alcohol abuse, on [facility] protocol  CT 4/14/2018 with new CBD dilation to 8mm      Plan:  - trend CMP  - please obtain an MRCP to evaluate the biliary tree  - will plan for ERCP based on the MRCP findings, sometime next week  - supportive care as per primary team    GI/Biliary team will continue to follow.   Thank you for the consult. 52 year old female with history of alcohol abuse (with withdrawal seizure x1) SVT, asthma/COPD, current smoker, nephrolithiasis, gallstones, presented to the emergency room with chief complain of palpitations, she was found to be in SVT and improved with vagal maneuvers. CT with CBD dilation and thus GI was consulted.     Impression:   - Elevated liver enzymes, could be 2/2 choledocholithiasis vs alcohol abuse   - Alcohol abuse, on CIWA protocol, level 177  - Diarrhea, could be 2/2 infection vs inflammatory  - Macrocytic anemia    CT 4/14/2018 with new CBD dilation to 8mm    Plan:  - trend CMP  - please send C diff, stool GI PCR, ova and parasites, stool culture   - please send anemia workup: folate, Vitamin b12, iron studies (iron, TIBC, ferritin, transferrin)  - please obtain an MRCP to evaluate the biliary tree  - will plan for ERCP based on the MRCP findings, sometime next week  - supportive care as per primary team    GI/Biliary team will continue to follow.   Thank you for the consult.

## 2018-04-15 NOTE — H&P ADULT - HISTORY OF PRESENT ILLNESS
51 yo F with PMH of alcohol abuse with hx of withdrawal seizure x1, SVT, asthma/copd current smoker, tobacco use, nephrolithiasis, gallstones, presents with palpitations 51 yo F with PMH of alcohol abuse with hx of withdrawal seizure x1, SVT, asthma/copd current smoker, tobacco use, nephrolithiasis, gallstones, presents with palpitations. PT reports she was sitting in her car today when she began feeling her heart racing which she has had in the past when she was going through withdrawal The palpitations continued then she began experiencing substernal non radiating chest pain which prompted her to come to the ED. She reports that she drinks 4 shots of [place] and 4 beers daily. She reports her last drink was at 7pm after her symptoms started. She reports having withdrawal seizure but never intubated for alcohol use. She reports a two week history of nausea/vomiting and black stools. She has not been able to keep even water down which she reports as her cause of dehydration.  She has been trying to manage her nausea with marijuana.     In the ED T:98 P:170 BP:100/81 RR:18 O2:99 %. PT was given IV protonix 40mg, magnesium 1g, Ativan 1mg IV push and Potassium 10meq

## 2018-04-15 NOTE — H&P ADULT - ASSESSMENT
51 yo F with PMH of alcohol abuse with hx of withdrawal seizure x1, SVT, asthma/copd current smoker, tobacco use, nephrolithiasis, gallstones, presents with palpitations. Found to be in SVT which corrected with vagal maneuvers

## 2018-04-15 NOTE — H&P ADULT - PROBLEM SELECTOR PLAN 5
stable. No wheezing on exam  -c/w inhaler regimen Likely related to profound dehydration vs possible GI bleed  -Trend lactate   -c/w IV hdyration Likely related to profound dehydration vs possible GI bleed vs diminished liver clearance ; no signs of sepsis at this point.   -Trend lactate   -c/w IV hdyration

## 2018-04-15 NOTE — ED ADULT NURSE REASSESSMENT NOTE - NS ED NURSE REASSESS COMMENT FT1
2345- patient received alert & oriented x3. OOB to BR with steady gait. No complaints offered. IV fluids in progress.

## 2018-04-16 DIAGNOSIS — D64.9 ANEMIA, UNSPECIFIED: ICD-10-CM

## 2018-04-16 LAB
ALBUMIN SERPL ELPH-MCNC: 2.8 G/DL — LOW (ref 3.3–5)
ALP SERPL-CCNC: 123 U/L — HIGH (ref 40–120)
ALT FLD-CCNC: 48 U/L RC — HIGH (ref 10–45)
ANION GAP SERPL CALC-SCNC: 14 MMOL/L — SIGNIFICANT CHANGE UP (ref 5–17)
APPEARANCE UR: CLEAR — SIGNIFICANT CHANGE UP
AST SERPL-CCNC: 116 U/L — HIGH (ref 10–40)
BILIRUB DIRECT SERPL-MCNC: 0.9 MG/DL — HIGH (ref 0–0.2)
BILIRUB INDIRECT FLD-MCNC: 0.9 MG/DL — SIGNIFICANT CHANGE UP (ref 0.2–1)
BILIRUB SERPL-MCNC: 1.8 MG/DL — HIGH (ref 0.2–1.2)
BILIRUB UR-MCNC: NEGATIVE — SIGNIFICANT CHANGE UP
BUN SERPL-MCNC: <4 MG/DL — LOW (ref 7–23)
CALCIUM SERPL-MCNC: 8 MG/DL — LOW (ref 8.4–10.5)
CHLORIDE SERPL-SCNC: 99 MMOL/L — SIGNIFICANT CHANGE UP (ref 96–108)
CO2 SERPL-SCNC: 22 MMOL/L — SIGNIFICANT CHANGE UP (ref 22–31)
COLOR SPEC: YELLOW — SIGNIFICANT CHANGE UP
CREAT SERPL-MCNC: 0.49 MG/DL — LOW (ref 0.5–1.3)
DIFF PNL FLD: NEGATIVE — SIGNIFICANT CHANGE UP
GLUCOSE SERPL-MCNC: 107 MG/DL — HIGH (ref 70–99)
GLUCOSE UR QL: NEGATIVE MG/DL — SIGNIFICANT CHANGE UP
HCT VFR BLD CALC: 27.4 % — LOW (ref 34.5–45)
HGB BLD-MCNC: 9.4 G/DL — LOW (ref 11.5–15.5)
KETONES UR-MCNC: NEGATIVE — SIGNIFICANT CHANGE UP
LEUKOCYTE ESTERASE UR-ACNC: NEGATIVE — SIGNIFICANT CHANGE UP
MAGNESIUM SERPL-MCNC: 1.4 MG/DL — LOW (ref 1.6–2.6)
MCHC RBC-ENTMCNC: 34.3 GM/DL — SIGNIFICANT CHANGE UP (ref 32–36)
MCHC RBC-ENTMCNC: 35.6 PG — HIGH (ref 27–34)
MCV RBC AUTO: 103.8 FL — HIGH (ref 80–100)
NITRITE UR-MCNC: NEGATIVE — SIGNIFICANT CHANGE UP
PH UR: 7 — SIGNIFICANT CHANGE UP (ref 5–8)
PHOSPHATE SERPL-MCNC: 2.6 MG/DL — SIGNIFICANT CHANGE UP (ref 2.5–4.5)
PLATELET # BLD AUTO: 108 K/UL — LOW (ref 150–400)
POTASSIUM SERPL-MCNC: 3 MMOL/L — LOW (ref 3.5–5.3)
POTASSIUM SERPL-SCNC: 3 MMOL/L — LOW (ref 3.5–5.3)
PROT SERPL-MCNC: 5.1 G/DL — LOW (ref 6–8.3)
PROT UR-MCNC: NEGATIVE MG/DL — SIGNIFICANT CHANGE UP
RBC # BLD: 2.64 M/UL — LOW (ref 3.8–5.2)
RBC # FLD: 13.3 % — SIGNIFICANT CHANGE UP (ref 10.3–14.5)
SODIUM SERPL-SCNC: 135 MMOL/L — SIGNIFICANT CHANGE UP (ref 135–145)
SP GR SPEC: 1.01 — LOW (ref 1.01–1.02)
UROBILINOGEN FLD QL: 1 MG/DL — SIGNIFICANT CHANGE UP
WBC # BLD: 3.03 K/UL — LOW (ref 3.8–10.5)
WBC # FLD AUTO: 3.03 K/UL — LOW (ref 3.8–10.5)

## 2018-04-16 PROCEDURE — 99233 SBSQ HOSP IP/OBS HIGH 50: CPT | Mod: GC

## 2018-04-16 RX ORDER — MAGNESIUM SULFATE 500 MG/ML
1 VIAL (ML) INJECTION ONCE
Qty: 0 | Refills: 0 | Status: COMPLETED | OUTPATIENT
Start: 2018-04-16 | End: 2018-04-16

## 2018-04-16 RX ORDER — POTASSIUM CHLORIDE 20 MEQ
20 PACKET (EA) ORAL
Qty: 0 | Refills: 0 | Status: COMPLETED | OUTPATIENT
Start: 2018-04-16 | End: 2018-04-16

## 2018-04-16 RX ORDER — ACETAMINOPHEN 500 MG
650 TABLET ORAL ONCE
Qty: 0 | Refills: 0 | Status: COMPLETED | OUTPATIENT
Start: 2018-04-16 | End: 2018-04-16

## 2018-04-16 RX ADMIN — PANTOPRAZOLE SODIUM 40 MILLIGRAM(S): 20 TABLET, DELAYED RELEASE ORAL at 08:16

## 2018-04-16 RX ADMIN — Medication 20 MILLIEQUIVALENT(S): at 10:30

## 2018-04-16 RX ADMIN — Medication 100 GRAM(S): at 08:15

## 2018-04-16 RX ADMIN — ONDANSETRON 4 MILLIGRAM(S): 8 TABLET, FILM COATED ORAL at 20:44

## 2018-04-16 RX ADMIN — Medication 2 MILLIGRAM(S): at 08:25

## 2018-04-16 RX ADMIN — Medication 20 MILLIEQUIVALENT(S): at 13:32

## 2018-04-16 RX ADMIN — Medication 2 MILLIGRAM(S): at 13:36

## 2018-04-16 RX ADMIN — Medication 2 MILLIGRAM(S): at 20:45

## 2018-04-16 RX ADMIN — Medication 1 MILLIGRAM(S): at 13:36

## 2018-04-16 RX ADMIN — SODIUM CHLORIDE 75 MILLILITER(S): 9 INJECTION INTRAMUSCULAR; INTRAVENOUS; SUBCUTANEOUS at 13:40

## 2018-04-16 RX ADMIN — Medication 100 MILLIGRAM(S): at 18:35

## 2018-04-16 RX ADMIN — Medication 20 MILLIEQUIVALENT(S): at 08:15

## 2018-04-16 RX ADMIN — Medication 2 MILLIGRAM(S): at 16:19

## 2018-04-16 NOTE — PROGRESS NOTE ADULT - PROBLEM SELECTOR PLAN 3
- Pattern of AST>ALT 2:1 favors EtOH-induced, though this is c/b elevated ALP and T-bili in setting of 8mm CBT dilatation, so obstructive pathologies such as choledocholithiasis should also remain on differential.  -Trend LFTs  -Avoid hepatotoxins   - GI recs appreciated - MRCP ordered, they will plan for ERCP depending on results.  - GI also recommends diarrhea w/u, anemia w/u.

## 2018-04-16 NOTE — PROGRESS NOTE ADULT - PROBLEM SELECTOR PLAN 2
PT daily drinker   -CIWA with symptoms triggered ativan   -Fall precautions  -Thiamine and Folic acid  -Trend LFTs   -SW consult  -Monitor Mg ,K+, and Phos  -Will c/w IVF. PT daily drinker   -CIWA with symptoms triggered ativan   -Fall precautions  -Thiamine and Folic acid  -Trend LFTs   -SW consult  -Monitor Mg ,K+, and Phos  -Will c/w IVF for now (if tolerating regular diet can d/c).

## 2018-04-16 NOTE — PROGRESS NOTE ADULT - PROBLEM SELECTOR PLAN 6
Likely related to profound dehydration vs possible GI bleed vs diminished liver clearance ; no signs of sepsis at this point.   -Trend lactate, now normalized  -c/w IV hydration

## 2018-04-16 NOTE — PROGRESS NOTE ADULT - PROBLEM SELECTOR PLAN 8
- Overnight team spend 4-minutes attempting to  patient on tobacco cessation but she was not interested in further discussions

## 2018-04-16 NOTE — PROGRESS NOTE ADULT - PROBLEM SELECTOR PLAN 4
-Patient reports melena-like stool but hemoglobin wnl so unlikely to be GI bleed but hard to definitively rule out given the labs could be hemoconcentrated so for now will keep NPO, c/w IV 40mg BID protonix  - H/H stable, on clears now. Advance as tolerated.  - Will send anemia w/u, including B12, folate, and iron studies. -Patient reports melena-like stool but hemoglobin wnl so unlikely to be GI bleed   - H/H stable, on clears now. Advance as tolerated.  - Will send anemia w/u, including B12, folate, and iron studies.  -protonix 40mg iv bid. can likely change to PO

## 2018-04-16 NOTE — PROGRESS NOTE ADULT - PROBLEM SELECTOR PLAN 9
DVT PPX: Will start Lovenox as CBC is stable, suspicion for GIB is not high at this time  Diet : CLs, advance as tolerated DVT PPX: Will start Lovenox as CBC is stable, suspicion for GIB is not high at this time  Diet : CLs, advance as tolerated.

## 2018-04-16 NOTE — PROGRESS NOTE ADULT - ATTENDING COMMENTS
alcohol withdrawal, SVT, transaminitis, and today with pancytopenia likely 2/2 alcohol abuse.  symptom triggered CIWA, cont IVF until tolerating regular diet, but will advance today.  MRCP ordered to evaluate for cholecocholithiasis as cause of transaminitis, however suspect elevated pattern more likely 2/2 alcohol use.   Continue to trend LFTs. TTE ordered to evaluate for structural abnormalities causing  SVT but suspect may have been 2/2 electrolyte abnormalities in setting of intoxication. replete K and mg today, continue tele monitoring.  low suspicion for GI bleeding. Can advance diet and change protonix to PO today.   Continue remainder of plan per resident note.

## 2018-04-16 NOTE — PROGRESS NOTE ADULT - PROBLEM SELECTOR PLAN 1
Unclear precipitant. Likely related to withdrawal but will need to r/o primary cardiac etiologies   -Telemetry monitoring  -Repeat TTE to r/o structural causes of SVT  -Trops neg x 2, stop trending  -F/u urine tox  - Serum EtOH elevated  - UTox + for THC Unclear precipitant. Likely related to withdrawal but will need to r/o primary cardiac etiologies   -Telemetry monitoring-- no further events  -Repeat TTE to r/o structural causes of SVT  -Trops neg x 2, stop trending  - Serum EtOH elevated  - UTox + for THC  -hypokalemia and hypomagnesemia repleted today. keep mg >2, K >4

## 2018-04-16 NOTE — PROGRESS NOTE ADULT - ASSESSMENT
53 yo F with PMH of alcohol abuse with hx of withdrawal seizure x1, SVT, asthma/copd current smoker, tobacco use, nephrolithiasis, gallstones, presents with palpitations. Found to be in SVT which corrected with vagal maneuvers 53 yo F with PMH of alcohol abuse with hx of withdrawal seizure x1, SVT, asthma/copd current smoker, tobacco use, nephrolithiasis, gallstones, presents with palpitations. Found to be in SVT which corrected with vagal maneuvers. Presentation also notable for alcohol withdrawal, and transaminitis

## 2018-04-16 NOTE — PROVIDER CONTACT NOTE (OTHER) - ASSESSMENT
Patient A&Ox4. c/o headache 7 out of 10 on pain scale and dizziness. Ciwa score 5. BP: 111/79, RR: 18, O2 sat: 98% on RA. She denies chest pain, SOB, palpitations.

## 2018-04-16 NOTE — PROGRESS NOTE ADULT - SUBJECTIVE AND OBJECTIVE BOX
CONTACT INFO:  Rosendo Dodd MD  PGY-1 | Internal Medicine  Spectra: [phone]    Patient is a 53y old  Female who presents with a chief complaint of palpitations (15 Apr 2018 03:49)      SUBJECTIVE / OVERNIGHT EVENTS: No overnight events. No complaints this AM. Patient denies CP, SOB.  On tele:    REVIEW OF SYSTEMS:  14-point ROS was conducted with the patient and is negative except for those listed above.      MEDICATIONS  (STANDING):  folic acid 1 milliGRAM(s) Oral daily  magnesium sulfate  IVPB 1 Gram(s) IV Intermittent once  pantoprazole  Injectable 40 milliGRAM(s) IV Push two times a day  potassium chloride    Tablet ER 20 milliEquivalent(s) Oral every 2 hours  sodium chloride 0.9%. 1000 milliLiter(s) (75 mL/Hr) IV Continuous <Continuous>  thiamine 100 milliGRAM(s) Oral daily    MEDICATIONS  (PRN):  acetaminophen   Tablet 650 milliGRAM(s) Oral every 6 hours PRN For Temp greater than 38 C (100.4 F)  LORazepam     Tablet 2 milliGRAM(s) Oral every 2 hours PRN CIWA-Ar score increase by 2 points and a total score of 7 or less  LORazepam     Tablet 2 milliGRAM(s) Oral every 2 hours PRN Symptom-triggered 2 point increase in CIWA-Ar  LORazepam   Injectable 2 milliGRAM(s) IV Push every 1 hour PRN CIWA-Ar score 8 or greater  ondansetron Injectable 4 milliGRAM(s) IV Push every 6 hours PRN Nausea      T(C): 36.8 (04-16-18 @ 08:10), Max: 37.3 (04-15-18 @ 16:02)  HR: 84 (04-16-18 @ 08:10) (80 - 92)  BP: 137/78 (04-16-18 @ 08:10) (111/60 - 137/78)  RR: 18 (04-16-18 @ 08:10) (18 - 20)  SpO2: 99% (04-16-18 @ 08:10) (96% - 100%)    PHYSICAL EXAM  GENERAL: NAD, sleepy, cooperative  NEURO: AO x3, PERRLA, EOMI, motor strength in tact in 4/4 extremities, sensation in tact. No tremor.  HEAD:  Atraumatic, Normocephalic  EYES: conjunctiva and sclera clear  NECK: Supple, No JVD  CHEST/LUNG: Clear to auscultation anteriorly  HEART: Regular rate and rhythm; No murmurs, rubs, or gallops  ABDOMEN: Soft, Nontender, Nondistended; Bowel sounds present, no masses.  EXTREMITIES:  2+ Peripheral Pulses, No clubbing, cyanosis, or edema  SKIN: Warm, dry, in tact, no rashes or lesions  PSYCH: affect appropriate, cooperative    LABS:                        10.5   4.3   )-----------( 107      ( 15 Apr 2018 21:03 )             29.9     04-16    135  |  99  |  <4<L>  ----------------------------<  107<H>  3.0<L>   |  22  |  0.49<L>    Ca    8.0<L>      16 Apr 2018 05:40  Phos  2.6     04-16  Mg     1.4     04-16    TPro  5.1<L>  /  Alb  2.8<L>  /  TBili  1.8<H>  /  DBili  x   /  AST  116<H>  /  ALT  48<H>  /  AlkPhos  123<H>  04-16    PT/INR - ( 14 Apr 2018 21:32 )   PT: 12.7 sec;   INR: 1.16 ratio         PTT - ( 14 Apr 2018 21:32 )  PTT:31.5 sec  CARDIAC MARKERS ( 15 Apr 2018 06:32 )  x     / <0.01 ng/mL / x     / x     / x      CARDIAC MARKERS ( 14 Apr 2018 21:32 )  x     / 0.01 ng/mL / x     / x     / x            I&O's Summary      MICROBIOLOGY:    RADIOLOGY:  < from: CT Abdomen and Pelvis w/ IV Cont (04.14.18 @ 23:07) >    IMPRESSION:     1. No bowel obstruction or inflammation.  2. New common bile duct dilatation to 8 mm. No discrete   choledocholithiasis. Correlate with LFTs and if clinically warranted an   MRCP.  3. Mild left hydronephrosis without evidence of an obstructing calculus   likely due to the dilated urinary bladder.       < end of copied text > CONTACT INFO:  Rosendo Dodd MD  PGY-1 | Internal Medicine  Spectra: [phone]    Patient is a 53y old  Female who presents with a chief complaint of palpitations (15 Apr 2018 03:49)      SUBJECTIVE / OVERNIGHT EVENTS: No overnight events. Max CIWA 3. No complaints this AM. Patient denies CP, SOB.  On tele:    REVIEW OF SYSTEMS:  14-point ROS was conducted with the patient and is negative except for those listed above.      MEDICATIONS  (STANDING):  folic acid 1 milliGRAM(s) Oral daily  magnesium sulfate  IVPB 1 Gram(s) IV Intermittent once  pantoprazole  Injectable 40 milliGRAM(s) IV Push two times a day  potassium chloride    Tablet ER 20 milliEquivalent(s) Oral every 2 hours  sodium chloride 0.9%. 1000 milliLiter(s) (75 mL/Hr) IV Continuous <Continuous>  thiamine 100 milliGRAM(s) Oral daily    MEDICATIONS  (PRN):  acetaminophen   Tablet 650 milliGRAM(s) Oral every 6 hours PRN For Temp greater than 38 C (100.4 F)  LORazepam     Tablet 2 milliGRAM(s) Oral every 2 hours PRN CIWA-Ar score increase by 2 points and a total score of 7 or less  LORazepam     Tablet 2 milliGRAM(s) Oral every 2 hours PRN Symptom-triggered 2 point increase in CIWA-Ar  LORazepam   Injectable 2 milliGRAM(s) IV Push every 1 hour PRN CIWA-Ar score 8 or greater  ondansetron Injectable 4 milliGRAM(s) IV Push every 6 hours PRN Nausea      T(C): 36.8 (04-16-18 @ 08:10), Max: 37.3 (04-15-18 @ 16:02)  HR: 84 (04-16-18 @ 08:10) (80 - 92)  BP: 137/78 (04-16-18 @ 08:10) (111/60 - 137/78)  RR: 18 (04-16-18 @ 08:10) (18 - 20)  SpO2: 99% (04-16-18 @ 08:10) (96% - 100%)    PHYSICAL EXAM  GENERAL: NAD, sleepy, cooperative  NEURO: AO x3, PERRLA, EOMI, motor strength in tact in 4/4 extremities, sensation in tact. No tremor.  HEAD:  Atraumatic, Normocephalic  EYES: conjunctiva and sclera clear  NECK: Supple, No JVD  CHEST/LUNG: Clear to auscultation anteriorly  HEART: Regular rate and rhythm; No murmurs, rubs, or gallops  ABDOMEN: Soft, Nontender, Nondistended; Bowel sounds present, no masses.  EXTREMITIES:  2+ Peripheral Pulses, No clubbing, cyanosis, or edema  SKIN: Warm, dry, in tact, no rashes or lesions  PSYCH: affect appropriate, cooperative    LABS:                        10.5   4.3   )-----------( 107      ( 15 Apr 2018 21:03 )             29.9     04-16    135  |  99  |  <4<L>  ----------------------------<  107<H>  3.0<L>   |  22  |  0.49<L>    Ca    8.0<L>      16 Apr 2018 05:40  Phos  2.6     04-16  Mg     1.4     04-16    TPro  5.1<L>  /  Alb  2.8<L>  /  TBili  1.8<H>  /  DBili  x   /  AST  116<H>  /  ALT  48<H>  /  AlkPhos  123<H>  04-16    PT/INR - ( 14 Apr 2018 21:32 )   PT: 12.7 sec;   INR: 1.16 ratio         PTT - ( 14 Apr 2018 21:32 )  PTT:31.5 sec  CARDIAC MARKERS ( 15 Apr 2018 06:32 )  x     / <0.01 ng/mL / x     / x     / x      CARDIAC MARKERS ( 14 Apr 2018 21:32 )  x     / 0.01 ng/mL / x     / x     / x            I&O's Summary      MICROBIOLOGY:    RADIOLOGY:  < from: CT Abdomen and Pelvis w/ IV Cont (04.14.18 @ 23:07) >    IMPRESSION:     1. No bowel obstruction or inflammation.  2. New common bile duct dilatation to 8 mm. No discrete   choledocholithiasis. Correlate with LFTs and if clinically warranted an   MRCP.  3. Mild left hydronephrosis without evidence of an obstructing calculus   likely due to the dilated urinary bladder.       < end of copied text >

## 2018-04-16 NOTE — PROVIDER CONTACT NOTE (OTHER) - REASON
Patient had episode of tachycardia up to  on cardiac monitor. Patient c/o headache 7 out of 10 with dizziness.

## 2018-04-17 DIAGNOSIS — D69.6 THROMBOCYTOPENIA, UNSPECIFIED: ICD-10-CM

## 2018-04-17 LAB
ALBUMIN SERPL ELPH-MCNC: 3.4 G/DL — SIGNIFICANT CHANGE UP (ref 3.3–5)
ALP SERPL-CCNC: 146 U/L — HIGH (ref 40–120)
ALT FLD-CCNC: 55 U/L — HIGH (ref 10–45)
ANION GAP SERPL CALC-SCNC: 10 MMOL/L — SIGNIFICANT CHANGE UP (ref 5–17)
ANION GAP SERPL CALC-SCNC: 16 MMOL/L — SIGNIFICANT CHANGE UP (ref 5–17)
AST SERPL-CCNC: 111 U/L — HIGH (ref 10–40)
BILIRUB DIRECT SERPL-MCNC: 0.5 MG/DL — HIGH (ref 0–0.2)
BILIRUB INDIRECT FLD-MCNC: 0.4 MG/DL — SIGNIFICANT CHANGE UP (ref 0.2–1)
BILIRUB SERPL-MCNC: 0.9 MG/DL — SIGNIFICANT CHANGE UP (ref 0.2–1.2)
BUN SERPL-MCNC: <4 MG/DL — LOW (ref 7–23)
BUN SERPL-MCNC: <4 MG/DL — LOW (ref 7–23)
CALCIUM SERPL-MCNC: 8.4 MG/DL — SIGNIFICANT CHANGE UP (ref 8.4–10.5)
CALCIUM SERPL-MCNC: 8.5 MG/DL — SIGNIFICANT CHANGE UP (ref 8.4–10.5)
CHLORIDE SERPL-SCNC: 100 MMOL/L — SIGNIFICANT CHANGE UP (ref 96–108)
CHLORIDE SERPL-SCNC: 98 MMOL/L — SIGNIFICANT CHANGE UP (ref 96–108)
CO2 SERPL-SCNC: 20 MMOL/L — LOW (ref 22–31)
CO2 SERPL-SCNC: 24 MMOL/L — SIGNIFICANT CHANGE UP (ref 22–31)
CREAT SERPL-MCNC: 0.56 MG/DL — SIGNIFICANT CHANGE UP (ref 0.5–1.3)
CREAT SERPL-MCNC: 0.57 MG/DL — SIGNIFICANT CHANGE UP (ref 0.5–1.3)
GLUCOSE SERPL-MCNC: 186 MG/DL — HIGH (ref 70–99)
GLUCOSE SERPL-MCNC: 96 MG/DL — SIGNIFICANT CHANGE UP (ref 70–99)
HCT VFR BLD CALC: 26.2 % — LOW (ref 34.5–45)
HGB BLD-MCNC: 9.1 G/DL — LOW (ref 11.5–15.5)
MAGNESIUM SERPL-MCNC: 1.5 MG/DL — LOW (ref 1.6–2.6)
MAGNESIUM SERPL-MCNC: 1.8 MG/DL — SIGNIFICANT CHANGE UP (ref 1.6–2.6)
MCHC RBC-ENTMCNC: 34.7 GM/DL — SIGNIFICANT CHANGE UP (ref 32–36)
MCHC RBC-ENTMCNC: 35.4 PG — HIGH (ref 27–34)
MCV RBC AUTO: 101.9 FL — HIGH (ref 80–100)
PHOSPHATE SERPL-MCNC: 1.3 MG/DL — LOW (ref 2.5–4.5)
PHOSPHATE SERPL-MCNC: 1.8 MG/DL — LOW (ref 2.5–4.5)
PLATELET # BLD AUTO: 105 K/UL — LOW (ref 150–400)
POTASSIUM SERPL-MCNC: 3.6 MMOL/L — SIGNIFICANT CHANGE UP (ref 3.5–5.3)
POTASSIUM SERPL-MCNC: 3.9 MMOL/L — SIGNIFICANT CHANGE UP (ref 3.5–5.3)
POTASSIUM SERPL-SCNC: 3.6 MMOL/L — SIGNIFICANT CHANGE UP (ref 3.5–5.3)
POTASSIUM SERPL-SCNC: 3.9 MMOL/L — SIGNIFICANT CHANGE UP (ref 3.5–5.3)
PROT SERPL-MCNC: 5.9 G/DL — LOW (ref 6–8.3)
RBC # BLD: 2.57 M/UL — LOW (ref 3.8–5.2)
RBC # FLD: 13.6 % — SIGNIFICANT CHANGE UP (ref 10.3–14.5)
SODIUM SERPL-SCNC: 134 MMOL/L — LOW (ref 135–145)
SODIUM SERPL-SCNC: 134 MMOL/L — LOW (ref 135–145)
WBC # BLD: 3.24 K/UL — LOW (ref 3.8–10.5)
WBC # FLD AUTO: 3.24 K/UL — LOW (ref 3.8–10.5)

## 2018-04-17 PROCEDURE — 74183 MRI ABD W/O CNTR FLWD CNTR: CPT | Mod: 26

## 2018-04-17 PROCEDURE — 99233 SBSQ HOSP IP/OBS HIGH 50: CPT | Mod: GC

## 2018-04-17 RX ORDER — MAGNESIUM SULFATE 500 MG/ML
1 VIAL (ML) INJECTION ONCE
Qty: 0 | Refills: 0 | Status: COMPLETED | OUTPATIENT
Start: 2018-04-17 | End: 2018-04-17

## 2018-04-17 RX ADMIN — Medication 62.5 MILLIMOLE(S): at 17:51

## 2018-04-17 RX ADMIN — Medication 100 GRAM(S): at 07:55

## 2018-04-17 RX ADMIN — Medication 650 MILLIGRAM(S): at 00:39

## 2018-04-17 RX ADMIN — Medication 650 MILLIGRAM(S): at 02:19

## 2018-04-17 RX ADMIN — Medication 1 MILLIGRAM(S): at 21:22

## 2018-04-17 RX ADMIN — Medication 2 MILLIGRAM(S): at 12:52

## 2018-04-17 RX ADMIN — ONDANSETRON 4 MILLIGRAM(S): 8 TABLET, FILM COATED ORAL at 04:41

## 2018-04-17 RX ADMIN — Medication 1 MILLIGRAM(S): at 16:55

## 2018-04-17 RX ADMIN — SODIUM CHLORIDE 75 MILLILITER(S): 9 INJECTION INTRAMUSCULAR; INTRAVENOUS; SUBCUTANEOUS at 04:42

## 2018-04-17 RX ADMIN — Medication 2 MILLIGRAM(S): at 07:55

## 2018-04-17 RX ADMIN — Medication 1 MILLIGRAM(S): at 16:54

## 2018-04-17 RX ADMIN — ONDANSETRON 4 MILLIGRAM(S): 8 TABLET, FILM COATED ORAL at 12:52

## 2018-04-17 NOTE — PROGRESS NOTE ADULT - ASSESSMENT
53 yo F with PMH of alcohol abuse with hx of withdrawal seizure x1, SVT, asthma/copd current smoker, tobacco use, nephrolithiasis, gallstones, presents with SVT (resolved with vagal maneuvers) admitted for alcohol withdrawal.

## 2018-04-17 NOTE — PROGRESS NOTE ADULT - PROBLEM SELECTOR PLAN 3
AST>ALT 2:1 favors EtOH-induced, though this is c/b elevated ALP and T-bili in setting of 8mm CBT dilatation, so obstructive pathologies such as choledocholithiasis should also remain on differential.  - Trend LFTs  - Avoid hepatotoxins   - GI recs appreciated - MRCP ordered, they will plan for ERCP depending on results.  - GI also recommends diarrhea w/u, anemia w/u.- pending

## 2018-04-17 NOTE — PROGRESS NOTE ADULT - ATTENDING COMMENTS
Agree w/ above, Ms. Maoyrga is a woman in her 50s drinking heavily > 25 years (multiple large beers + whiskey shots/day), hx of inpatient rehab x 1 though w/ immediate relapse,   +Hx of withdrawal seizure, also w/ hx of SVT from her etOH use who presents w/ SVT and etOH detox.   On tele overnight w/ rates up to 120. On CIWA, scoring 6 though on my account this AM she was closer to 9 with tachycardia and tremors.   Will start low dose ativan standing for her given her hx of withdrawal seizures and persistent symptomatic tachycardia, in part from withdrawal: 2mg ativan PO q8 x 6 doses.   Await MRCP w/ e/o CBD distention, await echo w/ SVT.   Discussed etOH cessation at length, motivated 7/10. She doesn't want inpatient rehab, is interested in starting naltrexone. Will initiate in house after it's confirmed she has no acute pain med requirements. Denies opioid use at home.   MUST do AA and outpatient rehab.   Thiamine 500 mg IV TID.   Lyte repletions today.   Nicotine patch 7mg please.   W/u of anemia and thrombocytopenia including liver imaging. Agree w/ above, Ms. Mayorga is a woman in her 50s drinking heavily > 25 years (multiple large beers + whiskey shots/day), hx of inpatient rehab x 1 though w/ immediate relapse,   +Hx of withdrawal seizure, also w/ hx of SVT from her etOH use who presents w/ SVT and etOH detox.   On tele overnight w/ rates up to 120. On CIWA, scoring 6 though on my account this AM she was closer to 9 with tachycardia and tremors.   Will start low dose ativan standing for her given her hx of withdrawal seizures and persistent symptomatic tachycardia, in part from withdrawal: 2mg ativan PO q8 x 6 doses.   Needs DVT proph: SCDs w/ low PLT.   Await MRCP w/ e/o CBD distention, await echo w/ SVT.   Discussed etOH cessation at length, motivated 7/10. She doesn't want inpatient rehab, is interested in starting naltrexone. Will initiate in house after it's confirmed she has no acute pain med requirements. Denies opioid use at home.   MUST do AA and outpatient rehab.   Thiamine 500 mg IV TID.   Lyte repletions today.   Nicotine patch 7mg please.   W/u of anemia and thrombocytopenia including liver imaging. Agree w/ above, Ms. Mayorga is a woman in her 50s drinking heavily > 25 years (multiple large beers + whiskey shots/day), hx of inpatient rehab x 1 though w/ immediate relapse,   +Hx of withdrawal seizure, also w/ hx of SVT from her etOH use who presents w/ SVT and etOH detox.   On tele overnight w/ rates up to 120. On CIWA, scoring 6 though on my account this AM she was closer to 9 with tachycardia and tremors.   Will start low dose ativan standing for her given her hx of withdrawal seizures and persistent symptomatic tachycardia, in part from withdrawal.  Needs DVT proph: SCDs w/ low PLT.   Await MRCP w/ e/o CBD distention, await echo w/ SVT.   Discussed etOH cessation at length, motivated 7/10. She doesn't want inpatient rehab, is interested in starting naltrexone. Will initiate in house after it's confirmed she has no acute pain med requirements. Denies opioid use at home.   MUST do AA and outpatient rehab.   Thiamine 500 mg IV TID.   Lyte repletions today.   Nicotine patch 7mg please.   W/u of anemia and thrombocytopenia including liver imaging.

## 2018-04-17 NOTE — PROGRESS NOTE ADULT - PROBLEM SELECTOR PLAN 1
likely 2/2 alcohol withdrawal. Cardiac enzymes negative x2. UTox + for THC  - Repeat TTE to r/o structural causes of SVT  - Continue telemetry monitoring   - Serum EtOH elevated  - Maintain mg >2, K >4

## 2018-04-17 NOTE — PROGRESS NOTE ADULT - PROBLEM SELECTOR PLAN 4
may be due to underlying cirrhosis in setting of alcohol abuse. normal B12, folate  - MRCP will evaluate liver cirrhosis   - Trend daily

## 2018-04-18 ENCOUNTER — TRANSCRIPTION ENCOUNTER (OUTPATIENT)
Age: 53
End: 2018-04-18

## 2018-04-18 DIAGNOSIS — R69 ILLNESS, UNSPECIFIED: ICD-10-CM

## 2018-04-18 LAB
ALBUMIN SERPL ELPH-MCNC: 2.8 G/DL — LOW (ref 3.3–5)
ALP SERPL-CCNC: 133 U/L — HIGH (ref 40–120)
ALT FLD-CCNC: 49 U/L — HIGH (ref 10–45)
ANION GAP SERPL CALC-SCNC: 13 MMOL/L — SIGNIFICANT CHANGE UP (ref 5–17)
AST SERPL-CCNC: 83 U/L — HIGH (ref 10–40)
BILIRUB SERPL-MCNC: 0.8 MG/DL — SIGNIFICANT CHANGE UP (ref 0.2–1.2)
BUN SERPL-MCNC: <4 MG/DL — LOW (ref 7–23)
CALCIUM SERPL-MCNC: 8.6 MG/DL — SIGNIFICANT CHANGE UP (ref 8.4–10.5)
CHLORIDE SERPL-SCNC: 99 MMOL/L — SIGNIFICANT CHANGE UP (ref 96–108)
CO2 SERPL-SCNC: 23 MMOL/L — SIGNIFICANT CHANGE UP (ref 22–31)
CREAT SERPL-MCNC: 0.57 MG/DL — SIGNIFICANT CHANGE UP (ref 0.5–1.3)
GLUCOSE SERPL-MCNC: 98 MG/DL — SIGNIFICANT CHANGE UP (ref 70–99)
MAGNESIUM SERPL-MCNC: 1.6 MG/DL — SIGNIFICANT CHANGE UP (ref 1.6–2.6)
PHOSPHATE SERPL-MCNC: 3.1 MG/DL — SIGNIFICANT CHANGE UP (ref 2.5–4.5)
POTASSIUM SERPL-MCNC: 4 MMOL/L — SIGNIFICANT CHANGE UP (ref 3.5–5.3)
POTASSIUM SERPL-SCNC: 4 MMOL/L — SIGNIFICANT CHANGE UP (ref 3.5–5.3)
PROT SERPL-MCNC: 5.6 G/DL — LOW (ref 6–8.3)
SODIUM SERPL-SCNC: 135 MMOL/L — SIGNIFICANT CHANGE UP (ref 135–145)

## 2018-04-18 PROCEDURE — 93306 TTE W/DOPPLER COMPLETE: CPT | Mod: 26

## 2018-04-18 PROCEDURE — 99233 SBSQ HOSP IP/OBS HIGH 50: CPT | Mod: GC

## 2018-04-18 RX ORDER — THIAMINE MONONITRATE (VIT B1) 100 MG
500 TABLET ORAL THREE TIMES A DAY
Qty: 0 | Refills: 0 | Status: DISCONTINUED | OUTPATIENT
Start: 2018-04-18 | End: 2018-04-19

## 2018-04-18 RX ADMIN — Medication 1 MILLIGRAM(S): at 13:49

## 2018-04-18 RX ADMIN — Medication 1 MILLIGRAM(S): at 18:29

## 2018-04-18 RX ADMIN — Medication 105 MILLIGRAM(S): at 18:33

## 2018-04-18 RX ADMIN — Medication 1 MILLIGRAM(S): at 22:56

## 2018-04-18 RX ADMIN — Medication 105 MILLIGRAM(S): at 11:29

## 2018-04-18 RX ADMIN — Medication 1 MILLIGRAM(S): at 05:07

## 2018-04-18 RX ADMIN — Medication 1 MILLIGRAM(S): at 11:29

## 2018-04-18 NOTE — DISCHARGE NOTE ADULT - CARE PLAN
Principal Discharge DX:	SVT (supraventricular tachycardia)  Secondary Diagnosis:	AA (alcohol abuse)  Secondary Diagnosis:	Transaminitis  Secondary Diagnosis:	Melena  Secondary Diagnosis:	Cigarette nicotine dependence without complication Principal Discharge DX:	SVT (supraventricular tachycardia)  Goal:	See your cardiologist/EP, Dr. Munoz in 1 week  Assessment and plan of treatment:	You came to the hospital with complaints of palpitations. An echocardiogram showed no changes from your last exam in May of 2017. The cause of your palpitations is not entirely clear; it may have been due to dehydration, electrolyte abnormalities, alcohol, or a combination of all three.     Please follow up with your electrophysiologist, Dr. Munoz, within 1 week.  Secondary Diagnosis:	AA (alcohol abuse)  Goal:	Please start taking acamprosate three times a day  Assessment and plan of treatment:	Please start taking acamprosate, a medication that may help manage your drinking habits, three times a day. We recommend you attend AA and seek assistance with outpatient rehab. These suggestions were discussed with your during your hospital stay. Please see your primary care physician in 1-2 weeks.  Secondary Diagnosis:	Transaminitis  Goal:	See your primary care physician in 1-2 weeks  Assessment and plan of treatment:	You were noted to have small elevations in your lab tests that pertain to liver function. These elevations are most likely due to your use of alcohol. We collected a special MRI of your abdomen, known as an MRCP, to rule out stones in the tubes that connect your gall bladder and liver to your intestines, known as bile ducts. There was no evidence of any stones.     Please see your primary care doctor in 1-2 weeks for follow up.  Secondary Diagnosis:	Melena  Goal:	See your primary care physician in 1-2 weeks  Assessment and plan of treatment:	You reported a history of dark stools but a special test of your bowel movements showed no evidence of bleeding. Please See your primary care physician in 1-2 weeks.  Secondary Diagnosis:	Cigarette nicotine dependence without complication  Goal:	See your primary care physician in 1-2 weeks  Assessment and plan of treatment:	See your primary care physician in 1-2 weeks, and if you are ready, please have a discussion with her if you are ready to cut down on your smoking. Principal Discharge DX:	SVT (supraventricular tachycardia)  Goal:	See your cardiologist/EP, Dr. Munoz in 1 week  Assessment and plan of treatment:	You came to the hospital with complaints of palpitations. An echocardiogram showed no changes from your last exam in May of 2017. The cause of your palpitations is not entirely clear; it may have been due to dehydration, electrolyte abnormalities, alcohol, or a combination of all three.     Continue to take your diltiazem and aspirin as you were before coming to the hospital. New prescriptions have been sent to your pharmacy.     Please follow up with your electrophysiologist, Dr. Munoz, within 1 week.  Secondary Diagnosis:	AA (alcohol abuse)  Goal:	Please start taking acamprosate three times a day  Assessment and plan of treatment:	Please start taking acamprosate, a medication that may help manage your drinking habits, three times a day. We have sent a 14-day supply of acamprosate to your pharmacy. Please take 2 tabs (666mg) three times a day.      We recommend you attend AA and seek assistance with outpatient rehab. These suggestions were discussed with your during your hospital stay. Please see your primary care physician in 1-2 weeks.  Secondary Diagnosis:	Transaminitis  Goal:	See your primary care physician in 1-2 weeks  Assessment and plan of treatment:	You were noted to have small elevations in your lab tests that pertain to liver function. These elevations are most likely due to your use of alcohol. We collected a special MRI of your abdomen, known as an MRCP, to rule out stones in the tubes that connect your gallbladder and liver to your intestines, known as bile ducts. There was no evidence of any stones or other abnormalities. However, the MRCP did show early damage to the liver (hepatic steatosis), a consequence of alcohol use.     Please see your primary care doctor in 1-2 weeks for follow up.  Secondary Diagnosis:	Melena  Goal:	See your primary care physician in 1-2 weeks  Assessment and plan of treatment:	You reported a history of dark stools but a special test of your bowel movements showed no evidence of bleeding. Please See your primary care physician in 1-2 weeks.    Continue to take Protonix (pantoprazole) daily. A prescription has been sent to your pharmacy.  Secondary Diagnosis:	Cigarette nicotine dependence without complication  Goal:	See your primary care physician in 1-2 weeks  Assessment and plan of treatment:	See your primary care physician in 1-2 weeks, and if you are ready, please have a discussion with her if you are ready to cut down on your smoking.  Secondary Diagnosis:	Asthma with COPD  Goal:	Continue Symbicort and see your primary care physician in 1-2 weeks  Assessment and plan of treatment:	Please continue your home asthma/COPD medications and see your primary care physician in 1-2 weeks.

## 2018-04-18 NOTE — DISCHARGE NOTE ADULT - MEDICATION SUMMARY - MEDICATIONS TO STOP TAKING
I will STOP taking the medications listed below when I get home from the hospital:    folic acid 0.4 mg oral tablet  -- 1 tab(s) by mouth once a day    hydrOXYzine hydrochloride 25 mg oral tablet  -- PRN    Levaquin 500 mg oral tablet  -- 1 tab(s) by mouth every 24 hours   -- Avoid prolonged or excessive exposure to direct and/or artificial sunlight while taking this medication.  Do not take dairy products, antacids, or iron preparations within one hour of this medication.  Finish all this medication unless otherwise directed by prescriber.  May cause drowsiness or dizziness.  Medication should be taken with plenty of water.    Flomax 0.4 mg oral capsule  -- 1 cap(s) by mouth once a day   -- It is very important that you take or use this exactly as directed.  Do not skip doses or discontinue unless directed by your doctor.  May cause drowsiness.  Alcohol may intensify this effect.  Use care when operating dangerous machinery.  Some non-prescription drugs may aggravate your condition.  Read all labels carefully.  If a warning appears, check with your doctor before taking.  Swallow whole.  Do not crush.  Take with food or milk.    Percocet 5/325 oral tablet  -- 1 tab(s) by mouth every 4 hours MDD:6  -- Caution federal law prohibits the transfer of this drug to any person other  than the person for whom it was prescribed.  May cause drowsiness.  Alcohol may intensify this effect.  Use care when operating dangerous machinery.  This prescription cannot be refilled.  This product contains acetaminophen.  Do not use  with any other product containing acetaminophen to prevent possible liver damage.  Using more of this medication than prescribed may cause serious breathing problems.    oxyCODONE 5 mg oral tablet  -- 1 tab(s) by mouth every 6 hours x 3 days, As Needed MDD:no more than 4 per day   -- Caution federal law prohibits the transfer of this drug to any person other  than the person for whom it was prescribed.  It is very important that you take or use this exactly as directed.  Do not skip doses or discontinue unless directed by your doctor.  May cause drowsiness.  Alcohol may intensify this effect.  Use care when operating dangerous machinery.  This prescription cannot be refilled.  Using more of this medication than prescribed may cause serious breathing problems.

## 2018-04-18 NOTE — DISCHARGE NOTE ADULT - PLAN OF CARE
See your cardiologist/EP, Dr. Munoz in 1 week You came to the hospital with complaints of palpitations. An echocardiogram showed no changes from your last exam in May of 2017. The cause of your palpitations is not entirely clear; it may have been due to dehydration, electrolyte abnormalities, alcohol, or a combination of all three.     Please follow up with your electrophysiologist, Dr. Munoz, within 1 week. Please start taking acamprosate three times a day Please start taking acamprosate, a medication that may help manage your drinking habits, three times a day. We recommend you attend AA and seek assistance with outpatient rehab. These suggestions were discussed with your during your hospital stay. Please see your primary care physician in 1-2 weeks. See your primary care physician in 1-2 weeks You were noted to have small elevations in your lab tests that pertain to liver function. These elevations are most likely due to your use of alcohol. We collected a special MRI of your abdomen, known as an MRCP, to rule out stones in the tubes that connect your gall bladder and liver to your intestines, known as bile ducts. There was no evidence of any stones.     Please see your primary care doctor in 1-2 weeks for follow up. You reported a history of dark stools but a special test of your bowel movements showed no evidence of bleeding. Please See your primary care physician in 1-2 weeks. See your primary care physician in 1-2 weeks, and if you are ready, please have a discussion with her if you are ready to cut down on your smoking. You came to the hospital with complaints of palpitations. An echocardiogram showed no changes from your last exam in May of 2017. The cause of your palpitations is not entirely clear; it may have been due to dehydration, electrolyte abnormalities, alcohol, or a combination of all three.     Continue to take your diltiazem and aspirin as you were before coming to the hospital. New prescriptions have been sent to your pharmacy.     Please follow up with your electrophysiologist, Dr. Munoz, within 1 week. Please start taking acamprosate, a medication that may help manage your drinking habits, three times a day. We have sent a 14-day supply of acamprosate to your pharmacy. Please take 2 tabs (666mg) three times a day.      We recommend you attend AA and seek assistance with outpatient rehab. These suggestions were discussed with your during your hospital stay. Please see your primary care physician in 1-2 weeks. You were noted to have small elevations in your lab tests that pertain to liver function. These elevations are most likely due to your use of alcohol. We collected a special MRI of your abdomen, known as an MRCP, to rule out stones in the tubes that connect your gallbladder and liver to your intestines, known as bile ducts. There was no evidence of any stones or other abnormalities. However, the MRCP did show early damage to the liver (hepatic steatosis), a consequence of alcohol use.     Please see your primary care doctor in 1-2 weeks for follow up. You reported a history of dark stools but a special test of your bowel movements showed no evidence of bleeding. Please See your primary care physician in 1-2 weeks.    Continue to take Protonix (pantoprazole) daily. A prescription has been sent to your pharmacy. Continue Symbicort and see your primary care physician in 1-2 weeks Please continue your home asthma/COPD medications and see your primary care physician in 1-2 weeks.

## 2018-04-18 NOTE — PROGRESS NOTE ADULT - PROBLEM SELECTOR PLAN 2
- Patient is interested in going home on medications to assist in alcohol cessation  - CIWA, symptom triggered ativan  - Fall and seizure precautions  - Continue folic acid and thiamine - Patient is interested in going home on medications to assist in alcohol cessation  - CIWA, symptom triggered ativan  - Fall and seizure precautions  - Continue folic acid and thiamine  - Will discuss utility of d/c on medication to help EtOH dependence such as naltrexone.

## 2018-04-18 NOTE — PROGRESS NOTE ADULT - PROBLEM SELECTOR PLAN 4
may be due to underlying cirrhosis in setting of alcohol abuse. normal B12, folate  - MRCP will evaluate liver cirrhosis   - Trend daily may be due to underlying cirrhosis in setting of alcohol abuse. normal B12, folate  - Stable  - MRCP w/ hepatic steatosis, no biliary tract issue  - Trend daily

## 2018-04-18 NOTE — DISCHARGE NOTE ADULT - MEDICATION SUMMARY - MEDICATIONS TO TAKE
I will START or STAY ON the medications listed below when I get home from the hospital:    Aspirin Enteric Coated 81 mg oral delayed release tablet  -- 1 tab(s) by mouth once a day  -- Indication: For Need for prophylactic measure    dilTIAZem 120 mg/24 hours oral capsule, extended release  -- 1 cap(s) by mouth once a day  -- Indication: For SVT (supraventricular tachycardia)    Symbicort 160 mcg-4.5 mcg/inh inhalation aerosol  -- 2 puff(s) inhaled 2 times a day  -- Indication: For Asthma with COPD    acamprosate 333 mg oral delayed release tablet  -- 2 tab(s) by mouth 3 times a day   -- Check with your doctor before becoming pregnant.  This drug may impair the ability to drive or operate machinery.  Use care until you become familiar with its effects.    -- Indication: For AA (alcohol abuse)    pantoprazole 40 mg oral delayed release tablet  -- 1 tab(s) by mouth once a day  -- Indication: For Need for prophylactic measure

## 2018-04-18 NOTE — PROGRESS NOTE ADULT - PROBLEM SELECTOR PLAN 3
AST>ALT 2:1 favors EtOH-induced, though this is c/b elevated ALP and T-bili in setting of 8mm CBT dilatation, so obstructive pathologies such as choledocholithiasis should also remain on differential.  - Trend LFTs  - Avoid hepatotoxins   - GI recs appreciated - MRCP done, shows no biliary tract blockage and just significant for cholelithiasis.   - GI also recommends diarrhea w/u - pending

## 2018-04-18 NOTE — DISCHARGE NOTE ADULT - SECONDARY DIAGNOSIS.
AA (alcohol abuse) Transaminitis Agatha Cigarette nicotine dependence without complication Asthma with COPD

## 2018-04-18 NOTE — DISCHARGE NOTE ADULT - CARE PROVIDERS DIRECT ADDRESSES
[email or web address]_Unknown

## 2018-04-18 NOTE — DISCHARGE NOTE ADULT - HOSPITAL COURSE
53 yo F with PMH of alcohol abuse with hx of withdrawal seizure x1, SVT, asthma/copd current smoker, tobacco use, nephrolithiasis, gallstones, presents with palpitations. Found to be in SVT which corrected with vagal maneuvers. Course c/b anemia with h/o melena - though FOBT neg in ED, as well as CBT dilatation on CT imaging. Patient's hospital stay was generally uneventful.     Patient underwent MRCP per GI's recommendation for w/u of 8mm CBD dilatation noted on CT abdomen. Patient also noted to have elevated Alk Phos and T-Bili. MRCP showed hepatic steatosis, cholelithiasis, but no choledocholithiasis or other biliary pathology. 51 yo F with PMH of alcohol abuse with hx of withdrawal seizure x1, SVT, asthma/copd current smoker, tobacco use, nephrolithiasis, gallstones, presents with palpitations. Found to be in SVT which corrected with vagal maneuvers. Course c/b anemia with h/o melena - though FOBT neg in ED, as well as CBT dilatation on CT imaging. Patient's hospital stay was generally uneventful.     Patient underwent MRCP per GI's recommendation for w/u of 8mm CBD dilatation noted on CT abdomen. Patient also noted to have elevated Alk Phos and T-Bili. MRCP showed hepatic steatosis, cholelithiasis, but no choledocholithiasis or other biliary pathology. Patient did not have any further episodes of SVT during her stay; her electrolytes were maintained WNL.    For her h/o EtOH dependence, she was kept on symptom-triggered CIWA w/ Ativan. Her max CIWA score was 6.     For her SVT, patient underwent TTE to r/o structural pathology, which demonstrated ______    Patient was advised to follow up with her electrophysiologist. 53 yo F with PMH of alcohol abuse with hx of withdrawal seizure x1, SVT, asthma/copd current smoker, tobacco use, nephrolithiasis, gallstones, presents with palpitations. Found to be in SVT which corrected with vagal maneuvers. Course c/b anemia with h/o melena - though FOBT neg in ED, as well as CBT dilatation on CT imaging. Patient's hospital stay was generally uneventful.     Patient underwent MRCP per GI's recommendation for w/u of 8mm CBD dilatation noted on CT abdomen. Patient also noted to have elevated Alk Phos and T-Bili. MRCP showed hepatic steatosis, cholelithiasis, but no choledocholithiasis or other biliary pathology. Patient did not have any further episodes of SVT during her stay; her electrolytes were maintained WNL.    For her h/o EtOH dependence, she was kept on symptom-triggered CIWA w/ Ativan. Her max CIWA score was 6.     For her SVT, patient underwent TTE to r/o structural pathology, which demonstrated no significant change from May 2017.    On 4/19, patient was deemed stable for d/c. She was given acamprosate for her h/o alcohol use disorder and advised to follow up with AA and outpatient rehab. For her SVT, she was advised to follow up with Dr. Munoz in 1 week.     Patient was advised to follow up with her electrophysiologist. 51 yo F with PMH of alcohol use d/o with hx of withdrawal seizure x1, SVT, asthma/copd current smoker, tobacco use, nephrolithiasis, gallstones, presents with palpitations. Found to be in SVT which corrected with vagal maneuvers. Imaging on admission demonstrated CBT dilatation on CT; MRCP was w/ out e/o choledocholithiasis, ++hepatic steatosis.     Patient did not have any further episodes of SVT during her stay; her electrolytes were maintained WNL. HR (sinus) oscillated between 80-130s NSR, higher tachy rates w/ exertion w/ etOH dependence and withdrawal. She was kept on symptom-triggered CIWA w/ Ativan; max CIWA score was 6. Patient underwent TTE to r/o structural pathology a/w SVT, which demonstrated no significant change from May 2017. Discussed case w/ her home cardiologist Dr. Munoz, she missed f/u w/ him though he recc'd cont encouragement of abstinence and close f/u with him, no further inpatient eval while withdrawing.    On 4/19, patient was deemed stable for d/c. She was given acamprosate for her etOh use d/o, counseled at length on how to take and informed of risks and benefits of taking. Discussed importance of AA and outpatient rehab f/u.  She will follow up with Dr. Munoz in 1 week or so.

## 2018-04-18 NOTE — DISCHARGE NOTE ADULT - CARE PROVIDER_API CALL
Keven Munoz), Cardiac Electrophysiology; Cardiovascular Disease; Internal Medicine  [address] [place] [address]  Phone: [phone]  Fax: [phone] Keven Munoz), Cardiac Electrophysiology; Cardiovascular Disease; Internal Medicine  [address] [address]  Phone: [phone]  Fax: [phone]    Christiano Clemens), Family Medicine  [address] [place] [address]  Phone: [phone]  Fax: [phone]

## 2018-04-18 NOTE — DISCHARGE NOTE ADULT - PATIENT PORTAL LINK FT
You can access the [facility] Patient Portal, offered by [facility], by registering with the following website: [email or web address]

## 2018-04-18 NOTE — PROGRESS NOTE ADULT - SUBJECTIVE AND OBJECTIVE BOX
CONTACT INFO:  Rosendo Dodd MD  PGY-1 | Internal Medicine  Spectra: [phone]    Patient is a 53y old  Female who presents with a chief complaint of palpitations (15 Apr 2018 03:49)      SUBJECTIVE / OVERNIGHT EVENTS: No acute overnight events. Max CIWA 6 overnight, patient received Ativan. No complaints this AM. Patient denies CP, SOB. Patient s/p MRCP yesterday.  On tele:    REVIEW OF SYSTEMS:  14-point ROS was conducted with the patient and is negative except for those listed above.      MEDICATIONS  (STANDING):  folic acid 1 milliGRAM(s) Oral daily  LORazepam   Injectable 1 milliGRAM(s) IV Push every 12 hours  LORazepam   Injectable   IV Push   thiamine IVPB 500 milliGRAM(s) IV Intermittent three times a day    MEDICATIONS  (PRN):  acetaminophen   Tablet 650 milliGRAM(s) Oral every 6 hours PRN For Temp greater than 38 C (100.4 F)  LORazepam   Injectable 1 milliGRAM(s) IV Push every 2 hours PRN CIWA-Ar score increase by 2 points and a total score of 7 or less  LORazepam   Injectable 1 milliGRAM(s) IV Push every 1 hour PRN CIWA-Ar score 8 or greater  ondansetron Injectable 4 milliGRAM(s) IV Push every 6 hours PRN Nausea      T(C): 36.8 (04-18-18 @ 04:44), Max: 37.1 (04-17-18 @ 20:51)  HR: 83 (04-18-18 @ 04:44) (83 - 106)  BP: 127/82 (04-18-18 @ 04:44) (93/72 - 139/88)  RR: 18 (04-18-18 @ 04:44) (18 - 19)  SpO2: 97% (04-18-18 @ 04:44) (97% - 100%)    PHYSICAL EXAM:  GENERAL: NAD, well-developed  HEAD:  Atraumatic, Normocephalic  EYES: conjunctiva and sclera clear  CHEST/LUNG: Clear to auscultation bilaterally; No wheeze  HEART: Regular rate and rhythm; No murmurs, rubs, or gallops  ABDOMEN: Soft, Nontender, Nondistended; Bowel sounds present  EXTREMITIES:  2+ Peripheral Pulses, No clubbing, cyanosis, or edema  PSYCH: AAOx3  SKIN: No rashes or lesions  LABS:                        9.1    3.24  )-----------( 105      ( 17 Apr 2018 10:11 )             26.2     04-18    135  |  99  |  <4<L>  ----------------------------<  98  4.0   |  23  |  0.57    Ca    8.6      18 Apr 2018 06:02  Phos  3.1     04-18  Mg     1.6     04-18    TPro  5.6<L>  /  Alb  2.8<L>  /  TBili  0.8  /  DBili  x   /  AST  83<H>  /  ALT  49<H>  /  AlkPhos  133<H>  04-18            I&O's Summary    17 Apr 2018 07:01  -  18 Apr 2018 07:00  --------------------------------------------------------  IN: 470 mL / OUT: 1900 mL / NET: -1430 mL        MICROBIOLOGY:    RADIOLOGY:  < from: MR MRCP w/wo IV Cont (04.17.18 @ 16:45) >  MPRESSION:     No biliary ductal dilatation or choledocholithiasis.    Cholelithiasis.      < end of copied text > CONTACT INFO:  Rosendo Dodd MD  PGY-1 | Internal Medicine  Spectra: [phone]    Patient is a 53y old  Female who presents with a chief complaint of palpitations (15 Apr 2018 03:49)      SUBJECTIVE / OVERNIGHT EVENTS: No acute overnight events. Max CIWA 6 overnight, patient received Ativan. No complaints this AM. Patient denies CP, SOB. Patient s/p MRCP yesterday.  On tele: 120-130 NSR.     REVIEW OF SYSTEMS:  14-point ROS was conducted with the patient and is negative except for those listed above.      MEDICATIONS  (STANDING):  folic acid 1 milliGRAM(s) Oral daily  LORazepam   Injectable 1 milliGRAM(s) IV Push every 12 hours  LORazepam   Injectable   IV Push   thiamine IVPB 500 milliGRAM(s) IV Intermittent three times a day    MEDICATIONS  (PRN):  acetaminophen   Tablet 650 milliGRAM(s) Oral every 6 hours PRN For Temp greater than 38 C (100.4 F)  LORazepam   Injectable 1 milliGRAM(s) IV Push every 2 hours PRN CIWA-Ar score increase by 2 points and a total score of 7 or less  LORazepam   Injectable 1 milliGRAM(s) IV Push every 1 hour PRN CIWA-Ar score 8 or greater  ondansetron Injectable 4 milliGRAM(s) IV Push every 6 hours PRN Nausea      T(C): 36.8 (04-18-18 @ 04:44), Max: 37.1 (04-17-18 @ 20:51)  HR: 83 (04-18-18 @ 04:44) (83 - 106)  BP: 127/82 (04-18-18 @ 04:44) (93/72 - 139/88)  RR: 18 (04-18-18 @ 04:44) (18 - 19)  SpO2: 97% (04-18-18 @ 04:44) (97% - 100%)    PHYSICAL EXAM:  GENERAL: NAD, well-developed  HEAD:  Atraumatic, Normocephalic  EYES: conjunctiva and sclera clear  CHEST/LUNG: Clear to auscultation bilaterally; No wheeze  HEART: Regular rate and rhythm; No murmurs, rubs, or gallops  ABDOMEN: Soft, Nontender, Nondistended; Bowel sounds present  EXTREMITIES:  2+ Peripheral Pulses, No clubbing, cyanosis, or edema  PSYCH: AAOx3  SKIN: No rashes or lesions  LABS:                        9.1    3.24  )-----------( 105      ( 17 Apr 2018 10:11 )             26.2     04-18    135  |  99  |  <4<L>  ----------------------------<  98  4.0   |  23  |  0.57    Ca    8.6      18 Apr 2018 06:02  Phos  3.1     04-18  Mg     1.6     04-18    TPro  5.6<L>  /  Alb  2.8<L>  /  TBili  0.8  /  DBili  x   /  AST  83<H>  /  ALT  49<H>  /  AlkPhos  133<H>  04-18      I&O's Summary    17 Apr 2018 07:01  -  18 Apr 2018 07:00  --------------------------------------------------------  IN: 470 mL / OUT: 1900 mL / NET: -1430 mL        MICROBIOLOGY:    RADIOLOGY:  < from: MR MRCP w/wo IV Cont (04.17.18 @ 16:45) >  MPRESSION:     No biliary ductal dilatation or choledocholithiasis.    Cholelithiasis.      < end of copied text >

## 2018-04-18 NOTE — PROGRESS NOTE ADULT - ATTENDING COMMENTS
Agree. Finish ming, d/c tmrw w/ pharmacotherapy for AUD + AA + outpatient rehab instructions + PCP and cardiology f/u. Discussed at length the importance of etOH cessation. D/w her cardiologist today, Dr. Abel: he saw her for SVT in the past also in setting of etOH withdrawal, reccs now if nothing changed on echo to reinforce above measures and have her f/u with him as outpt. Echo WNL. Tachy in setting of stress and withdrawal, she's had this intermittently x 25 years she says. Wells score 0, low suspicion of PE at this time given her hx and clinical picture. Feeling improved w/ benzos.

## 2018-04-19 VITALS
SYSTOLIC BLOOD PRESSURE: 124 MMHG | RESPIRATION RATE: 18 BRPM | HEART RATE: 92 BPM | OXYGEN SATURATION: 100 % | DIASTOLIC BLOOD PRESSURE: 73 MMHG | TEMPERATURE: 98 F

## 2018-04-19 PROCEDURE — 99239 HOSP IP/OBS DSCHRG MGMT >30: CPT

## 2018-04-19 RX ORDER — BUDESONIDE AND FORMOTEROL FUMARATE DIHYDRATE 160; 4.5 UG/1; UG/1
2 AEROSOL RESPIRATORY (INHALATION)
Qty: 0 | Refills: 0 | Status: DISCONTINUED | OUTPATIENT
Start: 2018-04-19 | End: 2018-04-19

## 2018-04-19 RX ORDER — DILTIAZEM HCL 120 MG
120 CAPSULE, EXT RELEASE 24 HR ORAL DAILY
Qty: 0 | Refills: 0 | Status: DISCONTINUED | OUTPATIENT
Start: 2018-04-19 | End: 2018-04-19

## 2018-04-19 RX ORDER — ASPIRIN/CALCIUM CARB/MAGNESIUM 324 MG
1 TABLET ORAL
Qty: 31 | Refills: 0 | OUTPATIENT
Start: 2018-04-19 | End: 2018-05-19

## 2018-04-19 RX ORDER — BUDESONIDE AND FORMOTEROL FUMARATE DIHYDRATE 160; 4.5 UG/1; UG/1
2 AEROSOL RESPIRATORY (INHALATION)
Qty: 1 | Refills: 0
Start: 2018-04-19 | End: 2018-05-19

## 2018-04-19 RX ORDER — PANTOPRAZOLE SODIUM 20 MG/1
40 TABLET, DELAYED RELEASE ORAL
Qty: 0 | Refills: 0 | Status: DISCONTINUED | OUTPATIENT
Start: 2018-04-19 | End: 2018-04-19

## 2018-04-19 RX ORDER — HYDROXYZINE HCL 10 MG
0 TABLET ORAL
Qty: 0 | Refills: 0 | COMMUNITY

## 2018-04-19 RX ORDER — PANTOPRAZOLE SODIUM 20 MG/1
1 TABLET, DELAYED RELEASE ORAL
Qty: 31 | Refills: 0 | OUTPATIENT
Start: 2018-04-19 | End: 2018-05-19

## 2018-04-19 RX ORDER — ASPIRIN/CALCIUM CARB/MAGNESIUM 324 MG
81 TABLET ORAL DAILY
Qty: 0 | Refills: 0 | Status: DISCONTINUED | OUTPATIENT
Start: 2018-04-19 | End: 2018-04-19

## 2018-04-19 RX ORDER — ACAMPROSATE CALCIUM 333 MG/1
2 TABLET, DELAYED RELEASE ORAL
Qty: 84 | Refills: 0 | OUTPATIENT
Start: 2018-04-19 | End: 2018-05-02

## 2018-04-19 RX ORDER — DILTIAZEM HCL 120 MG
1 CAPSULE, EXT RELEASE 24 HR ORAL
Qty: 31 | Refills: 0
Start: 2018-04-19 | End: 2018-05-19

## 2018-04-19 RX ADMIN — Medication 1 MILLIGRAM(S): at 05:01

## 2018-04-19 RX ADMIN — Medication 105 MILLIGRAM(S): at 02:52

## 2018-04-19 RX ADMIN — Medication 1 MILLIGRAM(S): at 10:23

## 2018-04-19 RX ADMIN — PANTOPRAZOLE SODIUM 40 MILLIGRAM(S): 20 TABLET, DELAYED RELEASE ORAL at 12:18

## 2018-04-19 RX ADMIN — Medication 81 MILLIGRAM(S): at 12:18

## 2018-04-19 RX ADMIN — Medication 105 MILLIGRAM(S): at 10:23

## 2018-04-19 RX ADMIN — Medication 120 MILLIGRAM(S): at 12:18

## 2018-04-19 RX ADMIN — Medication 1 MILLIGRAM(S): at 12:46

## 2018-04-19 NOTE — PROGRESS NOTE ADULT - PROBLEM SELECTOR PROBLEM 7
Asthma with COPD
Cigarette nicotine dependence without complication

## 2018-04-19 NOTE — PROGRESS NOTE ADULT - PROBLEM SELECTOR PLAN 7
stable. No wheezing on exam  -c/w inhaler regimen
- Overnight team spend 4-minutes attempting to  patient on tobacco cessation but she was not interested in further discussions

## 2018-04-19 NOTE — PROGRESS NOTE ADULT - ATTENDING COMMENTS
d/w her importance of etOH cessation at length prior to d/c.  acamprosate tid x few weeks then f/u w/ pcp, she will f/u at outpt rehab and AA.   Close f/u w/ EP.   45 minutes spent on discharge.

## 2018-04-19 NOTE — PROGRESS NOTE ADULT - PROBLEM SELECTOR PLAN 5
- MCV >100, could be 2/2 EtOH abuse and/or folate deficiency  - Anemia c/b reports of melenic stool though FOBT was neg.   - Iron studies c/w AoCD, hemolysis labs are neg  - Likely multifactorial - 2/2 EtOH and AoCD  - C/w folate, B12 repletion  - Patient also having diarrhea, which could be infectious vs. inflammatory, and may be contributing to anemia if inducing malabsorption of folate, b12, iron...  - For diarrhea, GI recs appreciated: sending C. diff, GI PCR, O+P, Cx
2/2 EtOH abuse and/or folate deficiency  - Continue folic acid  - Elevated MCV, with iron studies suggestive of AoCD
2/2 EtOH abuse and/or folate deficiency  - Continue folic acid
2/2 EtOH abuse and/or folate deficiency  - Continue folic acid  - Elevated MCV, with iron studies suggestive of AoCD
Likely related to profound dehydration vs possible GI bleed vs diminished liver clearance ; no signs of sepsis at this point.   -Trend lactate, now normalized  -c/w IV hydration

## 2018-04-19 NOTE — PROGRESS NOTE ADULT - PROBLEM SELECTOR PLAN 4
may be due to underlying cirrhosis in setting of alcohol abuse. normal B12, folate  - Stable  - MRCP w/ hepatic steatosis, no biliary tract issue  - Trend daily

## 2018-04-19 NOTE — PROGRESS NOTE ADULT - PROBLEM SELECTOR PROBLEM 1
SVT (supraventricular tachycardia)

## 2018-04-19 NOTE — PROGRESS NOTE ADULT - PROBLEM SELECTOR PLAN 2
- Patient is interested in going home on medications to assist in alcohol cessation  - CIWA, symptom triggered ativan  - Fall and seizure precautions  - Continue folic acid and thiamine  - Will discuss utility of d/c on medication to help EtOH dependence such as naltrexone.

## 2018-04-19 NOTE — PROGRESS NOTE ADULT - SUBJECTIVE AND OBJECTIVE BOX
CONTACT INFO:  Rosendo Dodd MD  PGY-1 | Internal Medicine  Spectra: [phone]    Patient is a 53y old  Female who presents with a chief complaint of palpitations (18 Apr 2018 12:26)      SUBJECTIVE / OVERNIGHT EVENTS: No overnight events. No complaints this AM. Patient denies CP, SOB.  On tele:    REVIEW OF SYSTEMS:  14-point ROS was conducted with the patient and is negative except for those listed above.      MEDICATIONS  (STANDING):  folic acid 1 milliGRAM(s) Oral daily  LORazepam   Injectable 1 milliGRAM(s) IV Push once  LORazepam   Injectable   IV Push   thiamine IVPB 500 milliGRAM(s) IV Intermittent three times a day    MEDICATIONS  (PRN):  acetaminophen   Tablet 650 milliGRAM(s) Oral every 6 hours PRN For Temp greater than 38 C (100.4 F)  LORazepam   Injectable 1 milliGRAM(s) IV Push every 2 hours PRN CIWA-Ar score increase by 2 points and a total score of 7 or less  LORazepam   Injectable 1 milliGRAM(s) IV Push every 1 hour PRN CIWA-Ar score 8 or greater  ondansetron Injectable 4 milliGRAM(s) IV Push every 6 hours PRN Nausea      T(C): 37.3 (04-19-18 @ 04:53), Max: 37.3 (04-19-18 @ 04:53)  HR: 89 (04-19-18 @ 04:53) (89 - 108)  BP: 106/72 (04-19-18 @ 04:53) (106/72 - 142/91)  RR: 18 (04-19-18 @ 04:53) (18 - 19)  SpO2: 99% (04-19-18 @ 04:53) (99% - 100%)    PHYSICAL EXAM  GENERAL: Disheveled, comfortable  NEURO: AO x3, PERRLA, EOMI, motor strength in tact in 4/4 extremities, sensation in tact  HEAD:  Atraumatic, Normocephalic  EYES: conjunctiva and sclera clear  NECK: Supple, No JVD, no lymphadenopathy, no thyromegaly  CHEST/LUNG: Clear to auscultation bilaterally; No wheezes, rales or rhonchi  HEART: Regular rate and rhythm; No murmurs, rubs, or gallops  ABDOMEN: Soft, Nontender, Nondistended; Bowel sounds present, no masses.  EXTREMITIES:  2+ Peripheral Pulses, No clubbing, cyanosis, or edema  SKIN: Warm, dry, in tact, no rashes or lesions  PSYCH: affect appropriate    LABS:                        9.1    3.24  )-----------( 105      ( 17 Apr 2018 10:11 )             26.2     04-18    135  |  99  |  <4<L>  ----------------------------<  98  4.0   |  23  |  0.57    Ca    8.6      18 Apr 2018 06:02  Phos  3.1     04-18  Mg     1.6     04-18    TPro  5.6<L>  /  Alb  2.8<L>  /  TBili  0.8  /  DBili  x   /  AST  83<H>  /  ALT  49<H>  /  AlkPhos  133<H>  04-18            I&O's Summary    18 Apr 2018 07:01  -  19 Apr 2018 07:00  --------------------------------------------------------  IN: 790 mL / OUT: 500 mL / NET: 290 mL        MICROBIOLOGY:    RADIOLOGY:  < from: MR MRCP w/wo IV Cont (04.17.18 @ 16:45) >  IMPRESSION:     No biliary ductal dilatation or choledocholithiasis.    Cholelithiasis.    < end of copied text > CONTACT INFO:  Rosendo Dodd MD  PGY-1 | Internal Medicine  Spectra: [phone]    Patient is a 53y old  Female who presents with a chief complaint of palpitations (18 Apr 2018 12:26)      SUBJECTIVE / OVERNIGHT EVENTS: No acute overnight events. Max CIWA overnight 6. No new complaints this AM. Patient denies CP, SOB.  On tele:    REVIEW OF SYSTEMS:  14-point ROS was conducted with the patient and is negative except for those listed above.      MEDICATIONS  (STANDING):  folic acid 1 milliGRAM(s) Oral daily  LORazepam   Injectable 1 milliGRAM(s) IV Push once  LORazepam   Injectable   IV Push   thiamine IVPB 500 milliGRAM(s) IV Intermittent three times a day    MEDICATIONS  (PRN):  acetaminophen   Tablet 650 milliGRAM(s) Oral every 6 hours PRN For Temp greater than 38 C (100.4 F)  LORazepam   Injectable 1 milliGRAM(s) IV Push every 2 hours PRN CIWA-Ar score increase by 2 points and a total score of 7 or less  LORazepam   Injectable 1 milliGRAM(s) IV Push every 1 hour PRN CIWA-Ar score 8 or greater  ondansetron Injectable 4 milliGRAM(s) IV Push every 6 hours PRN Nausea      T(C): 37.3 (04-19-18 @ 04:53), Max: 37.3 (04-19-18 @ 04:53)  HR: 89 (04-19-18 @ 04:53) (89 - 108)  BP: 106/72 (04-19-18 @ 04:53) (106/72 - 142/91)  RR: 18 (04-19-18 @ 04:53) (18 - 19)  SpO2: 99% (04-19-18 @ 04:53) (99% - 100%)    PHYSICAL EXAM  GENERAL: Disheveled, comfortable  NEURO: AO x3, PERRLA, EOMI, motor strength in tact in 4/4 extremities, sensation in tact  HEAD:  Atraumatic, Normocephalic  EYES: conjunctiva and sclera clear  NECK: Supple, No JVD, no lymphadenopathy, no thyromegaly  CHEST/LUNG: Clear to auscultation bilaterally; No wheezes, rales or rhonchi  HEART: Regular rate and rhythm; No murmurs, rubs, or gallops  ABDOMEN: Soft, Nontender, Nondistended; Bowel sounds present, no masses.  EXTREMITIES:  2+ Peripheral Pulses, No clubbing, cyanosis, or edema  SKIN: Warm, dry, in tact, no rashes or lesions  PSYCH: affect appropriate    LABS:                        9.1    3.24  )-----------( 105      ( 17 Apr 2018 10:11 )             26.2     04-18    135  |  99  |  <4<L>  ----------------------------<  98  4.0   |  23  |  0.57    Ca    8.6      18 Apr 2018 06:02  Phos  3.1     04-18  Mg     1.6     04-18    TPro  5.6<L>  /  Alb  2.8<L>  /  TBili  0.8  /  DBili  x   /  AST  83<H>  /  ALT  49<H>  /  AlkPhos  133<H>  04-18            I&O's Summary    18 Apr 2018 07:01  -  19 Apr 2018 07:00  --------------------------------------------------------  IN: 790 mL / OUT: 500 mL / NET: 290 mL        MICROBIOLOGY:    RADIOLOGY:  < from: MR MRCP w/wo IV Cont (04.17.18 @ 16:45) >  IMPRESSION:     No biliary ductal dilatation or choledocholithiasis.    Cholelithiasis.    < end of copied text > CONTACT INFO:  Rosendo Dodd MD  PGY-1 | Internal Medicine  Spectra: [phone]    Patient is a 53y old  Female who presents with a chief complaint of palpitations (18 Apr 2018 12:26)      SUBJECTIVE / OVERNIGHT EVENTS: No acute overnight events. Max CIWA overnight 6. No new complaints this AM. Patient denies CP, SOB, palpitations. Patient ready to go home.   On tele:    REVIEW OF SYSTEMS:  14-point ROS was conducted with the patient and is negative except for those listed above.      MEDICATIONS  (STANDING):  folic acid 1 milliGRAM(s) Oral daily  LORazepam   Injectable 1 milliGRAM(s) IV Push once  LORazepam   Injectable   IV Push   thiamine IVPB 500 milliGRAM(s) IV Intermittent three times a day    MEDICATIONS  (PRN):  acetaminophen   Tablet 650 milliGRAM(s) Oral every 6 hours PRN For Temp greater than 38 C (100.4 F)  LORazepam   Injectable 1 milliGRAM(s) IV Push every 2 hours PRN CIWA-Ar score increase by 2 points and a total score of 7 or less  LORazepam   Injectable 1 milliGRAM(s) IV Push every 1 hour PRN CIWA-Ar score 8 or greater  ondansetron Injectable 4 milliGRAM(s) IV Push every 6 hours PRN Nausea      T(C): 37.3 (04-19-18 @ 04:53), Max: 37.3 (04-19-18 @ 04:53)  HR: 89 (04-19-18 @ 04:53) (89 - 108)  BP: 106/72 (04-19-18 @ 04:53) (106/72 - 142/91)  RR: 18 (04-19-18 @ 04:53) (18 - 19)  SpO2: 99% (04-19-18 @ 04:53) (99% - 100%)    PHYSICAL EXAM  GENERAL: Sitting in bed, comfortable  NEURO: AO x3, PERRLA, EOMI, motor strength in tact in 4/4 extremities, sensation in tact  HEAD:  Atraumatic, Normocephalic  EYES: conjunctiva and sclera clear  NECK: Supple, No JVD, no lymphadenopathy, no thyromegaly  CHEST/LUNG: Clear to auscultation bilaterally; No wheezes, rales or rhonchi  HEART: Regular rate and rhythm; No murmurs, rubs, or gallops  ABDOMEN: Soft, Nontender, Nondistended; Bowel sounds present, no masses.  EXTREMITIES:  2+ Peripheral Pulses, No clubbing, cyanosis, or edema  SKIN: Warm, dry, in tact, no rashes or lesions  PSYCH: affect appropriate    LABS:                        9.1    3.24  )-----------( 105      ( 17 Apr 2018 10:11 )             26.2     04-18    135  |  99  |  <4<L>  ----------------------------<  98  4.0   |  23  |  0.57    Ca    8.6      18 Apr 2018 06:02  Phos  3.1     04-18  Mg     1.6     04-18    TPro  5.6<L>  /  Alb  2.8<L>  /  TBili  0.8  /  DBili  x   /  AST  83<H>  /  ALT  49<H>  /  AlkPhos  133<H>  04-18            I&O's Summary    18 Apr 2018 07:01  -  19 Apr 2018 07:00  --------------------------------------------------------  IN: 790 mL / OUT: 500 mL / NET: 290 mL        MICROBIOLOGY:    RADIOLOGY:  < from: MR MRCP w/wo IV Cont (04.17.18 @ 16:45) >  IMPRESSION:     No biliary ductal dilatation or choledocholithiasis.    Cholelithiasis.    < end of copied text >

## 2018-04-19 NOTE — PROGRESS NOTE ADULT - PROBLEM SELECTOR PLAN 1
likely 2/2 alcohol withdrawal. Cardiac enzymes negative x2. UTox + for THC  - TTE overall unremarkable, shows hyperdynamic LV function, no signifcant change from 5/17 TTE  - Continue telemetry monitoring   - Serum EtOH elevated  - Maintain mg >2, K >4

## 2018-04-19 NOTE — PROGRESS NOTE ADULT - PROBLEM SELECTOR PROBLEM 8
Cigarette nicotine dependence without complication
Need for prophylactic measure

## 2018-05-23 PROCEDURE — 83690 ASSAY OF LIPASE: CPT

## 2018-05-23 PROCEDURE — A9585: CPT

## 2018-05-23 PROCEDURE — 83605 ASSAY OF LACTIC ACID: CPT

## 2018-05-23 PROCEDURE — 93005 ELECTROCARDIOGRAM TRACING: CPT

## 2018-05-23 PROCEDURE — 71046 X-RAY EXAM CHEST 2 VIEWS: CPT

## 2018-05-23 PROCEDURE — 82435 ASSAY OF BLOOD CHLORIDE: CPT

## 2018-05-23 PROCEDURE — 84132 ASSAY OF SERUM POTASSIUM: CPT

## 2018-05-23 PROCEDURE — 82272 OCCULT BLD FECES 1-3 TESTS: CPT

## 2018-05-23 PROCEDURE — 82330 ASSAY OF CALCIUM: CPT

## 2018-05-23 PROCEDURE — 84100 ASSAY OF PHOSPHORUS: CPT

## 2018-05-23 PROCEDURE — 83010 ASSAY OF HAPTOGLOBIN QUANT: CPT

## 2018-05-23 PROCEDURE — 86850 RBC ANTIBODY SCREEN: CPT

## 2018-05-23 PROCEDURE — 85610 PROTHROMBIN TIME: CPT

## 2018-05-23 PROCEDURE — 81003 URINALYSIS AUTO W/O SCOPE: CPT

## 2018-05-23 PROCEDURE — 85730 THROMBOPLASTIN TIME PARTIAL: CPT

## 2018-05-23 PROCEDURE — 99285 EMERGENCY DEPT VISIT HI MDM: CPT | Mod: 25

## 2018-05-23 PROCEDURE — 82746 ASSAY OF FOLIC ACID SERUM: CPT

## 2018-05-23 PROCEDURE — 84295 ASSAY OF SERUM SODIUM: CPT

## 2018-05-23 PROCEDURE — 83735 ASSAY OF MAGNESIUM: CPT

## 2018-05-23 PROCEDURE — 85027 COMPLETE CBC AUTOMATED: CPT

## 2018-05-23 PROCEDURE — 74183 MRI ABD W/O CNTR FLWD CNTR: CPT

## 2018-05-23 PROCEDURE — 82248 BILIRUBIN DIRECT: CPT

## 2018-05-23 PROCEDURE — 82565 ASSAY OF CREATININE: CPT

## 2018-05-23 PROCEDURE — 82728 ASSAY OF FERRITIN: CPT

## 2018-05-23 PROCEDURE — 96374 THER/PROPH/DIAG INJ IV PUSH: CPT | Mod: XU

## 2018-05-23 PROCEDURE — 80307 DRUG TEST PRSMV CHEM ANLYZR: CPT

## 2018-05-23 PROCEDURE — 82607 VITAMIN B-12: CPT

## 2018-05-23 PROCEDURE — 80048 BASIC METABOLIC PNL TOTAL CA: CPT

## 2018-05-23 PROCEDURE — 74177 CT ABD & PELVIS W/CONTRAST: CPT

## 2018-05-23 PROCEDURE — 84484 ASSAY OF TROPONIN QUANT: CPT

## 2018-05-23 PROCEDURE — 83550 IRON BINDING TEST: CPT

## 2018-05-23 PROCEDURE — 93306 TTE W/DOPPLER COMPLETE: CPT

## 2018-05-23 PROCEDURE — 86901 BLOOD TYPING SEROLOGIC RH(D): CPT

## 2018-05-23 PROCEDURE — 80076 HEPATIC FUNCTION PANEL: CPT

## 2018-05-23 PROCEDURE — 82803 BLOOD GASES ANY COMBINATION: CPT

## 2018-05-23 PROCEDURE — 83615 LACTATE (LD) (LDH) ENZYME: CPT

## 2018-05-23 PROCEDURE — 85014 HEMATOCRIT: CPT

## 2018-05-23 PROCEDURE — 80053 COMPREHEN METABOLIC PANEL: CPT

## 2018-05-23 PROCEDURE — 82947 ASSAY GLUCOSE BLOOD QUANT: CPT

## 2018-05-23 PROCEDURE — 86900 BLOOD TYPING SEROLOGIC ABO: CPT

## 2018-07-16 PROBLEM — J44.9 CHRONIC OBSTRUCTIVE PULMONARY DISEASE, UNSPECIFIED: Chronic | Status: ACTIVE | Noted: 2017-10-26

## 2018-07-16 PROBLEM — J45.909 UNSPECIFIED ASTHMA, UNCOMPLICATED: Chronic | Status: INACTIVE | Noted: 2017-03-16 | Resolved: 2017-10-26

## 2018-07-26 ENCOUNTER — INPATIENT (INPATIENT)
Facility: HOSPITAL | Age: 53
LOS: 10 days | Discharge: NURSING, REHAB OR LONG-TERM CARE | End: 2018-08-06
Attending: INTERNAL MEDICINE | Admitting: INTERNAL MEDICINE
Payer: MEDICAID

## 2018-07-26 VITALS — WEIGHT: 87.96 LBS

## 2018-07-26 DIAGNOSIS — Z98.891 HISTORY OF UTERINE SCAR FROM PREVIOUS SURGERY: Chronic | ICD-10-CM

## 2018-07-26 DIAGNOSIS — Z90.710 ACQUIRED ABSENCE OF BOTH CERVIX AND UTERUS: Chronic | ICD-10-CM

## 2018-07-26 LAB
ALBUMIN SERPL ELPH-MCNC: 3.1 G/DL — LOW (ref 3.3–5)
ALP SERPL-CCNC: 167 U/L — HIGH (ref 40–120)
ALT FLD-CCNC: 38 U/L — SIGNIFICANT CHANGE UP (ref 12–78)
AMMONIA BLD-MCNC: 27 UMOL/L — SIGNIFICANT CHANGE UP (ref 11–32)
AMPHET UR-MCNC: NEGATIVE — SIGNIFICANT CHANGE UP
ANION GAP SERPL CALC-SCNC: 15 MMOL/L — SIGNIFICANT CHANGE UP (ref 5–17)
APAP SERPL-MCNC: < 2 UG/ML (ref 10–30)
APPEARANCE UR: CLEAR — SIGNIFICANT CHANGE UP
APTT BLD: 28.6 SEC — SIGNIFICANT CHANGE UP (ref 27.5–37.4)
AST SERPL-CCNC: 99 U/L — HIGH (ref 15–37)
BACTERIA # UR AUTO: ABNORMAL
BARBITURATES UR SCN-MCNC: NEGATIVE — SIGNIFICANT CHANGE UP
BASOPHILS # BLD AUTO: 0.04 K/UL — SIGNIFICANT CHANGE UP (ref 0–0.2)
BASOPHILS NFR BLD AUTO: 0.6 % — SIGNIFICANT CHANGE UP (ref 0–2)
BENZODIAZ UR-MCNC: NEGATIVE — SIGNIFICANT CHANGE UP
BILIRUB SERPL-MCNC: 2.7 MG/DL — HIGH (ref 0.2–1.2)
BILIRUB UR-MCNC: ABNORMAL
BLD GP AB SCN SERPL QL: SIGNIFICANT CHANGE UP
BUN SERPL-MCNC: 9 MG/DL — SIGNIFICANT CHANGE UP (ref 7–23)
CALCIUM SERPL-MCNC: 9 MG/DL — SIGNIFICANT CHANGE UP (ref 8.5–10.1)
CHLORIDE SERPL-SCNC: 89 MMOL/L — LOW (ref 96–108)
CO2 SERPL-SCNC: 31 MMOL/L — SIGNIFICANT CHANGE UP (ref 22–31)
COCAINE METAB.OTHER UR-MCNC: NEGATIVE — SIGNIFICANT CHANGE UP
COLOR SPEC: ABNORMAL
CREAT SERPL-MCNC: 0.65 MG/DL — SIGNIFICANT CHANGE UP (ref 0.5–1.3)
DIFF PNL FLD: ABNORMAL
EOSINOPHIL # BLD AUTO: 0.03 K/UL — SIGNIFICANT CHANGE UP (ref 0–0.5)
EOSINOPHIL NFR BLD AUTO: 0.4 % — SIGNIFICANT CHANGE UP (ref 0–6)
EPI CELLS # UR: SIGNIFICANT CHANGE UP
ETHANOL SERPL-MCNC: <10 MG/DL — SIGNIFICANT CHANGE UP (ref 0–10)
GLUCOSE SERPL-MCNC: 103 MG/DL — HIGH (ref 70–99)
GLUCOSE UR QL: NEGATIVE MG/DL — SIGNIFICANT CHANGE UP
HCG SERPL-ACNC: <1 MIU/ML — SIGNIFICANT CHANGE UP
HCT VFR BLD CALC: 31.3 % — LOW (ref 34.5–45)
HGB BLD-MCNC: 10.6 G/DL — LOW (ref 11.5–15.5)
HYALINE CASTS # UR AUTO: ABNORMAL /LPF
IMM GRANULOCYTES NFR BLD AUTO: 1 % — SIGNIFICANT CHANGE UP (ref 0–1.5)
INR BLD: 1.25 RATIO — HIGH (ref 0.88–1.16)
KETONES UR-MCNC: ABNORMAL
LEUKOCYTE ESTERASE UR-ACNC: ABNORMAL
LIDOCAIN IGE QN: 116 U/L — SIGNIFICANT CHANGE UP (ref 73–393)
LYMPHOCYTES # BLD AUTO: 0.91 K/UL — LOW (ref 1–3.3)
LYMPHOCYTES # BLD AUTO: 12.6 % — LOW (ref 13–44)
MCHC RBC-ENTMCNC: 33.8 PG — SIGNIFICANT CHANGE UP (ref 27–34)
MCHC RBC-ENTMCNC: 33.9 GM/DL — SIGNIFICANT CHANGE UP (ref 32–36)
MCV RBC AUTO: 99.7 FL — SIGNIFICANT CHANGE UP (ref 80–100)
METHADONE UR-MCNC: NEGATIVE — SIGNIFICANT CHANGE UP
MONOCYTES # BLD AUTO: 0.54 K/UL — SIGNIFICANT CHANGE UP (ref 0–0.9)
MONOCYTES NFR BLD AUTO: 7.5 % — SIGNIFICANT CHANGE UP (ref 2–14)
NEUTROPHILS # BLD AUTO: 5.61 K/UL — SIGNIFICANT CHANGE UP (ref 1.8–7.4)
NEUTROPHILS NFR BLD AUTO: 77.9 % — HIGH (ref 43–77)
NITRITE UR-MCNC: POSITIVE
NRBC # BLD: 0 /100 WBCS — SIGNIFICANT CHANGE UP (ref 0–0)
OPIATES UR-MCNC: NEGATIVE — SIGNIFICANT CHANGE UP
PCP SPEC-MCNC: SIGNIFICANT CHANGE UP
PCP UR-MCNC: NEGATIVE — SIGNIFICANT CHANGE UP
PH UR: 7 — SIGNIFICANT CHANGE UP (ref 5–8)
PLATELET # BLD AUTO: 258 K/UL — SIGNIFICANT CHANGE UP (ref 150–400)
POTASSIUM SERPL-MCNC: 3.3 MMOL/L — LOW (ref 3.5–5.3)
POTASSIUM SERPL-SCNC: 3.3 MMOL/L — LOW (ref 3.5–5.3)
PROT SERPL-MCNC: 7.7 GM/DL — SIGNIFICANT CHANGE UP (ref 6–8.3)
PROT UR-MCNC: 30 MG/DL
PROTHROM AB SERPL-ACNC: 13.6 SEC — HIGH (ref 9.8–12.7)
RBC # BLD: 3.14 M/UL — LOW (ref 3.8–5.2)
RBC # FLD: 14.6 % — HIGH (ref 10.3–14.5)
RBC CASTS # UR COMP ASSIST: ABNORMAL /HPF (ref 0–4)
SALICYLATES SERPL-MCNC: <1.7 MG/DL — LOW (ref 2.8–20)
SODIUM SERPL-SCNC: 135 MMOL/L — SIGNIFICANT CHANGE UP (ref 135–145)
SP GR SPEC: 1.01 — SIGNIFICANT CHANGE UP (ref 1.01–1.02)
THC UR QL: POSITIVE — SIGNIFICANT CHANGE UP
TYPE + AB SCN PNL BLD: SIGNIFICANT CHANGE UP
UROBILINOGEN FLD QL: 12 MG/DL
WBC # BLD: 7.2 K/UL — SIGNIFICANT CHANGE UP (ref 3.8–10.5)
WBC # FLD AUTO: 7.2 K/UL — SIGNIFICANT CHANGE UP (ref 3.8–10.5)
WBC UR QL: ABNORMAL

## 2018-07-26 PROCEDURE — 93010 ELECTROCARDIOGRAM REPORT: CPT

## 2018-07-26 PROCEDURE — 99285 EMERGENCY DEPT VISIT HI MDM: CPT

## 2018-07-26 PROCEDURE — 76705 ECHO EXAM OF ABDOMEN: CPT | Mod: 26

## 2018-07-26 PROCEDURE — 71045 X-RAY EXAM CHEST 1 VIEW: CPT | Mod: 26

## 2018-07-26 PROCEDURE — 70450 CT HEAD/BRAIN W/O DYE: CPT | Mod: 26

## 2018-07-26 RX ORDER — SODIUM CHLORIDE 9 MG/ML
1000 INJECTION INTRAMUSCULAR; INTRAVENOUS; SUBCUTANEOUS ONCE
Qty: 0 | Refills: 0 | Status: COMPLETED | OUTPATIENT
Start: 2018-07-26 | End: 2018-07-26

## 2018-07-26 RX ORDER — SODIUM CHLORIDE 9 MG/ML
3 INJECTION INTRAMUSCULAR; INTRAVENOUS; SUBCUTANEOUS ONCE
Qty: 0 | Refills: 0 | Status: COMPLETED | OUTPATIENT
Start: 2018-07-26 | End: 2018-07-26

## 2018-07-26 RX ADMIN — SODIUM CHLORIDE 1000 MILLILITER(S): 9 INJECTION INTRAMUSCULAR; INTRAVENOUS; SUBCUTANEOUS at 22:12

## 2018-07-26 RX ADMIN — SODIUM CHLORIDE 3 MILLILITER(S): 9 INJECTION INTRAMUSCULAR; INTRAVENOUS; SUBCUTANEOUS at 18:26

## 2018-07-26 RX ADMIN — SODIUM CHLORIDE 1000 MILLILITER(S): 9 INJECTION INTRAMUSCULAR; INTRAVENOUS; SUBCUTANEOUS at 18:25

## 2018-07-26 NOTE — ED PROVIDER NOTE - CARE PLAN
Principal Discharge DX:	Altered mental status, unspecified  Secondary Diagnosis:	Acute cystitis without hematuria  Secondary Diagnosis:	Confusion

## 2018-07-26 NOTE — ED PROVIDER NOTE - CARDIAC, MLM
Normal rate, regular rhythm.  Heart sounds S1, S2.  No murmurs, rubs or gallops. Tachycardic rate, regular rhythm.  Heart sounds S1, S2.  No murmurs, rubs or gallops. Normal radial pulse.

## 2018-07-26 NOTE — ED PROVIDER NOTE - MUSCULOSKELETAL, MLM
Spine appears normal, range of motion is not limited, no muscle or joint tenderness Spine appears normal, range of motion is not limited, no muscle or joint tenderness. MAEx4. No focal swelling, tenderness. Diffuse cachexia.

## 2018-07-26 NOTE — ED PROVIDER NOTE - GASTROINTESTINAL, MLM
Abdomen soft, non-tender, no guarding. Abdomen soft, non-tender, no guarding. Bowel sounds positive. Old surgical scars well-healed.

## 2018-07-26 NOTE — ED ADULT NURSE NOTE - OBJECTIVE STATEMENT
nvd past 3 days, no fever, confused drowsy fall today Trauma alert canceled.   Patient brought in by boyfriend for unwitnessed fall. Boyfriend states when he came home from work today, he found patient face down on the floor. Patient does not recall falling but does recall she was standing. Patient and boyfriend denies any blood thinners. Patient denies pain to head, neck, back, and chest. Bruises noted to right forearm, but boyfriend states, that's from a previous blood draw. Slight greenish bruise to b/l shins. Patient is alert but confused and forgetful. Periods of anxiousness and agitation. Boyfriend states for the past 3-7 days, patient has been more drowsy and confused more than usual and she has been having diarrhea, nausea and vomiting. Patient denies any of these symptoms at the moment. Trauma alert canceled.   Patient brought in by boyfriend for unwitnessed fall. Boyfriend states when he came home from work today, he found patient face down on the floor. Patient does not recall falling but does recall she was standing. Patient and boyfriend denies any blood thinners. Patient denies pain to head, neck, back, and chest. Bruises noted to right forearm, but boyfriend states, that's from a previous blood draw. Slight greenish bruise to b/l shins. Patient is alert but confused and forgetful. Periods of anxiousness and agitation. Boyfriend states for the past 3-7 days, patient has been more drowsy and confused more than usual and she has been having diarrhea, nausea and vomiting. Possible history of alcohol abuse. Patient denies any of these symptoms at the moment.

## 2018-07-26 NOTE — ED PROVIDER NOTE - ENMT, MLM
Airway patent, Nasal mucosa clear. Mouth with normal mucosa. Throat has no vesicles, no oropharyngeal exudates and uvula is midline. Airway patent, Nasal mucosa clear. Mildly DMM. Throat has no vesicles, no oropharyngeal exudates and uvula is midline. Pt has dentures - multiple teeth missing.

## 2018-07-26 NOTE — ED ADULT NURSE NOTE - PRIMARY CARE PROVIDER
[facility]. Patient unsure [facility]. Patient unsure od PCP

## 2018-07-26 NOTE — ED PROVIDER NOTE - CONSTITUTIONAL, MLM
normal... Well appearing, well nourished, awake, alert, oriented to person, place, time/situation and in no apparent distress. Thin white female. Acute upon chronically ill. No respiratory discomfort. Disheveled. Awake, alert.

## 2018-07-26 NOTE — ED PROVIDER NOTE - OBJECTIVE STATEMENT
54 y/o F with PMHx of EtOH Abuse, Anemia, COPD, GERD, Chronic Tachycardia, Kidney Stones, and Chronic Renal Insufficiency presenting to the ED with SO c/o unwitnessed fall and AMS. Significant Other reports that when he came home from work yesterday, pt appeared confused and was not making coherent sentences. When significant other returned from work today, he found pt on the floor next to the couch. Pt reports that she fell yesterday and denies falling today. Significant other also reports that pt has slept ~20 hours over the last 1.5 days. +N/V/D and decreased PO intake x4-5 days. No fevers, CP, Abd pain, or SOB. PCP: Dr. Clemens. Nephrology: Dr. Forrest 54 y/o F with PMHx of EtOH Abuse, Anemia, COPD, GERD, Chronic Tachycardia, Kidney Stones, and Chronic Renal Insufficiency presenting to the ED with SO c/o unwitnessed fall and AMS. Significant Other reports that when he came home from work yesterday, pt appeared confused and was not making coherent sentences. When significant other returned from work today, he found pt on the floor next to the couch. Pt reports that she fell yesterday and denies falling today. Significant other also reports that pt has slept ~20 hours over the last 1.5 days. SO had to carry pt to the car and into the ED because pt was too fatigued to ambulate. +N/V/D and decreased PO intake x4-5 days. No fevers, CP, Abd pain, or SOB. PCP: Dr. Clemens. Nephrology: Dr. Forrest

## 2018-07-26 NOTE — ED ADULT TRIAGE NOTE - CHIEF COMPLAINT QUOTE
As per , pt has been nauseas with vomitting,  has not been eatting or drinking and has been dizzy. pt had altered mental status yesterday and this AM, when  went to work he  and came  home and found pt on floor, unwitnessed fall, unknown LOC, pt has brusises to elft arm. no fevers, pt has increased AMS since this Am.  no anticoagulation therapy. TA called at [phone]

## 2018-07-26 NOTE — ED PROVIDER NOTE - SKIN, MLM
Skin normal color for race, warm, dry and intact. No evidence of rash. Small areas of ecchymosis to bilateral forearms and right hand. No evidence of rash.

## 2018-07-26 NOTE — ED PROVIDER NOTE - NEUROLOGICAL, MLM
Alert and oriented, no focal deficits, no motor or sensory deficits. Alert and oriented to person and place. Cranial nerves grossly intact. No focal deficits, no motor or sensory deficits.

## 2018-07-26 NOTE — ED PROVIDER NOTE - MEDICAL DECISION MAKING DETAILS
52 yo WF, h/o EtOH abuse, gallstones, kidney stones w/ CRI, BIB long-term boyfriend re: 2 days confusion, sometimes not making sense, recent falls.  No recent EtOH.    Plan: AMS w/u incl: rectal temp, CXR, CT head, labs incl. urine & BCs, EKG, IVF.  Observe, reassess, possible TBA.

## 2018-07-26 NOTE — ED PROVIDER NOTE - PROGRESS NOTE DETAILS
[facility] AWAD for ED attending, Dr. Graff: SO reports that pt typically drinks and smokes every day but that there has been no alcohol in their house in 5 days and she has only smoked one cigarette in past 3-4 days (normally 0.5ppd)

## 2018-07-27 DIAGNOSIS — K21.9 GASTRO-ESOPHAGEAL REFLUX DISEASE WITHOUT ESOPHAGITIS: ICD-10-CM

## 2018-07-27 DIAGNOSIS — K76.0 FATTY (CHANGE OF) LIVER, NOT ELSEWHERE CLASSIFIED: ICD-10-CM

## 2018-07-27 DIAGNOSIS — F10.10 ALCOHOL ABUSE, UNCOMPLICATED: ICD-10-CM

## 2018-07-27 DIAGNOSIS — J44.9 CHRONIC OBSTRUCTIVE PULMONARY DISEASE, UNSPECIFIED: ICD-10-CM

## 2018-07-27 DIAGNOSIS — R64 CACHEXIA: ICD-10-CM

## 2018-07-27 DIAGNOSIS — R41.82 ALTERED MENTAL STATUS, UNSPECIFIED: ICD-10-CM

## 2018-07-27 PROBLEM — K80.20 CALCULUS OF GALLBLADDER WITHOUT CHOLECYSTITIS WITHOUT OBSTRUCTION: Chronic | Status: ACTIVE | Noted: 2017-03-16

## 2018-07-27 PROBLEM — D64.9 ANEMIA, UNSPECIFIED: Chronic | Status: ACTIVE | Noted: 2017-10-26

## 2018-07-27 PROBLEM — F10.230 ALCOHOL DEPENDENCE WITH WITHDRAWAL, UNCOMPLICATED: Chronic | Status: ACTIVE | Noted: 2017-10-26

## 2018-07-27 PROBLEM — F10.980 ALCOHOL USE, UNSPECIFIED WITH ALCOHOL-INDUCED ANXIETY DISORDER: Chronic | Status: ACTIVE | Noted: 2017-10-26

## 2018-07-27 PROBLEM — R00.0 TACHYCARDIA, UNSPECIFIED: Chronic | Status: ACTIVE | Noted: 2017-03-16

## 2018-07-27 PROBLEM — A69.20 LYME DISEASE, UNSPECIFIED: Chronic | Status: ACTIVE | Noted: 2017-03-16

## 2018-07-27 PROBLEM — N20.0 CALCULUS OF KIDNEY: Chronic | Status: ACTIVE | Noted: 2017-03-16

## 2018-07-27 LAB
ALBUMIN SERPL ELPH-MCNC: 2.5 G/DL — LOW (ref 3.3–5)
ALP SERPL-CCNC: 131 U/L — HIGH (ref 40–120)
ALT FLD-CCNC: 31 U/L — SIGNIFICANT CHANGE UP (ref 12–78)
ANION GAP SERPL CALC-SCNC: 17 MMOL/L — SIGNIFICANT CHANGE UP (ref 5–17)
ANION GAP SERPL CALC-SCNC: 17 MMOL/L — SIGNIFICANT CHANGE UP (ref 5–17)
AST SERPL-CCNC: 60 U/L — HIGH (ref 15–37)
BASOPHILS # BLD AUTO: 0.01 K/UL — SIGNIFICANT CHANGE UP (ref 0–0.2)
BASOPHILS NFR BLD AUTO: 0.1 % — SIGNIFICANT CHANGE UP (ref 0–2)
BILIRUB DIRECT SERPL-MCNC: 1.5 MG/DL — HIGH (ref 0–0.2)
BILIRUB INDIRECT FLD-MCNC: 0.7 MG/DL — SIGNIFICANT CHANGE UP (ref 0.2–1)
BILIRUB SERPL-MCNC: 2.2 MG/DL — HIGH (ref 0.2–1.2)
BUN SERPL-MCNC: 7 MG/DL — SIGNIFICANT CHANGE UP (ref 7–23)
BUN SERPL-MCNC: 8 MG/DL — SIGNIFICANT CHANGE UP (ref 7–23)
CALCIUM SERPL-MCNC: 7.9 MG/DL — LOW (ref 8.5–10.1)
CALCIUM SERPL-MCNC: 7.9 MG/DL — LOW (ref 8.5–10.1)
CHLORIDE SERPL-SCNC: 96 MMOL/L — SIGNIFICANT CHANGE UP (ref 96–108)
CHLORIDE SERPL-SCNC: 99 MMOL/L — SIGNIFICANT CHANGE UP (ref 96–108)
CO2 SERPL-SCNC: 20 MMOL/L — LOW (ref 22–31)
CO2 SERPL-SCNC: 25 MMOL/L — SIGNIFICANT CHANGE UP (ref 22–31)
CREAT SERPL-MCNC: 0.39 MG/DL — LOW (ref 0.5–1.3)
CREAT SERPL-MCNC: 0.4 MG/DL — LOW (ref 0.5–1.3)
EOSINOPHIL # BLD AUTO: 0.02 K/UL — SIGNIFICANT CHANGE UP (ref 0–0.5)
EOSINOPHIL NFR BLD AUTO: 0.3 % — SIGNIFICANT CHANGE UP (ref 0–6)
ESTIMATED AVERAGE GLUCOSE: 94 MG/DL — SIGNIFICANT CHANGE UP (ref 68–114)
GLUCOSE SERPL-MCNC: 102 MG/DL — HIGH (ref 70–99)
GLUCOSE SERPL-MCNC: 80 MG/DL — SIGNIFICANT CHANGE UP (ref 70–99)
HBA1C BLD-MCNC: 4.9 % — SIGNIFICANT CHANGE UP (ref 4–5.6)
HCT VFR BLD CALC: 27.6 % — LOW (ref 34.5–45)
HGB BLD-MCNC: 9.4 G/DL — LOW (ref 11.5–15.5)
IMM GRANULOCYTES NFR BLD AUTO: 0.7 % — SIGNIFICANT CHANGE UP (ref 0–1.5)
INR BLD: 1.56 RATIO — HIGH (ref 0.88–1.16)
LACTATE SERPL-SCNC: 1.7 MMOL/L — SIGNIFICANT CHANGE UP (ref 0.7–2)
LYMPHOCYTES # BLD AUTO: 0.78 K/UL — LOW (ref 1–3.3)
LYMPHOCYTES # BLD AUTO: 11.3 % — LOW (ref 13–44)
MAGNESIUM SERPL-MCNC: 1.4 MG/DL — LOW (ref 1.6–2.6)
MCHC RBC-ENTMCNC: 33 PG — SIGNIFICANT CHANGE UP (ref 27–34)
MCHC RBC-ENTMCNC: 34.1 GM/DL — SIGNIFICANT CHANGE UP (ref 32–36)
MCV RBC AUTO: 96.8 FL — SIGNIFICANT CHANGE UP (ref 80–100)
MONOCYTES # BLD AUTO: 0.48 K/UL — SIGNIFICANT CHANGE UP (ref 0–0.9)
MONOCYTES NFR BLD AUTO: 7 % — SIGNIFICANT CHANGE UP (ref 2–14)
NEUTROPHILS # BLD AUTO: 5.55 K/UL — SIGNIFICANT CHANGE UP (ref 1.8–7.4)
NEUTROPHILS NFR BLD AUTO: 80.6 % — HIGH (ref 43–77)
NRBC # BLD: 0 /100 WBCS — SIGNIFICANT CHANGE UP (ref 0–0)
PHOSPHATE SERPL-MCNC: 2.6 MG/DL — SIGNIFICANT CHANGE UP (ref 2.5–4.5)
PLATELET # BLD AUTO: 209 K/UL — SIGNIFICANT CHANGE UP (ref 150–400)
POTASSIUM SERPL-MCNC: 2.8 MMOL/L — CRITICAL LOW (ref 3.5–5.3)
POTASSIUM SERPL-MCNC: 4.4 MMOL/L — SIGNIFICANT CHANGE UP (ref 3.5–5.3)
POTASSIUM SERPL-SCNC: 2.8 MMOL/L — CRITICAL LOW (ref 3.5–5.3)
POTASSIUM SERPL-SCNC: 4.4 MMOL/L — SIGNIFICANT CHANGE UP (ref 3.5–5.3)
PROT SERPL-MCNC: 6.4 GM/DL — SIGNIFICANT CHANGE UP (ref 6–8.3)
PROTHROM AB SERPL-ACNC: 17 SEC — HIGH (ref 9.8–12.7)
RBC # BLD: 2.85 M/UL — LOW (ref 3.8–5.2)
RBC # FLD: 14.1 % — SIGNIFICANT CHANGE UP (ref 10.3–14.5)
SODIUM SERPL-SCNC: 136 MMOL/L — SIGNIFICANT CHANGE UP (ref 135–145)
SODIUM SERPL-SCNC: 138 MMOL/L — SIGNIFICANT CHANGE UP (ref 135–145)
TSH SERPL-MCNC: 1.17 UU/ML — SIGNIFICANT CHANGE UP (ref 0.34–4.82)
WBC # BLD: 6.89 K/UL — SIGNIFICANT CHANGE UP (ref 3.8–10.5)
WBC # FLD AUTO: 6.89 K/UL — SIGNIFICANT CHANGE UP (ref 3.8–10.5)

## 2018-07-27 RX ORDER — POTASSIUM CHLORIDE 20 MEQ
10 PACKET (EA) ORAL
Qty: 0 | Refills: 0 | Status: COMPLETED | OUTPATIENT
Start: 2018-07-27 | End: 2018-07-27

## 2018-07-27 RX ORDER — DEXTROSE MONOHYDRATE, SODIUM CHLORIDE, AND POTASSIUM CHLORIDE 50; .745; 4.5 G/1000ML; G/1000ML; G/1000ML
1000 INJECTION, SOLUTION INTRAVENOUS
Qty: 0 | Refills: 0 | Status: DISCONTINUED | OUTPATIENT
Start: 2018-07-27 | End: 2018-07-28

## 2018-07-27 RX ORDER — PANTOPRAZOLE SODIUM 20 MG/1
40 TABLET, DELAYED RELEASE ORAL
Qty: 0 | Refills: 0 | Status: DISCONTINUED | OUTPATIENT
Start: 2018-07-27 | End: 2018-08-06

## 2018-07-27 RX ORDER — BUDESONIDE AND FORMOTEROL FUMARATE DIHYDRATE 160; 4.5 UG/1; UG/1
2 AEROSOL RESPIRATORY (INHALATION)
Qty: 0 | Refills: 0 | Status: DISCONTINUED | OUTPATIENT
Start: 2018-07-27 | End: 2018-08-06

## 2018-07-27 RX ORDER — DILTIAZEM HCL 120 MG
120 CAPSULE, EXT RELEASE 24 HR ORAL DAILY
Qty: 0 | Refills: 0 | Status: DISCONTINUED | OUTPATIENT
Start: 2018-07-27 | End: 2018-08-06

## 2018-07-27 RX ORDER — POTASSIUM CHLORIDE 20 MEQ
20 PACKET (EA) ORAL
Qty: 0 | Refills: 0 | Status: COMPLETED | OUTPATIENT
Start: 2018-07-27 | End: 2018-07-27

## 2018-07-27 RX ORDER — THIAMINE MONONITRATE (VIT B1) 100 MG
100 TABLET ORAL DAILY
Qty: 0 | Refills: 0 | Status: DISCONTINUED | OUTPATIENT
Start: 2018-07-27 | End: 2018-07-28

## 2018-07-27 RX ORDER — FOLIC ACID 0.8 MG
1 TABLET ORAL DAILY
Qty: 0 | Refills: 0 | Status: DISCONTINUED | OUTPATIENT
Start: 2018-07-27 | End: 2018-08-06

## 2018-07-27 RX ORDER — ASPIRIN/CALCIUM CARB/MAGNESIUM 324 MG
81 TABLET ORAL DAILY
Qty: 0 | Refills: 0 | Status: DISCONTINUED | OUTPATIENT
Start: 2018-07-27 | End: 2018-07-28

## 2018-07-27 RX ORDER — HEPARIN SODIUM 5000 [USP'U]/ML
5000 INJECTION INTRAVENOUS; SUBCUTANEOUS EVERY 8 HOURS
Qty: 0 | Refills: 0 | Status: DISCONTINUED | OUTPATIENT
Start: 2018-07-27 | End: 2018-07-29

## 2018-07-27 RX ADMIN — Medication 1 TABLET(S): at 11:43

## 2018-07-27 RX ADMIN — Medication 81 MILLIGRAM(S): at 11:43

## 2018-07-27 RX ADMIN — Medication 20 MILLIEQUIVALENT(S): at 11:43

## 2018-07-27 RX ADMIN — Medication 20 MILLIEQUIVALENT(S): at 14:42

## 2018-07-27 RX ADMIN — HEPARIN SODIUM 5000 UNIT(S): 5000 INJECTION INTRAVENOUS; SUBCUTANEOUS at 22:43

## 2018-07-27 RX ADMIN — Medication 120 MILLIGRAM(S): at 11:49

## 2018-07-27 RX ADMIN — Medication 100 MILLIEQUIVALENT(S): at 11:47

## 2018-07-27 RX ADMIN — PANTOPRAZOLE SODIUM 40 MILLIGRAM(S): 20 TABLET, DELAYED RELEASE ORAL at 11:43

## 2018-07-27 RX ADMIN — Medication 1 MILLIGRAM(S): at 11:43

## 2018-07-27 RX ADMIN — DEXTROSE MONOHYDRATE, SODIUM CHLORIDE, AND POTASSIUM CHLORIDE 100 MILLILITER(S): 50; .745; 4.5 INJECTION, SOLUTION INTRAVENOUS at 16:36

## 2018-07-27 RX ADMIN — HEPARIN SODIUM 5000 UNIT(S): 5000 INJECTION INTRAVENOUS; SUBCUTANEOUS at 14:43

## 2018-07-27 RX ADMIN — Medication 100 MILLIEQUIVALENT(S): at 16:37

## 2018-07-27 RX ADMIN — Medication 100 MILLIEQUIVALENT(S): at 14:42

## 2018-07-27 NOTE — PROGRESS NOTE ADULT - PROBLEM SELECTOR PLAN 2
-AST/ALT= 99/38 (on admission), improving   - Continue CIWA protocol  -MV, thiamine 100 mg PO QD, folate 1 mg PO QD  -repeat LFTs , PT , INR  -f/u lytes, Mg, K, -AST/ALT= 99/38 (on admission), improving   - Continue CIWA protocol  -MV, thiamine 100 mg PO QD, folate 1 mg PO QD  -Consider discriminant factor and if steroids to be given   -repeat LFTs , PT , INR  -f/u lytes, Mg, K,

## 2018-07-27 NOTE — PROGRESS NOTE ADULT - SUBJECTIVE AND OBJECTIVE BOX
HPI:  53 yr old female w alcohol abuse hx, anemia, asthma/COPD, chronic renal insufficiency, GERD, GI bleed, Hepatitic steatosis who presented to  ED after unwitnessed fall and prior day of confusion. ED obtained info from significant other.  Last drink was Sunday 07-22.  Patient became confused 1 day prior to ED presentation.  Then boyfriend found her on the floor.  She has also slept a lot more in the past few days. In ED head CT neg, UDS neg except for THC,  neg alcohol.  UA abn so levaquin given. While in ED, pt was uncooperative w nursing.  Did not answer questions for me, just asked me the time and grunted    SUBJECTIVE:     7/27/18-  Patient appears confused and tearful with questioning. She was seen and examined in the ED. When asked what brought her to the hospital, she became tearful and stated that she was light headed and dizzy. Pt was not able to expand on the topic of when symptoms started and further questions. She stated that she developed chest tightness while smoking recently and that she has been smoking and drinking for over 20 years. When asked where she lives, patient not able to state. Discussed with patient speaking to family or significant other to better understand events prior to admission.  Patients significant other (Sylvester Denney) stated that patient had last drink of ETOH (beer), which is her typical drink of choice, on 7/22 and was fine up until 7/25 when she became confused. Sylvester states that when he came home from work on 7/25 she was sleeping and woke up slightly but was not making sense. The following morning, patient was still incomprehensible, eyes looked strange and did not focus. Later when Sylvester returned home from work, pt was laying on the floor next to the couch, not convulsing or shaking, no signs of blood, just mild bruise to the arm, no urinary or fecal incontinence. It is unclear if pt actually had a fall. Sylvester then proceeded to bring patient to  ED.     REVIEW OF SYSTEMS:    CONSTITUTIONAL: No weakness, fevers or chills  EYES/ENT: No visual changes;  No vertigo or throat pain   NECK: No pain or stiffness  RESPIRATORY: No cough, wheezing, hemoptysis; No shortness of breath  CARDIOVASCULAR: No chest pain or palpitations  GASTROINTESTINAL: No abdominal or epigastric pain. No nausea, vomiting, or hematemesis; No diarrhea or constipation. No melena or hematochezia.  GENITOURINARY: No dysuria, frequency or hematuria  NEUROLOGICAL: No numbness or weakness  SKIN: No itching, burning, rashes, or lesions   All other review of systems is negative unless indicated above    Vital Signs Last 24 Hrs  T(C): 36.4 (27 Jul 2018 11:09), Max: 37.6 (26 Jul 2018 17:30)  T(F): 97.6 (27 Jul 2018 11:09), Max: 99.7 (26 Jul 2018 17:30)  HR: 92 (27 Jul 2018 11:09) (76 - 109)  BP: 142/96 (27 Jul 2018 11:09) (134/99 - 149/96)  BP(mean): --  RR: 16 (27 Jul 2018 11:09) (16 - 21)  SpO2: 100% (27 Jul 2018 11:09) (100% - 100%)    I&O's Summary      CAPILLARY BLOOD GLUCOSE      PHYSICAL EXAM:    Constitutional: NAD, awake and alert, thin/cachetic appearing female, +not oriented to place or time  HEENT: pupils reactive to light, EOMI, eyes unable to focus, +vertical nystagmus present  Neck: Soft and supple, No LAD  Respiratory: Breath sounds are clear bilaterally, No wheezing, rales or rhonchi  Cardiovascular: S1 and S2, regular rate and rhythm, no Murmurs, gallops or rubs  Gastrointestinal: Bowel Sounds present, soft, nontender, nondistended, no guarding, no rebound  Extremities: No peripheral edema  Vascular: 2+ peripheral pulses  Neurological: Alert, +not oriented to place and time, cranial nerves intact   Musculoskeletal: 5/5 strength b/l upper and lower extremities  Skin: No rashes    MEDICATIONS  (STANDING):  aspirin enteric coated 81 milliGRAM(s) Oral daily  buDESOnide 160 MICROgram(s)/formoterol 4.5 MICROgram(s) Inhaler 2 Puff(s) Inhalation two times a day  diltiazem    milliGRAM(s) Oral daily  folic acid 1 milliGRAM(s) Oral daily  heparin  Injectable 5000 Unit(s) SubCutaneous every 8 hours  levoFLOXacin  Tablet 500 milliGRAM(s) Oral every 24 hours  multivitamin 1 Tablet(s) Oral daily  pantoprazole    Tablet 40 milliGRAM(s) Oral before breakfast  potassium chloride  10 mEq/100 mL IVPB 10 milliEquivalent(s) IV Intermittent every 1 hour  sodium chloride 0.9% with potassium chloride 20 mEq/L 1000 milliLiter(s) (100 mL/Hr) IV Continuous <Continuous>  thiamine 100 milliGRAM(s) Oral daily    MEDICATIONS  (PRN):  LORazepam     Tablet 2 milliGRAM(s) Oral every 2 hours PRN Symptom-triggered 2 point increase in CIWA-Ar  LORazepam     Tablet 2 milliGRAM(s) Oral every 1 hour PRN Symptom-triggered: each CIWA -Ar score 8 or GREATER      LABS: All Labs Reviewed:                        9.4    6.89  )-----------( 209      ( 27 Jul 2018 10:24 )             27.6     07-27    138  |  96  |  7   ----------------------------<  102<H>  2.8<LL>   |  25  |  0.39<L>    Ca    7.9<L>      27 Jul 2018 10:24  Phos  2.6     07-27  Mg     1.4     07-27    TPro  6.4  /  Alb  2.5<L>  /  TBili  2.2<H>  /  DBili  1.5<H>  /  AST  60<H>  /  ALT  31  /  AlkPhos  131<H>  07-27    PT/INR - ( 27 Jul 2018 10:24 )   PT: 17.0 sec;   INR: 1.56 ratio         PTT - ( 26 Jul 2018 18:02 )  PTT:28.6 sec    Urine Microscopic-Add On (NC) (07.26.18 @ 22:37)    Bacteria: Moderate    Epithelial Cells: Few    Red Blood Cell - Urine: 3-5 /HPF    White Blood Cell - Urine: 11-25    Hyaline Casts: 0-2 /LPF    Ammonium= 27     RADIOLOGY/EKG:    IMPRESSION:    No acute intracranial hemorrhage, mass effect, or midline shift. No   interval change since head CT of 3/22/17      DVT PPX: Heparin subQ    ADVANCED DIRECTIVE:    DISPOSITION:

## 2018-07-27 NOTE — H&P ADULT - ASSESSMENT
53 yr old female w alcohol abuse hx, anemia, asthma/COPD, chronic renal insufficiency, GERD, GI bleed, Hepatitic steatosis who presented to  ED after unwitnessed fall and prior day of confusion    1) Altered mental status   s/p fall w unremarkable head CT  abn ua so maybe due to infection despite absence of fever or leukocytosis  1. serial exams  2. beyond the window for alcohol withdrawl sz  3. CIWA symptom triggered  4. trend CBC  5. follow cx  6. continue levaquin 500  7. trend temp  8.  TSH    2) Alcohol hx  1. check B12  2. folate  3. mag    3) GERD  continue protonix    4) transaminitis  due to hepatitic steatosis in past by MRCP   1. repeat LFTs  2. Pt/INR      5) appears cachectic  labs as above

## 2018-07-27 NOTE — PROGRESS NOTE ADULT - SUBJECTIVE AND OBJECTIVE BOX
Reason for Admission: altered mental status	  History of Present Illness: 	    Patient is a 54 y/o/f with alcohol abuse hx, anemia, asthma/COPD, chronic renal insufficiency, GERD, GI bleed, Hepatitic steatosis who presented to  ED after unwitnessed fall and prior day of confusion.  ED obtained info from significant other.  Last drink was .  Patient became confused 1 day prior to ED presentation.  Then boyfriend found her on the floor.  She has also slept a lot more in the past few days. In ED head CT neg, UDS neg except for THC,  neg alcohol.  UA abn so levaquin given. While in ED, pt was uncooperative w nursing.  Did not answer questions for me, just asked me the time and grunted.     : seen and eval. hemodynamically stable. Denies any HA, CP, SOB. Patient very deconditioned. patient's family tried to be reached over the phone, no answer. will discuss patient care with family.     REVIEW OF SYSTEMS:  poor historian.     Physical Exam:   GENERAL APPEARANCE:  frail, deconditioned, skinny  T(C): 36.4 (18 @ 11:09), Max: 37.6 (18 @ 17:30)  HR: 92 (18 @ 11:09) (76 - 109)  BP: 142/96 (18 @ 11:09) (134/99 - 149/96)  RR: 16 (18 @ 11:09) (16 - 21)  SpO2: 100% (18 @ 11:09) (100% - 100%)  HEENT: poor dentition, + vertical nystagmus  Skin:  dry, thin skin  NECK:  Supple without lymphadenopathy.   HEART:  Regular rate and rhythm. normal S1 and S2, No M/R/G  LUNGS:  Good ins/exp effort, no W/R/R/C  ABDOMEN:  Soft, nontender, nondistended with good bowel sounds heard  EXTREMITIES:  Without cyanosis, clubbing or edema.   NEUROLOGICAL:  Gross nonfocal, very tired and sleepy, poor historian due to tangental converstion       CBC Full  -  ( 2018 10:24 )  WBC Count : 6.89 K/uL  Hemoglobin : 9.4 g/dL  Hematocrit : 27.6 %  Platelet Count - Automated : 209 K/uL    PT/INR - ( 2018 10:24 )   PT: 17.0 sec;   INR: 1.56 ratio       PTT - ( 2018 18:02 )  PTT:28.6 sec  Urinalysis Basic - ( 2018 22:37 )    Color: Vivienne / Appearance: Clear / S.010 / pH: x  Gluc: x / Ketone: Moderate  / Bili: Large / Urobili: 12 mg/dL   Blood: x / Protein: 30 mg/dL / Nitrite: Positive   Leuk Esterase: Moderate / RBC: 3-5 /HPF / WBC 11-25   Sq Epi: x / Non Sq Epi: Few / Bacteria: Moderate    138  |  96  |  7   ----------------------------<  102<H>  2.8<LL>   |  25  |  0.39<L>    Ca    7.9<L>      2018 10:24  Phos  2.6       Mg     1.4         TPro  6.4  /  Alb  2.5<L>  /  TBili  2.2<H>  /  DBili  1.5<H>  /  AST  60<H>  /  ALT  31  /  AlkPhos  131<H>        # Altered mental status r/o CVA / seizures / UTI  - s/p fall w unremarkable head CT  - abn ua so maybe due to infection despite absence of fever or leukocytosis  - beyond the window for alcohol withdrawl gilbert  - PAKO symptom triggered  - continue with IV levaquin    # Alcohol hx  - check B12, folate levels    # GERD  - continue protonix    # transaminitis due to hepatitic steatosis in past by MRCP   - repeat LFTs  - Pt/INR    # hypokalemia secondary to poor appetite /  etoh use / IV hydration  - monitor closely after the repletion    # appears cachectic  labs as above Reason for Admission: altered mental status	    Patient is a 54 y/o/f with alcohol abuse hx, anemia, asthma/COPD, chronic renal insufficiency, GERD, GI bleed, Hepatitic steatosis who presented to  ED after unwitnessed fall and prior day of confusion.  ED obtained info from significant other.  Last drink was .  Patient became confused 1 day prior to ED presentation.  Then boyfriend found her on the floor.  She has also slept a lot more in the past few days. In ED head CT neg, UDS neg except for THC,  neg alcohol.  UA abn so levaquin given. While in ED, pt was uncooperative w nursing.  Did not answer questions for me, just asked me the time and grunted.     : seen and eval. hemodynamically stable. Denies any HA, CP, SOB. Patient very deconditioned. patient's family tried to be reached over the phone, no answer. Will discuss patient care with family.     Addendum: Patient's significant other has been reached. apperently patient drinks 3-4 beers daily and states in february patient had etoh withdrawal seizures.  suspects that she was more confused past 2 days and suspects it is etoh related.     REVIEW OF SYSTEMS:  poor historian.     Physical Exam:   GENERAL APPEARANCE:  frail, deconditioned, skinny  HEENT: poor dentition, + vertical nystagmus  Skin:  dry, thin skin  NECK:  Supple without lymphadenopathy.   HEART:  Regular rate and rhythm. normal S1 and S2, No M/R/G  LUNGS:  Good ins/exp effort, no W/R/R/C  ABDOMEN:  Soft, nontender, nondistended with good bowel sounds heard  EXTREMITIES:  Without cyanosis, clubbing or edema.   NEUROLOGICAL:  Gross nonfocal, very tired and sleepy, poor historian due to tangental converstion     Labs:     WBC Count : 6.89 K/uL  Hemoglobin : 9.4 g/dL  Hematocrit : 27.6 %  Platelet Count - Automated : 209 K/uL    PT/INR - ( 2018 10:24 )   PT: 17.0 sec;   INR: 1.56 ratio       PTT - ( 2018 18:02 )  PTT:28.6 sec  Urinalysis Basic - ( 2018 22:37 )    Color: Vivienne / Appearance: Clear / S.010 / pH: x  Gluc: x / Ketone: Moderate  / Bili: Large / Urobili: 12 mg/dL   Blood: x / Protein: 30 mg/dL / Nitrite: Positive   Leuk Esterase: Moderate / RBC: 3-5 /HPF / WBC 11-25   Sq Epi: x / Non Sq Epi: Few / Bacteria: Moderate    138  |  96  |  7   ----------------------------<  102<H>  2.8<LL>   |  25  |  0.39<L>    Ca    7.9<L>      2018 10:24  Phos  2.6       Mg     1.4         TPro  6.4  /  Alb  2.5<L>  /  TBili  2.2<H>  /  DBili  1.5<H>  /  AST  60<H>  /  ALT  31  /  AlkPhos  131<H>        # Altered mental status r/o CVA / seizures / UTI (infection)  - s/p fall w unremarkable head CT  - abn ua so maybe due to infection despite absence of fever or leukocytosis  - beyond the window for alcohol withdrawal gilbert MIN symptom triggered  - continue with IV Levaquin    # Alcohol hx  - check B12, folate levels    # GERD  - continue protonix    # transaminitis due to hepatitic steatosis in past by MRCP   - repeat LFTs  - Pt/INR    # hypokalemia secondary to poor appetite /  etoh use / IV hydration  - monitor closely after the repletion    # appears cachectic  - labs as above

## 2018-07-27 NOTE — H&P ADULT - HISTORY OF PRESENT ILLNESS
53 yr old female w alcohol abuse hx, anemia, asthma/COPD, chronic renal insufficiency, GERD, GI bleed, Hepatitic steatosis who presented to  ED after unwitnessed fall and prior day of confusion       ED obtained info from significant other.  Last drink was Sunday 07-22.  Patient became confused 1 day prior to ED presentation.  Then boyfriend found her on the floor .  She has also sleep a lot more in the past few days.    PAST MED HX  1) Alcohol abuse w hx withdrawl seizures; last drank 07-22  2) Anemia w hx blood transfusion  3) Asthma/COPD  4) Chronic renal insufficiency  5) GERD  6) GI bleed w neg EGD reported ; no hx colonoscopy  7) Nephrolithiasis  8) Tachycardia hx SVT  9) Transaminitis  due to hepatitic steatosis as per MRCP at [facility]    PAST SURG HX  Cystoscopy and nephroscopy performed w removal of L renal stone  w Dr. Forrest  Hysterectomy  Csection      FAM HX  unable to obtain 53 yr old female w alcohol abuse hx, anemia, asthma/COPD, chronic renal insufficiency, GERD, GI bleed, Hepatitic steatosis who presented to  ED after unwitnessed fall and prior day of confusion       ED obtained info from significant other.  Last drink was .  Patient became confused 1 day prior to ED presentation.  Then boyfriend found her on the floor.  She has also slept a lot more in the past few days.       In ED head CT neg, UDS neg except for THC,  neg alcohol.  UA abn so levaquin given  While in ED, pt was uncooperative w nursing.  Did not answer questions for me, just asked me the time and grunted    PAST MED HX  1) Alcohol abuse w hx withdrawl seizures; last drank   2) Anemia w hx blood transfusion  3) Asthma/COPD  4) Chronic renal insufficiency  5) GERD  6) GI bleed w neg EGD reported ; no hx colonoscopy  7) Nephrolithiasis  8) Tachycardia hx SVT  9) Transaminitis  due to hepatitic steatosis as per MRCP at [facility]    PAST SURG HX  Cystoscopy and laser lithotripsy performed w removal of L renal stone w stent  w Dr. Forrest  Hysterectomy          FAM HX  unable to obtain due to AMS

## 2018-07-27 NOTE — PROGRESS NOTE ADULT - PROBLEM SELECTOR PLAN 1
-UA pos- LE and Nitrites positive, with ketones, moderate bacteria  -Continue Levaquin 500 mg PO Q24h for UTI  -Ammonia= 27  -F/u Neuro - r/o CVA, seizure   - CIWA protocol  -Continue IVF @ 100cc/hr   -f/u folate, B12, TSH in am -UA pos- LE and Nitrites positive, with ketones, moderate bacteria  -Continue Levaquin 500 mg PO Q24h for UTI  -Ammonia= 27  -F/u Neuro - r/o CVA, seizure   - CIWA protocol, monitor vital signs and symptoms   -Continue IVF @ 100cc/hr   -f/u folate, B12, TSH in am

## 2018-07-27 NOTE — H&P ADULT - NSHPLABSRESULTS_GEN_ALL_CORE
CT Head No Cont: EXAM:  CT BRAIN                        	  	PROCEDURE DATE:  07/26/2018    	  	INTERPRETATION:  CLINICAL INDICATION:  Altered mental status, recent   	falls, nausea/vomiting/diarrhea, poor p.o. intake.   	  	TECHNIQUE : Axial CT scanning of the brain was obtained from the skull   	base to the vertex without the administration of intravenous contrast.   	Coronal and sagittal reformatted images were subsequently obtained.   	  	COMPARISON: Head CT dated 3/22/2017 and 11/13/2016.  	  	FINDINGS:    	  	There is no loss of the gray-white matter distinction to indicate acute   	territorial infarct. No acute intracranial hemorrhage.  	There is no hydrocephalus, mass effect or midline shift.                 No extra-axial collection.     	The visualized orbits are unremarkable.  	The calvarium is intact.  	The visualized paranasal sinuses and mastoid air cells are clear.  	  	IMPRESSION:  	  	No acute intracranial hemorrhage, mass effect, or midline shift. No   	interval change since head CT of 3/22/17.  	  	  	  	  	  	  Non-Obstetrical Ultrasounds:    26-Jul-2018 20:19, US Abdomen Limited  · US Abdomen Limited: EXAM:  US ABDOMEN LIMITED                        	  	PROCEDURE DATE:  07/26/2018    	  	INTERPRETATION:  Exam Date: July 26, 2018    Ultrasound of the right upper abdomen       	  	CLINICAL INFORMATION:  Elevated LFTs. EtOH. History of gallstones. Rule   	out cholecystitis   	  	TECHNIQUE: Sonography of the right upper quadrant was performed.  	  	COMPARISON: Right upper abdominal ultrasound dated 9/15/2017 is available   	for review.  	  	FINDINGS:      	  	The liver demonstrates homogeneous hyperechogenicity without focal   	lesion, consistent with hepatic steatosis, as visualized on right upper   	quadrant ultrasound of 9/15/17.  Hepatic size is within normal limits   	measuring 15.5cm. The hepatic contours are maintained.  The main hepatic   	and portal veins appear patent.  There is normal hepatopetal flow of the   	portal vein.  No intrahepatic or ductal dilatation is found.   The common   	duct is not dilated, measuring 0.6 cm.  The gallbladder is intact with   	re demonstration of multiple shadowing calculi.  There is no gallbladder   	wall thickening. No pericholecystic fluid. No tenderness was elicited   	with direct compression by the transducer (no sonographic Alexander's sign   	identified).  The pancreas appears intact, allowing for the limited   	evaluation by the ultrasound technique.    	  	The right kidney measures 4.1 cm in length.  It demonstrates no mass,   	calculus or hydronephrosis.    	  	The abdominal aorta measures normal caliber at 1.9 cm in maximum anterior   	to posterior dimensions.    	  	The retroperitoneal structures and IVC appear intact.  	  	  	IMPRESSION:     	  	No sonographic evidence for acute cholecystitis.  	  	Cholelithiasis.  	  	Chronic hepatic steatosis.

## 2018-07-27 NOTE — H&P ADULT - NSHPPHYSICALEXAM_GEN_ALL_CORE
Vital Signs Last 24 Hrs  T(C): 36.7 (27 Jul 2018 07:00), Max: 37.6 (26 Jul 2018 17:30)  T(F): 98.1 (27 Jul 2018 07:00), Max: 99.7 (26 Jul 2018 17:30)  HR: 95 (27 Jul 2018 07:00) (94 - 109)  BP: 138/91 (27 Jul 2018 07:00) (134/99 - 149/96)  BP(mean): --  RR: 18 (27 Jul 2018 07:00) (16 - 21)  SpO2: 100% (27 Jul 2018 07:00) (100% - 100%)

## 2018-07-28 LAB
ALBUMIN SERPL ELPH-MCNC: 2.4 G/DL — LOW (ref 3.3–5)
ALP SERPL-CCNC: 129 U/L — HIGH (ref 40–120)
ALT FLD-CCNC: 30 U/L — SIGNIFICANT CHANGE UP (ref 12–78)
ANION GAP SERPL CALC-SCNC: 18 MMOL/L — HIGH (ref 5–17)
AST SERPL-CCNC: 64 U/L — HIGH (ref 15–37)
BILIRUB DIRECT SERPL-MCNC: 1.1 MG/DL — HIGH (ref 0–0.2)
BILIRUB INDIRECT FLD-MCNC: 0.8 MG/DL — SIGNIFICANT CHANGE UP (ref 0.2–1)
BILIRUB SERPL-MCNC: 1.9 MG/DL — HIGH (ref 0.2–1.2)
BUN SERPL-MCNC: 9 MG/DL — SIGNIFICANT CHANGE UP (ref 7–23)
CALCIUM SERPL-MCNC: 8.2 MG/DL — LOW (ref 8.5–10.1)
CHLORIDE SERPL-SCNC: 106 MMOL/L — SIGNIFICANT CHANGE UP (ref 96–108)
CO2 SERPL-SCNC: 15 MMOL/L — LOW (ref 22–31)
CREAT SERPL-MCNC: 0.56 MG/DL — SIGNIFICANT CHANGE UP (ref 0.5–1.3)
FOLATE SERPL-MCNC: 3.8 NG/ML — LOW
FOLATE SERPL-MCNC: 5.9 NG/ML — SIGNIFICANT CHANGE UP
GLUCOSE SERPL-MCNC: 113 MG/DL — HIGH (ref 70–99)
HCT VFR BLD CALC: 25.4 % — LOW (ref 34.5–45)
HGB BLD-MCNC: 8.6 G/DL — LOW (ref 11.5–15.5)
MAGNESIUM SERPL-MCNC: 1.6 MG/DL — SIGNIFICANT CHANGE UP (ref 1.6–2.6)
MCHC RBC-ENTMCNC: 33.7 PG — SIGNIFICANT CHANGE UP (ref 27–34)
MCHC RBC-ENTMCNC: 33.9 GM/DL — SIGNIFICANT CHANGE UP (ref 32–36)
MCV RBC AUTO: 99.6 FL — SIGNIFICANT CHANGE UP (ref 80–100)
NRBC # BLD: 0 /100 WBCS — SIGNIFICANT CHANGE UP (ref 0–0)
PLATELET # BLD AUTO: 213 K/UL — SIGNIFICANT CHANGE UP (ref 150–400)
POTASSIUM SERPL-MCNC: 5.1 MMOL/L — SIGNIFICANT CHANGE UP (ref 3.5–5.3)
POTASSIUM SERPL-SCNC: 5.1 MMOL/L — SIGNIFICANT CHANGE UP (ref 3.5–5.3)
PROT SERPL-MCNC: 6.3 GM/DL — SIGNIFICANT CHANGE UP (ref 6–8.3)
RBC # BLD: 2.55 M/UL — LOW (ref 3.8–5.2)
RBC # FLD: 14.6 % — HIGH (ref 10.3–14.5)
SODIUM SERPL-SCNC: 139 MMOL/L — SIGNIFICANT CHANGE UP (ref 135–145)
T3 SERPL-MCNC: 50 NG/DL — LOW (ref 80–200)
T4 AB SER-ACNC: 5.7 UG/DL — SIGNIFICANT CHANGE UP (ref 4.6–12)
TSH SERPL-MCNC: 1.12 UU/ML — SIGNIFICANT CHANGE UP (ref 0.34–4.82)
VIT B12 SERPL-MCNC: 1520 PG/ML — HIGH (ref 232–1245)
VIT B12 SERPL-MCNC: 1541 PG/ML — HIGH (ref 232–1245)
WBC # BLD: 10.19 K/UL — SIGNIFICANT CHANGE UP (ref 3.8–10.5)
WBC # FLD AUTO: 10.19 K/UL — SIGNIFICANT CHANGE UP (ref 3.8–10.5)

## 2018-07-28 PROCEDURE — 93010 ELECTROCARDIOGRAM REPORT: CPT

## 2018-07-28 PROCEDURE — 99222 1ST HOSP IP/OBS MODERATE 55: CPT

## 2018-07-28 RX ORDER — THIAMINE MONONITRATE (VIT B1) 100 MG
200 TABLET ORAL DAILY
Qty: 0 | Refills: 0 | Status: DISCONTINUED | OUTPATIENT
Start: 2018-07-28 | End: 2018-07-28

## 2018-07-28 RX ORDER — THIAMINE MONONITRATE (VIT B1) 100 MG
500 TABLET ORAL ONCE
Qty: 0 | Refills: 0 | Status: COMPLETED | OUTPATIENT
Start: 2018-07-28 | End: 2018-07-28

## 2018-07-28 RX ORDER — THIAMINE MONONITRATE (VIT B1) 100 MG
200 TABLET ORAL
Qty: 0 | Refills: 0 | Status: DISCONTINUED | OUTPATIENT
Start: 2018-07-29 | End: 2018-07-29

## 2018-07-28 RX ORDER — ONDANSETRON 8 MG/1
4 TABLET, FILM COATED ORAL EVERY 8 HOURS
Qty: 0 | Refills: 0 | Status: DISCONTINUED | OUTPATIENT
Start: 2018-07-28 | End: 2018-08-06

## 2018-07-28 RX ORDER — SODIUM CHLORIDE 9 MG/ML
1000 INJECTION INTRAMUSCULAR; INTRAVENOUS; SUBCUTANEOUS
Qty: 0 | Refills: 0 | Status: DISCONTINUED | OUTPATIENT
Start: 2018-07-28 | End: 2018-07-30

## 2018-07-28 RX ADMIN — HEPARIN SODIUM 5000 UNIT(S): 5000 INJECTION INTRAVENOUS; SUBCUTANEOUS at 13:30

## 2018-07-28 RX ADMIN — BUDESONIDE AND FORMOTEROL FUMARATE DIHYDRATE 2 PUFF(S): 160; 4.5 AEROSOL RESPIRATORY (INHALATION) at 10:45

## 2018-07-28 RX ADMIN — HEPARIN SODIUM 5000 UNIT(S): 5000 INJECTION INTRAVENOUS; SUBCUTANEOUS at 05:28

## 2018-07-28 RX ADMIN — Medication 1 MILLIGRAM(S): at 12:06

## 2018-07-28 RX ADMIN — Medication 255 MILLIGRAM(S): at 13:30

## 2018-07-28 RX ADMIN — BUDESONIDE AND FORMOTEROL FUMARATE DIHYDRATE 2 PUFF(S): 160; 4.5 AEROSOL RESPIRATORY (INHALATION) at 18:32

## 2018-07-28 RX ADMIN — Medication 1 TABLET(S): at 12:06

## 2018-07-28 RX ADMIN — PANTOPRAZOLE SODIUM 40 MILLIGRAM(S): 20 TABLET, DELAYED RELEASE ORAL at 05:29

## 2018-07-28 RX ADMIN — DEXTROSE MONOHYDRATE, SODIUM CHLORIDE, AND POTASSIUM CHLORIDE 100 MILLILITER(S): 50; .745; 4.5 INJECTION, SOLUTION INTRAVENOUS at 05:28

## 2018-07-28 RX ADMIN — Medication 81 MILLIGRAM(S): at 12:06

## 2018-07-28 RX ADMIN — Medication 120 MILLIGRAM(S): at 05:28

## 2018-07-28 RX ADMIN — HEPARIN SODIUM 5000 UNIT(S): 5000 INJECTION INTRAVENOUS; SUBCUTANEOUS at 21:21

## 2018-07-28 RX ADMIN — SODIUM CHLORIDE 100 MILLILITER(S): 9 INJECTION INTRAMUSCULAR; INTRAVENOUS; SUBCUTANEOUS at 14:21

## 2018-07-28 RX ADMIN — Medication 100 MILLIGRAM(S): at 12:06

## 2018-07-28 NOTE — PROGRESS NOTE ADULT - SUBJECTIVE AND OBJECTIVE BOX
Reason for Admission: altered mental status	    Patient is a 54 y/o/f with alcohol abuse hx, anemia, asthma/COPD, chronic renal insufficiency, GERD, GI bleed, Hepatitic steatosis who presented to  ED after unwitnessed fall and prior day of confusion.  ED obtained info from significant other.  Last drink was .  Patient became confused 1 day prior to ED presentation.  Then boyfriend found her on the floor.  She has also slept a lot more in the past few days. In ED head CT neg, UDS neg except for THC,  neg alcohol.  UA abn so levaquin given. While in ED, pt was uncooperative w nursing.  Did not answer questions for me, just asked me the time and grunted.     : seen and eval. hemodynamically stable. Denies any HA, CP, SOB. Patient very deconditioned. patient's family tried to be reached over the phone, no answer. Will discuss patient care with family.     Addendum: Patient's significant other has been reached. apperently patient drinks 3-4 beers daily and states in february patient had etoh withdrawal seizures.  suspects that she was more confused past 2 days and suspects it is etoh related.     : seen and eval. currently getting EEG. pending MRIs. Care discussed with neuro /  family.     REVIEW OF SYSTEMS:  poor historian.     Physical Exam:   GENERAL APPEARANCE:  frail, deconditioned, skinny  HEENT: poor dentition, + vertical nystagmus  Skin:  dry, thin skin  NECK:  Supple without lymphadenopathy.   HEART:  Regular rate and rhythm. normal S1 and S2, No M/R/G  LUNGS:  Good ins/exp effort, no W/R/R/C  ABDOMEN:  Soft, nontender, nondistended with good bowel sounds heard  EXTREMITIES:  Without cyanosis, clubbing or edema.   NEUROLOGICAL:  Gross nonfocal, very tired and sleepy, poor historian due to tangental converstion / confused. Ataxia     Labs:       CBC Full  -  ( 2018 06:11 )  WBC Count : 10.19 K/uL  Hemoglobin : 8.6 g/dL  Hematocrit : 25.4 %  Platelet Count - Automated : 213 K/uL    PT/INR - ( 2018 10:24 )   PT: 17.0 sec;   INR: 1.56 ratio         PTT - ( 2018 18:02 )  PTT:28.6 sec  Urinalysis Basic - ( 2018 22:37 )    Color: Vivienne / Appearance: Clear / S.010 / pH: x  Gluc: x / Ketone: Moderate  / Bili: Large / Urobili: 12 mg/dL   Blood: x / Protein: 30 mg/dL / Nitrite: Positive   Leuk Esterase: Moderate / RBC: 3-5 /HPF / WBC 11-25   Sq Epi: x / Non Sq Epi: Few / Bacteria: Moderate    139  |  106  |  9   ----------------------------<  113<H>  5.1   |  15<L>  |  0.56    Ca    8.2<L>      2018 06:11  Phos  2.6       Mg     1.6         TPro  6.3  /  Alb  2.4<L>  /  TBili  1.9<H>  /  DBili  1.1<H>  /  AST  64<H>  /  ALT  30  /  AlkPhos  129<H>        # Altered mental status r/o CVA / seizures / UTI (infection)  - s/p fall w unremarkable head CT  - abn ua so maybe due to infection despite absence of fever or leukocytosis  - beyond the window for alcohol withdrawal sz  - CIWA symptom triggered  - continue with IV Levaquin    # Alcohol hx  - check B12, folate levels    # GERD  - continue protonix    # transaminitis due to hepatitic steatosis in past by MRCP   - repeat LFTs  - Pt/INR    # hypokalemia secondary to poor appetite /  etoh use / IV hydration  - monitor closely after the repletion    # appears cachectic  - labs as above

## 2018-07-28 NOTE — PROGRESS NOTE ADULT - PROBLEM SELECTOR PLAN 1
-UA pos- LE and Nitrites positive, with ketones, moderate bacteria  -Continue Levaquin 500 mg PO Q24h for UTI  -Ammonia= 27  -F/u Neuro and EEG - r/o seizure   - CIWA protocol, monitor vital signs and symptoms   -Continue IVF @ 100cc/hr  -F/u MRI brain non cont

## 2018-07-28 NOTE — PROGRESS NOTE ADULT - SUBJECTIVE AND OBJECTIVE BOX
HPI:  53 yr old female w alcohol abuse hx, anemia, asthma/COPD, chronic renal insufficiency, GERD, GI bleed, Hepatitic steatosis who presented to  ED after unwitnessed fall and prior day of confusion. ED obtained info from significant other.  Last drink was Sunday 07-22.  Patient became confused 1 day prior to ED presentation.  Then boyfriend found her on the floor.  She has also slept a lot more in the past few days. In ED head CT neg, UDS neg except for THC,  neg alcohol.  UA abn so levaquin given. While in ED, pt was uncooperative w nursing.  Did not answer questions for me, just asked me the time and grunted    SUBJECTIVE:     7/28/18-  Patient seen today with medical team. Still not oriented to surroundings. As per nurse, pt complaining of nausea, Zofran was ordered. Pt did not state any complaints today. Case discussed with Dr. Mujica. Plan for MRI brain and EEG, and give 500 mg Thiamine IV. Aspirin was discontinued as this is unlikely a stroke.     REVIEW OF SYSTEMS:    CONSTITUTIONAL: No weakness, fevers or chills  EYES/ENT: No visual changes;  No vertigo or throat pain   NECK: No pain or stiffness  RESPIRATORY: No cough, wheezing, hemoptysis; No shortness of breath  CARDIOVASCULAR: No chest pain or palpitations  GASTROINTESTINAL: No abdominal or epigastric pain. No nausea, vomiting, or hematemesis; No diarrhea or constipation. No melena or hematochezia.  GENITOURINARY: No dysuria, frequency or hematuria  NEUROLOGICAL: No numbness or weakness  SKIN: No itching, burning, rashes, or lesions   All other review of systems is negative unless indicated above    Vital Signs Last 24 Hrs  T(C): 37.1 (28 Jul 2018 13:17), Max: 37.1 (28 Jul 2018 13:17)  T(F): 98.8 (28 Jul 2018 13:17), Max: 98.8 (28 Jul 2018 13:17)  HR: 89 (28 Jul 2018 13:17) (88 - 110)  BP: 117/90 (28 Jul 2018 13:17) (117/90 - 141/92)  BP(mean): --  RR: 16 (28 Jul 2018 10:08) (16 - 18)  SpO2: 100% (28 Jul 2018 10:54) (100% - 100%)    I&O's Summary      CAPILLARY BLOOD GLUCOSE      PHYSICAL EXAM:    Constitutional: NAD, awake and alert, thin/cachetic appearing female, +not oriented to place or time  HEENT: pupils reactive to light, EOMI, +eyes unable to focus  Neck: Soft and supple, No LAD  Respiratory: Breath sounds are clear bilaterally, No wheezing, rales or rhonchi  Cardiovascular: S1 and S2, regular rate and rhythm, no Murmurs, gallops or rubs  Gastrointestinal: Bowel Sounds present, soft, nontender, nondistended, no guarding, no rebound  Extremities: No peripheral edema  Vascular: 2+ peripheral pulses  Neurological: Alert, +not oriented to place and time, cranial nerves intact   Musculoskeletal: 5/5 strength b/l upper and lower extremities  Skin: No rashes    MEDICATIONS  (STANDING):  buDESOnide 160 MICROgram(s)/formoterol 4.5 MICROgram(s) Inhaler 2 Puff(s) Inhalation two times a day  diltiazem    milliGRAM(s) Oral daily  folic acid 1 milliGRAM(s) Oral daily  heparin  Injectable 5000 Unit(s) SubCutaneous every 8 hours  levoFLOXacin  Tablet 500 milliGRAM(s) Oral every 24 hours  multivitamin 1 Tablet(s) Oral daily  pantoprazole    Tablet 40 milliGRAM(s) Oral before breakfast  sodium chloride 0.9%. 1000 milliLiter(s) (100 mL/Hr) IV Continuous <Continuous>  Thiamine 500 mg IV [id number]     MEDICATIONS  (PRN):  LORazepam     Tablet 2 milliGRAM(s) Oral every 2 hours PRN Symptom-triggered 2 point increase in CIWA-Ar  LORazepam     Tablet 2 milliGRAM(s) Oral every 1 hour PRN Symptom-triggered: each CIWA -Ar score 8 or GREATER  ondansetron    Tablet 4 milliGRAM(s) Oral every 8 hours PRN Nausea      LABS: All Labs Reviewed:             07-28    139  |  106  |  9   ----------------------------<  113<H>  5.1   |  15<L>  |  0.56    Ca    8.2<L>      28 Jul 2018 06:11  Phos  2.6     07-27  Mg     1.6     07-28    TPro  6.3  /  Alb  2.4<L>  /  TBili  1.9<H>  /  DBili  1.1<H>  /  AST  64<H>  /  ALT  30  /  AlkPhos  129<H>  07-28                            8.6    10.19 )-----------( 213      ( 28 Jul 2018 06:11 )             25.4       LIVER FUNCTIONS - ( 28 Jul 2018 06:11 )  Alb: 2.4 g/dL / Pro: 6.3 gm/dL / ALK PHOS: 129 U/L / ALT: 30 U/L / AST: 64 U/L / GGT: x              PT/INR - ( 27 Jul 2018 10:24 )   PT: 17.0 sec;   INR: 1.56 ratio        Urine Microscopic-Add On (NC) (07.26.18 @ 22:37)    Bacteria: Moderate    Epithelial Cells: Few    Red Blood Cell - Urine: 3-5 /HPF    White Blood Cell - Urine: 11-25    Hyaline Casts: 0-2 /LPF    Ammonium= 27     RADIOLOGY/EKG:    IMPRESSION:    No acute intracranial hemorrhage, mass effect, or midline shift. No   interval change since head CT of 3/22/17    DVT PPX: Heparin subQ    ADVANCED DIRECTIVE:    DISPOSITION:

## 2018-07-28 NOTE — PROGRESS NOTE ADULT - PROBLEM SELECTOR PLAN 2
- Continue CIWA protocol  -B12 elevated, folate low  -MV, s/p thiamine 500 mg IVPB, ordered thiamine 200 mg PO 2x/day (will start giving on 7/29), folate 1 mg PO QD  -Consider discriminant factor and if steroids to be given; DF=25  -Monitor LFTs , PT , INR  -f/u lytes, Mg, K,

## 2018-07-29 DIAGNOSIS — Z29.9 ENCOUNTER FOR PROPHYLACTIC MEASURES, UNSPECIFIED: ICD-10-CM

## 2018-07-29 DIAGNOSIS — D64.9 ANEMIA, UNSPECIFIED: ICD-10-CM

## 2018-07-29 DIAGNOSIS — F10.96 ALCOHOL USE, UNSPECIFIED WITH ALCOHOL-INDUCED PERSISTING AMNESTIC DISORDER: ICD-10-CM

## 2018-07-29 LAB
ALBUMIN SERPL ELPH-MCNC: 1.6 G/DL — LOW (ref 3.3–5)
ALP SERPL-CCNC: 114 U/L — SIGNIFICANT CHANGE UP (ref 40–120)
ALT FLD-CCNC: 26 U/L — SIGNIFICANT CHANGE UP (ref 12–78)
ANION GAP SERPL CALC-SCNC: 9 MMOL/L — SIGNIFICANT CHANGE UP (ref 5–17)
AST SERPL-CCNC: 47 U/L — HIGH (ref 15–37)
BILIRUB DIRECT SERPL-MCNC: 0.5 MG/DL — HIGH (ref 0–0.2)
BILIRUB INDIRECT FLD-MCNC: 0.8 MG/DL — SIGNIFICANT CHANGE UP (ref 0.2–1)
BILIRUB SERPL-MCNC: 1.3 MG/DL — HIGH (ref 0.2–1.2)
BUN SERPL-MCNC: 6 MG/DL — LOW (ref 7–23)
CALCIUM SERPL-MCNC: 8.1 MG/DL — LOW (ref 8.5–10.1)
CHLORIDE SERPL-SCNC: 113 MMOL/L — HIGH (ref 96–108)
CO2 SERPL-SCNC: 16 MMOL/L — LOW (ref 22–31)
CREAT SERPL-MCNC: 0.52 MG/DL — SIGNIFICANT CHANGE UP (ref 0.5–1.3)
GLUCOSE SERPL-MCNC: 89 MG/DL — SIGNIFICANT CHANGE UP (ref 70–99)
HCT VFR BLD CALC: 20.4 % — CRITICAL LOW (ref 34.5–45)
HCT VFR BLD CALC: 22.2 % — LOW (ref 34.5–45)
HCT VFR BLD CALC: 23.8 % — LOW (ref 34.5–45)
HGB BLD-MCNC: 6.9 G/DL — CRITICAL LOW (ref 11.5–15.5)
HGB BLD-MCNC: 7.4 G/DL — LOW (ref 11.5–15.5)
HGB BLD-MCNC: 7.9 G/DL — LOW (ref 11.5–15.5)
INR BLD: 1.51 RATIO — HIGH (ref 0.88–1.16)
MAGNESIUM SERPL-MCNC: 1.4 MG/DL — LOW (ref 1.6–2.6)
MCHC RBC-ENTMCNC: 33.2 GM/DL — SIGNIFICANT CHANGE UP (ref 32–36)
MCHC RBC-ENTMCNC: 34.3 PG — HIGH (ref 27–34)
MCV RBC AUTO: 103.5 FL — HIGH (ref 80–100)
NRBC # BLD: 0 /100 WBCS — SIGNIFICANT CHANGE UP (ref 0–0)
PHOSPHATE SERPL-MCNC: 1.4 MG/DL — LOW (ref 2.5–4.5)
PLATELET # BLD AUTO: 169 K/UL — SIGNIFICANT CHANGE UP (ref 150–400)
POTASSIUM SERPL-MCNC: 4 MMOL/L — SIGNIFICANT CHANGE UP (ref 3.5–5.3)
POTASSIUM SERPL-SCNC: 4 MMOL/L — SIGNIFICANT CHANGE UP (ref 3.5–5.3)
PROT SERPL-MCNC: 5.7 GM/DL — LOW (ref 6–8.3)
PROTHROM AB SERPL-ACNC: 16.4 SEC — HIGH (ref 9.8–12.7)
RBC # BLD: 2.3 M/UL — LOW (ref 3.8–5.2)
RBC # FLD: 15.1 % — HIGH (ref 10.3–14.5)
SODIUM SERPL-SCNC: 138 MMOL/L — SIGNIFICANT CHANGE UP (ref 135–145)
WBC # BLD: 10.14 K/UL — SIGNIFICANT CHANGE UP (ref 3.8–10.5)
WBC # FLD AUTO: 10.14 K/UL — SIGNIFICANT CHANGE UP (ref 3.8–10.5)

## 2018-07-29 PROCEDURE — 70551 MRI BRAIN STEM W/O DYE: CPT | Mod: 26

## 2018-07-29 PROCEDURE — 95951: CPT | Mod: 26

## 2018-07-29 RX ORDER — MAGNESIUM OXIDE 400 MG ORAL TABLET 241.3 MG
400 TABLET ORAL
Qty: 0 | Refills: 0 | Status: COMPLETED | OUTPATIENT
Start: 2018-07-29 | End: 2018-07-30

## 2018-07-29 RX ORDER — THIAMINE MONONITRATE (VIT B1) 100 MG
200 TABLET ORAL THREE TIMES A DAY
Qty: 0 | Refills: 0 | Status: DISCONTINUED | OUTPATIENT
Start: 2018-07-29 | End: 2018-07-30

## 2018-07-29 RX ORDER — SODIUM,POTASSIUM PHOSPHATES 278-250MG
1 POWDER IN PACKET (EA) ORAL
Qty: 0 | Refills: 0 | Status: COMPLETED | OUTPATIENT
Start: 2018-07-29 | End: 2018-07-29

## 2018-07-29 RX ADMIN — Medication 204 MILLIGRAM(S): at 14:25

## 2018-07-29 RX ADMIN — SODIUM CHLORIDE 100 MILLILITER(S): 9 INJECTION INTRAMUSCULAR; INTRAVENOUS; SUBCUTANEOUS at 00:30

## 2018-07-29 RX ADMIN — Medication 204 MILLIGRAM(S): at 21:20

## 2018-07-29 RX ADMIN — Medication 1 MILLIGRAM(S): at 14:27

## 2018-07-29 RX ADMIN — PANTOPRAZOLE SODIUM 40 MILLIGRAM(S): 20 TABLET, DELAYED RELEASE ORAL at 05:29

## 2018-07-29 RX ADMIN — HEPARIN SODIUM 5000 UNIT(S): 5000 INJECTION INTRAVENOUS; SUBCUTANEOUS at 05:29

## 2018-07-29 RX ADMIN — Medication 1 PACKET(S): at 10:18

## 2018-07-29 RX ADMIN — SODIUM CHLORIDE 100 MILLILITER(S): 9 INJECTION INTRAMUSCULAR; INTRAVENOUS; SUBCUTANEOUS at 10:18

## 2018-07-29 RX ADMIN — BUDESONIDE AND FORMOTEROL FUMARATE DIHYDRATE 2 PUFF(S): 160; 4.5 AEROSOL RESPIRATORY (INHALATION) at 20:31

## 2018-07-29 RX ADMIN — HEPARIN SODIUM 5000 UNIT(S): 5000 INJECTION INTRAVENOUS; SUBCUTANEOUS at 14:27

## 2018-07-29 RX ADMIN — MAGNESIUM OXIDE 400 MG ORAL TABLET 400 MILLIGRAM(S): 241.3 TABLET ORAL at 10:18

## 2018-07-29 RX ADMIN — SODIUM CHLORIDE 100 MILLILITER(S): 9 INJECTION INTRAMUSCULAR; INTRAVENOUS; SUBCUTANEOUS at 21:19

## 2018-07-29 RX ADMIN — MAGNESIUM OXIDE 400 MG ORAL TABLET 400 MILLIGRAM(S): 241.3 TABLET ORAL at 17:54

## 2018-07-29 RX ADMIN — Medication 1 PACKET(S): at 21:20

## 2018-07-29 RX ADMIN — Medication 1 TABLET(S): at 14:27

## 2018-07-29 NOTE — PROGRESS NOTE ADULT - SUBJECTIVE AND OBJECTIVE BOX
Reason for Admission: altered mental status	    Patient is a 52 y/o/f with alcohol abuse hx, anemia, asthma/COPD, chronic renal insufficiency, GERD, GI bleed, Hepatitic steatosis who presented to  ED after unwitnessed fall and prior day of confusion.  ED obtained info from significant other.  Last drink was Sunday 07-22.  Patient became confused 1 day prior to ED presentation.  Then boyfriend found her on the floor.  She has also slept a lot more in the past few days. In ED head CT neg, UDS neg except for THC,  neg alcohol.  UA abn so levaquin given. While in ED, pt was uncooperative w nursing.  Did not answer questions for me, just asked me the time and grunted.     7/27: seen and eval. hemodynamically stable. Denies any HA, CP, SOB. Patient very deconditioned. patient's family tried to be reached over the phone, no answer. Will discuss patient care with family.     Addendum: Patient's significant other has been reached. apperently patient drinks 3-4 beers daily and states in february patient had etoh withdrawal seizures.  suspects that she was more confused past 2 days and suspects it is etoh related.     7/28: seen and eval. currently getting EEG. pending MRIs. Care discussed with neuro /  family.   7/29: seen and eval. hemodynamically stable. Denies any HA, CP, SOB - but it is important to know that patient is a poor historian and very forgetful. Just completed EEG. Patient very confused, confabulating, trouble coordinating finger / nose / finger test.      REVIEW OF SYSTEMS:  poor historian.     Physical Exam:   GENERAL APPEARANCE:  frail, deconditioned, skinny  HEENT: poor dentition, + vertical nystagmus  Skin:  dry, thin skin  NECK:  Supple without lymphadenopathy.   HEART:  Regular rate and rhythm. normal S1 and S2, No M/R/G  LUNGS:  Good ins/exp effort, no W/R/R/C  ABDOMEN:  Soft, nontender, nondistended with good bowel sounds heard  EXTREMITIES:  Without cyanosis, clubbing or edema.   NEUROLOGICAL:  Gross nonfocal, very tired and sleepy, poor historian due to tangental converstion / confused. Ataxia. Dysmetria     Labs:       CBC Full  -  ( 29 Jul 2018 05:46 )  WBC Count : 10.14 K/uL  Hemoglobin : 7.9 g/dL  Hematocrit : 23.8 %  Platelet Count - Automated : 169 K/uL    PT/INR - ( 29 Jul 2018 05:46 )   PT: 16.4 sec;   INR: 1.51 ratio          138  |  113<H>  |  6<L>  ----------------------------<  89  4.0   |  16<L>  |  0.52    Ca    8.1<L>      29 Jul 2018 05:46  Phos  1.4     07-29  Mg     1.4     07-29    TPro  5.7<L>  /  Alb  1.6<L>  /  TBili  1.3<H>  /  DBili  0.5<H>  /  AST  47<H>  /  ALT  26  /  AlkPhos  114  07-29        # Altered mental status r/o CVA / seizures / UTI (infection)  - s/p fall w unremarkable head CT  - pending MRI  - abn ua so maybe due to infection despite absence of fever or leukocytosis  - beyond the window for alcohol withdrawal gilbert MIN symptom triggered  - continue with IV Levaquin    # Alcohol hx  - check B12, folate levels  - patient received thiamine 500 mg IV x 1 yesterday, now 200 mg po TID (total 5 days)    # GERD  - continue protonix    # transaminitis due to hepatitic steatosis in past by MRCP   - repeat LFTs  - Pt/INR    # hypokalemia secondary to poor appetite /  etoh use / IV hydration  - monitor closely after the repletion    # appears cachectic  - labs as above Reason for Admission: altered mental status	    Patient is a 52 y/o/f with alcohol abuse hx, anemia, asthma/COPD, chronic renal insufficiency, GERD, GI bleed, Hepatitic steatosis who presented to  ED after unwitnessed fall and prior day of confusion.  ED obtained info from significant other.  Last drink was Sunday 07-22.  Patient became confused 1 day prior to ED presentation.  Then boyfriend found her on the floor.  She has also slept a lot more in the past few days. In ED head CT neg, UDS neg except for THC,  neg alcohol.  UA abn so levaquin given. While in ED, pt was uncooperative w nursing.  Did not answer questions for me, just asked me the time and grunted.     7/27: seen and eval. hemodynamically stable. Denies any HA, CP, SOB. Patient very deconditioned. patient's family tried to be reached over the phone, no answer. Will discuss patient care with family.     Addendum: Patient's significant other has been reached. apperently patient drinks 3-4 beers daily and states in february patient had etoh withdrawal seizures.  suspects that she was more confused past 2 days and suspects it is etoh related.     7/28: seen and eval. currently getting EEG. pending MRIs. Care discussed with neuro /  family.   7/29: seen and eval. hemodynamically stable. Denies any HA, CP, SOB - but it is important to know that patient is a poor historian and very forgetful. Just completed EEG. Patient very confused, confabulating, trouble coordinating finger / nose / finger test.      REVIEW OF SYSTEMS:  poor historian.     Physical Exam:   GENERAL APPEARANCE:  frail, deconditioned, skinny  HEENT: poor dentition, + vertical nystagmus  Skin:  dry, thin skin  NECK:  Supple without lymphadenopathy.   HEART:  Regular rate and rhythm. normal S1 and S2, No M/R/G  LUNGS:  Good ins/exp effort, no W/R/R/C  ABDOMEN:  Soft, nontender, nondistended with good bowel sounds heard  EXTREMITIES:  Without cyanosis, clubbing or edema.   NEUROLOGICAL:  Gross nonfocal, very tired and sleepy, poor historian due to tangental converstion / confused. Ataxia. Dysmetria     Labs:       CBC Full  -  ( 29 Jul 2018 05:46 )  WBC Count : 10.14 K/uL  Hemoglobin : 7.9 g/dL  Hematocrit : 23.8 %  Platelet Count - Automated : 169 K/uL    PT/INR - ( 29 Jul 2018 05:46 )   PT: 16.4 sec;   INR: 1.51 ratio          138  |  113<H>  |  6<L>  ----------------------------<  89  4.0   |  16<L>  |  0.52    Ca    8.1<L>      29 Jul 2018 05:46  Phos  1.4     07-29  Mg     1.4     07-29    TPro  5.7<L>  /  Alb  1.6<L>  /  TBili  1.3<H>  /  DBili  0.5<H>  /  AST  47<H>  /  ALT  26  /  AlkPhos  114  07-29        # Altered mental status r/o CVA / seizures / UTI (infection) - ? [place] - Korsakoff syndrome  - s/p fall w unremarkable head CT  - pending MRI  - completed EEG - pending official read  - abn ua so maybe due to infection despite absence of fever or leukocytosis  - beyond the window for alcohol withdrawal sz  - CIWA symptom triggered  - continue with IV Levaquin    # Alcohol hx  - check B12, folate levels  - patient received thiamine 500 mg IV x 1 yesterday, now 200 mg po TID (total 5 days)    # GERD  - continue protonix    # transaminitis due to hepatitic steatosis in past by MRCP   - repeat LFTs  - Pt/INR    # hypokalemia secondary to poor appetite /  etoh use / IV hydration  - monitor closely after the repletion    # appears cachectic  - labs as above

## 2018-07-29 NOTE — PROGRESS NOTE ADULT - SUBJECTIVE AND OBJECTIVE BOX
KALEBJINLARRY  53y  Female      Patient is a 53y old  Female who presents with a chief complaint of altered mental status (27 Jul 2018 06:45)      INTERVAL HPI/OVERNIGHT EVENTS:  53 yr old female w alcohol abuse hx, anemia, asthma/COPD, chronic renal insufficiency, GERD, GI bleed, Hepatitic steatosis who presented to  ED after unwitnessed fall and prior day of confusion. ED obtained info from significant other.  Last drink was Sunday 07-22.  Patient became confused  1 day prior to ED presentation.  Then boyfriend found her on the floor.  She has also slept a lot more in the past few days. In ED head CT neg, UDS neg except for THC,  neg alcohol.  UA abn so levaquin given. While in ED, pt was uncooperative w nursing.    SUBJECTIVE:     7/28/18-  Patient seen today with medical team. Still not oriented to surroundings. As per nurse, pt complaining of nausea, Zofran was ordered. Pt did not state any complaints today. Case discussed with Dr. Mujica. Plan for MRI brain and EEG, and give 500 mg Thiamine IV. Aspirin was discontinued as this is unlikely a stroke.     7/29/18-   Pt seen in AM with medical team. She was still not oriented to place. Speech was very tangential during interview. Patient had no complaints overnight.     REVIEW OF SYSTEMS:  CONSTITUTIONAL: No fever, weight loss, or fatigue  EYES: No eye pain, visual disturbances, or discharge  ENMT:  No difficulty hearing, tinnitus, vertigo; No sinus or throat pain  NECK: No pain or stiffness  BREASTS: No pain, masses, or nipple discharge  RESPIRATORY: No cough, wheezing, chills or hemoptysis; No shortness of breath  CARDIOVASCULAR: No chest pain, palpitations, dizziness, or leg swelling  GASTROINTESTINAL: No abdominal or epigastric pain. No nausea, vomiting, or hematemesis; No diarrhea or constipation. No melena or hematochezia.  GENITOURINARY: No dysuria, frequency, hematuria, or incontinence  NEUROLOGICAL: No headaches, memory loss, loss of strength, numbness, or tremors  SKIN: No itching, burning, rashes, or lesions   LYMPH NODES: No enlarged glands  ENDOCRINE: No heat or cold intolerance; No hair loss  MUSCULOSKELETAL: No joint pain or swelling; No muscle, back, or extremity pain  PSYCHIATRIC: No depression, anxiety, mood swings, or difficulty sleeping  HEME/LYMPH: No easy bruising, or bleeding gums  ALLERY AND IMMUNOLOGIC: No hives or eczema    T(C): 36.5 (07-29-18 @ 14:25), Max: 37.1 (07-29-18 @ 10:05)  HR: 92 (07-29-18 @ 14:25) (87 - 97)  BP: 92/69 (07-29-18 @ 14:25) (92/61 - 112/78)  RR: 18 (07-29-18 @ 14:25) (18 - 18)  SpO2: 100% (07-29-18 @ 14:25) (99% - 100%)    --Vital Signs Last 24 Hrs  T(C): 36.5 (29 Jul 2018 14:25), Max: 37.1 (29 Jul 2018 10:05)  T(F): 97.7 (29 Jul 2018 14:25), Max: 98.7 (29 Jul 2018 10:05)  HR: 92 (29 Jul 2018 14:25) (87 - 97)  BP: 92/69 (29 Jul 2018 14:25) (92/61 - 112/78)  BP(mean): --  RR: 18 (29 Jul 2018 14:25) (18 - 18)  SpO2: 100% (29 Jul 2018 14:25) (99% - 100%)    PHYSICAL EXAM:  GENERAL: pt lying on hospital bed, thin, disheveled  HEAD:  Atraumatic, Normocephalic  EYES: + vertical nystagmus, PERRL,   ENMT:  Moist mucous membranes; + missing teeth  NECK: Supple, No JVD, Normal thyroid  NERVOUS SYSTEM: Oriented to person; tangential speech, +dysmetria seen, 4/5 strength in all extremities  CHEST/LUNG: Clear to percussion bilaterally; No rales, rhonchi, wheezing, or rubs  HEART: Regular rate and rhythm; No murmurs, rubs, or gallops  ABDOMEN: Soft, Nontender, Nondistended; Bowel sounds present  EXTREMITIES:  2+ Peripheral Pulses, no edema  LYMPH: No lymphadenopathy noted  SKIN: No rashes or lesions    Consultant(s) Notes Reviewed:  [x ] YES  [ ] NO  Care Discussed with Consultants/Other Providers [ x] YES  [ ] NO    LABS:                        7.9    10.14 )-----------( 169      ( 29 Jul 2018 05:46 )             23.8     07-29    138  |  113<H>  |  6<L>  ----------------------------<  89  4.0   |  16<L>  |  0.52    Ca    8.1<L>      29 Jul 2018 05:46  Phos  1.4     07-29  Mg     1.4     07-29    TPro  5.7<L>  /  Alb  1.6<L>  /  TBili  1.3<H>  /  DBili  0.5<H>  /  AST  47<H>  /  ALT  26  /  AlkPhos  114  07-29    PT/INR - ( 29 Jul 2018 05:46 )   PT: 16.4 sec;   INR: 1.51 ratio      RADIOLOGY & ADDITIONAL TESTS:    Imaging Personally Reviewed:  [ ] YES  [ ] NO KALEBJINLARRY  53y  Female      Patient is a 53y old  Female who presents with a chief complaint of altered mental status (2018 06:45)      INTERVAL HPI/OVERNIGHT EVENTS:  53 yr old female w alcohol abuse hx, anemia, asthma/COPD, chronic renal insufficiency, GERD, GI bleed, Hepatitic steatosis who presented to  ED after unwitnessed fall and prior day of confusion. ED obtained info from significant other.  Last drink was .  Patient became confused  1 day prior to ED presentation.  Then boyfriend found her on the floor.  She has also slept a lot more in the past few days. In ED head CT neg, UDS neg except for THC,  neg alcohol.  UA abn so levaquin given. While in ED, pt was uncooperative w nursing.    SUBJECTIVE:     18-  Patient seen today with medical team. Still not oriented to surroundings. As per nurse, pt complaining of nausea, Zofran was ordered. Pt did not state any complaints today. Case discussed with Dr. Mujica. Plan for MRI brain and EEG, and give 500 mg Thiamine IV. Aspirin was discontinued as this is unlikely a stroke.     18-   Pt seen in AM with medical team. She was still not oriented to place. Speech was very tangential during interview. Patient had no complaints overnight. Pt completed EEG this AM- EEG found to be normal.     REVIEW OF SYSTEMS: ** Pt was poor historian*  CONSTITUTIONAL: No fever, weight loss, or fatigue  EYES: No eye pain, visual disturbances, or discharge  ENMT:  No difficulty hearing, tinnitus, vertigo; No sinus or throat pain  NECK: No pain or stiffness  RESPIRATORY: No cough, wheezing, chills or hemoptysis; No shortness of breath  CARDIOVASCULAR: No chest pain, palpitations, dizziness, or leg swelling  GASTROINTESTINAL: No abdominal or epigastric pain. No nausea, vomiting, or hematemesis; No diarrhea or constipation. No melena or hematochezia.  GENITOURINARY: No dysuria, frequency, hematuria, or incontinence  NEUROLOGICAL: No headaches, memory loss, loss of strength, numbness, or tremors  SKIN: No itching, burning, rashes, or lesions   LYMPH NODES: No enlarged glands  ENDOCRINE: No heat or cold intolerance; No hair loss  MUSCULOSKELETAL: No joint pain or swelling; No muscle, back, or extremity pain  PSYCHIATRIC: No depression, anxiety, mood swings, or difficulty sleeping  HEME/LYMPH: No easy bruising, or bleeding gums  ALLERY AND IMMUNOLOGIC: No hives or eczema    T(C): 36.5 (18 @ 14:25), Max: 37.1 (18 @ 10:05)  HR: 92 (18 @ 14:25) (87 - 97)  BP: 92/69 (18 @ 14:25) (92/61 - 112/78)  RR: 18 (18 @ 14:25) (18 - 18)  SpO2: 100% (18 @ 14:25) (99% - 100%)    --Vital Signs Last 24 Hrs  T(C): 36.5 (2018 14:25), Max: 37.1 (2018 10:05)  T(F): 97.7 (2018 14:25), Max: 98.7 (2018 10:05)  HR: 92 (2018 14:25) (87 - 97)  BP: 92/69 (2018 14:25) (92/61 - 112/78)  BP(mean): --  RR: 18 (2018 14:25) (18 - 18)  SpO2: 100% (2018 14:25) (99% - 100%)    PHYSICAL EXAM:  GENERAL: pt lying on hospital bed, thin, disheveled  HEAD:  Atraumatic, Normocephalic  EYES: + vertical nystagmus, PERRL,   ENMT:  Moist mucous membranes; + missing teeth  NECK: Supple, No JVD, Normal thyroid  NERVOUS SYSTEM: Oriented to person; tangential speech, +dysmetria seen, 4/5 strength in all extremities  CHEST/LUNG: Clear to percussion bilaterally; No rales, rhonchi, wheezing, or rubs  HEART: Regular rate and rhythm; No murmurs, rubs, or gallops  ABDOMEN: Soft, Nontender, Nondistended; Bowel sounds present  EXTREMITIES:  2+ Peripheral Pulses, no edema  LYMPH: No lymphadenopathy noted  SKIN: No rashes or lesions    Consultant(s) Notes Reviewed:  [x ] YES  [ ] NO  Care Discussed with Consultants/Other Providers [ x] YES  [ ] NO    LABS:                        7.9    10.14 )-----------( 169      ( 2018 05:46 )             23.8         138  |  113<H>  |  6<L>  ----------------------------<  89  4.0   |  16<L>  |  0.52    Ca    8.1<L>      2018 05:46  Phos  1.4       Mg     1.4         TPro  5.7<L>  /  Alb  1.6<L>  /  TBili  1.3<H>  /  DBili  0.5<H>  /  AST  47<H>  /  ALT  26  /  AlkPhos  114      PT/INR - ( 2018 05:46 )   PT: 16.4 sec;   INR: 1.51 ratio      : Blood cx X 2: no growth    : urine cx: 50,000- 99,000 CFU/ ml     Video EE hours of Normal EEG seen     MRI Head w/o  Contrast:  Motion limited study. No evidence for intracranial mass, acute territorial infarct, acute intracranial hemorrhage, or midline shift. KALEBJINLARRY  53y  Female      Patient is a 53y old  Female who presents with a chief complaint of altered mental status (2018 06:45)      INTERVAL HPI/OVERNIGHT EVENTS:  53 yr old female w alcohol abuse hx, anemia, asthma/COPD, chronic renal insufficiency, GERD, GI bleed, Hepatitic steatosis who presented to  ED after unwitnessed fall and prior day of confusion. ED obtained info from significant other.  Last drink was .  Patient became confused  1 day prior to ED presentation.  Then boyfriend found her on the floor.  She has also slept a lot more in the past few days. In ED head CT neg, UDS neg except for THC,  neg alcohol.  UA abn so levaquin given. While in ED, pt was uncooperative w nursing.    SUBJECTIVE:     18-  Patient seen today with medical team. Still not oriented to surroundings. As per nurse, pt complaining of nausea, Zofran was ordered. Pt did not state any complaints today. Case discussed with Dr. Mujica. Plan for MRI brain and EEG, and give 500 mg Thiamine IV. Aspirin was discontinued as this is unlikely a stroke.     18-   Pt seen in AM with medical team. She was still not oriented to place. Speech was very tangential during interview. Patient had no complaints overnight. Pt completed EEG this AM- EEG found to be normal. This AM patient's phosphate and magnesium were repleted.      REVIEW OF SYSTEMS: ** Pt was poor historian*  CONSTITUTIONAL: No fever, weight loss, or fatigue  EYES: No eye pain, visual disturbances, or discharge  ENMT:  No difficulty hearing, tinnitus, vertigo; No sinus or throat pain  NECK: No pain or stiffness  RESPIRATORY: No cough, wheezing, chills or hemoptysis; No shortness of breath  CARDIOVASCULAR: No chest pain, palpitations, dizziness, or leg swelling  GASTROINTESTINAL: No abdominal or epigastric pain. No nausea, vomiting, or hematemesis; No diarrhea or constipation. No melena or hematochezia.  GENITOURINARY: No dysuria, frequency, hematuria, or incontinence  NEUROLOGICAL: No headaches, memory loss, loss of strength, numbness, or tremors  SKIN: No itching, burning, rashes, or lesions   LYMPH NODES: No enlarged glands  ENDOCRINE: No heat or cold intolerance; No hair loss  MUSCULOSKELETAL: No joint pain or swelling; No muscle, back, or extremity pain  PSYCHIATRIC: No depression, anxiety, mood swings, or difficulty sleeping  HEME/LYMPH: No easy bruising, or bleeding gums  ALLERY AND IMMUNOLOGIC: No hives or eczema    T(C): 36.5 (18 @ 14:25), Max: 37.1 (18 @ 10:05)  HR: 92 (18 @ 14:25) (87 - 97)  BP: 92/69 (18 @ 14:25) (92/61 - 112/78)  RR: 18 (18 @ 14:25) (18 - 18)  SpO2: 100% (18 @ 14:25) (99% - 100%)    --Vital Signs Last 24 Hrs  T(C): 36.5 (2018 14:25), Max: 37.1 (2018 10:05)  T(F): 97.7 (2018 14:25), Max: 98.7 (2018 10:05)  HR: 92 (2018 14:25) (87 - 97)  BP: 92/69 (2018 14:25) (92/61 - 112/78)  BP(mean): --  RR: 18 (2018 14:25) (18 - 18)  SpO2: 100% (2018 14:25) (99% - 100%)    PHYSICAL EXAM:  GENERAL: pt lying on hospital bed, thin, disheveled  HEAD:  Atraumatic, Normocephalic  EYES: + vertical nystagmus, PERRL,   ENMT:  Moist mucous membranes; + missing teeth  NECK: Supple, No JVD, Normal thyroid  NERVOUS SYSTEM: Oriented to person; tangential speech, +dysmetria seen, 4/5 strength in all extremities  CHEST/LUNG: Clear to percussion bilaterally; No rales, rhonchi, wheezing, or rubs  HEART: Regular rate and rhythm; No murmurs, rubs, or gallops  ABDOMEN: Soft, Nontender, Nondistended; Bowel sounds present  EXTREMITIES:  2+ Peripheral Pulses, no edema  LYMPH: No lymphadenopathy noted  SKIN: No rashes or lesions    Consultant(s) Notes Reviewed:  [x ] YES  [ ] NO  Care Discussed with Consultants/Other Providers [ x] YES  [ ] NO    LABS:                        7.9    10.14 )-----------( 169      ( 2018 05:46 )             23.8         138  |  113<H>  |  6<L>  ----------------------------<  89  4.0   |  16<L>  |  0.52    Ca    8.1<L>      2018 05:46  Phos  1.4       Mg     1.4         TPro  5.7<L>  /  Alb  1.6<L>  /  TBili  1.3<H>  /  DBili  0.5<H>  /  AST  47<H>  /  ALT  26  /  AlkPhos  114      PT/INR - ( 2018 05:46 )   PT: 16.4 sec;   INR: 1.51 ratio      : Blood cx X 2: no growth    : urine cx: 50,000- 99,000 CFU/ ml     Video EE hours of Normal EEG seen     MRI Head w/o  Contrast:  Motion limited study. No evidence for intracranial mass, acute territorial infarct, acute intracranial hemorrhage, or midline shift.

## 2018-07-29 NOTE — PROGRESS NOTE ADULT - PROBLEM SELECTOR PLAN 1
- s/p fall- CT head and MRI head WNL  - AMS not likely due to seizure due to neg 24 hour EEG  - pt afebrile for >24 hours  - UTI? - difficult to determine if pt has dysuria as she is a poor historian, Urine culture growing E. Coli        - will follow up susceptibilities        - pt currently on day 2 of Levaquin PO- will switch ABx based on susceptibilities

## 2018-07-29 NOTE — PROGRESS NOTE ADULT - PROBLEM SELECTOR PLAN 3
- likely megaloblastic in nature due to low folate and B12 on admission and significant ETOH use history   - Hemoglobin 7.9 this AM (downtrending during hospitalization- 10.9 on admission)  - repeat H&H at 1800 tonight.

## 2018-07-29 NOTE — PROGRESS NOTE ADULT - PROBLEM SELECTOR PLAN 2
- Pt has significant ETOH use history  - continue IV Thiamine now 200 mg IV 3x day for a total of 5 days  - f/u Neuro recommendations

## 2018-07-29 NOTE — PROGRESS NOTE ADULT - PROBLEM SELECTOR PLAN 4
- significant ETOH use h/o  - last drink 7/22 therefore pt is not at risk of ETOH withdrawal seizures  - CIWA protocol in place

## 2018-07-30 DIAGNOSIS — R33.9 RETENTION OF URINE, UNSPECIFIED: ICD-10-CM

## 2018-07-30 LAB
-  AMIKACIN: SIGNIFICANT CHANGE UP
-  AMOXICILLIN/CLAVULANIC ACID: SIGNIFICANT CHANGE UP
-  AMPICILLIN/SULBACTAM: SIGNIFICANT CHANGE UP
-  AMPICILLIN: SIGNIFICANT CHANGE UP
-  AZTREONAM: SIGNIFICANT CHANGE UP
-  CEFAZOLIN: SIGNIFICANT CHANGE UP
-  CEFEPIME: SIGNIFICANT CHANGE UP
-  CEFOXITIN: SIGNIFICANT CHANGE UP
-  CEFTRIAXONE: SIGNIFICANT CHANGE UP
-  CIPROFLOXACIN: SIGNIFICANT CHANGE UP
-  ERTAPENEM: SIGNIFICANT CHANGE UP
-  GENTAMICIN: SIGNIFICANT CHANGE UP
-  IMIPENEM: SIGNIFICANT CHANGE UP
-  LEVOFLOXACIN: SIGNIFICANT CHANGE UP
-  MEROPENEM: SIGNIFICANT CHANGE UP
-  NITROFURANTOIN: SIGNIFICANT CHANGE UP
-  PIPERACILLIN/TAZOBACTAM: SIGNIFICANT CHANGE UP
-  TIGECYCLINE: SIGNIFICANT CHANGE UP
-  TOBRAMYCIN: SIGNIFICANT CHANGE UP
-  TRIMETHOPRIM/SULFAMETHOXAZOLE: SIGNIFICANT CHANGE UP
ANION GAP SERPL CALC-SCNC: 10 MMOL/L — SIGNIFICANT CHANGE UP (ref 5–17)
BLD GP AB SCN SERPL QL: SIGNIFICANT CHANGE UP
BUN SERPL-MCNC: 3 MG/DL — LOW (ref 7–23)
CALCIUM SERPL-MCNC: 7.7 MG/DL — LOW (ref 8.5–10.1)
CHLORIDE SERPL-SCNC: 114 MMOL/L — HIGH (ref 96–108)
CO2 SERPL-SCNC: 18 MMOL/L — LOW (ref 22–31)
CREAT SERPL-MCNC: 0.51 MG/DL — SIGNIFICANT CHANGE UP (ref 0.5–1.3)
CULTURE RESULTS: SIGNIFICANT CHANGE UP
GLUCOSE SERPL-MCNC: 90 MG/DL — SIGNIFICANT CHANGE UP (ref 70–99)
HCT VFR BLD CALC: 28.3 % — LOW (ref 34.5–45)
HCT VFR BLD CALC: 29.3 % — LOW (ref 34.5–45)
HGB BLD-MCNC: 10.1 G/DL — LOW (ref 11.5–15.5)
HGB BLD-MCNC: 9.5 G/DL — LOW (ref 11.5–15.5)
MAGNESIUM SERPL-MCNC: 1.2 MG/DL — LOW (ref 1.6–2.6)
MCHC RBC-ENTMCNC: 32.1 PG — SIGNIFICANT CHANGE UP (ref 27–34)
MCHC RBC-ENTMCNC: 33.6 GM/DL — SIGNIFICANT CHANGE UP (ref 32–36)
MCV RBC AUTO: 95.6 FL — SIGNIFICANT CHANGE UP (ref 80–100)
METHOD TYPE: SIGNIFICANT CHANGE UP
NRBC # BLD: 0 /100 WBCS — SIGNIFICANT CHANGE UP (ref 0–0)
ORGANISM # SPEC MICROSCOPIC CNT: SIGNIFICANT CHANGE UP
ORGANISM # SPEC MICROSCOPIC CNT: SIGNIFICANT CHANGE UP
PHOSPHATE SERPL-MCNC: 1.8 MG/DL — LOW (ref 2.5–4.5)
PLATELET # BLD AUTO: 161 K/UL — SIGNIFICANT CHANGE UP (ref 150–400)
POTASSIUM SERPL-MCNC: 3.7 MMOL/L — SIGNIFICANT CHANGE UP (ref 3.5–5.3)
POTASSIUM SERPL-SCNC: 3.7 MMOL/L — SIGNIFICANT CHANGE UP (ref 3.5–5.3)
RBC # BLD: 2.96 M/UL — LOW (ref 3.8–5.2)
RBC # FLD: 18.6 % — HIGH (ref 10.3–14.5)
SODIUM SERPL-SCNC: 142 MMOL/L — SIGNIFICANT CHANGE UP (ref 135–145)
SPECIMEN SOURCE: SIGNIFICANT CHANGE UP
TYPE + AB SCN PNL BLD: SIGNIFICANT CHANGE UP
WBC # BLD: 7.36 K/UL — SIGNIFICANT CHANGE UP (ref 3.8–10.5)
WBC # FLD AUTO: 7.36 K/UL — SIGNIFICANT CHANGE UP (ref 3.8–10.5)

## 2018-07-30 PROCEDURE — 99233 SBSQ HOSP IP/OBS HIGH 50: CPT

## 2018-07-30 PROCEDURE — 74176 CT ABD & PELVIS W/O CONTRAST: CPT | Mod: 26

## 2018-07-30 RX ORDER — SODIUM,POTASSIUM PHOSPHATES 278-250MG
2 POWDER IN PACKET (EA) ORAL ONCE
Qty: 0 | Refills: 0 | Status: COMPLETED | OUTPATIENT
Start: 2018-07-30 | End: 2018-07-30

## 2018-07-30 RX ORDER — BUDESONIDE AND FORMOTEROL FUMARATE DIHYDRATE 160; 4.5 UG/1; UG/1
2 AEROSOL RESPIRATORY (INHALATION)
Qty: 0 | Refills: 0 | Status: DISCONTINUED | OUTPATIENT
Start: 2018-07-30 | End: 2018-07-31

## 2018-07-30 RX ORDER — THIAMINE MONONITRATE (VIT B1) 100 MG
200 TABLET ORAL THREE TIMES A DAY
Qty: 0 | Refills: 0 | Status: DISCONTINUED | OUTPATIENT
Start: 2018-07-30 | End: 2018-08-01

## 2018-07-30 RX ORDER — MAGNESIUM OXIDE 400 MG ORAL TABLET 241.3 MG
400 TABLET ORAL
Qty: 0 | Refills: 0 | Status: DISCONTINUED | OUTPATIENT
Start: 2018-07-30 | End: 2018-08-01

## 2018-07-30 RX ORDER — POTASSIUM CHLORIDE 20 MEQ
40 PACKET (EA) ORAL EVERY 4 HOURS
Qty: 0 | Refills: 0 | Status: COMPLETED | OUTPATIENT
Start: 2018-07-30 | End: 2018-07-30

## 2018-07-30 RX ORDER — POLYETHYLENE GLYCOL 3350 17 G/17G
17 POWDER, FOR SOLUTION ORAL
Qty: 0 | Refills: 0 | Status: DISCONTINUED | OUTPATIENT
Start: 2018-07-30 | End: 2018-08-06

## 2018-07-30 RX ADMIN — Medication 204 MILLIGRAM(S): at 21:21

## 2018-07-30 RX ADMIN — Medication 120 MILLIGRAM(S): at 05:35

## 2018-07-30 RX ADMIN — MAGNESIUM OXIDE 400 MG ORAL TABLET 400 MILLIGRAM(S): 241.3 TABLET ORAL at 09:25

## 2018-07-30 RX ADMIN — Medication 1 MILLIGRAM(S): at 18:37

## 2018-07-30 RX ADMIN — PANTOPRAZOLE SODIUM 40 MILLIGRAM(S): 20 TABLET, DELAYED RELEASE ORAL at 05:35

## 2018-07-30 RX ADMIN — Medication 40 MILLIEQUIVALENT(S): at 09:26

## 2018-07-30 RX ADMIN — Medication 40 MILLIEQUIVALENT(S): at 13:58

## 2018-07-30 RX ADMIN — POLYETHYLENE GLYCOL 3350 17 GRAM(S): 17 POWDER, FOR SOLUTION ORAL at 18:37

## 2018-07-30 RX ADMIN — MAGNESIUM OXIDE 400 MG ORAL TABLET 400 MILLIGRAM(S): 241.3 TABLET ORAL at 18:37

## 2018-07-30 RX ADMIN — Medication 1 TABLET(S): at 13:58

## 2018-07-30 RX ADMIN — Medication 204 MILLIGRAM(S): at 13:58

## 2018-07-30 RX ADMIN — MAGNESIUM OXIDE 400 MG ORAL TABLET 400 MILLIGRAM(S): 241.3 TABLET ORAL at 11:41

## 2018-07-30 RX ADMIN — Medication 2 PACKET(S): at 18:38

## 2018-07-30 RX ADMIN — Medication 204 MILLIGRAM(S): at 05:35

## 2018-07-30 NOTE — PHYSICAL THERAPY INITIAL EVALUATION ADULT - LEVEL OF INDEPENDENCE: SIT/STAND, REHAB EVAL
unable to perform/Patient became labile with attempts, " I can't".  Not really trying.  Patient refused further attempts.

## 2018-07-30 NOTE — PROGRESS NOTE ADULT - PROBLEM SELECTOR PLAN 5
- significant ETOH use h/o  - no current withdrawal  - noted hypomagnesemia and hypophosphatemia which were corrected today. will continue to monitor electrolytes

## 2018-07-30 NOTE — PROGRESS NOTE ADULT - PROBLEM SELECTOR PLAN 4
-urology consult Dr. Smith  -s/p straight cath, per RN were unable to place morel  -monitor bladder scan q8H

## 2018-07-30 NOTE — PROGRESS NOTE ADULT - PROBLEM SELECTOR PLAN 2
- Pt has significant ETOH use history  - continue IV Thiamine now 200 mg IV 3x day for a total of 5 days. Will change to PO in AM.   - Neuro consult appreciated

## 2018-07-30 NOTE — PROGRESS NOTE ADULT - SUBJECTIVE AND OBJECTIVE BOX
INTERVAL/OVERNIGHT EVENTS: 53 yr old female w alcohol abuse hx, anemia, asthma/COPD, chronic renal insufficiency, GERD, GI bleed, Hepatitic steatosis who presented to  ED after unwitnessed fall and prior day of confusion. Associated with increased lethargy. Patient was later found on the ground by her significant other. Last drink was Sunday 07-22. In ED head CT neg, UDS neg except for THC,  neg alcohol.     7/30: Patient appears more alert and oriented today. Endorses mild abdominal distention and discomfort. Has not had a bowel movement for 2 days.     MEDICATIONS  (STANDING):  buDESOnide 160 MICROgram(s)/formoterol 4.5 MICROgram(s) Inhaler 2 Puff(s) Inhalation two times a day  buDESOnide 160 MICROgram(s)/formoterol 4.5 MICROgram(s) Inhaler 2 Puff(s) Inhalation two times a day  diltiazem    milliGRAM(s) Oral daily  folic acid 1 milliGRAM(s) Oral daily  magnesium oxide 400 milliGRAM(s) Oral three times a day with meals  multivitamin 1 Tablet(s) Oral daily  pantoprazole    Tablet 40 milliGRAM(s) Oral before breakfast  polyethylene glycol 3350 17 Gram(s) Oral two times a day  potassium phosphate / sodium phosphate powder 2 Packet(s) Oral once  thiamine IVPB 200 milliGRAM(s) IV Intermittent three times a day    MEDICATIONS  (PRN):  ondansetron    Tablet 4 milliGRAM(s) Oral every 8 hours PRN Nausea    Allergies  amoxicillin (Other)  Ceclor (Other)    Vital Signs Last 24 Hrs  T(C): 36.4 (30 Jul 2018 10:05), Max: 37 (30 Jul 2018 05:48)  T(F): 97.5 (30 Jul 2018 10:05), Max: 98.6 (30 Jul 2018 05:48)  HR: 102 (30 Jul 2018 10:05) (72 - 102)  BP: 119/82 (30 Jul 2018 10:05) (101/72 - 119/82)  BP(mean): --  RR: 17 (30 Jul 2018 10:05) (17 - 18)  SpO2: 100% (30 Jul 2018 10:05) (100% - 100%)  I&O's Summary    VS reviewed, stable.  Gen: patient is in no acute distress.  HEENT: NC/AT  Neck: FROM, supple, no cervical LAD  Chest: CTA b/l, no crackles/wheezes, good air entry, no tachypnea or retractions  CV: S1/2 present, RRR  Abd: soft, mild distention. Minimal tenderness to deep palpation  Back: no vertebral or paraspinal tenderness along entire spine; no CVAT  Extrem: 2+ peripheral pulses, WWP.   Neuro: Persistent oculomotor nystagmus. No additional focal deficits.     Interval Lab Results:                        9.5    7.36  )-----------( 161      ( 30 Jul 2018 07:50 )             28.3                         6.9    x     )-----------( x        ( 29 Jul 2018 22:27 )             20.4                         7.4    x     )-----------( x        ( 29 Jul 2018 18:08 )             22.2                               142    |  114    |  3                   Calcium: 7.7   / iCa: x      (07-30 @ 07:50)    ----------------------------<  90        Magnesium: 1.2                              3.7     |  18     |  0.51             Phosphorous: 1.8 INTERVAL/OVERNIGHT EVENTS: 53 yr old female w alcohol abuse hx, anemia, asthma/COPD, chronic renal insufficiency, GERD, GI bleed, Hepatitic steatosis who presented to  ED after unwitnessed fall and prior day of confusion. Associated with increased lethargy. Patient was later found on the ground by her significant other. Last drink was Sunday 07-22. In ED head CT neg, UDS neg except for THC,  neg alcohol.     7/30: Patient appears more alert and oriented today. Endorses mild abdominal distention and discomfort. Has not had a bowel movement for 2 days.     MEDICATIONS  (STANDING):  buDESOnide 160 MICROgram(s)/formoterol 4.5 MICROgram(s) Inhaler 2 Puff(s) Inhalation two times a day  buDESOnide 160 MICROgram(s)/formoterol 4.5 MICROgram(s) Inhaler 2 Puff(s) Inhalation two times a day  diltiazem    milliGRAM(s) Oral daily  folic acid 1 milliGRAM(s) Oral daily  magnesium oxide 400 milliGRAM(s) Oral three times a day with meals  multivitamin 1 Tablet(s) Oral daily  pantoprazole    Tablet 40 milliGRAM(s) Oral before breakfast  polyethylene glycol 3350 17 Gram(s) Oral two times a day  potassium phosphate / sodium phosphate powder 2 Packet(s) Oral once  thiamine IVPB 200 milliGRAM(s) IV Intermittent three times a day    MEDICATIONS  (PRN):  ondansetron    Tablet 4 milliGRAM(s) Oral every 8 hours PRN Nausea    Allergies  amoxicillin (Other)  Ceclor (Other)    Vital Signs Last 24 Hrs  T(C): 36.4 (30 Jul 2018 10:05), Max: 37 (30 Jul 2018 05:48)  T(F): 97.5 (30 Jul 2018 10:05), Max: 98.6 (30 Jul 2018 05:48)  HR: 102 (30 Jul 2018 10:05) (72 - 102)  BP: 119/82 (30 Jul 2018 10:05) (101/72 - 119/82)  BP(mean): --  RR: 17 (30 Jul 2018 10:05) (17 - 18)  SpO2: 100% (30 Jul 2018 10:05) (100% - 100%)  I&O's Summary    VS reviewed, stable.  Gen: patient is in no acute distress.  HEENT: NC/AT  Neck: FROM, supple, no cervical LAD  Chest: CTA b/l, no crackles/wheezes, good air entry, no tachypnea or retractions  CV: S1/2 present, RRR  Abd: soft, mild distention. Minimal tenderness to deep palpation  Back: no vertebral or paraspinal tenderness along entire spine; no CVAT  Extrem: 2+ peripheral pulses, WWP.   Neuro: Persistent oculomotor nystagmus. Otherwise CN2-12 intact.    Interval Lab Results:                        9.5    7.36  )-----------( 161      ( 30 Jul 2018 07:50 )             28.3                         6.9    x     )-----------( x        ( 29 Jul 2018 22:27 )             20.4                         7.4    x     )-----------( x        ( 29 Jul 2018 18:08 )             22.2                               142    |  114    |  3                   Calcium: 7.7   / iCa: x      (07-30 @ 07:50)    ----------------------------<  90        Magnesium: 1.2                              3.7     |  18     |  0.51             Phosphorous: 1.8

## 2018-07-30 NOTE — PHYSICAL THERAPY INITIAL EVALUATION ADULT - PERTINENT HX OF CURRENT PROBLEM, REHAB EVAL
54 yo F admitted post fall at home.  H/O ETOH abuse. Last drink was Sunday 07-22.  Patient became confused 1 day prior to ED presentation.  Then boyfriend found her on the floor.  She has also slept a lot more in the past few days. In ED head CT neg, UDS neg except for THC,  neg alcohol.  EEG (-). MRI (-)

## 2018-07-30 NOTE — PROGRESS NOTE ADULT - PROBLEM SELECTOR PLAN 1
2/2 suspected wernicke's encephalopathy  - s/p fall- CT head and MRI head WNL  - AMS not likely due to seizure due to neg 24 hour EEG  - pt afebrile for >24 hours  - UTI: cultures <100 000 and patient does not endorse symptoms

## 2018-07-30 NOTE — PHYSICAL THERAPY INITIAL EVALUATION ADULT - SOCIAL CONCERNS
Patient labile during session, not able to participate in program today./Complex psychosocial needs/coping issues/Substance misuse

## 2018-07-30 NOTE — PHYSICAL THERAPY INITIAL EVALUATION ADULT - DIAGNOSIS, PT EVAL
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

## 2018-07-30 NOTE — PROVIDER CONTACT NOTE (CHANGE IN STATUS NOTIFICATION) - ACTION/TREATMENT ORDERED:
Patient was incontinent moderate amount of urine (overflow?) and st. cathed for 1650 ml dark giulia urine. MDs aware. Urology consult called. patient feels better.

## 2018-07-30 NOTE — PROGRESS NOTE ADULT - SUBJECTIVE AND OBJECTIVE BOX
Reason for Admission: altered mental status	    Patient is a 54 y/o/f with alcohol abuse hx, anemia, asthma/COPD, chronic renal insufficiency, GERD, GI bleed, Hepatitic steatosis who presented to  ED after unwitnessed fall and prior day of confusion.  ED obtained info from significant other.  Last drink was Sunday 07-22.  Patient became confused 1 day prior to ED presentation.  Then boyfriend found her on the floor.  She has also slept a lot more in the past few days. In ED head CT neg, UDS neg except for THC,  neg alcohol.  UA abn so levaquin given. While in ED, pt was uncooperative w nursing.  Did not answer questions for me, just asked me the time and grunted.     7/27: seen and eval. hemodynamically stable. Denies any HA, CP, SOB. Patient very deconditioned. patient's family tried to be reached over the phone, no answer. Will discuss patient care with family.     Addendum: Patient's significant other has been reached. apperently patient drinks 3-4 beers daily and states in february patient had etoh withdrawal seizures.  suspects that she was more confused past 2 days and suspects it is etoh related.     7/28: seen and eval. currently getting EEG. pending MRIs. Care discussed with neuro /  family.     7/29: seen and eval. hemodynamically stable. Denies any HA, CP, SOB - but it is important to know that patient is a poor historian and very forgetful. Just completed EEG. Patient very confused, confabulating, trouble coordinating finger / nose / finger test.      REVIEW OF SYSTEMS:  poor historian.     Physical Exam:   ICU Vital Signs Last 24 Hrs  T(C): 37 (30 Jul 2018 05:48), Max: 37.1 (29 Jul 2018 10:05)  T(F): 98.6 (30 Jul 2018 05:48), Max: 98.7 (29 Jul 2018 10:05)  HR: 96 (30 Jul 2018 05:48) (72 - 97)  BP: 113/80 (30 Jul 2018 05:48) (92/69 - 113/80)  RR: 18 (30 Jul 2018 05:48) (17 - 18)  SpO2: 100% (30 Jul 2018 05:48) (100% - 100%)    GENERAL APPEARANCE:  frail, deconditioned, skinny  HEENT: poor dentition, + vertical nystagmus  Skin:  dry, thin skin  NECK:  Supple without lymphadenopathy.   HEART:  Regular rate and rhythm. normal S1 and S2, No M/R/G  LUNGS:  Good ins/exp effort, no W/R/R/C  ABDOMEN:  Soft, nontender, nondistended with good bowel sounds heard  EXTREMITIES:  Without cyanosis, clubbing or edema.   NEUROLOGICAL:  Gross nonfocal, very tired and sleepy, poor historian due to tangental converstion / confused. Ataxia. Dysmetria     Labs:       CBC Full  -  ( 29 Jul 2018 05:46 )    CBC Full  -  ( 30 Jul 2018 07:50 )  WBC Count : 7.36 K/uL  Hemoglobin : 9.5 g/dL  Hematocrit : 28.3 %  Platelet Count - Automated : 161 K/uL    PT/INR - ( 29 Jul 2018 05:46 )   PT: 16.4 sec;   INR: 1.51 ratio      142  |  114<H>  |  3<L>  ----------------------------<  90  3.7   |  18<L>  |  0.51    Ca    7.7<L>      30 Jul 2018 07:50  Phos  1.8     07-30  Mg     1.2     07-30    TPro  5.7<L>  /  Alb  1.6<L>  /  TBili  1.3<H>  /  DBili  0.5<H>  /  AST  47<H>  /  ALT  26  /  AlkPhos  114  07-29          # Altered mental status r/o CVA / seizures / UTI (infection) - Wernicke's - 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  - Normal 24 hour video EEG recording.  - d/c CIWA  - d/c IV Levaquin      # Anemia acute on chronic -  r/o GI bleed  - patient's H/H throughout the hospitalization has been trending down - suspected to be initially dilutional in the setting of dehydration / IV hydration  - overnight it trended down to 6.9, required 7/30 1 unit of PRBC transfusion  - now H/H 9.5  - pending stool guiac    # Alcohol hx  - reviewed  B12, folate levels  - patient received thiamine 500 mg IV x 1 yesterday, now 200 mg po TID (total 2/5 days)    # GERD  - continue protonix    # transaminitis due to hepatitic steatosis in past by MRCP   - repeat LFTs  - Pt/INR    # hypokalemia secondary to poor appetite /  etoh use / IV hydration  - monitor closely after the repletion    # appears cachectic  - labs as above Reason for Admission: altered mental status	    Patient is a 54 y/o/f with alcohol abuse hx, anemia, asthma/COPD, chronic renal insufficiency, GERD, GI bleed, Hepatitic steatosis who presented to  ED after unwitnessed fall and prior day of confusion.  ED obtained info from significant other.  Last drink was Sunday 07-22.  Patient became confused 1 day prior to ED presentation.  Then boyfriend found her on the floor.  She has also slept a lot more in the past few days. In ED head CT neg, UDS neg except for THC,  neg alcohol.  UA abn so levaquin given. While in ED, pt was uncooperative w nursing.  Did not answer questions for me, just asked me the time and grunted.     7/27: seen and eval. hemodynamically stable. Denies any HA, CP, SOB. Patient very deconditioned. patient's family tried to be reached over the phone, no answer. Will discuss patient care with family.     Addendum: Patient's significant other has been reached. apperently patient drinks 3-4 beers daily and states in february patient had etoh withdrawal seizures.  suspects that she was more confused past 2 days and suspects it is etoh related.     7/28: seen and eval. currently getting EEG. pending MRIs. Care discussed with neuro /  family.     7/29: seen and eval. hemodynamically stable. Denies any HA, CP, SOB - but it is important to know that patient is a poor historian and very forgetful. Just completed EEG. Patient very confused, confabulating, trouble coordinating finger / nose / finger test.      7/30: patient's mental status slowly improving but not at baseline. Answers simple questions. more awake. denies any CP, SOB. No fevers, chills or shakes. Labs and vitals reviewed. patient encouraged to get up today and start working with physical therapy. Encourage to maintain good oral nutritional intake. Strongly recommended to stop drinking alcohol. Overnight patient had a drop in H/H requiring transfusion of 1 unit of PRBCs. Will closely monitor H.H. Patient does not recall ever having a colonoscopy in the past.      REVIEW OF SYSTEMS:  poor historian.     Physical Exam:   ICU Vital Signs Last 24 Hrs  T(C): 37 (30 Jul 2018 05:48), Max: 37.1 (29 Jul 2018 10:05)  T(F): 98.6 (30 Jul 2018 05:48), Max: 98.7 (29 Jul 2018 10:05)  HR: 96 (30 Jul 2018 05:48) (72 - 97)  BP: 113/80 (30 Jul 2018 05:48) (92/69 - 113/80)  RR: 18 (30 Jul 2018 05:48) (17 - 18)  SpO2: 100% (30 Jul 2018 05:48) (100% - 100%)    GENERAL APPEARANCE:  frail, deconditioned, skinny  HEENT: poor dentition, + vertical nystagmus  Skin:  dry, thin skin  NECK:  Supple without lymphadenopathy.   HEART:  Regular rate and rhythm. normal S1 and S2, No M/R/G  LUNGS:  Good ins/exp effort, no W/R/R/C  ABDOMEN:  Soft, nontender, nondistended with good bowel sounds heard  EXTREMITIES:  Without cyanosis, clubbing or edema.   NEUROLOGICAL:  Gross nonfocal, very tired and sleepy, poor historian due to tangental converstion / confused. Ataxia. Dysmetria     Labs:       CBC Full  -  ( 29 Jul 2018 05:46 )    CBC Full  -  ( 30 Jul 2018 07:50 )  WBC Count : 7.36 K/uL  Hemoglobin : 9.5 g/dL  Hematocrit : 28.3 %  Platelet Count - Automated : 161 K/uL    PT/INR - ( 29 Jul 2018 05:46 )   PT: 16.4 sec;   INR: 1.51 ratio      142  |  114<H>  |  3<L>  ----------------------------<  90  3.7   |  18<L>  |  0.51    Ca    7.7<L>      30 Jul 2018 07:50  Phos  1.8     07-30  Mg     1.2     07-30    TPro  5.7<L>  /  Alb  1.6<L>  /  TBili  1.3<H>  /  DBili  0.5<H>  /  AST  47<H>  /  ALT  26  /  AlkPhos  114  07-29          # Altered mental status r/o CVA / seizures / UTI (infection) - Wernicke's - 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  - Normal 24 hour video EEG recording.  - d/c CIWA  - d/c IV Levaquin      # Anemia acute on chronic -  r/o GI bleed vs dilutional vs secondary to bone marrow suppression secondary to extensive etoh use.   - patient's H/H throughout the hospitalization has been trending down - suspected to be initially dilutional in the setting of dehydration / IV hydration  - overnight it trended down to 6.9, required 7/30 1 unit of PRBC transfusion  - now H/H 9.5  - pending stool guiac  - never had a age related screen for colonoscopy  - pending GI eval    # Alcohol hx  - reviewed  B12, folate levels  - patient received thiamine 500 mg IV x 1 yesterday, now 200 mg po TID (total 2/5 days)    # GERD  - continue protonix    # transaminitis due to hepatitic steatosis in past by MRCP   - repeat LFTs  - Pt/INR    # hypokalemia secondary to poor appetite /  etoh use / IV hydration  - monitor closely after the repletion    # appears cachectic  - labs as above

## 2018-07-30 NOTE — PROGRESS NOTE ADULT - PROBLEM SELECTOR PLAN 3
- likely megaloblastic in nature due to low folate and B12 on admission and significant ETOH use history   - s/p 1 unit PRBCs overnight, H/H is now stable. Will monitor in AM.  -CT non-revealing for source of bleed.

## 2018-07-30 NOTE — PROGRESS NOTE ADULT - SUBJECTIVE AND OBJECTIVE BOX
S&O:  Pt has made moderate improvement of mental status since this morning, feels better, answers questions appropriately.  Is on Thiamine , no seizures    24 hr EEG monitoring is normal.  MRI brain reveals no acute findings    ROS: As above, unable to obtain full details    MEDICATIONS  (STANDING):  buDESOnide 160 MICROgram(s)/formoterol 4.5 MICROgram(s) Inhaler 2 Puff(s) Inhalation two times a day  buDESOnide 160 MICROgram(s)/formoterol 4.5 MICROgram(s) Inhaler 2 Puff(s) Inhalation two times a day  diltiazem    milliGRAM(s) Oral daily  folic acid 1 milliGRAM(s) Oral daily  magnesium oxide 400 milliGRAM(s) Oral three times a day with meals  multivitamin 1 Tablet(s) Oral daily  pantoprazole    Tablet 40 milliGRAM(s) Oral before breakfast  potassium chloride    Tablet ER 40 milliEquivalent(s) Oral every 4 hours  potassium phosphate / sodium phosphate powder 2 Packet(s) Oral once  thiamine IVPB 200 milliGRAM(s) IV Intermittent three times a day      Vital Signs Last 24 Hrs  T(C): 36.4 (30 Jul 2018 10:05), Max: 37 (30 Jul 2018 05:48)  T(F): 97.5 (30 Jul 2018 10:05), Max: 98.6 (30 Jul 2018 05:48)  HR: 102 (30 Jul 2018 10:05) (72 - 102)  BP: 119/82 (30 Jul 2018 10:05) (92/69 - 119/82)  BP(mean): --  RR: 17 (30 Jul 2018 10:05) (17 - 18)  SpO2: 100% (30 Jul 2018 10:05) (100% - 100%)    Neurological exam:  HF: A x O x self - palce, attentive, decreased concentration, Speech fluent, no Aphasia, Naming intact, content - irrelevant   CN: PRASHANT, Dysconjugate gaze - imoproved, no diplopia, facial sensation normal, no NLFD, tongue midline, Palate moves equally  Motor: No pronator drift, moves all 4 antigravity, unable to sustain, tremolous hands    Sens: Intact to light touch / unable to assess PP/ VS/ JS - intact   Reflexes: BJ 2+, BR 2+, KJ 2+, AJ trace, left plantar upgoing  Coord: Dysmetria +, no ataxia    Gait/Balance: Cannot tested                       9.5    7.36  )-----------( 161      ( 30 Jul 2018 07:50 )             28.3     07-30    142  |  114<H>  |  3<L>  ----------------------------<  90  3.7   |  18<L>  |  0.51    Ca    7.7<L>      30 Jul 2018 07:50  Phos  1.8     07-30  Mg     1.2     07-30    TPro  5.7<L>  /  Alb  1.6<L>  /  TBili  1.3<H>  /  DBili  0.5<H>  /  AST  47<H>  /  ALT  26  /  AlkPhos  114  07-29 07-27 [id number] 4.9    Radiology report:  - MRI brain: < from: MR Head No Cont (07.29.18 @ 14:09) >  No evidence for intracranial mass, acute territorial infarct, acute   intracranial hemorrhage, or midline shift.    - EEG: < from: EEG Monitoring Each 24 hours (07.29.18 @ 09:00) >  Normal 24 hour video EEG recording.    < end of copied text >

## 2018-07-31 DIAGNOSIS — R14.0 ABDOMINAL DISTENSION (GASEOUS): ICD-10-CM

## 2018-07-31 LAB
ALBUMIN SERPL ELPH-MCNC: 1.9 G/DL — LOW (ref 3.3–5)
ALP SERPL-CCNC: 119 U/L — SIGNIFICANT CHANGE UP (ref 40–120)
ALT FLD-CCNC: 25 U/L — SIGNIFICANT CHANGE UP (ref 12–78)
AMMONIA BLD-MCNC: 19 UMOL/L — SIGNIFICANT CHANGE UP (ref 11–32)
ANION GAP SERPL CALC-SCNC: 7 MMOL/L — SIGNIFICANT CHANGE UP (ref 5–17)
AST SERPL-CCNC: 61 U/L — HIGH (ref 15–37)
BASOPHILS # BLD AUTO: 0.01 K/UL — SIGNIFICANT CHANGE UP (ref 0–0.2)
BASOPHILS NFR BLD AUTO: 0.1 % — SIGNIFICANT CHANGE UP (ref 0–2)
BILIRUB SERPL-MCNC: 1.1 MG/DL — SIGNIFICANT CHANGE UP (ref 0.2–1.2)
BUN SERPL-MCNC: 3 MG/DL — LOW (ref 7–23)
CALCIUM SERPL-MCNC: 8.5 MG/DL — SIGNIFICANT CHANGE UP (ref 8.5–10.1)
CHLORIDE SERPL-SCNC: 111 MMOL/L — HIGH (ref 96–108)
CO2 SERPL-SCNC: 22 MMOL/L — SIGNIFICANT CHANGE UP (ref 22–31)
CREAT SERPL-MCNC: 0.55 MG/DL — SIGNIFICANT CHANGE UP (ref 0.5–1.3)
EOSINOPHIL # BLD AUTO: 0.07 K/UL — SIGNIFICANT CHANGE UP (ref 0–0.5)
EOSINOPHIL NFR BLD AUTO: 0.9 % — SIGNIFICANT CHANGE UP (ref 0–6)
FERRITIN SERPL-MCNC: 1197 NG/ML — HIGH (ref 15–150)
GLUCOSE SERPL-MCNC: 79 MG/DL — SIGNIFICANT CHANGE UP (ref 70–99)
HCT VFR BLD CALC: 25.1 % — LOW (ref 34.5–45)
HCT VFR BLD CALC: 27.6 % — LOW (ref 34.5–45)
HGB BLD-MCNC: 8.7 G/DL — LOW (ref 11.5–15.5)
HGB BLD-MCNC: 9.6 G/DL — LOW (ref 11.5–15.5)
IGA FLD-MCNC: 410 MG/DL — SIGNIFICANT CHANGE UP (ref 84–499)
IGG FLD-MCNC: 955 MG/DL — SIGNIFICANT CHANGE UP (ref 610–1660)
IGM SERPL-MCNC: 118 MG/DL — SIGNIFICANT CHANGE UP (ref 35–242)
IMM GRANULOCYTES NFR BLD AUTO: 1.2 % — SIGNIFICANT CHANGE UP (ref 0–1.5)
KAPPA LC SER QL IFE: 2.28 MG/DL — HIGH (ref 0.33–1.94)
KAPPA/LAMBDA FREE LIGHT CHAIN RATIO, SERUM: 0.58 RATIO — SIGNIFICANT CHANGE UP (ref 0.26–1.65)
LAMBDA LC SER QL IFE: 3.96 MG/DL — HIGH (ref 0.57–2.63)
LYMPHOCYTES # BLD AUTO: 1.39 K/UL — SIGNIFICANT CHANGE UP (ref 1–3.3)
LYMPHOCYTES # BLD AUTO: 18 % — SIGNIFICANT CHANGE UP (ref 13–44)
MAGNESIUM SERPL-MCNC: 1.2 MG/DL — LOW (ref 1.6–2.6)
MCHC RBC-ENTMCNC: 33.1 PG — SIGNIFICANT CHANGE UP (ref 27–34)
MCHC RBC-ENTMCNC: 34.7 GM/DL — SIGNIFICANT CHANGE UP (ref 32–36)
MCV RBC AUTO: 95.4 FL — SIGNIFICANT CHANGE UP (ref 80–100)
MONOCYTES # BLD AUTO: 0.98 K/UL — HIGH (ref 0–0.9)
MONOCYTES NFR BLD AUTO: 12.7 % — SIGNIFICANT CHANGE UP (ref 2–14)
NEUTROPHILS # BLD AUTO: 5.18 K/UL — SIGNIFICANT CHANGE UP (ref 1.8–7.4)
NEUTROPHILS NFR BLD AUTO: 67.1 % — SIGNIFICANT CHANGE UP (ref 43–77)
NRBC # BLD: 0 /100 WBCS — SIGNIFICANT CHANGE UP (ref 0–0)
PHOSPHATE SERPL-MCNC: 2.2 MG/DL — LOW (ref 2.5–4.5)
PLATELET # BLD AUTO: 158 K/UL — SIGNIFICANT CHANGE UP (ref 150–400)
POTASSIUM SERPL-MCNC: 5 MMOL/L — SIGNIFICANT CHANGE UP (ref 3.5–5.3)
POTASSIUM SERPL-SCNC: 5 MMOL/L — SIGNIFICANT CHANGE UP (ref 3.5–5.3)
PROT SERPL-MCNC: 5.2 GM/DL — LOW (ref 6–8.3)
RBC # BLD: 2.63 M/UL — LOW (ref 3.8–5.2)
RBC # FLD: 18.6 % — HIGH (ref 10.3–14.5)
SODIUM SERPL-SCNC: 140 MMOL/L — SIGNIFICANT CHANGE UP (ref 135–145)
VIT B12 SERPL-MCNC: 1227 PG/ML — SIGNIFICANT CHANGE UP (ref 232–1245)
WBC # BLD: 7.72 K/UL — SIGNIFICANT CHANGE UP (ref 3.8–10.5)
WBC # FLD AUTO: 7.72 K/UL — SIGNIFICANT CHANGE UP (ref 3.8–10.5)

## 2018-07-31 PROCEDURE — 78226 HEPATOBILIARY SYSTEM IMAGING: CPT | Mod: 26

## 2018-07-31 PROCEDURE — 99222 1ST HOSP IP/OBS MODERATE 55: CPT

## 2018-07-31 PROCEDURE — 74181 MRI ABDOMEN W/O CONTRAST: CPT | Mod: 26

## 2018-07-31 RX ORDER — MAGNESIUM SULFATE 500 MG/ML
1 VIAL (ML) INJECTION ONCE
Qty: 0 | Refills: 0 | Status: COMPLETED | OUTPATIENT
Start: 2018-07-31 | End: 2018-07-31

## 2018-07-31 RX ORDER — BETHANECHOL CHLORIDE 25 MG
25 TABLET ORAL THREE TIMES A DAY
Qty: 0 | Refills: 0 | Status: DISCONTINUED | OUTPATIENT
Start: 2018-07-31 | End: 2018-08-06

## 2018-07-31 RX ORDER — SODIUM,POTASSIUM PHOSPHATES 278-250MG
2 POWDER IN PACKET (EA) ORAL ONCE
Qty: 0 | Refills: 0 | Status: COMPLETED | OUTPATIENT
Start: 2018-07-31 | End: 2018-07-31

## 2018-07-31 RX ADMIN — Medication 204 MILLIGRAM(S): at 13:10

## 2018-07-31 RX ADMIN — MAGNESIUM OXIDE 400 MG ORAL TABLET 400 MILLIGRAM(S): 241.3 TABLET ORAL at 16:50

## 2018-07-31 RX ADMIN — POLYETHYLENE GLYCOL 3350 17 GRAM(S): 17 POWDER, FOR SOLUTION ORAL at 05:41

## 2018-07-31 RX ADMIN — Medication 1 MILLIGRAM(S): at 11:14

## 2018-07-31 RX ADMIN — Medication 204 MILLIGRAM(S): at 05:40

## 2018-07-31 RX ADMIN — POLYETHYLENE GLYCOL 3350 17 GRAM(S): 17 POWDER, FOR SOLUTION ORAL at 17:15

## 2018-07-31 RX ADMIN — Medication 1 TABLET(S): at 11:14

## 2018-07-31 RX ADMIN — Medication 120 MILLIGRAM(S): at 05:41

## 2018-07-31 RX ADMIN — Medication 204 MILLIGRAM(S): at 21:43

## 2018-07-31 RX ADMIN — BUDESONIDE AND FORMOTEROL FUMARATE DIHYDRATE 2 PUFF(S): 160; 4.5 AEROSOL RESPIRATORY (INHALATION) at 20:57

## 2018-07-31 RX ADMIN — MAGNESIUM OXIDE 400 MG ORAL TABLET 400 MILLIGRAM(S): 241.3 TABLET ORAL at 11:14

## 2018-07-31 RX ADMIN — Medication 2 PACKET(S): at 16:47

## 2018-07-31 RX ADMIN — Medication 100 GRAM(S): at 16:47

## 2018-07-31 RX ADMIN — Medication 25 MILLIGRAM(S): at 23:45

## 2018-07-31 RX ADMIN — PANTOPRAZOLE SODIUM 40 MILLIGRAM(S): 20 TABLET, DELAYED RELEASE ORAL at 05:41

## 2018-07-31 NOTE — PROGRESS NOTE ADULT - PROBLEM SELECTOR PLAN 9
- DVT prophylaxis scd. Heparin  and ASA held in concern of GI bleed  - DASH / TLC diet but pt is NPO after midnight for endoscopy

## 2018-07-31 NOTE — CONSULT NOTE ADULT - ASSESSMENT
Anemia with drop in hgb to 6-7 range without overt gi bleeding                  check stool guiac                   protonix                  advised upper gi eval , upper endoscopy with iv sedation possible biopsies possible bicap cautery                   rationale, all potential risks including but not limited to perforation requiring surgery, aspiration pneumonia or                  bleeding requiring blood transfusions were discussed along with all benefits and alternatives such as ugi                  discussed and the pt wishes to have an upper endoscopy with iv sedation                  recommend hematology consult   RUQ abd pain                    gallstones with elevated lft                  alcoholic liver disease and steatosis                  mrcp to make sure no bile duct stone even though sonogram with normal caliber bile duct                  hida scan  Hypoalbulinemia                   poor nutritional status/alcohol addiction and alcoholic liver disease with possible underlying cirrhosis                  anasarca present                  with anemia-check celiac serologies                  recommend cardiology consult to rule out heart failure                  pt reports no chronic gi bowel movement complaints and ibd seems less likely                  no prior colon eval, may consider Thursday if pt consents.   Alcoholic addiction                  discussed nature of alcohol addiction and effects on body including cirrhosis , liver cancer and other cancers                   and death                  strongly advised alcohol abstinence and recommend alcohol rehab-consider psych consult-defer to hospitalist                  alcohol withdrawal protocol
53 yr old female w alcohol abuse hx, anemia, asthma/COPD, chronic renal insufficiency, GERD, GI bleed, Hepatitic steatosis who presented to  ED after unwitnessed fall and prior day of confusion.  Developed urinary retention in setting of above. Urine culture positive. Unclear source of retention. Will need outpatient work up. For now, recommend morel catheter to gravity and bethanecol. If she is admitted for long enough to complete course of abx, she can be given TOV before discharge. Otherwise dc with morel for outpatient TOV.
54 y/o/f with alcohol abuse hx, anemia, asthma/COPD, CRI, GI bleed, Hepatitic steatosis presented to  ED after unwitnessed fall, became confused 1 day prior to ED presentation, boyfriend found her on the floor, had been sleeping a lot in the past few days. Patient drinks 3-4 beers daily and had etoh withdrawal seizures in 3/2017 EEG (03.23.17) normal EEG from with the patient awake and drowsy.    In ED head CT neg, UDS neg except for THC,  neg alcohol. . Neuro exam, pt is confused, possibly confabulates, encephalopthic with dysconjugate gaze and diplopia.    # Rule out Wernickes encephalopathy    # Rule out seizures    # Rule out stroke    - Thiamine 500 mg x1, then 100 daily  - 24 hr EEG monitoring  - MRI brain    D/W Dr. Hidalgo and Dr. Mccray

## 2018-07-31 NOTE — PROGRESS NOTE ADULT - SUBJECTIVE AND OBJECTIVE BOX
GUSMARYJINLARRY  53y  Female      Patient is a 53y old  Female who presents with a chief complaint of altered mental status (27 Jul 2018 06:45)      INTERVAL HPI/OVERNIGHT EVENTS:  53 yr old female w alcohol abuse hx, anemia, asthma/COPD, chronic renal insufficiency, GERD, GI bleed, Hepatitic steatosis who presented to  ED after unwitnessed fall and prior day of confusion. ED obtained info from significant other.  Last drink was Sunday 07-22.  Patient became confused  1 day prior to ED presentation.  Then boyfriend found her on the floor.  She has also slept a lot more in the past few days. In ED head CT neg, UDS neg except for THC,  neg alcohol.  UA abn so levaquin given. While in ED, pt was uncooperative w nursing.    SUBJECTIVE:     7/28/18-  Patient seen today with medical team. Still not oriented to surroundings. As per nurse, pt complaining of nausea, Zofran was ordered. Pt did not state any complaints today. Case discussed with Dr. Mujica. Plan for MRI brain and EEG, and give 500 mg Thiamine IV. Aspirin was discontinued as this is unlikely a stroke.     7/29/18-   Pt seen in AM with medical team. She was still not oriented to place. Speech was very tangential during interview. Patient had no complaints overnight. Pt completed EEG this AM- EEG found to be normal. This AM patient's phosphate and magnesium were repleted.    7/30: Patient appears more alert and oriented today. Endorses mild abdominal distention and discomfort. Has not had a bowel movement for 2 days.     7/31/2018: Late yesterday, the patient went for a CT Abd to evaluate her abdominal distention. Urinary retention was noted and the patient was bladder scanned (>1L seen). Patient was straight cath and 1.6L of urine was collected. Pt noted relief after straight cath. Urology was consulted as the patient see Dr. Forrest for unknown reason. Pt appears more alert and oriented today. Patient complains of mild discomfort inn the RUQ. patient still has not had a bowel movement. GI was consulted and advises HIDA scan, MRA, Ferritin,     REVIEW OF SYSTEMS:  CONSTITUTIONAL: No fever, weight loss, or fatigue  EYES: No eye pain, visual disturbances, or discharge  ENMT:  No difficulty hearing, tinnitus, vertigo; No sinus or throat pain  NECK: No pain or stiffness  BREASTS: No pain, masses, or nipple discharge  RESPIRATORY: No cough, wheezing, chills or hemoptysis; No shortness of breath  CARDIOVASCULAR: No chest pain, palpitations, dizziness, or leg swelling  GASTROINTESTINAL: No abdominal or epigastric pain. No nausea, vomiting, or hematemesis; No diarrhea or constipation. No melena or hematochezia.  GENITOURINARY: No dysuria, frequency, hematuria, or incontinence  NEUROLOGICAL: No headaches, memory loss, loss of strength, numbness, or tremors  SKIN: No itching, burning, rashes, or lesions   LYMPH NODES: No enlarged glands  ENDOCRINE: No heat or cold intolerance; No hair loss  MUSCULOSKELETAL: No joint pain or swelling; No muscle, back, or extremity pain  PSYCHIATRIC: No depression, anxiety, mood swings, or difficulty sleeping  HEME/LYMPH: No easy bruising, or bleeding gums  ALLERY AND IMMUNOLOGIC: No hives or eczema    T(C): 36.9 (07-31-18 @ 11:24), Max: 37.1 (07-31-18 @ 05:41)  HR: 95 (07-31-18 @ 11:24) (95 - 105)  BP: 106/77 (07-31-18 @ 11:24) (101/61 - 106/77)  RR: 18 (07-31-18 @ 11:24) (18 - 18)  SpO2: 97% (07-31-18 @ 11:24) (97% - 100%)  Wt(kg): --Vital Signs Last 24 Hrs  T(C): 36.9 (31 Jul 2018 11:24), Max: 37.1 (31 Jul 2018 05:41)  T(F): 98.4 (31 Jul 2018 11:24), Max: 98.8 (31 Jul 2018 05:41)  HR: 95 (31 Jul 2018 11:24) (95 - 105)  BP: 106/77 (31 Jul 2018 11:24) (101/61 - 106/77)  BP(mean): --  RR: 18 (31 Jul 2018 11:24) (18 - 18)  SpO2: 97% (31 Jul 2018 11:24) (97% - 100%)    PHYSICAL EXAM:  GENERAL: NAD, well-groomed, well-developed  HEAD:  Atraumatic, Normocephalic  EYES: EOMI, PERRLA, conjunctiva and sclera clear  ENMT: No tonsillar erythema, exudates, or enlargement; Moist mucous membranes, Good dentition, No lesions  NECK: Supple, No JVD, Normal thyroid  NERVOUS SYSTEM:  Alert & Oriented X3, Good concentration; Motor Strength 5/5 B/L upper and lower extremities; DTRs 2+ intact and symmetric  CHEST/LUNG: Clear to percussion bilaterally; No rales, rhonchi, wheezing, or rubs  HEART: Regular rate and rhythm; No murmurs, rubs, or gallops  ABDOMEN: Soft, Nontender, Nondistended; Bowel sounds present  EXTREMITIES:  2+ Peripheral Pulses, No clubbing, cyanosis, or edema  LYMPH: No lymphadenopathy noted  SKIN: No rashes or lesions    Consultant(s) Notes Reviewed:  [x ] YES  [ ] NO  Care Discussed with Consultants/Other Providers [ x] YES  [ ] NO    LABS:                        8.7    7.72  )-----------( 158      ( 31 Jul 2018 07:32 )             25.1     07-31    140  |  111<H>  |  3<L>  ----------------------------<  79  5.0   |  22  |  0.55    Ca    8.5      31 Jul 2018 07:32  Phos  2.2     07-31  Mg     1.2     07-31    TPro  5.2<L>  /  Alb  1.9<L>  /  TBili  1.1  /  DBili  x   /  AST  61<H>  /  ALT  25  /  AlkPhos  119  07-31        CAPILLARY BLOOD GLUCOSE                RADIOLOGY & ADDITIONAL TESTS:    Imaging Personally Reviewed:  [ ] YES  [ ] NO KALEBJINLARRY  53y  Female      Patient is a 53y old  Female who presents with a chief complaint of altered mental status (27 Jul 2018 06:45)      INTERVAL HPI/OVERNIGHT EVENTS:  53 yr old female w alcohol abuse hx, anemia, asthma/COPD, chronic renal insufficiency, GERD, GI bleed, Hepatitic steatosis who presented to  ED after unwitnessed fall and prior day of confusion. ED obtained info from significant other.  Last drink was Sunday 07-22.  Patient became confused  1 day prior to ED presentation.  Then boyfriend found her on the floor.  She has also slept a lot more in the past few days. In ED head CT neg, UDS neg except for THC,  neg alcohol.  UA abn so levaquin given. While in ED, pt was uncooperative w nursing.    SUBJECTIVE:     7/28/18-  Patient seen today with medical team. Still not oriented to surroundings. As per nurse, pt complaining of nausea, Zofran was ordered. Pt did not state any complaints today. Case discussed with Dr. Mujica. Plan for MRI brain and EEG, and give 500 mg Thiamine IV. Aspirin was discontinued as this is unlikely a stroke.     7/29/18-   Pt seen in AM with medical team. She was still not oriented to place. Speech was very tangential during interview. Patient had no complaints overnight. Pt completed EEG this AM- EEG found to be normal. This AM patient's phosphate and magnesium were repleted.    7/30: Patient appears more alert and oriented today. Endorses mild abdominal distention and discomfort. Has not had a bowel movement for 2 days.     7/31/2018: Late yesterday, the patient went for a CT Abd to evaluate her abdominal distention. Urinary retention was noted and the patient was bladder scanned (>1L seen). Patient was straight cath and 1.6L of urine was collected. Pt noted relief after straight cath. Urology was consulted. Pt appears more alert and oriented today. Patient complains of mild discomfort inn the RUQ. patient still has not had a bowel movement. GI was consulted, HIDA scan Neg, MRI and endoscopy pending    REVIEW OF SYSTEMS: unable to ascertain as patient is poor historian.       T(C): 36.9 (07-31-18 @ 11:24), Max: 37.1 (07-31-18 @ 05:41)  HR: 95 (07-31-18 @ 11:24) (95 - 105)  BP: 106/77 (07-31-18 @ 11:24) (101/61 - 106/77)  RR: 18 (07-31-18 @ 11:24) (18 - 18)  SpO2: 97% (07-31-18 @ 11:24) (97% - 100%)  Wt(kg): --Vital Signs Last 24 Hrs  T(C): 36.9 (31 Jul 2018 11:24), Max: 37.1 (31 Jul 2018 05:41)  T(F): 98.4 (31 Jul 2018 11:24), Max: 98.8 (31 Jul 2018 05:41)  HR: 95 (31 Jul 2018 11:24) (95 - 105)  BP: 106/77 (31 Jul 2018 11:24) (101/61 - 106/77)  BP(mean): --  RR: 18 (31 Jul 2018 11:24) (18 - 18)  SpO2: 97% (31 Jul 2018 11:24) (97% - 100%)    PHYSICAL EXAM:  GENERAL: NAD, thin, cachetic  HEAD:  Atraumatic, Normocephalic  EYES: +vertical nystagmus PERRLA, conjunctiva and sclera clear  ENMT: Moist mucous membranes, missing teeth, dentures not in place  NERVOUS SYSTEM: Alert; oriented to person only. gait not assessed  CHEST/LUNG: Clear to percussion bilaterally; No rales, rhonchi, wheezing, or rubs  HEART: Regular rate and rhythm; No murmurs, rubs, or gallops  ABDOMEN: Soft, Nontender, distended Bowel sounds present  EXTREMITIES:  2+ Peripheral Pulses, no edema seen  Cordero in place draining yellow nonbloody urine    Consultant(s) Notes Reviewed:  [x ] YES  [ ] NO      LABS:                        8.7    7.72  )-----------( 158      ( 31 Jul 2018 07:32 )             25.1     07-31    140  |  111<H>  |  3<L>  ----------------------------<  79  5.0   |  22  |  0.55    Ca    8.5      31 Jul 2018 07:32  Phos  2.2     07-31  Mg     1.2     07-31    TPro  5.2<L>  /  Alb  1.9<L>  /  TBili  1.1  /  DBili  x   /  AST  61<H>  /  ALT  25  /  AlkPhos  119  07-31 7/31/2018: HIDA scan: No sign of cholecystitis    7/31/ 2018 MR MRCP No Cont  No biliary dilatation or choledocholithiasis. Gallstones.    Hepatic steatosis.    Resolution of right-sided hydronephrosis and decreased mild left-sided   hydronephrosis.    Stable small ascites.    Nonspecific peripancreatic edema may be secondary to generalized   anasarca/third spacing of fluid. However, correlate with laboratory   values for acute pancreatitis.

## 2018-07-31 NOTE — PROGRESS NOTE ADULT - SUBJECTIVE AND OBJECTIVE BOX
Reason for Admission: altered mental status	    Patient is a 52 y/o/f with alcohol abuse hx, anemia, asthma/COPD, chronic renal insufficiency, GERD, GI bleed, Hepatitic steatosis who presented to  ED after unwitnessed fall and prior day of confusion.  ED obtained info from significant other.  Last drink was Sunday 07-22.  Patient became confused 1 day prior to ED presentation.  Then boyfriend found her on the floor.  She has also slept a lot more in the past few days. In ED head CT neg, UDS neg except for THC,  neg alcohol.  UA abn so levaquin given. While in ED, pt was uncooperative w nursing.  Did not answer questions for me, just asked me the time and grunted.     7/27: seen and eval. hemodynamically stable. Denies any HA, CP, SOB. Patient very deconditioned. patient's family tried to be reached over the phone, no answer. Will discuss patient care with family.     Addendum: Patient's significant other has been reached. apperently patient drinks 3-4 beers daily and states in february patient had etoh withdrawal seizures.  suspects that she was more confused past 2 days and suspects it is etoh related.     7/28: seen and eval. currently getting EEG. pending MRIs. Care discussed with neuro /  family.     7/29: seen and eval. hemodynamically stable. Denies any HA, CP, SOB - but it is important to know that patient is a poor historian and very forgetful. Just completed EEG. Patient very confused, confabulating, trouble coordinating finger / nose / finger test.      7/30: patient's mental status slowly improving but not at baseline. Answers simple questions. more awake. denies any CP, SOB. No fevers, chills or shakes. Labs and vitals reviewed. patient encouraged to get up today and start working with physical therapy. Encourage to maintain good oral nutritional intake. Strongly recommended to stop drinking alcohol. Overnight patient had a drop in H/H requiring transfusion of 1 unit of PRBCs. Will closely monitor H.H. Patient does not recall ever having a colonoscopy in the past.      7/31: seen and eval. hemodynamically stable. Denies any HA, CP, SOB. HIDA scan normal. pending MRCP    REVIEW OF SYSTEMS:  poor historian.     Physical Exam:   ICU Vital Signs Last 24 Hrs  T(C): 36.9 (31 Jul 2018 11:24), Max: 37.1 (31 Jul 2018 05:41)  T(F): 98.4 (31 Jul 2018 11:24), Max: 98.8 (31 Jul 2018 05:41)  HR: 95 (31 Jul 2018 11:24) (95 - 105)  BP: 106/77 (31 Jul 2018 11:24) (101/61 - 106/77)  RR: 18 (31 Jul 2018 11:24) (18 - 18)  SpO2: 97% (31 Jul 2018 11:24) (97% - 100%)  GENERAL APPEARANCE:  frail, deconditioned, skinny  HEENT: poor dentition, + vertical nystagmus  Skin:  dry, thin skin  NECK:  Supple without lymphadenopathy.   HEART:  Regular rate and rhythm. normal S1 and S2, No M/R/G  LUNGS:  Good ins/exp effort, no W/R/R/C  ABDOMEN:  Soft, nontender, nondistended with good bowel sounds heard  EXTREMITIES:  Without cyanosis, clubbing or edema.   NEUROLOGICAL:  Gross nonfocal, very tired and sleepy, poor historian due to tangental converstion / confused. Ataxia. 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  - Normal 24 hour video EEG recording.  - d/c CIWA  - d/c IV Levaquin      # Anemia acute on chronic -  r/o GI bleed vs dilutional vs secondary to bone marrow suppression secondary to extensive etoh use.   - patient's H/H throughout the hospitalization has been trending down - suspected to be initially dilutional in the setting of dehydration / IV hydration  - overnight it trended down to 6.9, required 7/30 1 unit of PRBC transfusion  - trending down H/H  - HIDa scan reviewed, MRCP pending  - never had a age related screen for colonoscopy  - pending GI eval    # Alcohol hx  - reviewed  B12, folate levels  - patient received thiamine 500 mg IV x 1 yesterday, now 200 mg po TID (total 2/5 days)    # GERD  - continue protonix    # transaminitis due to hepatitic steatosis in past by MRCP   - repeat LFTs  - Pt/INR    # hypokalemia / hypomagnesemia /  hypophosphoremia secondary to poor appetite /  etoh use / IV hydration  - monitor closely after the repletion    # appears cachectic  - labs as above Reason for Admission: altered mental status	    Patient is a 52 y/o/f with alcohol abuse hx, anemia, asthma/COPD, chronic renal insufficiency, GERD, GI bleed, Hepatitic steatosis who presented to  ED after unwitnessed fall and prior day of confusion.  ED obtained info from significant other.  Last drink was Sunday 07-22.  Patient became confused 1 day prior to ED presentation.  Then boyfriend found her on the floor.  She has also slept a lot more in the past few days. In ED head CT neg, UDS neg except for THC,  neg alcohol.  UA abn so levaquin given. While in ED, pt was uncooperative w nursing.  Did not answer questions for me, just asked me the time and grunted.     7/27: seen and eval. hemodynamically stable. Denies any HA, CP, SOB. Patient very deconditioned. patient's family tried to be reached over the phone, no answer. Will discuss patient care with family.     Addendum: Patient's significant other has been reached. apperently patient drinks 3-4 beers daily and states in february patient had etoh withdrawal seizures.  suspects that she was more confused past 2 days and suspects it is etoh related.     7/28: seen and eval. currently getting EEG. pending MRIs. Care discussed with neuro /  family.     7/29: seen and eval. hemodynamically stable. Denies any HA, CP, SOB - but it is important to know that patient is a poor historian and very forgetful. Just completed EEG. Patient very confused, confabulating, trouble coordinating finger / nose / finger test.      7/30: patient's mental status slowly improving but not at baseline. Answers simple questions. more awake. denies any CP, SOB. No fevers, chills or shakes. Labs and vitals reviewed. patient encouraged to get up today and start working with physical therapy. Encourage to maintain good oral nutritional intake. Strongly recommended to stop drinking alcohol. Overnight patient had a drop in H/H requiring transfusion of 1 unit of PRBCs. Will closely monitor H.H. Patient does not recall ever having a colonoscopy in the past.      7/31: seen and eval. hemodynamically stable. Denies any HA, CP, SOB. HIDA scan normal. pending MRCP. Anticipated endoscopy in the am.     REVIEW OF SYSTEMS:  poor historian.     Physical Exam:   ICU Vital Signs Last 24 Hrs  T(C): 36.9 (31 Jul 2018 11:24), Max: 37.1 (31 Jul 2018 05:41)  T(F): 98.4 (31 Jul 2018 11:24), Max: 98.8 (31 Jul 2018 05:41)  HR: 95 (31 Jul 2018 11:24) (95 - 105)  BP: 106/77 (31 Jul 2018 11:24) (101/61 - 106/77)  RR: 18 (31 Jul 2018 11:24) (18 - 18)  SpO2: 97% (31 Jul 2018 11:24) (97% - 100%)  GENERAL APPEARANCE:  frail, deconditioned, skinny  HEENT: poor dentition, + vertical nystagmus  Skin:  dry, thin skin  NECK:  Supple without lymphadenopathy.   HEART:  Regular rate and rhythm. normal S1 and S2, No M/R/G  LUNGS:  Good ins/exp effort, no W/R/R/C  ABDOMEN:  Soft, nontender, nondistended with good bowel sounds heard  EXTREMITIES:  Without cyanosis, clubbing or edema.   NEUROLOGICAL:  Gross nonfocal, very tired and sleepy, poor historian due to tangental converstion / confused. Ataxia. 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  - Normal 24 hour video EEG recording.  - d/c CIWA  - d/c IV Levaquin      # Anemia acute on chronic -  r/o GI bleed vs dilutional vs secondary to bone marrow suppression secondary to extensive etoh use.   - patient's H/H throughout the hospitalization has been trending down - suspected to be initially dilutional in the setting of dehydration / IV hydration  - overnight it trended down to 6.9, required 7/30 1 unit of PRBC transfusion  - trending down H/H  - HIDa scan reviewed, MRCP pending  - never had a age related screen for colonoscopy  - pending GI eval    # Alcohol hx  - reviewed  B12, folate levels  - patient received thiamine 500 mg IV x 1 yesterday, now 200 mg po TID (total 2/5 days)    # GERD  - continue protonix    # transaminitis due to hepatitic steatosis in past by MRCP   - repeat LFTs  - Pt/INR    # hypokalemia / hypomagnesemia /  hypophosphoremia secondary to poor appetite /  etoh use / IV hydration  - monitor closely after the repletion    # appears cachectic  - labs as above

## 2018-07-31 NOTE — PROGRESS NOTE ADULT - PROBLEM SELECTOR PLAN 1
- s/p fall- CT head and MRI head WNL  -EEG negative  - d/c levaquin today as patient completed 3 day course

## 2018-07-31 NOTE — PROGRESS NOTE ADULT - PROBLEM SELECTOR PLAN 2
- Pt has significant ETOH use history  - continue IV Thiamine now 200 mg IV 3x day for a total of 5 days;  will consider switch to PO Thiamine in AM  - f/u Neuro recommendations

## 2018-07-31 NOTE — PROGRESS NOTE ADULT - PROBLEM SELECTOR PLAN 8
Encourage PO intake  -Magnesium repeatedly low during hospitalization. Pt started on Mag -ox TID with meals as supplementation

## 2018-07-31 NOTE — CONSULT NOTE ADULT - SUBJECTIVE AND OBJECTIVE BOX
CC RUQ abd pain   HPI:  53 yr old female w alcohol abuse hx, anemia, asthma/COPD, chronic renal insufficiency, GERD, GI bleed, Hepatitic steatosis who presented to  ED after unwitnessed fall and prior day of confusion      pt appears alert today  has RUQ abd pain non radiating, worse after eating  denies brbpr or melena  bm's regular   no jaundice or pruritis   no early satiety      PAST MED HX  1) Alcohol abuse w hx withdrawl seizures; last drank   2) Anemia w hx blood transfusion  3) Asthma/COPD  4) Chronic renal insufficiency  5) GERD  6) GI bleed w neg EGD reported ; no hx colonoscopy  7) Nephrolithiasis  8) Tachycardia hx SVT  9) Transaminitis  due to hepatitic steatosis as per MRCP at [facility]    PAST SURG HX  Cystoscopy and laser lithotripsy performed w removal of L renal stone w stent  w Dr. Forrest  Hysterectomy          FAM HX  unable to obtain due to AMS (2018 06:45)      PAST MEDICAL & SURGICAL HISTORY:  Anemia: h/o blood transfusion 6 months ago  Alcohol-induced anxiety disorder  Alcohol withdrawal seizure without complication: x 1  patient unable to recall date  AA (alcohol abuse): sober since 2017  GERD (gastroesophageal reflux disease)  Asthma with COPD: last PFT within the year  no recent respiratory infection   on symbicort daily  have not used ventolin for months  Cholelithiasis  Renal calculi  Lyme disease  Tachycardia  S/P hysterectomy:   S/P :       Allergies    amoxicillin (Other)  Ceclor (Other)    Intolerances        MEDICATIONS  (STANDING):  buDESOnide 160 MICROgram(s)/formoterol 4.5 MICROgram(s) Inhaler 2 Puff(s) Inhalation two times a day  buDESOnide 160 MICROgram(s)/formoterol 4.5 MICROgram(s) Inhaler 2 Puff(s) Inhalation two times a day  diltiazem    milliGRAM(s) Oral daily  folic acid 1 milliGRAM(s) Oral daily  magnesium oxide 400 milliGRAM(s) Oral three times a day with meals  multivitamin 1 Tablet(s) Oral daily  pantoprazole    Tablet 40 milliGRAM(s) Oral before breakfast  polyethylene glycol 3350 17 Gram(s) Oral two times a day  thiamine IVPB 200 milliGRAM(s) IV Intermittent three times a day    MEDICATIONS  (PRN):  ondansetron    Tablet 4 milliGRAM(s) Oral every 8 hours PRN Nausea      FAMILY HISTORY:  No pertinent family history in first degree relatives      Social History: strong hx of alcohol intake; reports no tobacco    REVIEW OF SYSTEMS      General:	No fever or chills    Skin/Breast: No jaundice or rash   		  ENMT: denies sore throat or thrush    Respiratory and Thorax: Denies dyspnea or cough or shortness of breath  	  Cardiovascular: Denies chest pain or palpitations 	    Gastrointestinal: Denies jaundice or pruritis    Genitourinary: Denies dysuria or hematuria	    Musculoskeletal: Denies muscular pain or swelling	    Neurological: Denies confusion or tremor	    Hematology/Lymphatics: Denies easy bruising or bleeding 	    Endocrine:	Denies polyphagia or polyuria    See above hx otherwise neg for any major organ systems    PHYSICAL EXAM:    Vital Signs Last 24 Hrs  T(C): 37.1 (2018 05:41), Max: 37.1 (2018 05:41)  T(F): 98.8 (2018 05:41), Max: 98.8 (2018 05:41)  HR: 105 (2018 05:41) (97 - 105)  BP: 101/61 (2018 05:41) (101/61 - 119/82)  BP(mean): --  RR: 18 (2018 17:10) (17 - 18)  SpO2: 100% (2018 05:41) (100% - 100%)    Constitutional: no acute distress    ENMT: NC/AT scl anicteric opm pink no lesions     Neck: supple. No jvd or LN    Respiratory: Clear     Cardiovascular: RRR s1s2     Gastrointestinal: Pos bs , soft , not tender, no hepatosplenomegaly,  no mass        Back: No CVA tenderness    Extremities: NO cce     Neurological: Alert and oriented x 3     Skin: No rash or jaundice     Date/Time: @ 07:50    Albumin: --  ALT/SGPT: --  Alk Phos: --  AST/SGOT: --  Bilirubin Direct: --  Bilirubin Total: --  Ca: 7.7  eGFR : 128  eGFR Non-: 111  Lipase: --  Amylase: --  INR: --  PTT: --          142  |  114<H>  |  3<L>  ----------------------------<  90  3.7   |  18<L>  |  0.51    Ca    7.7<L>      2018 07:50  Phos  1.8       Mg     1.2         Hepatic Function Panel in AM (18 @ 05:46)    Indirect Reacting Bilirubin: 0.8 mg/dL    Protein Total, Serum: 5.7 gm/dL    Albumin, Serum: 1.6 g/dL    Bilirubin Total, Serum: 1.3 mg/dL    Bilirubin Direct, Serum: 0.5 mg/dL    Alkaline Phosphatase, Serum: 114 U/L    Aspartate Aminotransferase (AST/SGOT): 47 U/L    Alanine Aminotransferase (ALT/SGPT): 26 U/L    Lipase, Serum: 116 U/L (18 @ 18:02)                              10.1   x     )-----------( x        ( 2018 15:32 )             29.3   < from: CT Abdomen and Pelvis No Cont (18 @ 16:14) >    EXAM:  CT ABDOMEN AND PELVIS                            PROCEDURE DATE:  2018          INTERPRETATION:  CT ABDOMEN AND PELVIS    HISTORY: Generalized abdominal pain and abdominal distention    Technique: CT of the abdomen and pelvis is performed without oral or   intravenous contrast. Axial images are supplemented with coronal and   sagittal reformations. This study was performed using automatic exposure   control (radiation dose reduction software) to obtain a diagnostic image   quality scan with patient dose as low as reasonably achievable.    Contrast:     None    Comparison: CT abdomen and pelvis 2018    Findings:  LIVER: Diffuse steatosis.  SPLEEN: Normal.  PANCREAS: Normal.  GALLBLADDER/BILIARY TREE: Nondilated. Gallstones.  ADRENALS: Normal.  KIDNEYS: Stable asymmetric left renal atrophy. Punctate bilateral   nonobstructing calculi. Mild bilateral hydroureteronephrosis without   evidence of calculus.  LYMPHADENOPATHY/RETROPERITONEUM: No adenopathy.  VASCULATURE: Normalcaliber aorta.  BOWEL: No bowel-related abnormality. Specifically, no bowel obstruction.   Normal appendix.  PELVIC VISCERA: Status post hysterectomy. Markedly distended urinary   bladder.  PELVIC LYMPH NODES: No pelvic adenopathy.  PERITONEUM/ABDOMINAL WALL: Small ascites. Diffuse body wall edema.  SKELETAL: No acute bony abnormality.  LUNG BASES: Small bilateral pleural effusions.    IMPRESSION:     Markedly distended urinary bladder resulting in mild bilateral   hydroureteronephrosis without obstructing calculus. Correlate for urinary   retention.    Interval development of anasarca with small bilateral pleural effusions,   small volume ascites, and diffuse body wall edema.                BENNY ESTRELLA   This document has been electronicallysigned. 2018  4:30PM                < end of copied text >  < from: US Abdomen Limited (18 @ 20:19) >    EXAM:  US ABDOMEN LIMITED                            PROCEDURE DATE:  2018          INTERPRETATION:  Exam Date: 2018    Ultrasound of the right upper abdomen         CLINICAL INFORMATION:  Elevated LFTs. EtOH. History of gallstones. Rule   out cholecystitis     TECHNIQUE: Sonography of the right upper quadrant was performed.    COMPARISON: Right upper abdominal ultrasound dated 9/15/2017 is available   for review.    FINDINGS:        The liver demonstrates homogeneous hyperechogenicity without focal   lesion, consistent with hepatic steatosis, as visualized on right upper   quadrant ultrasound of 9/15/17.  Hepatic size is within normal limits   measuring 15.5cm. The hepatic contours are maintained.  The main hepatic   and portal veins appear patent.  There is normal hepatopetal flow of the   portal vein.  No intrahepatic or ductal dilatation is found.   The common   duct is not dilated, measuring 0.6 cm.  The gallbladder is intact with   redemonstration of multiple shadowing calculi.  There is no gallbladder   wall thickening. No pericholecystic fluid. No tenderness was elicited   with direct compression by the transducer (no sonographic Alexander's sign   identified).  The pancreas appears intact, allowing for the limited   evaluation by the ultrasound technique.      The right kidney measures 4.1 cm in length.  It demonstrates no mass,   calculus or hydronephrosis.      The abdominal aorta measures normal caliber at 1.9 cm in maximum anterior   to posterior dimensions.      The retroperitoneal structures and IVC appear intact.      IMPRESSION:       No sonographic evidence for acute cholecystitis.    Cholelithiasis.    Chronic hepatic steatosis.      < end of copied text >
53 yr old female w alcohol abuse hx, anemia, asthma/COPD, chronic renal insufficiency, GERD, GI bleed, Hepatitic steatosis who presented to  ED after unwitnessed fall and prior day of confusion. ED obtained info from significant other.  Last drink was .  Patient became confused 1 day prior to ED presentation.  Then boyfriend found her on the floor.  She has also slept a lot more in the past few days.    She was found to have urinary retention, morel catheter was placed with 1600 mL drained. Patient and boyfriend report that she has had increasing difficulty voiding, with straining and sensation of incomplete emptying for past few months. She has not been evaluated for this. She has seen a urologist, Dr Forrest, but for stone disease and not for voiding issues.     PAST MED HX  1) Alcohol abuse w hx withdrawl seizures; last drank   2) Anemia w hx blood transfusion  3) Asthma/COPD  4) Chronic renal insufficiency  5) GERD  6) GI bleed w neg EGD reported ; no hx colonoscopy  7) Nephrolithiasis  8) Tachycardia hx SVT  9) Transaminitis  due to hepatitic steatosis as per MRCP at [facility]    PAST SURG HX  Cystoscopy and laser lithotripsy performed w removal of L renal stone w stent  w Dr. Forrest  Hysterectomy          FAM HX  unable to obtain due to AMS    Review of Systems:  Review of Systems: unable to obtain due to altered mental status        Allergies and Intolerances:        Allergies:  	amoxicillin: Drug, Other, Originally Entered as [see Comment: family allergic] reaction to [Amoxicillin]  	Ceclor: Drug, Other, Originally Entered as [see Comment: parents and kids allergic] reaction to [Ceclor]    Home Medications:   * Patient Currently Takes Medications as of 2018 06:44 documented in Structured Notes  · 	pantoprazole 40 mg oral delayed release tablet: 1 tab(s) orally once a day, Last Dose Taken:    · 	dilTIAZem 120 mg/24 hours oral capsule, extended release: 1 cap(s) orally once a day, Last Dose Taken:    · 	Symbicort 160 mcg-4.5 mcg/inh inhalation aerosol: 2 puff(s) inhaled 2 times a day, Last Dose Taken:    · 	Aspirin Enteric Coated 81 mg oral delayed release tablet: 1 tab(s) orally once a day, Last Dose Taken:      Patient History:   Social History:  Social History (marital status, living situation, occupation, tobacco use, alcohol and drug use, and sexual history): Single  	Last drank  volume and daily intake unknown  smoker amount unknown        Physical Exam:  Vital Signs Last 24 Hrs  T(C): 36.3 (2018 17:18), Max: 37.1 (2018 05:41)  T(F): 97.4 (2018 17:18), Max: 98.8 (2018 05:41)  HR: 89 (2018 17:18) (89 - 105)  BP: 98/59 (2018 17:18) (98/59 - 106/77)  BP(mean): --  RR: 18 (2018 17:18) (18 - 18)  SpO2: 98% (2018 17:18) (97% - 100%)      Physical Exam:  · Constitutional  detailed exam    · Constitutional Details  no distress; cachectic; appearing older than stated age, in NAD    · Constitutional Comments  limited cooperation w interview and exam    · Eyes  EOMI; PERRL; no drainage or redness    · ENMT  detailed exam    · ENMT Details  mouth    · Mouth  dry; missing teeth    · Neck  detailed exam     · Neck Details  supple; no JVD    · Breasts  not examined    · Back  No deformity or limitation of movement     · Respiratory  detailed exam    · Respiratory Details  breath sounds equal; no intercostal retractions; no rales; no rhonchi; no wheezes    · Cardiovascular  detailed exam    · Cardiovascular Details  regular rate and rhythm  no murmur     · Gastrointestinal  detailed exam    · GI Normal  soft; nontender; bowel sounds normal; no rebound tenderness    · Genitourinary  no SP tenderness, morel draining clear urine     · Rectal  not examined     · Extremities  No cyanosis, clubbing or edema     · Vascular  Equal and normal pulses (carotid, femoral, dorsalis pedis)     · Neurological  detailed exam    · Mental Status  awakes to name and to touch  limited speech    · Motor  moves all extremities    · Sensory  unable to determine    · Gait/Balance  not tested    · Skin  No lesions; no rash    · Lymph Nodes  not examined                            9.6    x     )-----------( x        ( 2018 20:35 )             27.6     07-    140  |  111<H>  |  3<L>  ----------------------------<  79  5.0   |  22  |  0.55    Ca    8.5      2018 07:32  Phos  2.2       Mg     1.2         TPro  5.2<L>  /  Alb  1.9<L>  /  TBili  1.1  /  DBili  x   /  AST  61<H>  /  ALT  25  /  AlkPhos  119      Urinalysis (18 @ 22:37)    Blood, Urine: Trace    Glucose Qualitative, Urine: Negative mg/dL    pH Urine: 7.0    Color: Vivienne    Urine Appearance: Clear    Bilirubin: Large    Ketone - Urine: Moderate    Specific Gravity: 1.010    Protein, Urine: 30 mg/dL    Urobilinogen: 12 mg/dL    Nitrite: Positive    Leukocyte Esterase Concentration: Moderate    Urine Microscopic-Add On (NC) (18 @ 22:37)    Bacteria: Moderate    Epithelial Cells: Few    Red Blood Cell - Urine: 3-5 /HPF    White Blood Cell - Urine: 11-25    Hyaline Casts: 0-2 /LPF    Culture - Urine (18 @ 22:37)    -  Gentamicin: R >8    -  Imipenem: S <=1    -  Levofloxacin: S <=1    -  Meropenem: S <=1    -  Nitrofurantoin: S <=32 Should not be used to treat pyelonephritis    -  Piperacillin/Tazobactam: S <=8    -  Tigecycline: S <=1    -  Tobramycin: S 4    -  Trimethoprim/Sulfamethoxazole: S <=0.5/9.5    -  Amikacin: S <=8    -  Amoxicillin/Clavulanic Acid: S <=8/4    -  Ampicillin: S <=2 These ampicillin results predict results for amoxicillin    -  Ampicillin/Sulbactam: S <=4/2    -  Aztreonam: S <=4    -  Cefazolin: S <=2 This predicts results for oral agents cefaclor, cefdinir, cefpodoxime, cefprozil, cefuroxime axetil, cephalexin and locarbef for uncomplicated UTI. Note that some isolates may be susceptible to these agents while testing resistant to cefazolin.    -  Cefepime: S <=2    -  Cefoxitin: S <=4    -  Ceftriaxone: S <=1 Enterobacter, Citrobacter, and Serratia may develop resistance during prolonged therapy    -  Ciprofloxacin: S <=0.5    -  Ertapenem: S <=0.5    Specimen Source: .Urine Catheterized    Culture Results:   50,000 - 99,000 CFU/mL Escherichia coli    Organism Identification: Escherichia coli    Organism: Escherichia coli    Method Type: NORA
CC: 53 y old  Female who presents with a chief complaint of altered mental status (2018 06:45)    HPI: 52 y/o/f with alcohol abuse hx, anemia, asthma/COPD, chronic renal insufficiency, GERD, GI bleed, Hepatitic steatosis who presented to  ED after unwitnessed fall, became confused 1 day prior to ED presentation, boyfriend found her on the floor.  She has also slept a lot more in the past few days.    In ED head CT neg, UDS neg except for THC,  neg alcohol. While in ED, pt was uncooperative and did not answer questions.     Patient's significant other states patient drinks 3-4 beers daily and states in february patient had etoh withdrawal seizures. EEG (17)    normal EEG from with the patient awake and drowsy.    At the time of my exam, pt is confused, possibly confabulates, no gross dysarthria or aphasia, barely able to provide history or participate in exam.    Denies any HA, CP, SOB  PAST MED HX  1) Alcohol abuse w hx withdrawl seizures; last drank   2) Anemia w hx blood transfusion  3) Asthma/COPD  4) Chronic renal insufficiency  5) GERD  6) GI bleed w neg EGD reported ; no hx colonoscopy  7) Nephrolithiasis  8) Tachycardia hx SVT  9) Transaminitis  due to hepatitic steatosis as per MRCP at [facility]    PAST SURG HX  Cystoscopy and laser lithotripsy performed w removal of L renal stone w stent  w Dr. Forrest  Hysterectomy          FAM HX  unable to obtain due to AMS (2018 06:45)      PAST MEDICAL & SURGICAL HISTORY:  Anemia: h/o blood transfusion 6 months ago  Alcohol-induced anxiety disorder  Alcohol withdrawal seizure without complication: x 1  patient unable to recall date  AA (alcohol abuse): sober since 2017  GERD (gastroesophageal reflux disease)  Asthma with COPD: last PFT within the year  no recent respiratory infection   on symbicort daily  have not used ventolin for months  Cholelithiasis  Renal calculi  Lyme disease  Tachycardia  S/P hysterectomy:   S/P :       FAMILY HISTORY:  unable to obatin    Social Hx:  Single, ETOH use; Last drank  volume and daily intake unknown  smoker     Home Medications:   * Patient Currently Takes Medications as of 2018 06:44 documented in Structured Notes  · 	pantoprazole 40 mg oral delayed release tablet: 1 tab(s) orally once a day, Last Dose Taken:    · 	dilTIAZem 120 mg/24 hours oral capsule, extended release: 1 cap(s) orally once a day, Last Dose Taken:    · 	Symbicort 160 mcg-4.5 mcg/inh inhalation aerosol: 2 puff(s) inhaled 2 times a day, Last Dose Taken:    · 	Aspirin Enteric Coated 81 mg oral delayed release tablet: 1 tab(s) orally once a day, Last Dose Taken:      MEDICATIONS  (STANDING):  aspirin enteric coated 81 milliGRAM(s) Oral daily  buDESOnide 160 MICROgram(s)/formoterol 4.5 MICROgram(s) Inhaler 2 Puff(s) Inhalation two times a day  diltiazem    milliGRAM(s) Oral daily  folic acid 1 milliGRAM(s) Oral daily  heparin  Injectable 5000 Unit(s) SubCutaneous every 8 hours  levoFLOXacin  Tablet 500 milliGRAM(s) Oral every 24 hours  multivitamin 1 Tablet(s) Oral daily  pantoprazole    Tablet 40 milliGRAM(s) Oral before breakfast  sodium chloride 0.9% with potassium chloride 20 mEq/L 1000 milliLiter(s) (100 mL/Hr) IV Continuous <Continuous>  thiamine 100 milliGRAM(s) Oral daily     Allergies  amoxicillin (Other)  Ceclor (Other)    Intolerances    ROS: Pertinent positives in HPI, all other ROS were reviewed and are negative.      Vital Signs Last 24 Hrs  T(C): 36.5 (2018 10:08), Max: 36.9 (2018 22:16)  T(F): 97.7 (2018 10:08), Max: 98.5 (2018 00:15)  HR: 88 (2018 10:08) (88 - 110)  BP: 123/82 (2018 10:08) (121/81 - 142/96)  BP(mean): --  RR: 16 (2018 10:08) (16 - 18)  SpO2: 100% (2018 10:08) (100% - 100%)    GE:  Constitutional: awake and alert, inattentive.  HEENT: PERRLA, Dysconjugate gaze, diplopia in primary position,  Neck: Supple.  Respiratory: Breath sounds are clear bilaterally  Cardiovascular: S1 and S2, regular rhythm  Extremities:  no edema  Vascular: Caritid Bruit - no  Musculoskeletal: tremors  Skin: No rashes    Neurological exam:  HF: A x O x self, confused, inattentive, decreased concentration, Speech fluent, no Aphasia, Naming intact, content - irrelevant   CN: PRASHANT, Dysconjugate gaze, diplopia in primary position, facial sensation normal, no NLFD, tongue midline, Palate moves equally  Motor: No pronator drift, moves all 4 antigravity, unable to sustain, tremolous hands    Sens: Intact to light touch / unable to assess PP/ VS/ JS - intact   Reflexes: BJ 2+, BR 2+, KJ 2+, AJ trace, left plantar upgoing  Coord: Dysmetria +, Probable left ataxia    Gait/Balance: Cannot test    Labs:       139  |  106  |  9   ----------------------------<  113<H>  5.1   |  15<L>  |  0.56    Ca    8.2<L>      2018 06:11  Phos  2.6       Mg     1.6         TPro  6.3  /  Alb  2.4<L>  /  TBili  1.9<H>  /  DBili  1.1<H>  /  AST  64<H>  /  ALT  30  /  AlkPhos  129<H>   [id number] 4.9                          8.6    10.19 )-----------( 213      ( 2018 06:11 )             25.4       Radiology:  - CT Head: < from: CT Head No Cont (18 @ 18:42) >  No acute intracranial hemorrhage, mass effect, or midline shift. No   interval change since head CT of 3/22/17.      < end of copied text >

## 2018-07-31 NOTE — PROGRESS NOTE ADULT - PROBLEM SELECTOR PLAN 4
- likely megaloblastic in nature due to low folate and B12 on admission and significant ETOH use history   - Hemoglobin 7.9 this AM (downtrending during hospitalization- 10.9 on admission) s/p blood transfusion x1 on 7/30  - repeat H&H at 1900 tonight.  -GI consulted to help determine if GI bleed as cause for GI bleed; HIDA scan- neg, MRCP- neg,  Endoscopy planned for AM

## 2018-07-31 NOTE — PROGRESS NOTE ADULT - PROBLEM SELECTOR PLAN 5
- significant ETOH use h/o  - last drink 7/22 therefore pt is not at risk of ETOH withdrawal seizures  - CIWA protocol d/c today as patient is not scoring

## 2018-08-01 LAB
ALBUMIN SERPL ELPH-MCNC: 2.1 G/DL — LOW (ref 3.3–5)
ALP SERPL-CCNC: 130 U/L — HIGH (ref 40–120)
ALT FLD-CCNC: 24 U/L — SIGNIFICANT CHANGE UP (ref 12–78)
ANION GAP SERPL CALC-SCNC: 9 MMOL/L — SIGNIFICANT CHANGE UP (ref 5–17)
AST SERPL-CCNC: 49 U/L — HIGH (ref 15–37)
BASOPHILS # BLD AUTO: 0.02 K/UL — SIGNIFICANT CHANGE UP (ref 0–0.2)
BASOPHILS NFR BLD AUTO: 0.3 % — SIGNIFICANT CHANGE UP (ref 0–2)
BILIRUB SERPL-MCNC: 1.1 MG/DL — SIGNIFICANT CHANGE UP (ref 0.2–1.2)
BUN SERPL-MCNC: 4 MG/DL — LOW (ref 7–23)
CALCIUM SERPL-MCNC: 8.6 MG/DL — SIGNIFICANT CHANGE UP (ref 8.5–10.1)
CHLORIDE SERPL-SCNC: 106 MMOL/L — SIGNIFICANT CHANGE UP (ref 96–108)
CO2 SERPL-SCNC: 25 MMOL/L — SIGNIFICANT CHANGE UP (ref 22–31)
CREAT SERPL-MCNC: 0.53 MG/DL — SIGNIFICANT CHANGE UP (ref 0.5–1.3)
CULTURE RESULTS: SIGNIFICANT CHANGE UP
CULTURE RESULTS: SIGNIFICANT CHANGE UP
EOSINOPHIL # BLD AUTO: 0.08 K/UL — SIGNIFICANT CHANGE UP (ref 0–0.5)
EOSINOPHIL NFR BLD AUTO: 1 % — SIGNIFICANT CHANGE UP (ref 0–6)
GLIADIN PEPTIDE IGA SER-ACNC: 5.1 UNITS — SIGNIFICANT CHANGE UP
GLIADIN PEPTIDE IGA SER-ACNC: NEGATIVE — SIGNIFICANT CHANGE UP
GLIADIN PEPTIDE IGG SER-ACNC: <5 UNITS — SIGNIFICANT CHANGE UP
GLIADIN PEPTIDE IGG SER-ACNC: NEGATIVE — SIGNIFICANT CHANGE UP
GLUCOSE SERPL-MCNC: 89 MG/DL — SIGNIFICANT CHANGE UP (ref 70–99)
HCT VFR BLD CALC: 28.7 % — LOW (ref 34.5–45)
HGB BLD-MCNC: 9.9 G/DL — LOW (ref 11.5–15.5)
IMM GRANULOCYTES NFR BLD AUTO: 1.6 % — HIGH (ref 0–1.5)
IRON SATN MFR SERPL: 23 % — SIGNIFICANT CHANGE UP (ref 14–50)
IRON SATN MFR SERPL: 28 UG/DL — LOW (ref 30–160)
LYMPHOCYTES # BLD AUTO: 1.28 K/UL — SIGNIFICANT CHANGE UP (ref 1–3.3)
LYMPHOCYTES # BLD AUTO: 16.5 % — SIGNIFICANT CHANGE UP (ref 13–44)
MAGNESIUM SERPL-MCNC: 1.6 MG/DL — SIGNIFICANT CHANGE UP (ref 1.6–2.6)
MCHC RBC-ENTMCNC: 32.7 PG — SIGNIFICANT CHANGE UP (ref 27–34)
MCHC RBC-ENTMCNC: 34.5 GM/DL — SIGNIFICANT CHANGE UP (ref 32–36)
MCV RBC AUTO: 94.7 FL — SIGNIFICANT CHANGE UP (ref 80–100)
MONOCYTES # BLD AUTO: 1.09 K/UL — HIGH (ref 0–0.9)
MONOCYTES NFR BLD AUTO: 14.1 % — HIGH (ref 2–14)
NEUTROPHILS # BLD AUTO: 5.15 K/UL — SIGNIFICANT CHANGE UP (ref 1.8–7.4)
NEUTROPHILS NFR BLD AUTO: 66.5 % — SIGNIFICANT CHANGE UP (ref 43–77)
NRBC # BLD: 0 /100 WBCS — SIGNIFICANT CHANGE UP (ref 0–0)
PHOSPHATE SERPL-MCNC: 3.9 MG/DL — SIGNIFICANT CHANGE UP (ref 2.5–4.5)
PLATELET # BLD AUTO: 164 K/UL — SIGNIFICANT CHANGE UP (ref 150–400)
POTASSIUM SERPL-MCNC: 3.7 MMOL/L — SIGNIFICANT CHANGE UP (ref 3.5–5.3)
POTASSIUM SERPL-SCNC: 3.7 MMOL/L — SIGNIFICANT CHANGE UP (ref 3.5–5.3)
PROT SERPL-MCNC: 5.5 GM/DL — LOW (ref 6–8.3)
RBC # BLD: 3.03 M/UL — LOW (ref 3.8–5.2)
RBC # FLD: 17.5 % — HIGH (ref 10.3–14.5)
SODIUM SERPL-SCNC: 140 MMOL/L — SIGNIFICANT CHANGE UP (ref 135–145)
SPECIMEN SOURCE: SIGNIFICANT CHANGE UP
SPECIMEN SOURCE: SIGNIFICANT CHANGE UP
TIBC SERPL-MCNC: 122 UG/DL — LOW (ref 220–430)
TTG IGA SER-ACNC: 7.7 UNITS — SIGNIFICANT CHANGE UP
TTG IGA SER-ACNC: NEGATIVE — SIGNIFICANT CHANGE UP
TTG IGG SER IA-ACNC: NEGATIVE — SIGNIFICANT CHANGE UP
TTG IGG SER-ACNC: <5 UNITS — SIGNIFICANT CHANGE UP
UIBC SERPL-MCNC: 94 UG/DL — LOW (ref 110–370)
WBC # BLD: 7.74 K/UL — SIGNIFICANT CHANGE UP (ref 3.8–10.5)
WBC # FLD AUTO: 7.74 K/UL — SIGNIFICANT CHANGE UP (ref 3.8–10.5)

## 2018-08-01 RX ORDER — SOD SULF/SODIUM/NAHCO3/KCL/PEG
4000 SOLUTION, RECONSTITUTED, ORAL ORAL ONCE
Qty: 0 | Refills: 0 | Status: COMPLETED | OUTPATIENT
Start: 2018-08-01 | End: 2018-08-02

## 2018-08-01 RX ORDER — MAGNESIUM OXIDE 400 MG ORAL TABLET 241.3 MG
400 TABLET ORAL DAILY
Qty: 0 | Refills: 0 | Status: DISCONTINUED | OUTPATIENT
Start: 2018-08-01 | End: 2018-08-05

## 2018-08-01 RX ORDER — THIAMINE MONONITRATE (VIT B1) 100 MG
500 TABLET ORAL
Qty: 0 | Refills: 0 | Status: COMPLETED | OUTPATIENT
Start: 2018-08-01 | End: 2018-08-03

## 2018-08-01 RX ORDER — THIAMINE MONONITRATE (VIT B1) 100 MG
200 TABLET ORAL
Qty: 0 | Refills: 0 | Status: DISCONTINUED | OUTPATIENT
Start: 2018-08-01 | End: 2018-08-01

## 2018-08-01 RX ADMIN — Medication 200 MILLIGRAM(S): at 11:34

## 2018-08-01 RX ADMIN — Medication 25 MILLIGRAM(S): at 14:37

## 2018-08-01 RX ADMIN — Medication 1 TABLET(S): at 11:32

## 2018-08-01 RX ADMIN — Medication 204 MILLIGRAM(S): at 05:37

## 2018-08-01 RX ADMIN — Medication 25 MILLIGRAM(S): at 21:29

## 2018-08-01 RX ADMIN — BUDESONIDE AND FORMOTEROL FUMARATE DIHYDRATE 2 PUFF(S): 160; 4.5 AEROSOL RESPIRATORY (INHALATION) at 20:05

## 2018-08-01 RX ADMIN — Medication 25 MILLIGRAM(S): at 07:26

## 2018-08-01 RX ADMIN — Medication 120 MILLIGRAM(S): at 05:38

## 2018-08-01 RX ADMIN — MAGNESIUM OXIDE 400 MG ORAL TABLET 400 MILLIGRAM(S): 241.3 TABLET ORAL at 11:32

## 2018-08-01 RX ADMIN — POLYETHYLENE GLYCOL 3350 17 GRAM(S): 17 POWDER, FOR SOLUTION ORAL at 18:46

## 2018-08-01 RX ADMIN — Medication 1 MILLIGRAM(S): at 11:32

## 2018-08-01 RX ADMIN — BUDESONIDE AND FORMOTEROL FUMARATE DIHYDRATE 2 PUFF(S): 160; 4.5 AEROSOL RESPIRATORY (INHALATION) at 09:33

## 2018-08-01 NOTE — PROGRESS NOTE ADULT - PROBLEM SELECTOR PLAN 9
- DVT prophylaxis scd. Heparin  and ASA held in concern of GI bleed  - DASH / TLC diet but pt is NPO after midnight for endoscopy - DVT prophylaxis scd. Heparin  and ASA held in concern of GI bleed  - DASH / TLC diet - DVT prophylaxis scd. Heparin  and ASA held in concern of GI bleed  - Clear liquid diet today in advance of Endoscopy. Will restart TLC/DASH diet after endoscopy

## 2018-08-01 NOTE — PROGRESS NOTE ADULT - PROBLEM SELECTOR PLAN 5
- significant ETOH use h/o  - last drink 7/22 therefore pt is not at risk of ETOH withdrawal seizures  - CIWA protocol d/c today as patient is not scoring - significant ETOH use h/o  - last drink 7/22 therefore pt is not at risk of ETOH withdrawal seizures  - [facility] protocol d/c'd 7/31

## 2018-08-01 NOTE — PROGRESS NOTE ADULT - PROBLEM SELECTOR PLAN 2
- Pt has significant ETOH use history  - continue IV Thiamine now 200 mg IV 3x day for a total of 5 days;  will consider switch to PO Thiamine in AM  - f/u Neuro recommendations - -likely cause of AMS  - Pt has significant ETOH use history  - continue IV Thiamine now 200 mg IV 3x day for a total of 5 days;  will consider switch to PO Thiamine in AM  - f/u Neuro recommendations - s/p fall- CT head and MRI head WNL  - EEG negative  - s/p Levaquin x 3 days for suspected UTI

## 2018-08-01 NOTE — PROGRESS NOTE ADULT - PROBLEM SELECTOR PLAN 4
- likely megaloblastic in nature due to low folate and B12 on admission and significant ETOH use history   - Hemoglobin 7.9 this AM (downtrending during hospitalization- 10.9 on admission) s/p blood transfusion x1 on 7/29  - repeat H&H at 1900 tonight.  -GI consulted to help determine if GI bleed as cause for GI bleed; HIDA scan- neg, MRCP- neg,  Endoscopy today- pending results - likely megaloblastic in nature due to low folate and B12 on admission and significant ETOH use history   - Hemoglobin 9.6 this AM (downtrending during hospitalization- 10.9 on admission) s/p blood transfusion x1 on 7/29  - repeat H&H at 1900 tonight.  -GI consulted to help determine if GI bleed as cause for GI bleed; HIDA scan- neg, MRCP- neg,  Endoscopy today- pending results - likely megaloblastic in nature due to low folate and B12 on admission and significant ETOH use history   - Hemoglobin 9.6 this AM (improved from 8.7 @ 7/31 AM) s/p blood transfusion x1 on 7/29  - repeat H&H?  -GI consulted to help determine if GI bleed as cause for GI bleed; HIDA scan- neg, MRCP- neg,  Endoscopy today- pending results - initially megaloblastic in nature due to low folate and B12 on admission and significant ETOH use history; now normocytic so there may be an anemia of chronic disease component to anemia  - Hemoglobin 9.6 this AM (improved from 8.7 @ 7/31 AM) s/p blood transfusion x1 on 7/29  -GI consulted to help determine if GI bleed as cause for GI bleed; HIDA scan- neg, MRCP- neg,  Endoscopy today- pending results

## 2018-08-01 NOTE — PROGRESS NOTE ADULT - PROBLEM SELECTOR PLAN 3
-improving from yesterday  - likely 2/2 to urinary retention  - Morel in place, draining non-bloody urine.   - Urology saw patient, notes that urinary retention is new issue for pt. . Bethanacol 25mg TID started. TOV before d/c. If TOV fails then pt will be d/c with morel and f/u Outpt with Urology.   - Urology consulted  - GI consulted for possibility of abd bleeding causing distention - improving from yesterday  - likely 2/2 to urinary retention  - Morel in place, draining non-bloody urine.   - Urology saw patient, notes that urinary retention is new issue for pt. . Bethanacol 25mg TID started. TOV before d/c. If TOV fails then pt will be d/c with morel and f/u Outpt with Urology.   - GI consulted for possibility of abd bleeding causing distention- HIDA scan, MRCP neg; Endoscopy today

## 2018-08-01 NOTE — PROGRESS NOTE ADULT - PROBLEM SELECTOR PLAN 1
- s/p fall- CT head and MRI head WNL  -EEG negative  - d/c levaquin today as patient completed 3 day course - s/p fall- CT head and MRI head WNL  - EEG negative  - s/p Levaquin x 3 days for suspected UTI - -likely cause of AMS  - Pt has significant ETOH use history  - continue IV Thiamine now 200 mg IV 3x day for a total of 5 days;  will consider switch to PO Thiamine in AM  - f/u Neuro recommendations - -likely cause of AMS  - Pt has significant ETOH use history  - IV Thiamine switched to PO Thiamine 200mg q8h for 2 more days. Pt will then have received a total of 5 days of thiamine supplementation  - f/u Neuro recommendations

## 2018-08-01 NOTE — PROGRESS NOTE ADULT - PROBLEM SELECTOR PLAN 8
Encourage PO intake  -Magnesium repeatedly low during hospitalization. Pt started on Mag -ox TID with meals as supplementation Encourage PO intake  -Magnesium repeatedly low during hospitalization. Pt started on Mag -ox TID with meals as supplementation. Magnesium WNL today

## 2018-08-01 NOTE — PROGRESS NOTE ADULT - SUBJECTIVE AND OBJECTIVE BOX
LARRY MAYORGA  53y  Female      Patient is a 53y old  Female who presents with a chief complaint of altered mental status (27 Jul 2018 06:45)      INTERVAL HPI/OVERNIGHT EVENTS:  53 yr old female w alcohol abuse hx, anemia, asthma/COPD, chronic renal insufficiency, GERD, GI bleed, Hepatitic steatosis who presented to  ED after unwitnessed fall and prior day of confusion. ED obtained info from significant other.  Last drink was Sunday 07-22.  Patient became confused  1 day prior to ED presentation.  Then boyfriend found her on the floor.  She has also slept a lot more in the past few days. In ED head CT neg, UDS neg except for THC,  neg alcohol.  UA abn so levaquin given. While in ED, pt was uncooperative w nursing.    SUBJECTIVE:     7/28/18-  Patient seen today with medical team. Still not oriented to surroundings. As per nurse, pt complaining of nausea, Zofran was ordered. Pt did not state any complaints today. Case discussed with Dr. Mujica. Plan for MRI brain and EEG, and give 500 mg Thiamine IV. Aspirin was discontinued as this is unlikely a stroke.     7/29/18-   Pt seen in AM with medical team. She was still not oriented to place. Speech was very tangential during interview. Patient had no complaints overnight. Pt completed EEG this AM- EEG found to be normal. This AM patient's phosphate and magnesium were repleted.    7/30: Patient appears more alert and oriented today. Endorses mild abdominal distention and discomfort. Has not had a bowel movement for 2 days.     7/31/2018: Late yesterday, the patient went for a CT Abd to evaluate her abdominal distention. Urinary retention was noted and the patient was bladder scanned (>1L seen). Patient was straight cath and 1.6L of urine was collected. Pt noted relief after straight cath. Urology was consulted. Pt appears more alert and oriented today. Patient complains of mild discomfort in the RUQ. patient still has not had a bowel movement. GI was consulted, HIDA scan Neg, MRI and endoscopy pending    8/1/2018: Last night, Urology attending Dr. Pike saw Ms. Mayorga concerning her urinary retention. Bethanecol started.     REVIEW OF SYSTEMS: unable to ascertain as patient is poor historian.       T(C): 36.9 (07-31-18 @ 11:24), Max: 37.1 (07-31-18 @ 05:41)  HR: 95 (07-31-18 @ 11:24) (95 - 105)  BP: 106/77 (07-31-18 @ 11:24) (101/61 - 106/77)  RR: 18 (07-31-18 @ 11:24) (18 - 18)  SpO2: 97% (07-31-18 @ 11:24) (97% - 100%)  Wt(kg): --Vital Signs Last 24 Hrs  T(C): 36.9 (31 Jul 2018 11:24), Max: 37.1 (31 Jul 2018 05:41)  T(F): 98.4 (31 Jul 2018 11:24), Max: 98.8 (31 Jul 2018 05:41)  HR: 95 (31 Jul 2018 11:24) (95 - 105)  BP: 106/77 (31 Jul 2018 11:24) (101/61 - 106/77)  BP(mean): --  RR: 18 (31 Jul 2018 11:24) (18 - 18)  SpO2: 97% (31 Jul 2018 11:24) (97% - 100%)    PHYSICAL EXAM:  GENERAL: NAD, thin, cachetic  HEAD:  Atraumatic, Normocephalic  EYES: +vertical nystagmus PERRLA, conjunctiva and sclera clear  ENMT: Moist mucous membranes, missing teeth, dentures not in place  NERVOUS SYSTEM: Alert; oriented to person only. gait not assessed  CHEST/LUNG: Clear to percussion bilaterally; No rales, rhonchi, wheezing, or rubs  HEART: Regular rate and rhythm; No murmurs, rubs, or gallops  ABDOMEN: Soft, Nontender, distended Bowel sounds present  EXTREMITIES:  2+ Peripheral Pulses, no edema seen  Cordero in place draining yellow nonbloody urine    Consultant(s) Notes Reviewed:  [x ] YES  [ ] NO      LABS:       X  xxx  xxX  	  7/31/2018: HIDA scan: No sign of cholecystitis    7/31/ 2018 MR MRCP No Cont  No biliary dilatation or choledocholithiasis. Gallstones.    Hepatic steatosis.    Resolution of right-sided hydronephrosis and decreased mild left-sided   hydronephrosis.    Stable small ascites.    Nonspecific peripancreatic edema may be secondary to generalized   anasarca/third spacing of fluid. However, correlate with laboratory   values for acute pancreatitis. LARRY MAYORGA  53y  Female      Patient is a 53y old  Female who presents with a chief complaint of altered mental status (27 Jul 2018 06:45)      INTERVAL HPI/OVERNIGHT EVENTS:  53 yr old female w alcohol abuse hx, anemia, asthma/COPD, chronic renal insufficiency, GERD, GI bleed, Hepatitic steatosis who presented to  ED after unwitnessed fall and prior day of confusion. ED obtained info from significant other.  Last drink was Sunday 07-22.  Patient became confused  1 day prior to ED presentation.  Then boyfriend found her on the floor.  She has also slept a lot more in the past few days. In ED head CT neg, UDS neg except for THC,  neg alcohol.  UA abn so levaquin given. While in ED, pt was uncooperative w nursing.    SUBJECTIVE:     7/28/18-  Patient seen today with medical team. Still not oriented to surroundings. As per nurse, pt complaining of nausea, Zofran was ordered. Pt did not state any complaints today. Case discussed with Dr. Mujica. Plan for MRI brain and EEG, and give 500 mg Thiamine IV. Aspirin was discontinued as this is unlikely a stroke.     7/29/18-   Pt seen in AM with medical team. She was still not oriented to place. Speech was very tangential during interview. Patient had no complaints overnight. Pt completed EEG this AM- EEG found to be normal. This AM patient's phosphate and magnesium were repleted.    7/30: Patient appears more alert and oriented today. Endorses mild abdominal distention and discomfort. Has not had a bowel movement for 2 days.     7/31/2018: Late yesterday, the patient went for a CT Abd to evaluate her abdominal distention. Urinary retention was noted and the patient was bladder scanned (>1L seen). Patient was straight cath and 1.6L of urine was collected. Pt noted relief after straight cath. Urology was consulted. Pt appears more alert and oriented today. Patient complains of mild discomfort in the RUQ. patient still has not had a bowel movement. GI was consulted, HIDA scan Neg, MRI and endoscopy pending    8/1/2018: Last night, Urology attending Dr. Pike saw Ms. Mayorga concerning her urinary retention. Bethanecol started. Pt had no acute events over night. Pt endorses mild abdominal pain. Overnight nurse notes that the patient seems to be improving in mentation.      REVIEW OF SYSTEMS: unable to ascertain as patient is poor historian.     Vital Signs Last 24 Hrs  T(C): 36.9 (01 Aug 2018 05:13), Max: 36.9 (31 Jul 2018 11:24)  T(F): 98.5 (01 Aug 2018 05:13), Max: 98.5 (01 Aug 2018 05:13)  HR: 90 (01 Aug 2018 05:13) (89 - 95)  BP: 119/81 (01 Aug 2018 05:13) (98/59 - 119/81)  BP(mean): --  RR: 18 (31 Jul 2018 17:18) (18 - 18)  SpO2: 99% (01 Aug 2018 05:13) (97% - 99%)  T(C): 36.9 (07-31-18 @ 11:24), Max: 37.1 (07-31-18 @ 05:41)  HR: 95 (07-31-18 @ 11:24) (95 - 105)  BP: 106/77 (07-31-18 @ 11:24) (101/61 - 106/77)  RR: 18 (07-31-18 @ 11:24) (18 - 18)  SpO2: 97% (07-31-18 @ 11:24) (97% - 100%)  Wt(kg):     PHYSICAL EXAM:  GENERAL: NAD, thin, cachetic  HEAD:  Atraumatic, Normocephalic  EYES: +vertical nystagmus PERRLA, conjunctiva and sclera clear  ENMT: Moist mucous membranes, missing teeth, dentures not in place  NERVOUS SYSTEM: Alert; oriented to person only. gait not assessed  CHEST/LUNG: Clear to percussion bilaterally; No rales, rhonchi, wheezing, or rubs  HEART: Regular rate and rhythm; No murmurs, rubs, or gallops  ABDOMEN: Soft, Nontender, non-distended Bowel sounds present  EXTREMITIES:  2+ Peripheral Pulses, no edema seen  Cordero in place draining yellow nonbloody urine    Consultant(s) Notes Reviewed:  [x ] YES  [ ] NO      LABS:    Comprehensive Metabolic Panel in AM (08.01.18 @ 05:33)    Sodium, Serum: 140 mmol/L    Potassium, Serum: 3.7 mmol/L    Chloride, Serum: 106 mmol/L    Carbon Dioxide, Serum: 25 mmol/L    Anion Gap, Serum: 9 mmol/L    Blood Urea Nitrogen, Serum: 4 mg/dL    Creatinine, Serum: 0.53 mg/dL    Glucose, Serum: 89 mg/dL    Calcium, Total Serum: 8.6 mg/dL    Protein Total, Serum: 5.5 gm/dL    Albumin, Serum: 2.1 g/dL    Bilirubin Total, Serum: 1.1 mg/dL    Alkaline Phosphatase, Serum: 130 U/L    Aspartate Aminotransferase (AST/SGOT): 49 U/L    Alanine Aminotransferase (ALT/SGPT): 24 U/L    eGFR if Non : 109: Interpretative comment    Complete Blood Count + Automated Diff in AM (08.01.18 @ 05:33)    WBC Count: 7.74 K/uL    RBC Count: 3.03 M/uL    Hemoglobin: 9.9 g/dL    Hematocrit: 28.7 %    Mean Cell Volume: 94.7 fl    Mean Cell Hemoglobin: 32.7 pg    Mean Cell Hemoglobin Conc: 34.5 gm/dL    Red Cell Distrib Width: 17.5 %    Platelet Count - Automated: 164 K/uL    Auto Neutrophil #: 5.15 K/uL    Auto Lymphocyte #: 1.28 K/uL    Auto Monocyte #: 1.09 K/uL    Auto Eosinophil #: 0.08 K/uL    Auto Basophil #: 0.02 K/uL    Auto Neutrophil %: 66.5: Differential percentages must be correlated with absolute numbers for  clinical significance. %    Auto Lymphocyte %: 16.5 %    Auto Monocyte %: 14.1 %    Auto Eosinophil %: 1.0 %    Auto Basophil %: 0.3 %    Auto Immature Granulocyte %: 1.6 %    7/31/2018: HIDA scan: No sign of cholecystitis    7/31/ 2018 MR MRCP No Cont  No biliary dilatation or choledocholithiasis. Gallstones.    Hepatic steatosis.    Resolution of right-sided hydronephrosis and decreased mild left-sided   hydronephrosis.  Stable small ascites.    Nonspecific peripancreatic edema may be secondary to generalized   anasarca/third spacing of fluid. However, correlate with laboratory   values for acute pancreatitis. LARRY MAYORGA  53y  Female      Patient is a 53y old  Female who presents with a chief complaint of altered mental status (27 Jul 2018 06:45)      INTERVAL HPI/OVERNIGHT EVENTS:  53 yr old female w alcohol abuse hx, anemia, asthma/COPD, chronic renal insufficiency, GERD, GI bleed, Hepatitic steatosis who presented to  ED after unwitnessed fall and prior day of confusion. ED obtained info from significant other.  Last drink was Sunday 07-22.  Patient became confused  1 day prior to ED presentation.  Then boyfriend found her on the floor.  She has also slept a lot more in the past few days. In ED head CT neg, UDS neg except for THC,  neg alcohol.  UA abn so levaquin given. While in ED, pt was uncooperative w nursing.    SUBJECTIVE:     7/28/18-  Patient seen today with medical team. Still not oriented to surroundings. As per nurse, pt complaining of nausea, Zofran was ordered. Pt did not state any complaints today. Case discussed with Dr. Mujica. Plan for MRI brain and EEG, and give 500 mg Thiamine IV. Aspirin was discontinued as this is unlikely a stroke.     7/29/18-   Pt seen in AM with medical team. She was still not oriented to place. Speech was very tangential during interview. Patient had no complaints overnight. Pt completed EEG this AM- EEG found to be normal. This AM patient's phosphate and magnesium were repleted.    7/30: Patient appears more alert and oriented today. Endorses mild abdominal distention and discomfort. Has not had a bowel movement for 2 days.     7/31/2018: Late yesterday, the patient went for a CT Abd to evaluate her abdominal distention. Urinary retention was noted and the patient was bladder scanned (>1L seen). Patient was straight cath and 1.6L of urine was collected. Pt noted relief after straight cath. Urology was consulted. Pt appears more alert and oriented today. Patient complains of mild discomfort in the RUQ. patient still has not had a bowel movement. GI was consulted, HIDA scan Neg, MRI and endoscopy pending    8/1/2018: Last night, Urology attending Dr. Pike saw Ms. Mayorga concerning her urinary retention. Bethanecol started. Pt had no acute events over night. Pt endorses mild abdominal pain. Overnight nurse notes that the patient seems to be improving in mentation.      REVIEW OF SYSTEMS: unable to ascertain as patient is poor historian.     Vital Signs Last 24 Hrs  T(C): 36.9 (01 Aug 2018 05:13), Max: 36.9 (31 Jul 2018 11:24)  T(F): 98.5 (01 Aug 2018 05:13), Max: 98.5 (01 Aug 2018 05:13)  HR: 90 (01 Aug 2018 05:13) (89 - 95)  BP: 119/81 (01 Aug 2018 05:13) (98/59 - 119/81)  BP(mean): --  RR: 18 (31 Jul 2018 17:18) (18 - 18)  SpO2: 99% (01 Aug 2018 05:13) (97% - 99%)  T(C): 36.9 (07-31-18 @ 11:24), Max: 37.1 (07-31-18 @ 05:41)  HR: 95 (07-31-18 @ 11:24) (95 - 105)  BP: 106/77 (07-31-18 @ 11:24) (101/61 - 106/77)  RR: 18 (07-31-18 @ 11:24) (18 - 18)  SpO2: 97% (07-31-18 @ 11:24) (97% - 100%)  Wt(kg):     PHYSICAL EXAM:  GENERAL: NAD, thin, cachetic  HEAD:  Atraumatic, Normocephalic  EYES: +vertical nystagmus PERRLA, conjunctiva and sclera clear  ENMT: Moist mucous membranes, missing teeth, dentures not in place  NERVOUS SYSTEM: Alert; oriented to person only. gait not assessed  CHEST/LUNG: Clear to percussion bilaterally; No rales, rhonchi, wheezing, or rubs  HEART: Regular rate and rhythm; No murmurs, rubs, or gallops  ABDOMEN: Soft, Nontender, non-distended Bowel sounds present  EXTREMITIES:  2+ Peripheral Pulses, no edema seen  Cordero in place draining yellow nonbloody urine    Consultant(s) Notes Reviewed:  [x ] YES  [ ] NO      LABS:    Comprehensive Metabolic Panel in AM (08.01.18 @ 05:33)    Sodium, Serum: 140 mmol/L    Potassium, Serum: 3.7 mmol/L    Chloride, Serum: 106 mmol/L    Carbon Dioxide, Serum: 25 mmol/L    Anion Gap, Serum: 9 mmol/L    Blood Urea Nitrogen, Serum: 4 mg/dL    Creatinine, Serum: 0.53 mg/dL    Glucose, Serum: 89 mg/dL    Calcium, Total Serum: 8.6 mg/dL    Protein Total, Serum: 5.5 gm/dL    Albumin, Serum: 2.1 g/dL    Bilirubin Total, Serum: 1.1 mg/dL    Alkaline Phosphatase, Serum: 130 U/L    Aspartate Aminotransferase (AST/SGOT): 49 U/L    Alanine Aminotransferase (ALT/SGPT): 24 U/L    eGFR if Non : 109: Interpretative comment    Complete Blood Count + Automated Diff in AM (08.01.18 @ 05:33)    WBC Count: 7.74 K/uL    RBC Count: 3.03 M/uL    Hemoglobin: 9.9 g/dL    Hematocrit: 28.7 %    Mean Cell Volume: 94.7 fl    Mean Cell Hemoglobin: 32.7 pg    Mean Cell Hemoglobin Conc: 34.5 gm/dL    Red Cell Distrib Width: 17.5 %    Platelet Count - Automated: 164 K/uL      7/31/2018: HIDA scan: No sign of cholecystitis    7/31/ 2018 MR MRCP No Cont  No biliary dilatation or choledocholithiasis. Gallstones.    Hepatic steatosis.    Resolution of right-sided hydronephrosis and decreased mild left-sided   hydronephrosis.  Stable small ascites.    Nonspecific peripancreatic edema may be secondary to generalized   anasarca/third spacing of fluid. However, correlate with laboratory   values for acute pancreatitis. LARRY MAYORGA  53y  Female      Patient is a 53y old  Female who presents with a chief complaint of altered mental status (27 Jul 2018 06:45)      INTERVAL HPI/OVERNIGHT EVENTS:  53 yr old female w alcohol abuse hx, anemia, asthma/COPD, chronic renal insufficiency, GERD, GI bleed, Hepatitic steatosis who presented to  ED after unwitnessed fall and prior day of confusion. ED obtained info from significant other.  Last drink was Sunday 07-22.  Patient became confused  1 day prior to ED presentation.  Then boyfriend found her on the floor.  She has also slept a lot more in the past few days. In ED head CT neg, UDS neg except for THC,  neg alcohol.  UA abn so levaquin given. While in ED, pt was uncooperative w nursing.    SUBJECTIVE:     7/28/18-  Patient seen today with medical team. Still not oriented to surroundings. As per nurse, pt complaining of nausea, Zofran was ordered. Pt did not state any complaints today. Case discussed with Dr. Mujica. Plan for MRI brain and EEG, and give 500 mg Thiamine IV. Aspirin was discontinued as this is unlikely a stroke.     7/29/18-   Pt seen in AM with medical team. She was still not oriented to place. Speech was very tangential during interview. Patient had no complaints overnight. Pt completed EEG this AM- EEG found to be normal. This AM patient's phosphate and magnesium were repleted.    7/30: Patient appears more alert and oriented today. Endorses mild abdominal distention and discomfort. Has not had a bowel movement for 2 days.     7/31/2018: Late yesterday, the patient went for a CT Abd to evaluate her abdominal distention. Urinary retention was noted and the patient was bladder scanned (>1L seen). Patient was straight cath and 1.6L of urine was collected. Pt noted relief after straight cath. Urology was consulted. Pt appears more alert and oriented today. Patient complains of mild discomfort in the RUQ. patient still has not had a bowel movement. GI was consulted, HIDA scan Neg, MRI and endoscopy pending    8/1/2018: Last night, Urology attending Dr. Pike saw Ms. Mayorga concerning her urinary retention. Bethanecol started. Pt had no acute events over night. Pt endorses mild abdominal pain. Overnight nurse notes that the patient seems to be improving in mentation.      REVIEW OF SYSTEMS: unable to ascertain as patient is poor historian.         Vital Signs Last 24 Hrs  T(C): 36.9 (01 Aug 2018 05:13), Max: 36.9 (31 Jul 2018 11:24)  T(F): 98.5 (01 Aug 2018 05:13), Max: 98.5 (01 Aug 2018 05:13)  HR: 90 (01 Aug 2018 05:13) (89 - 95)  BP: 119/81 (01 Aug 2018 05:13) (98/59 - 119/81)  BP(mean): --  RR: 18 (31 Jul 2018 17:18) (18 - 18)  SpO2: 99% (01 Aug 2018 05:13) (97% - 99%)  T(C): 36.9 (07-31-18 @ 11:24), Max: 37.1 (07-31-18 @ 05:41)  HR: 95 (07-31-18 @ 11:24) (95 - 105)  BP: 106/77 (07-31-18 @ 11:24) (101/61 - 106/77)  RR: 18 (07-31-18 @ 11:24) (18 - 18)  SpO2: 97% (07-31-18 @ 11:24) (97% - 100%)  Wt(kg):     PHYSICAL EXAM:  GENERAL: NAD, thin, cachetic  HEAD:  Atraumatic, Normocephalic  EYES: +vertical nystagmus PERRLA, conjunctiva and sclera clear  ENMT: Moist mucous membranes, missing teeth, dentures not in place  NERVOUS SYSTEM: Alert; oriented to person only. gait not assessed  CHEST/LUNG: Clear to percussion bilaterally; No rales, rhonchi, wheezing, or rubs  HEART: Regular rate and rhythm; No murmurs, rubs, or gallops  ABDOMEN: Soft, Nontender, non-distended Bowel sounds present  EXTREMITIES:  2+ Peripheral Pulses, no edema seen  Cordero in place draining yellow nonbloody urine    Consultant(s) Notes Reviewed:  [x ] YES  [ ] NO      LABS:    Comprehensive Metabolic Panel in AM (08.01.18 @ 05:33)    Sodium, Serum: 140 mmol/L    Potassium, Serum: 3.7 mmol/L    Chloride, Serum: 106 mmol/L    Carbon Dioxide, Serum: 25 mmol/L    Anion Gap, Serum: 9 mmol/L    Blood Urea Nitrogen, Serum: 4 mg/dL    Creatinine, Serum: 0.53 mg/dL    Glucose, Serum: 89 mg/dL    Calcium, Total Serum: 8.6 mg/dL    Protein Total, Serum: 5.5 gm/dL    Albumin, Serum: 2.1 g/dL    Bilirubin Total, Serum: 1.1 mg/dL    Alkaline Phosphatase, Serum: 130 U/L    Aspartate Aminotransferase (AST/SGOT): 49 U/L    Alanine Aminotransferase (ALT/SGPT): 24 U/L    eGFR if Non : 109: Interpretative comment    Complete Blood Count + Automated Diff in AM (08.01.18 @ 05:33)    WBC Count: 7.74 K/uL    RBC Count: 3.03 M/uL    Hemoglobin: 9.9 g/dL    Hematocrit: 28.7 %    Mean Cell Volume: 94.7 fl    Mean Cell Hemoglobin: 32.7 pg    Mean Cell Hemoglobin Conc: 34.5 gm/dL    Red Cell Distrib Width: 17.5 %    Platelet Count - Automated: 164 K/uL      7/31/2018: HIDA scan: No sign of cholecystitis    7/31/ 2018 MR MRCP No Cont  No biliary dilatation or choledocholithiasis. Gallstones.    Hepatic steatosis.    Resolution of right-sided hydronephrosis and decreased mild left-sided   hydronephrosis.  Stable small ascites.    Nonspecific peripancreatic edema may be secondary to generalized   anasarca/third spacing of fluid. However, correlate with laboratory   values for acute pancreatitis.

## 2018-08-02 LAB
ALBUMIN SERPL ELPH-MCNC: 1.9 G/DL — LOW (ref 3.3–5)
ALP SERPL-CCNC: 122 U/L — HIGH (ref 40–120)
ALT FLD-CCNC: 24 U/L — SIGNIFICANT CHANGE UP (ref 12–78)
ANION GAP SERPL CALC-SCNC: 9 MMOL/L — SIGNIFICANT CHANGE UP (ref 5–17)
AST SERPL-CCNC: 50 U/L — HIGH (ref 15–37)
BASOPHILS # BLD AUTO: 0.01 K/UL — SIGNIFICANT CHANGE UP (ref 0–0.2)
BASOPHILS NFR BLD AUTO: 0.2 % — SIGNIFICANT CHANGE UP (ref 0–2)
BILIRUB SERPL-MCNC: 0.9 MG/DL — SIGNIFICANT CHANGE UP (ref 0.2–1.2)
BUN SERPL-MCNC: 4 MG/DL — LOW (ref 7–23)
CALCIUM SERPL-MCNC: 8 MG/DL — LOW (ref 8.5–10.1)
CHLORIDE SERPL-SCNC: 105 MMOL/L — SIGNIFICANT CHANGE UP (ref 96–108)
CO2 SERPL-SCNC: 26 MMOL/L — SIGNIFICANT CHANGE UP (ref 22–31)
CREAT SERPL-MCNC: 0.36 MG/DL — LOW (ref 0.5–1.3)
EOSINOPHIL # BLD AUTO: 0.09 K/UL — SIGNIFICANT CHANGE UP (ref 0–0.5)
EOSINOPHIL NFR BLD AUTO: 1.4 % — SIGNIFICANT CHANGE UP (ref 0–6)
GLUCOSE SERPL-MCNC: 76 MG/DL — SIGNIFICANT CHANGE UP (ref 70–99)
HCT VFR BLD CALC: 26.2 % — LOW (ref 34.5–45)
HGB BLD-MCNC: 8.9 G/DL — LOW (ref 11.5–15.5)
IMM GRANULOCYTES NFR BLD AUTO: 1.3 % — SIGNIFICANT CHANGE UP (ref 0–1.5)
LYMPHOCYTES # BLD AUTO: 0.72 K/UL — LOW (ref 1–3.3)
LYMPHOCYTES # BLD AUTO: 11.4 % — LOW (ref 13–44)
MAGNESIUM SERPL-MCNC: 1.6 MG/DL — SIGNIFICANT CHANGE UP (ref 1.6–2.6)
MCHC RBC-ENTMCNC: 32.4 PG — SIGNIFICANT CHANGE UP (ref 27–34)
MCHC RBC-ENTMCNC: 34 GM/DL — SIGNIFICANT CHANGE UP (ref 32–36)
MCV RBC AUTO: 95.3 FL — SIGNIFICANT CHANGE UP (ref 80–100)
MONOCYTES # BLD AUTO: 0.83 K/UL — SIGNIFICANT CHANGE UP (ref 0–0.9)
MONOCYTES NFR BLD AUTO: 13.1 % — SIGNIFICANT CHANGE UP (ref 2–14)
NEUTROPHILS # BLD AUTO: 4.59 K/UL — SIGNIFICANT CHANGE UP (ref 1.8–7.4)
NEUTROPHILS NFR BLD AUTO: 72.6 % — SIGNIFICANT CHANGE UP (ref 43–77)
NRBC # BLD: 0 /100 WBCS — SIGNIFICANT CHANGE UP (ref 0–0)
PHOSPHATE SERPL-MCNC: 5.1 MG/DL — HIGH (ref 2.5–4.5)
PLATELET # BLD AUTO: 168 K/UL — SIGNIFICANT CHANGE UP (ref 150–400)
POTASSIUM SERPL-MCNC: 3.4 MMOL/L — LOW (ref 3.5–5.3)
POTASSIUM SERPL-SCNC: 3.4 MMOL/L — LOW (ref 3.5–5.3)
PROT SERPL-MCNC: 5 GM/DL — LOW (ref 6–8.3)
RBC # BLD: 2.75 M/UL — LOW (ref 3.8–5.2)
RBC # FLD: 17.2 % — HIGH (ref 10.3–14.5)
SODIUM SERPL-SCNC: 140 MMOL/L — SIGNIFICANT CHANGE UP (ref 135–145)
WBC # BLD: 6.32 K/UL — SIGNIFICANT CHANGE UP (ref 3.8–10.5)
WBC # FLD AUTO: 6.32 K/UL — SIGNIFICANT CHANGE UP (ref 3.8–10.5)

## 2018-08-02 RX ORDER — PROPRANOLOL HCL 160 MG
10 CAPSULE, EXTENDED RELEASE 24HR ORAL DAILY
Qty: 0 | Refills: 0 | Status: DISCONTINUED | OUTPATIENT
Start: 2018-08-02 | End: 2018-08-06

## 2018-08-02 RX ORDER — POTASSIUM CHLORIDE 20 MEQ
20 PACKET (EA) ORAL ONCE
Qty: 0 | Refills: 0 | Status: COMPLETED | OUTPATIENT
Start: 2018-08-02 | End: 2018-08-02

## 2018-08-02 RX ADMIN — Medication 25 MILLIGRAM(S): at 15:43

## 2018-08-02 RX ADMIN — MAGNESIUM OXIDE 400 MG ORAL TABLET 400 MILLIGRAM(S): 241.3 TABLET ORAL at 11:22

## 2018-08-02 RX ADMIN — Medication 500 MILLIGRAM(S): at 11:23

## 2018-08-02 RX ADMIN — POLYETHYLENE GLYCOL 3350 17 GRAM(S): 17 POWDER, FOR SOLUTION ORAL at 06:30

## 2018-08-02 RX ADMIN — BUDESONIDE AND FORMOTEROL FUMARATE DIHYDRATE 2 PUFF(S): 160; 4.5 AEROSOL RESPIRATORY (INHALATION) at 19:53

## 2018-08-02 RX ADMIN — Medication 500 MILLIGRAM(S): at 17:46

## 2018-08-02 RX ADMIN — BUDESONIDE AND FORMOTEROL FUMARATE DIHYDRATE 2 PUFF(S): 160; 4.5 AEROSOL RESPIRATORY (INHALATION) at 08:51

## 2018-08-02 RX ADMIN — Medication 25 MILLIGRAM(S): at 06:30

## 2018-08-02 RX ADMIN — Medication 1 TABLET(S): at 11:22

## 2018-08-02 RX ADMIN — PANTOPRAZOLE SODIUM 40 MILLIGRAM(S): 20 TABLET, DELAYED RELEASE ORAL at 06:30

## 2018-08-02 RX ADMIN — Medication 500 MILLIGRAM(S): at 08:41

## 2018-08-02 RX ADMIN — Medication 120 MILLIGRAM(S): at 06:30

## 2018-08-02 RX ADMIN — Medication 4000 MILLILITER(S): at 08:41

## 2018-08-02 RX ADMIN — Medication 1 MILLIGRAM(S): at 11:22

## 2018-08-02 RX ADMIN — Medication 20 MILLIEQUIVALENT(S): at 11:22

## 2018-08-02 RX ADMIN — Medication 25 MILLIGRAM(S): at 23:18

## 2018-08-02 RX ADMIN — Medication 10 MILLIGRAM(S): at 15:43

## 2018-08-02 NOTE — PROGRESS NOTE ADULT - PROBLEM SELECTOR PLAN 8
Encourage PO intake  -Magnesium repeatedly low during hospitalization. Pt started on Mag -ox TID with meals as supplementation. Magnesium WNL today   -potassium repleted this AM

## 2018-08-02 NOTE — PROGRESS NOTE ADULT - SUBJECTIVE AND OBJECTIVE BOX
Pt has been seen and examined with FP resident, resident supervised agree with a/p       Patient is a 53y old  Female who presents with a chief complaint of altered mental status (27 Jul 2018 06:45)    Vital Signs Last 24 Hrs  T(C): 37.2 (02 Aug 2018 10:24), Max: 37.2 (02 Aug 2018 10:24)  T(F): 98.9 (02 Aug 2018 10:24), Max: 98.9 (02 Aug 2018 10:24)  HR: 102 (02 Aug 2018 10:24) (70 - 111)  BP: 113/71 (02 Aug 2018 10:24) (101/62 - 119/70)  BP(mean): --  RR: 17 (02 Aug 2018 10:24) (17 - 18)  SpO2: 100% (02 Aug 2018 10:24) (99% - 100%)  general- comfortable   -[id number],cta  -cvs-[id number] normal   -p/a-soft,bs+  -extremity- no asymmetrical swelling noted           A/P    #discharge plan     #supportive care

## 2018-08-02 NOTE — PROGRESS NOTE ADULT - PROBLEM SELECTOR PLAN 9
- DVT prophylaxis scd. Heparin  and ASA held in concern of GI bleed  - Clear liquid diet and Golytely today in advance of colonoscopy sincere. Will restart TLC/DASH diet after endoscopy

## 2018-08-02 NOTE — PROGRESS NOTE ADULT - PROBLEM SELECTOR PLAN 1
- -likely cause of AMS  - Pt has significant ETOH use history  - Continue Thiamine 200mg q8h for 1 more days. Pt will then have received a total of 5 days of thiamine supplementation  - f/u Neuro recommendations

## 2018-08-02 NOTE — PROGRESS NOTE ADULT - SUBJECTIVE AND OBJECTIVE BOX
LARRY MAYORGA  53y  Female      Patient is a 53y old  Female who presents with a chief complaint of altered mental status (27 Jul 2018 06:45)      INTERVAL HPI/OVERNIGHT EVENTS:  53 yr old female w alcohol abuse hx, anemia, asthma/COPD, chronic renal insufficiency, GERD, GI bleed, Hepatitic steatosis who presented to  ED after unwitnessed fall and prior day of confusion. ED obtained info from significant other.  Last drink was Sunday 07-22.  Patient became confused  1 day prior to ED presentation.  Then boyfriend found her on the floor.  She has also slept a lot more in the past few days. In ED head CT neg, UDS neg except for THC,  neg alcohol.  UA abn so levaquin given. While in ED, pt was uncooperative w nursing.    SUBJECTIVE:     7/28/18-  Patient seen today with medical team. Still not oriented to surroundings. As per nurse, pt complaining of nausea, Zofran was ordered. Pt did not state any complaints today. Case discussed with Dr. Mujica. Plan for MRI brain and EEG, and give 500 mg Thiamine IV. Aspirin was discontinued as this is unlikely a stroke.     7/29/18-   Pt seen in AM with medical team. She was still not oriented to place. Speech was very tangential during interview. Patient had no complaints overnight. Pt completed EEG this AM- EEG found to be normal. This AM patient's phosphate and magnesium were repleted.    7/30: Patient appears more alert and oriented today. Endorses mild abdominal distention and discomfort. Has not had a bowel movement for 2 days.     7/31/2018: Late yesterday, the patient went for a CT Abd to evaluate her abdominal distention. Urinary retention was noted and the patient was bladder scanned (>1L seen). Patient was straight cath and 1.6L of urine was collected. Pt noted relief after straight cath. Urology was consulted. Pt appears more alert and oriented today. Patient complains of mild discomfort in the RUQ. patient still has not had a bowel movement. GI was consulted, HIDA scan Neg, MRI and endoscopy pending    8/1/2018: Last night, Urology attending Dr. Pike saw Ms. Mayorga concerning her urinary retention. Bethanecol started. Pt had no acute events over night. Pt endorses mild abdominal pain. Overnight nurse notes that the patient seems to be improving in mentation.      8/2/2018. Pt had no acute overnight events. Patient denies any current abdominal pain. She expresses desire to go home but becomes tearful at mention of home situation.      REVIEW OF SYSTEMS: unable to ascertain as patient is poor historian.     MEDICATIONS  (STANDING):  bethanechol 25 milliGRAM(s) Oral three times a day  buDESOnide 160 MICROgram(s)/formoterol 4.5 MICROgram(s) Inhaler 2 Puff(s) Inhalation two times a day  diltiazem    milliGRAM(s) Oral daily  folic acid 1 milliGRAM(s) Oral daily  magnesium oxide 400 milliGRAM(s) Oral daily  multivitamin 1 Tablet(s) Oral daily  pantoprazole    Tablet 40 milliGRAM(s) Oral before breakfast  polyethylene glycol 3350 17 Gram(s) Oral two times a day  propranolol 10 milliGRAM(s) Oral daily  thiamine 500 milliGRAM(s) Oral <User Schedule>    MEDICATIONS  (PRN):  ondansetron    Tablet 4 milliGRAM(s) Oral every 8 hours PRN Nausea    Vital Signs Last 24 Hrs  T(C): 37.2 (02 Aug 2018 10:24), Max: 37.2 (02 Aug 2018 10:24)  T(F): 98.9 (02 Aug 2018 10:24), Max: 98.9 (02 Aug 2018 10:24)  HR: 102 (02 Aug 2018 10:24) (70 - 111)  BP: 113/71 (02 Aug 2018 10:24) (101/62 - 119/70)  BP(mean): --  RR: 17 (02 Aug 2018 10:24) (17 - 18)  SpO2: 100% (02 Aug 2018 10:24) (99% - 100%)    PHYSICAL EXAM:  GENERAL: NAD, thin, cachetic  HEAD:  Atraumatic, Normocephalic  EYES: +vertical nystagmus PERRLA, conjunctiva and sclera clear  ENMT: Moist mucous membranes, missing teeth, dentures not in place  NERVOUS SYSTEM: Alert; oriented to person, place only. gait not assessed  CHEST/LUNG: Clear to percussion bilaterally; No rales, rhonchi, wheezing, or rubs  HEART: Regular rate and rhythm; No murmurs, rubs, or gallops  ABDOMEN: Soft, Nontender, non-distended Bowel sounds present  EXTREMITIES:  2+ Peripheral Pulses, no edema seen  Cordero in place draining yellow nonbloody urine    Consultant(s) Notes Reviewed:  [x ] YES  [ ] NO      LABS:                          8.9    6.32  )-----------( 168      ( 02 Aug 2018 04:54 )             26.2       08-02    140  |  105  |  4<L>  ----------------------------<  76  3.4<L>   |  26  |  0.36<L>    Ca    8.0<L>      02 Aug 2018 04:54  Phos  5.1     08-02  Mg     1.6     08-02    TPro  5.0<L>  /  Alb  1.9<L>  /  TBili  0.9  /  DBili  x   /  AST  50<H>  /  ALT  24  /  AlkPhos  122<H>  08-02     Upper Endoscopy (08.01.18 @ 16:03)   - Z-line regular, 34 cm from the incisors.      - Hiatal hernia.     - Portal hypertensive gastropathy.    - Normal duodenal bulb and second portion of the  duodenum.      - No specimens collected.

## 2018-08-02 NOTE — PROGRESS NOTE ADULT - PROBLEM SELECTOR PLAN 3
- likely 2/2 to urinary retention  - abdomen not distended today  - continure Bethanacol 25mg TID started.   - TOV today.  If TOV fails then pt will be d/c with morel and f/u Outpt with Urology.

## 2018-08-02 NOTE — PROGRESS NOTE ADULT - PROBLEM SELECTOR PLAN 5
- significant ETOH use h/o  - last drink 7/22 therefore pt is not at risk of ETOH withdrawal seizures  - [facility] protocol d/c'd 7/31  - portal gastropathy seen on Endoscopy 2/2 long term ETOH

## 2018-08-02 NOTE — PROGRESS NOTE ADULT - PROBLEM SELECTOR PLAN 4
- initially megaloblastic in nature due to low folate and B12 on admission and significant ETOH use history; now normocytic so there may be an anemia of chronic disease component to anemia  - Hemoglobin 8.9 this AM (improved from 9.6 @ 8/1 AM) s/p blood transfusion x1 on 7/29  -GI consulted to help determine if GI bleed as cause for GI bleed; HIDA scan- neg, MRCP- neg,  Endoscopy today-portal hypertension gastropathy  - Colonoscopy planned for tomorrow. Go-lytely prep today.  - start propanolol 10mg daily for prevention of GI 2/2 gastropathy and development of esophageal varices.

## 2018-08-02 NOTE — PROGRESS NOTE ADULT - SUBJECTIVE AND OBJECTIVE BOX
no complaints            Allergies    amoxicillin (Other)  Ceclor (Other)    Intolerances        MEDICATIONS  (STANDING):  bethanechol 25 milliGRAM(s) Oral three times a day  buDESOnide 160 MICROgram(s)/formoterol 4.5 MICROgram(s) Inhaler 2 Puff(s) Inhalation two times a day  diltiazem    milliGRAM(s) Oral daily  folic acid 1 milliGRAM(s) Oral daily  magnesium oxide 400 milliGRAM(s) Oral daily  multivitamin 1 Tablet(s) Oral daily  pantoprazole    Tablet 40 milliGRAM(s) Oral before breakfast  polyethylene glycol 3350 17 Gram(s) Oral two times a day  potassium chloride    Tablet ER 20 milliEquivalent(s) Oral once  thiamine 500 milliGRAM(s) Oral <User Schedule>    MEDICATIONS  (PRN):  ondansetron    Tablet 4 milliGRAM(s) Oral every 8 hours PRN Nausea      REVIEW OF SYSTEMS      General:	No fever or chills    Skin/Breast: No jaundice or rash   		  ENMT: denies sore throat or thrush    Respiratory and Thorax: Denies dyspnea or cough or shortness of breath  	  Cardiovascular: Denies chest pain or palpitations 	    Gastrointestinal: Denies jaundice or pruritis    Genitourinary: Denies dysuria or hematuria	    Musculoskeletal: Denies muscular pain or swelling	    Neurological: Denies confusion or tremor	    Hematology/Lymphatics: Denies easy bruising or bleeding 	    Endocrine:	Denies polyphagia or polyuria    See above hx otherwise neg for any major organ systems    PHYSICAL EXAM:    Vital Signs Last 24 Hrs  T(C): 37 (02 Aug 2018 04:52), Max: 37 (02 Aug 2018 04:52)  T(F): 98.6 (02 Aug 2018 04:52), Max: 98.6 (02 Aug 2018 04:52)  HR: 81 (02 Aug 2018 08:58) (70 - 111)  BP: 101/62 (02 Aug 2018 04:52) (98/61 - 119/70)  BP(mean): --  RR: 18 (01 Aug 2018 18:47) (18 - 18)  SpO2: 99% (02 Aug 2018 04:52) (98% - 100%)    Constitutional: no acute distress    ENMT: NC/AT scl anicteric opm pink no lesions     Neck: supple. No jvd or LN    Respiratory: Clear     Cardiovascular: RRR s1s2     Gastrointestinal: Pos bs , soft , not tender, no hepatosplenomegaly,  no mass        Back: No CVA tenderness    Extremities: NO cce     Neurological: Alert and oriented x 3     Skin: No rash or jaundice    Date/Time:08-02 @ 04:54    ALT/SGPT: 24  Albumin: 1.9  AST/SGOT: 50  Bilirubin Direct: --  Bilirubin Total: 0.9  Ca: 8.0  eGFR : 144  eGFR Non-: 124  Lipase: --  Amylase: --  INR: --  PTT: --        Date/Time:08-01 @ 05:33    ALT/SGPT: 24  Albumin: 2.1  AST/SGOT: 49  Bilirubin Direct: --  Bilirubin Total: 1.1  Ca: 8.6  eGFR : 127  eGFR Non-: 109  Lipase: --  Amylase: --  INR: --  PTT: --        Date/Time:07-31 @ 07:32    ALT/SGPT: 25  Albumin: 1.9  AST/SGOT: 61  Bilirubin Direct: --  Bilirubin Total: 1.1  Ca: 8.5  eGFR : 125  eGFR Non-: 108  Lipase: --  Amylase: --  INR: --  PTT: --            08-02    140  |  105  |  4<L>  ----------------------------<  76  3.4<L>   |  26  |  0.36<L>    Ca    8.0<L>      02 Aug 2018 04:54  Phos  5.1     08-02  Mg     1.6     08-02    TPro  5.0<L>  /  Alb  1.9<L>  /  TBili  0.9  /  DBili  x   /  AST  50<H>  /  ALT  24  /  AlkPhos  122<H>  08-02                            8.9    6.32  )-----------( 168      ( 02 Aug 2018 04:54 )             26.2     Iron with Total Binding Capacity (07.31.18 @ 07:33)    % Saturation, Iron: 23 %    Iron - Total Binding Capacity.: 122 ug/dL    Iron Total, Serum: 28 ug/dL    Unsaturated Iron Binding Capacity: 94 ug/dL    Ferritin, Serum: 1197 ng/mL (07.31.18 @ 07:33)

## 2018-08-03 ENCOUNTER — TRANSCRIPTION ENCOUNTER (OUTPATIENT)
Age: 53
End: 2018-08-03

## 2018-08-03 LAB
ALBUMIN SERPL ELPH-MCNC: 2 G/DL — LOW (ref 3.3–5)
ALP SERPL-CCNC: 132 U/L — HIGH (ref 40–120)
ALT FLD-CCNC: 21 U/L — SIGNIFICANT CHANGE UP (ref 12–78)
ANION GAP SERPL CALC-SCNC: 9 MMOL/L — SIGNIFICANT CHANGE UP (ref 5–17)
AST SERPL-CCNC: 46 U/L — HIGH (ref 15–37)
BASOPHILS # BLD AUTO: 0.03 K/UL — SIGNIFICANT CHANGE UP (ref 0–0.2)
BASOPHILS NFR BLD AUTO: 0.4 % — SIGNIFICANT CHANGE UP (ref 0–2)
BILIRUB SERPL-MCNC: 1.3 MG/DL — HIGH (ref 0.2–1.2)
BUN SERPL-MCNC: 4 MG/DL — LOW (ref 7–23)
CALCIUM SERPL-MCNC: 8.1 MG/DL — LOW (ref 8.5–10.1)
CHLORIDE SERPL-SCNC: 106 MMOL/L — SIGNIFICANT CHANGE UP (ref 96–108)
CO2 SERPL-SCNC: 26 MMOL/L — SIGNIFICANT CHANGE UP (ref 22–31)
CREAT SERPL-MCNC: 0.45 MG/DL — LOW (ref 0.5–1.3)
EOSINOPHIL # BLD AUTO: 0.1 K/UL — SIGNIFICANT CHANGE UP (ref 0–0.5)
EOSINOPHIL NFR BLD AUTO: 1.4 % — SIGNIFICANT CHANGE UP (ref 0–6)
GLUCOSE SERPL-MCNC: 70 MG/DL — SIGNIFICANT CHANGE UP (ref 70–99)
HCT VFR BLD CALC: 27.2 % — LOW (ref 34.5–45)
HGB BLD-MCNC: 9.2 G/DL — LOW (ref 11.5–15.5)
IMM GRANULOCYTES NFR BLD AUTO: 1.3 % — SIGNIFICANT CHANGE UP (ref 0–1.5)
LYMPHOCYTES # BLD AUTO: 1.01 K/UL — SIGNIFICANT CHANGE UP (ref 1–3.3)
LYMPHOCYTES # BLD AUTO: 14.2 % — SIGNIFICANT CHANGE UP (ref 13–44)
MAGNESIUM SERPL-MCNC: 1.7 MG/DL — SIGNIFICANT CHANGE UP (ref 1.6–2.6)
MCHC RBC-ENTMCNC: 32.3 PG — SIGNIFICANT CHANGE UP (ref 27–34)
MCHC RBC-ENTMCNC: 33.8 GM/DL — SIGNIFICANT CHANGE UP (ref 32–36)
MCV RBC AUTO: 95.4 FL — SIGNIFICANT CHANGE UP (ref 80–100)
MONOCYTES # BLD AUTO: 0.98 K/UL — HIGH (ref 0–0.9)
MONOCYTES NFR BLD AUTO: 13.7 % — SIGNIFICANT CHANGE UP (ref 2–14)
NEUTROPHILS # BLD AUTO: 4.92 K/UL — SIGNIFICANT CHANGE UP (ref 1.8–7.4)
NEUTROPHILS NFR BLD AUTO: 69 % — SIGNIFICANT CHANGE UP (ref 43–77)
NRBC # BLD: 0 /100 WBCS — SIGNIFICANT CHANGE UP (ref 0–0)
PHOSPHATE SERPL-MCNC: 3.3 MG/DL — SIGNIFICANT CHANGE UP (ref 2.5–4.5)
PLATELET # BLD AUTO: 177 K/UL — SIGNIFICANT CHANGE UP (ref 150–400)
POTASSIUM SERPL-MCNC: 3.3 MMOL/L — LOW (ref 3.5–5.3)
POTASSIUM SERPL-SCNC: 3.3 MMOL/L — LOW (ref 3.5–5.3)
PROT SERPL-MCNC: 5.2 GM/DL — LOW (ref 6–8.3)
RBC # BLD: 2.85 M/UL — LOW (ref 3.8–5.2)
RBC # FLD: 16.8 % — HIGH (ref 10.3–14.5)
SODIUM SERPL-SCNC: 141 MMOL/L — SIGNIFICANT CHANGE UP (ref 135–145)
WBC # BLD: 7.13 K/UL — SIGNIFICANT CHANGE UP (ref 3.8–10.5)
WBC # FLD AUTO: 7.13 K/UL — SIGNIFICANT CHANGE UP (ref 3.8–10.5)

## 2018-08-03 RX ORDER — THIAMINE MONONITRATE (VIT B1) 100 MG
100 TABLET ORAL DAILY
Qty: 0 | Refills: 0 | Status: DISCONTINUED | OUTPATIENT
Start: 2018-08-03 | End: 2018-08-04

## 2018-08-03 RX ORDER — POTASSIUM CHLORIDE 20 MEQ
20 PACKET (EA) ORAL
Qty: 0 | Refills: 0 | Status: COMPLETED | OUTPATIENT
Start: 2018-08-03 | End: 2018-08-03

## 2018-08-03 RX ADMIN — BUDESONIDE AND FORMOTEROL FUMARATE DIHYDRATE 2 PUFF(S): 160; 4.5 AEROSOL RESPIRATORY (INHALATION) at 20:09

## 2018-08-03 RX ADMIN — Medication 25 MILLIGRAM(S): at 11:35

## 2018-08-03 RX ADMIN — Medication 500 MILLIGRAM(S): at 11:34

## 2018-08-03 RX ADMIN — Medication 25 MILLIGRAM(S): at 20:15

## 2018-08-03 RX ADMIN — MAGNESIUM OXIDE 400 MG ORAL TABLET 400 MILLIGRAM(S): 241.3 TABLET ORAL at 11:34

## 2018-08-03 RX ADMIN — Medication 1 MILLIGRAM(S): at 11:34

## 2018-08-03 RX ADMIN — BUDESONIDE AND FORMOTEROL FUMARATE DIHYDRATE 2 PUFF(S): 160; 4.5 AEROSOL RESPIRATORY (INHALATION) at 08:47

## 2018-08-03 RX ADMIN — Medication 1 TABLET(S): at 11:34

## 2018-08-03 NOTE — PROGRESS NOTE ADULT - PROBLEM SELECTOR PLAN 9
- DVT prophylaxis scd. Heparin  and ASA held in concern of GI bleed  -  TLC/DASH   - planned dispo to [facility]

## 2018-08-03 NOTE — PROGRESS NOTE ADULT - PROBLEM SELECTOR PLAN 5
- significant ETOH use h/o  - last drink 7/22 therefore pt is not at risk of ETOH withdrawal seizures  - [facility] protocol d/c'd 7/31  - portal gastropathy seen on Endoscopy 2/2 long term ETOH

## 2018-08-03 NOTE — DISCHARGE NOTE ADULT - PATIENT PORTAL LINK FT
You can access the [facility] Portal, offered by [facility], by registering with the following website: [email or web address]

## 2018-08-03 NOTE — DISCHARGE NOTE ADULT - SECONDARY DIAGNOSIS.
Altered mental status, unspecified Anemia of chronic disease Asthma with COPD Cachexia GERD (gastroesophageal reflux disease) Urinary retention

## 2018-08-03 NOTE — PROGRESS NOTE ADULT - PROBLEM SELECTOR PLAN 1
- -likely cause of AMS  - Pt has significant ETOH use history  - s/p 5 days of thiamine supplementation

## 2018-08-03 NOTE — PROGRESS NOTE ADULT - PROBLEM SELECTOR PLAN 4
- initially megaloblastic in nature due to low folate and B12 on admission and significant ETOH use history; now normocytic so there may be an anemia of chronic disease component to anemia  - Hemoglobin 8.9 this AM (improved from 9.6 @ 8/1 AM) s/p blood transfusion x1 on 7/29  -GI consulted to help determine if GI bleed as cause for GI bleed; HIDA scan- neg, MRCP- neg,  Endoscopy today-portal hypertension gastropathy  - Colonoscopy WNL  - start propanolol 10mg daily for prevention of GI 2/2 gastropathy and development of esophageal varices.

## 2018-08-03 NOTE — DISCHARGE NOTE ADULT - CARE PROVIDER_API CALL
Christiano Clemens), Family Medicine  [address] [place] [address]  Phone: [phone]  Fax: [phone]    Adarsh Forrest), Urology  [address] [place] [address]  Phone: [phone]  Fax: [phone]

## 2018-08-03 NOTE — DISCHARGE NOTE ADULT - CARE PROVIDERS DIRECT ADDRESSES
,DirectAddress_Unknown,[email or web address]

## 2018-08-03 NOTE — DISCHARGE NOTE ADULT - PLAN OF CARE
stable patient - alert and oriented to person and place today  - patient is s/p 7 days of high dose Thiamine supplementation and will continue low dose oral thiamine supplementation on discharge. Pt is still ataxic but improved from admission, still requiring assistance on ambulation. alert and oriented - likley secondary to Wernicke's- Korsakoff syndrome as above.   - Ammonia level was within normal limits, moderately decreased low T3 but normal TSH and T4, B12 level within normal limits. - Patients hemoglobin stable. Pt had endoscopy and colonoscopy did not reveal a source of bleeding.   Iron studies suggestive of anemia of chronic disease. B12 and folate levels within normal limits. Recheck CBC as well BMP, magnesium and Phosphorus within 3 days. - No issues during hospitalization. Continue Budesonide. improve nutritional status Seen by dietician in hospital. Recommend regular diet with 3 times daily with Ensure Enlive 8oz. stable Endoscopy revealed gastritis and portal gastropathy. Patient was started on propanolol 10mg daily, and will continue on discharge. Patient's aspirin was held as there was no history of CAD in the setting of anemia , patient is at high risk for bleed. Please evaluate need for Aspirin in 1 month. Initially presented with urinary retention and anasarca. Patient failed trial of void prior to discharge. Patient will be discharged with Cordero and will need to follow up with Urology outpatient. - likely secondary to Wernicke's- Korsakoff syndrome as above.   - Ammonia level was within normal limits, moderately decreased low T3 but normal TSH and T4, B12 level within normal limits.

## 2018-08-03 NOTE — PROGRESS NOTE ADULT - SUBJECTIVE AND OBJECTIVE BOX
LARRY MAYORGA  53y  Female      Patient is a 53y old  Female who presents with a chief complaint of altered mental status (27 Jul 2018 06:45)      INTERVAL HPI/OVERNIGHT EVENTS:  53 yr old female w alcohol abuse hx, anemia, asthma/COPD, chronic renal insufficiency, GERD, GI bleed, Hepatitic steatosis who presented to  ED after unwitnessed fall and prior day of confusion. ED obtained info from significant other.  Last drink was Sunday 07-22.  Patient became confused  1 day prior to ED presentation.  Then boyfriend found her on the floor.  She has also slept a lot more in the past few days. In ED head CT neg, UDS neg except for THC,  neg alcohol.  UA abn so levaquin given. While in ED, pt was uncooperative w nursing.    SUBJECTIVE:     7/28/18-  Patient seen today with medical team. Still not oriented to surroundings. As per nurse, pt complaining of nausea, Zofran was ordered. Pt did not state any complaints today. Case discussed with Dr. Mujica. Plan for MRI brain and EEG, and give 500 mg Thiamine IV. Aspirin was discontinued as this is unlikely a stroke.     7/29/18-   Pt seen in AM with medical team. She was still not oriented to place. Speech was very tangential during interview. Patient had no complaints overnight. Pt completed EEG this AM- EEG found to be normal. This AM patient's phosphate and magnesium were repleted.    7/30: Patient appears more alert and oriented today. Endorses mild abdominal distention and discomfort. Has not had a bowel movement for 2 days.     7/31/2018: Late yesterday, the patient went for a CT Abd to evaluate her abdominal distention. Urinary retention was noted and the patient was bladder scanned (>1L seen). Patient was straight cath and 1.6L of urine was collected. Pt noted relief after straight cath. Urology was consulted. Pt appears more alert and oriented today. Patient complains of mild discomfort in the RUQ. patient still has not had a bowel movement. GI was consulted, HIDA scan Neg, MRI and endoscopy pending    8/1/2018: Last night, Urology attending Dr. Pike saw Ms. Mayorga concerning her urinary retention. Bethanecol started. Pt had no acute events over night. Pt endorses mild abdominal pain. Overnight nurse notes that the patient seems to be improving in mentation.      8/2/2018. Pt had no acute overnight events. Patient denies any current abdominal pain. She expresses desire to go home but becomes tearful at mention of home situation.      8/3/2018: A TOV was attempted yesterday evening. Pt retained and was straight cathed for 1.1 L. Cordero was placed. Pt has no complaints this Morning. the pt was accepted to [facility] today, pending prior authorization    REVIEW OF SYSTEMS: unable to ascertain as patient is poor historian.     MEDICATIONS  (STANDING):  bethanechol 25 milliGRAM(s) Oral three times a day  buDESOnide 160 MICROgram(s)/formoterol 4.5 MICROgram(s) Inhaler 2 Puff(s) Inhalation two times a day  diltiazem    milliGRAM(s) Oral daily  folic acid 1 milliGRAM(s) Oral daily  magnesium oxide 400 milliGRAM(s) Oral daily  multivitamin 1 Tablet(s) Oral daily  pantoprazole    Tablet 40 milliGRAM(s) Oral before breakfast  polyethylene glycol 3350 17 Gram(s) Oral two times a day  propranolol 10 milliGRAM(s) Oral daily  thiamine 100 milliGRAM(s) Oral daily    MEDICATIONS  (PRN):  ondansetron    Tablet 4 milliGRAM(s) Oral every 8 hours PRN Nausea      Vital Signs Last 24 Hrs  T(C): 36.9 (03 Aug 2018 17:13), Max: 37.3 (02 Aug 2018 18:30)  T(F): 98.4 (03 Aug 2018 17:13), Max: 99.2 (03 Aug 2018 05:18)  HR: 96 (03 Aug 2018 17:13) (74 - 96)  BP: 107/68 (03 Aug 2018 17:13) (101/64 - 131/84)  BP(mean): --  RR: 17 (03 Aug 2018 17:13) (16 - 18)  SpO2: 99% (03 Aug 2018 17:13) (98% - 99%)Vital Signs Last 24 Hrs  T(C): 37.2 (02 Aug 2018 10:24), Max: 37.2 (02 Aug 2018 10:24)  T(F): 98.9 (02 Aug 2018 10:24), Max: 98.9 (02 Aug 2018 10:24)  HR: 102 (02 Aug 2018 10:24) (70 - 111)  BP: 113/71 (02 Aug 2018 10:24) (101/62 - 119/70)  BP(mean): --  RR: 17 (02 Aug 2018 10:24) (17 - 18)  SpO2: 100% (02 Aug 2018 10:24) (99% - 100%)    PHYSICAL EXAM:  GENERAL: NAD, thin, cachetic  HEAD:  Atraumatic, Normocephalic  EYES: +vertical nystagmus PERRLA, conjunctiva and sclera clear  ENMT: Moist mucous membranes, missing teeth, dentures not in place  NERVOUS SYSTEM: Alert; oriented to person, place only. gait not assessed  CHEST/LUNG: Clear to percussion bilaterally; No rales, rhonchi, wheezing, or rubs  HEART: Regular rate and rhythm; No murmurs, rubs, or gallops  ABDOMEN: Soft, Nontender, non-distended Bowel sounds present  EXTREMITIES:  2+ Peripheral Pulses, no edema seen  Cordero in place draining yellow nonbloody urine    Consultant(s) Notes Reviewed:  [x ] YES  [ ] NO      LABS:                               9.2    7.13  )-----------( 177      ( 03 Aug 2018 07:06 )             27.2   Basic Metabolic Panel in AM (07.30.18 @ 07:50)    Sodium, Serum: 142 mmol/L    Potassium, Serum: 3.7 mmol/L    Chloride, Serum: 114 mmol/L    Carbon Dioxide, Serum: 18 mmol/L    Anion Gap, Serum: 10 mmol/L    Blood Urea Nitrogen, Serum: 3 mg/dL    Creatinine, Serum: 0.51 mg/dL    Glucose, Serum: 90 mg/dL    Calcium, Total Serum: 7.7 mg/dL    eGFR if Non : 111: Interpretative comment  The units for eGFR are ml/min/1.73m2 (normalized body surface area). The  eGFR is calculated from a serum creatinine using the CKD-EPI equation.  Other variables required for calculation are race, age and sex. Among  patients with chronic kidney disease (CKD), the eGFR is useful in  determining the stage of disease according to KDOQI CKD classification.  All eGFR results are reported numerically with the following  interpretation.          GFR                    With                 Without     (ml/min/1.73 m2)    Kidney Damage       Kidney Damage        >= 90                    Stage 1                     Normal        60-89                    Stage 2                     Decreased GFR        30-59     Stage 3                     Stage 3        15-29                    Stage 4                     Stage 4        < 15                      Stage 5                     Stage 5  Each stage of CKD assumes that the associated GFR level has been in  effect for at least 3 months. Determination of stages one and two (with  eGFR > 59 ml/min/m2) requires estimation of kidney damage for at least 3  months as defined by structural or functional abnormalities.  Limitations: All estimates of GFR will be less accurate for patients at  extremes of muscle mass (including but not limited to frail elderly,  critically ill, or cancer patients), those with unusual diets, and those  with conditions associated with reduced secretion or extrarenal  elimination of creatinine. The eGFR equation is not recommended for use  in patients with unstable creatinine levels. mL/min/1.73M2    eGFR if African American: 128 mL/min/1.73M2    Colonoscopy 8/3: No hemorrhoids or polyps seen. Repeat in 10 years     Upper Endoscopy (08.01.18 @ 16:03)   - Z-line regular, 34 cm from the incisors.      - Hiatal hernia.     - Portal hypertensive gastropathy.    - Normal duodenal bulb and second portion of the  duodenum.      - No specimens collected.

## 2018-08-03 NOTE — DISCHARGE NOTE ADULT - CARE PLAN
Principal Discharge DX:	Wernicke-Korsakoff syndrome (alcoholic)  Goal:	stable patient  Assessment and plan of treatment:	- alert and oriented to person and place today  - patient is s/p 7 days of high dose Thiamine supplementation and will continue low dose oral thiamine supplementation on discharge. Pt is still ataxic but improved from admission, still requiring assistance on ambulation.  Secondary Diagnosis:	Altered mental status, unspecified  Goal:	alert and oriented  Assessment and plan of treatment:	- likley secondary to Wernicke's- Korsakoff syndrome as above.   - Ammonia level was within normal limits, moderately decreased low T3 but normal TSH and T4, B12 level within normal limits.  Secondary Diagnosis:	Anemia of chronic disease  Goal:	stable patient  Assessment and plan of treatment:	- Patients hemoglobin stable. Pt had endoscopy and colonoscopy did not reveal a source of bleeding.   Iron studies suggestive of anemia of chronic disease. B12 and folate levels within normal limits. Recheck CBC as well BMP, magnesium and Phosphorus within 3 days.  Secondary Diagnosis:	Asthma with COPD  Goal:	stable patient  Assessment and plan of treatment:	- No issues during hospitalization. Continue Budesonide.  Secondary Diagnosis:	Cachexia  Goal:	improve nutritional status  Assessment and plan of treatment:	Seen by dietician in hospital. Recommend regular diet with 3 times daily with Ensure Enlive 8oz.  Secondary Diagnosis:	GERD (gastroesophageal reflux disease)  Goal:	stable  Assessment and plan of treatment:	Endoscopy revealed gastritis and portal gastropathy. Patient was started on propanolol 10mg daily, and will continue on discharge. Patient's aspirin was held as there was no history of CAD in the setting of anemia , patient is at high risk for bleed. Please evaluate need for Aspirin in 1 month.  Secondary Diagnosis:	Urinary retention  Goal:	stable  Assessment and plan of treatment:	Initially presented with urinary retention and anasarca. Patient failed trial of void prior to discharge. Patient will be discharged with Cordero and will need to follow up with Urology outpatient. Principal Discharge DX:	Wernicke-Korsakoff syndrome (alcoholic)  Goal:	stable patient  Assessment and plan of treatment:	- alert and oriented to person and place today  - patient is s/p 7 days of high dose Thiamine supplementation and will continue low dose oral thiamine supplementation on discharge. Pt is still ataxic but improved from admission, still requiring assistance on ambulation.  Secondary Diagnosis:	Altered mental status, unspecified  Goal:	alert and oriented  Assessment and plan of treatment:	- likely secondary to Wernicke's- Korsakoff syndrome as above.   - Ammonia level was within normal limits, moderately decreased low T3 but normal TSH and T4, B12 level within normal limits.  Secondary Diagnosis:	Anemia of chronic disease  Goal:	stable patient  Assessment and plan of treatment:	- Patients hemoglobin stable. Pt had endoscopy and colonoscopy did not reveal a source of bleeding.   Iron studies suggestive of anemia of chronic disease. B12 and folate levels within normal limits. Recheck CBC as well BMP, magnesium and Phosphorus within 3 days.  Secondary Diagnosis:	Asthma with COPD  Goal:	stable patient  Assessment and plan of treatment:	- No issues during hospitalization. Continue Budesonide.  Secondary Diagnosis:	Cachexia  Goal:	improve nutritional status  Assessment and plan of treatment:	Seen by dietician in hospital. Recommend regular diet with 3 times daily with Ensure Enlive 8oz.  Secondary Diagnosis:	GERD (gastroesophageal reflux disease)  Goal:	stable  Assessment and plan of treatment:	Endoscopy revealed gastritis and portal gastropathy. Patient was started on propanolol 10mg daily, and will continue on discharge. Patient's aspirin was held as there was no history of CAD in the setting of anemia , patient is at high risk for bleed. Please evaluate need for Aspirin in 1 month.  Secondary Diagnosis:	Urinary retention  Goal:	stable  Assessment and plan of treatment:	Initially presented with urinary retention and anasarca. Patient failed trial of void prior to discharge. Patient will be discharged with Cordero and will need to follow up with Urology outpatient.

## 2018-08-03 NOTE — DISCHARGE NOTE ADULT - ADDITIONAL INSTRUCTIONS
Follow-up with PCP within 1 week to recheck CBC, BMP, Magnesium, Phosphorus.   Follow-up with Urology ASAP to assess need for urinary morel.

## 2018-08-03 NOTE — PROGRESS NOTE ADULT - SUBJECTIVE AND OBJECTIVE BOX
Pt has been seen and examined with FP resident, resident supervised agree with a/p       Patient is a 53y old  Female who presents with a chief complaint of altered mental status (27 Jul 2018 06:45)      general- comfortable   -rs-aeeb,cta  -cvs-s1s2 normal   -p/a-soft,bs+  -extremity- no asymmetrical swelling noted   -mental status- she is alert, follows command, oriented to time, place and person but slow to respond           A/P    #discharge plan     #supportive care

## 2018-08-03 NOTE — DISCHARGE NOTE ADULT - HOSPITAL COURSE
Pt was found to have altered mental status after unwitnessed fall. on admission patient was not ariented to person place or time. 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 Patient completed 5 days of high dose thiamine supplementation. The hospital course was complicated by abdominal distention found to be secondary to acute urinary retention. A morel was place but she fzailed a trial of void so the patient ewill be discharged with morel. Hospital course was complicated by anemia likley secondary to folate defiency and iron defiency. Gastroenterology was also consulted to rule out bleeding, on endoscopy portal hypertension gastropathy was found. Pt was found to have altered mental status after unwitnessed fall. On admission patient was not oriented to person, place or time. Patient was found to have ataxia, confusion and vertical nystagmus, in the setting of chronic ETOH use and therefore diagnosed with Wernicke-Korsakoff encephalopathy. Patient completed 7 days of high dose thiamine supplementation without any complications and will be discharged home with oral daily thiamine. The hospital course was complicated by abdominal distention found to be secondary to acute urinary retention. A urinary catheter was placed but she failed a trial of void. The patient will be discharged to subacute rehab with a Cordero catheter in place and will follow-up with Dr. Forrest's group after discharge. Ms. Mayorga's hospital course was also complicated by persistent anemia likely secondary to anemia of chronic disease. Gastroenterology was also consulted to rule out bleeding, on endoscopy portal gastropathy was found. The patient was started on  daily Propanolol with no complications.     8/6/2018: The patient was seen and evaluated at bedside with the medical team. Yesterday, the patient had an unwitnessed fall, hitting her knees,  while walking from the commode. A CT head was done that showed no intracranial bleed. The patient does not note any pain today, neither abdominal pain or pain of the knee. The patient expresses desire to leave hospital. Will update PCP concerning patient's discharge to [facility].       Vital Signs Last 24 Hrs  T(C): 36.6 (06 Aug 2018 11:05), Max: 36.9 (05 Aug 2018 17:16)  T(F): 97.8 (06 Aug 2018 11:05), Max: 98.4 (05 Aug 2018 17:16)  HR: 78 (06 Aug 2018 11:05) (74 - 81)  BP: 105/76 (06 Aug 2018 11:05) (105/76 - 136/83)  BP(mean): --  RR: 18 (06 Aug 2018 11:05) (16 - 18)  SpO2: 100% (06 Aug 2018 11:05) (100% - 100%)    Gen: Not in acute distress, Cachetic Alert and oriented to person and place  HEENT: + vertical nystagmus  CV: Normal rate and rhythm S1,S2 auscultated , no murmurs heard  RESP: CTABL, good inspiratory effort  Abd: soft, non-distended , non- tender, + bowel sounds  : Cordero catheter in place, draining light yellow, non-bloody urine  Ext: thin extremities, 4/5 strength on UE and LE b/l, 2+ reflexes, + mild dysmetria (improved from admission)                          10.0   7.26  )-----------( 262      ( 06 Aug 2018 05:57 )             30.1   08-06    139  |  103  |  4<L>  ----------------------------<  66<L>  3.9   |  27  |  0.41<L>    Ca    8.3<L>      06 Aug 2018 05:57  Phos  3.2     08-06  Mg     2.0     08-06    TPro  5.7<L>  /  Alb  2.2<L>  /  TBili  1.1  /  DBili  x   /  AST  49<H>  /  ALT  25  /  AlkPhos  132<H>  08-05

## 2018-08-03 NOTE — DISCHARGE NOTE ADULT - MEDICATION SUMMARY - MEDICATIONS TO TAKE
I will START or STAY ON the medications listed below when I get home from the hospital:    propranolol 10 mg oral tablet  -- 1 tab(s) by mouth once a day  -- Indication: For Gastropathy    dilTIAZem 120 mg/24 hours oral capsule, extended release  -- 1 cap(s) by mouth once a day  -- Indication: For Hypertension    Symbicort 160 mcg-4.5 mcg/inh inhalation aerosol  -- 2 puff(s) inhaled 2 times a day  -- Indication: For Asthma with COPD    polyethylene glycol 3350 oral powder for reconstitution  -- 17 gram(s) by mouth once a day as needed  -- Indication: For Constipation    bethanechol 25 mg oral tablet  -- 1 tab(s) by mouth 3 times a day  -- Indication: For Urinary retention    pantoprazole 40 mg oral delayed release tablet  -- 1 tab(s) by mouth once a day (before a meal)  -- Indication: For GERD (gastroesophageal reflux disease)    Multiple Vitamins oral tablet  -- 1 tab(s) by mouth once a day  -- Indication: For vitamin supplementation    folic acid 1 mg oral tablet  -- 1 tab(s) by mouth once a day  -- Indication: For vitamin supplementation    thiamine 100 mg oral tablet  -- 1 tab(s) by mouth once a day   -- Indication: For Wernicke-Korsakoff syndrome (alcoholic) I will START or STAY ON the medications listed below when I get home from the hospital:    propranolol 10 mg oral tablet  -- 1 tab(s) by mouth once a day  -- Indication: For Gastropathy    dilTIAZem 120 mg/24 hours oral capsule, extended release  -- 1 cap(s) by mouth once a day  -- Indication: For Tachycardia (SVT)    Symbicort 160 mcg-4.5 mcg/inh inhalation aerosol  -- 2 puff(s) inhaled 2 times a day  -- Indication: For Asthma with COPD    polyethylene glycol 3350 oral powder for reconstitution  -- 17 gram(s) by mouth once a day as needed  -- Indication: For Constipation    bethanechol 25 mg oral tablet  -- 1 tab(s) by mouth 3 times a day  -- Indication: For Urinary retention    pantoprazole 40 mg oral delayed release tablet  -- 1 tab(s) by mouth once a day (before a meal)  -- Indication: For GERD (gastroesophageal reflux disease)    Multiple Vitamins oral tablet  -- 1 tab(s) by mouth once a day  -- Indication: For vitamin supplementation    folic acid 1 mg oral tablet  -- 1 tab(s) by mouth once a day  -- Indication: For vitamin supplementation    thiamine 100 mg oral tablet  -- 1 tab(s) by mouth once a day   -- Indication: For Wernicke-Korsakoff syndrome (alcoholic)

## 2018-08-03 NOTE — PROGRESS NOTE ADULT - PROBLEM SELECTOR PLAN 3
- likely 2/2 to urinary retention  - abdomen not distended today  - continue Bethanacol 25mg TID started.   - failed TOV on 8/2. Morel in place will d/c with morel and f/u Outpt with Urology.

## 2018-08-03 NOTE — DISCHARGE NOTE ADULT - INSTRUCTIONS
Regular diet with supplements: Enlive 8 oz tid. Regular diet with supplements: Ensure Enlive 8 oz tid.

## 2018-08-04 LAB
ANION GAP SERPL CALC-SCNC: 7 MMOL/L — SIGNIFICANT CHANGE UP (ref 5–17)
BUN SERPL-MCNC: 5 MG/DL — LOW (ref 7–23)
CALCIUM SERPL-MCNC: 8.4 MG/DL — LOW (ref 8.5–10.1)
CHLORIDE SERPL-SCNC: 104 MMOL/L — SIGNIFICANT CHANGE UP (ref 96–108)
CO2 SERPL-SCNC: 27 MMOL/L — SIGNIFICANT CHANGE UP (ref 22–31)
CREAT SERPL-MCNC: 0.46 MG/DL — LOW (ref 0.5–1.3)
GLUCOSE SERPL-MCNC: 88 MG/DL — SIGNIFICANT CHANGE UP (ref 70–99)
POTASSIUM SERPL-MCNC: 3.3 MMOL/L — LOW (ref 3.5–5.3)
POTASSIUM SERPL-SCNC: 3.3 MMOL/L — LOW (ref 3.5–5.3)
SODIUM SERPL-SCNC: 138 MMOL/L — SIGNIFICANT CHANGE UP (ref 135–145)

## 2018-08-04 RX ORDER — POTASSIUM CHLORIDE 20 MEQ
20 PACKET (EA) ORAL
Qty: 0 | Refills: 0 | Status: COMPLETED | OUTPATIENT
Start: 2018-08-04 | End: 2018-08-04

## 2018-08-04 RX ORDER — THIAMINE MONONITRATE (VIT B1) 100 MG
250 TABLET ORAL DAILY
Qty: 0 | Refills: 0 | Status: COMPLETED | OUTPATIENT
Start: 2018-08-04 | End: 2018-08-06

## 2018-08-04 RX ORDER — THIAMINE MONONITRATE (VIT B1) 100 MG
250 TABLET ORAL DAILY
Qty: 0 | Refills: 0 | Status: DISCONTINUED | OUTPATIENT
Start: 2018-08-04 | End: 2018-08-04

## 2018-08-04 RX ADMIN — PANTOPRAZOLE SODIUM 40 MILLIGRAM(S): 20 TABLET, DELAYED RELEASE ORAL at 06:33

## 2018-08-04 RX ADMIN — Medication 20 MILLIEQUIVALENT(S): at 13:25

## 2018-08-04 RX ADMIN — Medication 25 MILLIGRAM(S): at 06:34

## 2018-08-04 RX ADMIN — Medication 120 MILLIGRAM(S): at 06:33

## 2018-08-04 RX ADMIN — POLYETHYLENE GLYCOL 3350 17 GRAM(S): 17 POWDER, FOR SOLUTION ORAL at 06:34

## 2018-08-04 RX ADMIN — Medication 25 MILLIGRAM(S): at 21:11

## 2018-08-04 RX ADMIN — POLYETHYLENE GLYCOL 3350 17 GRAM(S): 17 POWDER, FOR SOLUTION ORAL at 17:55

## 2018-08-04 RX ADMIN — Medication 10 MILLIGRAM(S): at 06:34

## 2018-08-04 RX ADMIN — Medication 1 MILLIGRAM(S): at 11:53

## 2018-08-04 RX ADMIN — Medication 100 MILLIGRAM(S): at 11:53

## 2018-08-04 RX ADMIN — Medication 1 TABLET(S): at 11:53

## 2018-08-04 RX ADMIN — Medication 25 MILLIGRAM(S): at 13:25

## 2018-08-04 RX ADMIN — Medication 102.5 MILLIGRAM(S): at 18:53

## 2018-08-04 RX ADMIN — MAGNESIUM OXIDE 400 MG ORAL TABLET 400 MILLIGRAM(S): 241.3 TABLET ORAL at 11:53

## 2018-08-04 RX ADMIN — Medication 20 MILLIEQUIVALENT(S): at 17:56

## 2018-08-04 RX ADMIN — BUDESONIDE AND FORMOTEROL FUMARATE DIHYDRATE 2 PUFF(S): 160; 4.5 AEROSOL RESPIRATORY (INHALATION) at 08:44

## 2018-08-04 NOTE — PROGRESS NOTE ADULT - PROBLEM SELECTOR PLAN 8
Encourage PO intake  -Pt started on Mag -ox TID with meals as supplementation.   -F/u mg and K tomorrow.   -potassium repleted this AM

## 2018-08-04 NOTE — PROGRESS NOTE ADULT - SUBJECTIVE AND OBJECTIVE BOX
CHIEF COMPLAINT: AMS    SUBJECTIVE:   HPI: 53 yr old female w alcohol abuse hx, anemia, asthma/COPD, chronic renal insufficiency, GERD, GI bleed, Hepatitic steatosis who presented to  ED after unwitnessed fall and prior day of confusion. ED obtained info from significant other.  Last drink was Sunday 07-22.  Patient became confused  1 day prior to ED presentation.  Then boyfriend found her on the floor.  She has also slept a lot more in the past few days. In ED head CT neg, UDS neg except for THC,  neg alcohol.  UA abn so levaquin given. While in ED, pt was uncooperative w nursing.    8/4/18:  Pt was seen and evaluated at bedside. Pt has no acute complaints. Tolerating po diet. AAO *3. Mentation has improved. Denies headaches, cp, n/v, d/c, fever and chills.     REVIEW OF SYSTEMS:  CONSTITUTIONAL: No weakness, fevers or chills  EYES/ENT: No visual changes;  No vertigo or throat pain   NECK: No pain or stiffness  RESPIRATORY: No cough, wheezing, hemoptysis; No shortness of breath  CARDIOVASCULAR: No chest pain or palpitations  GASTROINTESTINAL: No abdominal or epigastric pain. No nausea, vomiting, or hematemesis; No diarrhea or constipation. No melena or hematochezia.  GENITOURINARY: No dysuria, frequency or hematuria  NEUROLOGICAL: No numbness or weakness  SKIN: No itching, burning, rashes, or lesions   All other review of systems is negative unless indicated above    Vital Signs Last 24 Hrs  T(C): 36.2 (04 Aug 2018 16:57), Max: 37.1 (03 Aug 2018 20:57)  T(F): 97.2 (04 Aug 2018 16:57), Max: 98.8 (03 Aug 2018 20:57)  HR: 71 (04 Aug 2018 16:57) (71 - 96)  BP: 120/84 (04 Aug 2018 16:57) (112/73 - 128/71)  BP(mean): --  RR: 18 (04 Aug 2018 16:57) (16 - 18)  SpO2: 100% (04 Aug 2018 16:57) (98% - 100%)    I&O's Summary    03 Aug 2018 07:01  -  04 Aug 2018 07:00  --------------------------------------------------------  IN: 0 mL / OUT: 850 mL / NET: -850 mL    04 Aug 2018 07:01  -  04 Aug 2018 17:35  --------------------------------------------------------  IN: 0 mL / OUT: 320 mL / NET: -320 mL        CAPILLARY BLOOD GLUCOSE          PHYSICAL EXAM:  Constitutional: NAD, awake and alert, well-developed  HEENT: PERR, Normal Hearing.  Respiratory: Breath sounds are clear bilaterally, No wheezing, rales or rhonchi  Cardiovascular: S1 and S2, regular rate and rhythm, no Murmurs, gallops or rubs  Gastrointestinal: Bowel Sounds present, soft, nontender, nondistended, no guarding, no rebound  Extremities: No peripheral edema  Vascular: 2+ peripheral pulses  Neurological: A/O x 3.    MEDICATIONS:  MEDICATIONS  (STANDING):  bethanechol 25 milliGRAM(s) Oral three times a day  buDESOnide 160 MICROgram(s)/formoterol 4.5 MICROgram(s) Inhaler 2 Puff(s) Inhalation two times a day  diltiazem    milliGRAM(s) Oral daily  folic acid 1 milliGRAM(s) Oral daily  magnesium oxide 400 milliGRAM(s) Oral daily  multivitamin 1 Tablet(s) Oral daily  pantoprazole    Tablet 40 milliGRAM(s) Oral before breakfast  polyethylene glycol 3350 17 Gram(s) Oral two times a day  potassium chloride    Tablet ER 20 milliEquivalent(s) Oral every 2 hours  propranolol 10 milliGRAM(s) Oral daily  thiamine IVPB 250 milliGRAM(s) IV Intermittent daily      LABS: All Labs Reviewed:                        9.2    7.13  )-----------( 177      ( 03 Aug 2018 07:06 )             27.2     08-04    138  |  104  |  5<L>  ----------------------------<  88  3.3<L>   |  27  |  0.46<L>    Ca    8.4<L>      04 Aug 2018 06:15  Phos  3.3     08-03  Mg     1.7     08-03    TPro  5.2<L>  /  Alb  2.0<L>  /  TBili  1.3<H>  /  DBili  x   /  AST  46<H>  /  ALT  21  /  AlkPhos  132<H>  08-03          Blood Culture:     RADIOLOGY/EKG:    DVT PPX:    ADVANCED DIRECTIVE:    DISPOSITION:

## 2018-08-04 NOTE — PROGRESS NOTE ADULT - PROBLEM SELECTOR PLAN 5
- significant ETOH use h/o  - last drink 7/22 therefore pt is not at risk of ETOH withdrawal seizures  - [facility] protocol d/c'd 7/31  - portal gastropathy seen on Endoscopy 2/2 long term ETOH

## 2018-08-05 DIAGNOSIS — R29.6 REPEATED FALLS: ICD-10-CM

## 2018-08-05 LAB
ALBUMIN SERPL ELPH-MCNC: 2.2 G/DL — LOW (ref 3.3–5)
ALP SERPL-CCNC: 132 U/L — HIGH (ref 40–120)
ALT FLD-CCNC: 25 U/L — SIGNIFICANT CHANGE UP (ref 12–78)
ANION GAP SERPL CALC-SCNC: 9 MMOL/L — SIGNIFICANT CHANGE UP (ref 5–17)
AST SERPL-CCNC: 49 U/L — HIGH (ref 15–37)
BASOPHILS # BLD AUTO: 0.02 K/UL — SIGNIFICANT CHANGE UP (ref 0–0.2)
BASOPHILS NFR BLD AUTO: 0.3 % — SIGNIFICANT CHANGE UP (ref 0–2)
BILIRUB SERPL-MCNC: 1.1 MG/DL — SIGNIFICANT CHANGE UP (ref 0.2–1.2)
BUN SERPL-MCNC: 4 MG/DL — LOW (ref 7–23)
CALCIUM SERPL-MCNC: 8.1 MG/DL — LOW (ref 8.5–10.1)
CHLORIDE SERPL-SCNC: 105 MMOL/L — SIGNIFICANT CHANGE UP (ref 96–108)
CO2 SERPL-SCNC: 26 MMOL/L — SIGNIFICANT CHANGE UP (ref 22–31)
CREAT SERPL-MCNC: 0.41 MG/DL — LOW (ref 0.5–1.3)
EOSINOPHIL # BLD AUTO: 0.09 K/UL — SIGNIFICANT CHANGE UP (ref 0–0.5)
EOSINOPHIL NFR BLD AUTO: 1.2 % — SIGNIFICANT CHANGE UP (ref 0–6)
GLUCOSE SERPL-MCNC: 86 MG/DL — SIGNIFICANT CHANGE UP (ref 70–99)
HCT VFR BLD CALC: 29.2 % — LOW (ref 34.5–45)
HGB BLD-MCNC: 9.8 G/DL — LOW (ref 11.5–15.5)
IMM GRANULOCYTES NFR BLD AUTO: 0.6 % — SIGNIFICANT CHANGE UP (ref 0–1.5)
LYMPHOCYTES # BLD AUTO: 1.2 K/UL — SIGNIFICANT CHANGE UP (ref 1–3.3)
LYMPHOCYTES # BLD AUTO: 15.5 % — SIGNIFICANT CHANGE UP (ref 13–44)
MAGNESIUM SERPL-MCNC: 1.5 MG/DL — LOW (ref 1.6–2.6)
MCHC RBC-ENTMCNC: 32.1 PG — SIGNIFICANT CHANGE UP (ref 27–34)
MCHC RBC-ENTMCNC: 33.6 GM/DL — SIGNIFICANT CHANGE UP (ref 32–36)
MCV RBC AUTO: 95.7 FL — SIGNIFICANT CHANGE UP (ref 80–100)
MONOCYTES # BLD AUTO: 0.85 K/UL — SIGNIFICANT CHANGE UP (ref 0–0.9)
MONOCYTES NFR BLD AUTO: 11 % — SIGNIFICANT CHANGE UP (ref 2–14)
NEUTROPHILS # BLD AUTO: 5.53 K/UL — SIGNIFICANT CHANGE UP (ref 1.8–7.4)
NEUTROPHILS NFR BLD AUTO: 71.4 % — SIGNIFICANT CHANGE UP (ref 43–77)
NRBC # BLD: 0 /100 WBCS — SIGNIFICANT CHANGE UP (ref 0–0)
PLATELET # BLD AUTO: 235 K/UL — SIGNIFICANT CHANGE UP (ref 150–400)
POTASSIUM SERPL-MCNC: 3.8 MMOL/L — SIGNIFICANT CHANGE UP (ref 3.5–5.3)
POTASSIUM SERPL-SCNC: 3.8 MMOL/L — SIGNIFICANT CHANGE UP (ref 3.5–5.3)
PROT SERPL-MCNC: 5.7 GM/DL — LOW (ref 6–8.3)
RBC # BLD: 3.05 M/UL — LOW (ref 3.8–5.2)
RBC # FLD: 15.9 % — HIGH (ref 10.3–14.5)
SODIUM SERPL-SCNC: 140 MMOL/L — SIGNIFICANT CHANGE UP (ref 135–145)
WBC # BLD: 7.74 K/UL — SIGNIFICANT CHANGE UP (ref 3.8–10.5)
WBC # FLD AUTO: 7.74 K/UL — SIGNIFICANT CHANGE UP (ref 3.8–10.5)

## 2018-08-05 PROCEDURE — 70450 CT HEAD/BRAIN W/O DYE: CPT | Mod: 26

## 2018-08-05 RX ORDER — MAGNESIUM OXIDE 400 MG ORAL TABLET 241.3 MG
400 TABLET ORAL
Qty: 0 | Refills: 0 | Status: DISCONTINUED | OUTPATIENT
Start: 2018-08-05 | End: 2018-08-06

## 2018-08-05 RX ORDER — MAGNESIUM SULFATE 500 MG/ML
1 VIAL (ML) INJECTION ONCE
Qty: 0 | Refills: 0 | Status: COMPLETED | OUTPATIENT
Start: 2018-08-05 | End: 2018-08-05

## 2018-08-05 RX ADMIN — BUDESONIDE AND FORMOTEROL FUMARATE DIHYDRATE 2 PUFF(S): 160; 4.5 AEROSOL RESPIRATORY (INHALATION) at 08:17

## 2018-08-05 RX ADMIN — Medication 10 MILLIGRAM(S): at 06:02

## 2018-08-05 RX ADMIN — POLYETHYLENE GLYCOL 3350 17 GRAM(S): 17 POWDER, FOR SOLUTION ORAL at 06:02

## 2018-08-05 RX ADMIN — Medication 1 TABLET(S): at 11:50

## 2018-08-05 RX ADMIN — Medication 25 MILLIGRAM(S): at 06:02

## 2018-08-05 RX ADMIN — Medication 102.5 MILLIGRAM(S): at 11:50

## 2018-08-05 RX ADMIN — Medication 1 MILLIGRAM(S): at 11:50

## 2018-08-05 RX ADMIN — Medication 25 MILLIGRAM(S): at 13:59

## 2018-08-05 RX ADMIN — MAGNESIUM OXIDE 400 MG ORAL TABLET 400 MILLIGRAM(S): 241.3 TABLET ORAL at 11:49

## 2018-08-05 RX ADMIN — Medication 100 GRAM(S): at 13:59

## 2018-08-05 RX ADMIN — PANTOPRAZOLE SODIUM 40 MILLIGRAM(S): 20 TABLET, DELAYED RELEASE ORAL at 06:02

## 2018-08-05 NOTE — PROGRESS NOTE ADULT - PROBLEM SELECTOR PROBLEM 6
GERD (gastroesophageal reflux disease)
Asthma with COPD
GERD (gastroesophageal reflux disease)
AA (alcohol abuse)
Asthma with COPD
Asthma with COPD
GERD (gastroesophageal reflux disease)
Asthma with COPD

## 2018-08-05 NOTE — PROGRESS NOTE ADULT - PROBLEM SELECTOR PROBLEM 5
Cachexia
AA (alcohol abuse)
AA (alcohol abuse)
Anemia
Cachexia
AA (alcohol abuse)
Asthma with COPD
AA (alcohol abuse)

## 2018-08-05 NOTE — PROGRESS NOTE ADULT - PROBLEM SELECTOR PLAN 5
Orders placed.   - initially megaloblastic in nature due to low folate and B12 on admission and significant ETOH use history; now normocytic so there may be an anemia of chronic disease component to anemia  - H&H stable  -GI - no active bleeding source found, continue propanolol 10mg daily for prevention of GI 2/2 gastropathy and development of esophageal varices.

## 2018-08-05 NOTE — PROGRESS NOTE ADULT - PROBLEM SELECTOR PLAN 2
- Day 7 of thiamine supplementation. Continue Thiamine 250mg qd  for 1 more day  - confusion improving

## 2018-08-05 NOTE — PROGRESS NOTE ADULT - PROBLEM SELECTOR PROBLEM 9
Prophylactic measure
Cachexia
Prophylactic measure

## 2018-08-05 NOTE — PROGRESS NOTE ADULT - ASSESSMENT
53 yr old female w alcohol abuse hx, anemia, asthma/COPD, chronic renal insufficiency, GERD, GI bleed, Hepatitic steatosis was admitted with AMS likely 2/2 Wernicke's Encephalopathy.  During hospitalization pt developed abdominal distention possibly 2/2 urinary retention.
52 y/o/f with alcohol abuse hx, anemia, asthma/COPD, CRI, GI bleed, Hepatitic steatosis presented to  ED after unwitnessed fall, became confused 1 day prior to ED presentation, boyfriend found her on the floor, had been sleeping a lot in the past few days. Patient drinks 3-4 beers daily and had etoh withdrawal seizures in 3/2017 EEG (03.23.17) normal EEG from with the patient awake and drowsy.    Head CT neg, Neuro exam, confused/confabulates/ encephalopthic/dysconjugate gaze/diplopia.    # Rule out Wernickes encephalopathy; remakable improvement with Thiamine IV.   24 hr EEG monitoring is normal. MRI brain reveals no acute findings    - Chnage Thiamine to PO  - Supportive care    D/W, Counselled regarding ETOH use
53 yr old female w alcohol abuse hx, anemia, asthma/COPD, chronic renal insufficiency, GERD, GI bleed, Hepatitic steatosis was admitted with AMS likely 2/2 Wernicke's Encephalopathy.  During hospitalization pt developed abdominal distention possibly 2/2 urinary retention. Low K today of 3.3.
53 yr old female w alcohol abuse hx, anemia, asthma/COPD, chronic renal insufficiency, GERD, GI bleed, Hepatitic steatosis who presented to  on 7/26 with AMS likely 2/2 Wernicke's Encephalopathy.  During hospitalization pt developed abdominal distention possibly 2/2 urinary retention
53 yr old female w alcohol abuse hx, anemia, asthma/COPD, chronic renal insufficiency, GERD, GI bleed, Hepatitic steatosis who presented to  on 7/26 with AMS.
53 yr old female w alcohol abuse hx, anemia, asthma/COPD, chronic renal insufficiency, GERD, GI bleed, Hepatitic steatosis who presented to  on 7/26 with AMS.
Anemia    plan for colonoscopy tomorrow if preps well    golytely prep today
53 yr old female w alcohol abuse hx, anemia, asthma/COPD, chronic renal insufficiency, GERD, GI bleed, Hepatitic steatosis who presented to  on 7/26 with AMS likely 2/2 Wernicke's Encephalopathy During hospitalization pt developed abdominal distention possibly 2/2 urinary retention
53 yr old female w alcohol abuse hx, anemia, asthma/COPD, chronic renal insufficiency, GERD, GI bleed, Hepatitic steatosis who presented to  on 7/26 with AMS.
53 yr old female w alcohol abuse hx, anemia, asthma/COPD, chronic renal insufficiency, GERD, GI bleed, Hepatitic steatosis who presented to  on 7/26 with AMS.
53 yr old female w alcohol abuse hx, anemia, asthma/COPD, chronic renal insufficiency, GERD, GI bleed, Hepatitic steatosis who presented to  on 7/26 with AMS likely 2/2 Wernicke's Encephalopathy.  During hospitalization pt developed abdominal distention possibly 2/2 urinary retention
53 yr old female w alcohol abuse hx, anemia, asthma/COPD, chronic renal insufficiency, GERD, GI bleed, Hepatitic steatosis who presented to  on 7/26 with AMS likely 2/2 Wernicke's Encephalopathy During hospitalization pt developed abdominal distention possibly 2/2 urinary retention

## 2018-08-05 NOTE — PROGRESS NOTE ADULT - PROBLEM SELECTOR PROBLEM 3
Altered mental status, unspecified
Abdominal distention
Abdominal distention
Hepatic steatosis
Abdominal distention
Hepatic steatosis
Anemia
Anemia

## 2018-08-05 NOTE — PROGRESS NOTE ADULT - SUBJECTIVE AND OBJECTIVE BOX
CHIEF COMPLAINT: AMS    SUBJECTIVE:   HPI: 53 yr old female w alcohol abuse hx, anemia, asthma/COPD, chronic renal insufficiency, GERD, GI bleed, Hepatitic steatosis who presented to  ED after unwitnessed fall and prior day of confusion. ED obtained info from significant other.  Last drink was Sunday 07-22.  Patient became confused  1 day prior to ED presentation.  Then boyfriend found her on the floor.  She has also slept a lot more in the past few days. In ED head CT neg, UDS neg except for THC,  neg alcohol.  UA abn so levaquin given. While in ED, pt was uncooperative w nursing.    8/5/2018: Patient initially seen and examined at bedside with medical team. Pt's significant other was present. Pt denied any chest pain, SOB, abdominal pain. In the afternoon, we were called to bedside because the patient was found on the floor after an unwitnessed fall. The patient was on commode and stood up on own. Patient noted that she felt dizzy after bending over  and fell. Patient hit her head after fall and scraped right knee. Pt denies any loss of consciousness after fall. A CT head was ordered.     REVIEW OF SYSTEMS:  CONSTITUTIONAL: No weakness, fevers or chills  EYES/ENT: No visual changes  RESPIRATORY: No cough, wheezing, hemoptysis; No shortness of breath  CARDIOVASCULAR: No chest pain or palpitations  GASTROINTESTINAL: No abdominal or epigastric pain. No nausea, vomiting, or hematemesis; No diarrhea or constipation. No melena or hematochezia.  GENITOURINARY: No dysuria, frequency or hematuria  NEUROLOGICAL: No numbness or weakness  SKIN: No itching, burning, rashes, or lesions   All other review of systems is negative unless indicated above    MEDICATIONS  (STANDING):  bethanechol 25 milliGRAM(s) Oral three times a day  buDESOnide 160 MICROgram(s)/formoterol 4.5 MICROgram(s) Inhaler 2 Puff(s) Inhalation two times a day  diltiazem    milliGRAM(s) Oral daily  folic acid 1 milliGRAM(s) Oral daily  magnesium oxide 400 milliGRAM(s) Oral daily  multivitamin 1 Tablet(s) Oral daily  pantoprazole    Tablet 40 milliGRAM(s) Oral before breakfast  polyethylene glycol 3350 17 Gram(s) Oral two times a day  propranolol 10 milliGRAM(s) Oral daily  thiamine IVPB 250 milliGRAM(s) IV Intermittent daily    MEDICATIONS  (PRN):  ondansetron    Tablet 4 milliGRAM(s) Oral every 8 hours PRN Nausea    Vital Signs Last 24 Hrs  T(C): 36.3 (05 Aug 2018 12:23), Max: 37.1 (05 Aug 2018 05:40)  T(F): 97.4 (05 Aug 2018 12:23), Max: 98.7 (05 Aug 2018 05:40)  HR: 74 (05 Aug 2018 12:23) (71 - 80)  BP: 124/70 (05 Aug 2018 12:23) (101/68 - 124/70)  BP(mean): --  RR: 17 (05 Aug 2018 12:23) (17 - 18)  SpO2: 100% (05 Aug 2018 12:23) (96% - 100%)        PHYSICAL EXAM:  Constitutional: NAD, awake and alert, well-developed  HEENT: PERRl, Normal Hearing. +vertical nystagmus  Respiratory: Breath sounds are clear bilaterally, No wheezing, rales or rhonchi  Cardiovascular: S1 and S2, regular rate and rhythm, no Murmurs, gallops or rubs  Gastrointestinal: Bowel Sounds present, soft, nontender, nondistended, no guarding, no rebound  Extremities: No peripheral edema  Vascular: 2+ peripheral pulses  Neurological: A/O to person and place                          9.8    7.74  )-----------( 235      ( 05 Aug 2018 06:34 )             29.2   08-05    140  |  105  |  4<L>  ----------------------------<  86  3.8   |  26  |  0.41<L>    Ca    8.1<L>      05 Aug 2018 06:34  Mg     1.5     08-05    TPro  5.7<L>  /  Alb  2.2<L>  /  TBili  1.1  /  DBili  x   /  AST  49<H>  /  ALT  25  /  AlkPhos  132<H>  08-05      IMPRESSION:     No acute intracranial hemorrhage, mass effect, or midline shift.     Generalized cerebral volume loss for the patient's age.

## 2018-08-05 NOTE — PROGRESS NOTE ADULT - PROBLEM SELECTOR PROBLEM 7
GERD (gastroesophageal reflux disease)
Cachexia
Asthma with COPD
GERD (gastroesophageal reflux disease)

## 2018-08-05 NOTE — PROGRESS NOTE ADULT - PROBLEM SELECTOR PLAN 6
Continue protonix  Zofran PRN for nausea
- continue symbicort
- continue protonix and Zofran PRN
- continue symbicort
- continue symbicort
- significant ETOH use h/o  - last drink 7/22 therefore pt is not at risk of ETOH withdrawal seizures  - [facility] protocol d/c'd 7/31  - portal gastropathy seen on Endoscopy 2/2 long term ETOH
Continue protonix
- continue symbicort

## 2018-08-05 NOTE — PROGRESS NOTE ADULT - PROBLEM SELECTOR PLAN 1
- no LOC, no active bleeding seen ion examination, PEERL, no pain on palpation of left knee  - CT Head- no active hemorrhage  - cont fall precautions

## 2018-08-05 NOTE — PROGRESS NOTE ADULT - PROBLEM SELECTOR PROBLEM 8
Cachexia
Prophylactic measure
Cachexia
Cachexia
GERD (gastroesophageal reflux disease)
Cachexia

## 2018-08-05 NOTE — PROGRESS NOTE ADULT - ATTENDING COMMENTS
on exam- comfortable   -cns- non focal    #discharge plan for tomorrow
on exam- comfortable   EYEs- vertical nystagmus noted   -rs-aeeb,cta     #ct thiamine, folic acid     #supportive care     #discharge plan

## 2018-08-05 NOTE — PROGRESS NOTE ADULT - PROBLEM SELECTOR PLAN 7
- continue protonix and Zofran PRN
Encourage PO intake
- continue protonix and Zofran PRN
- continue protonix and Zofran PRN
- continue symbicort
- continue protonix and Zofran PRN

## 2018-08-05 NOTE — PROGRESS NOTE ADULT - PROBLEM SELECTOR PROBLEM 4
Abdominal distention
Anemia
Anemia
Asthma with COPD
Anemia
Asthma with COPD
AA (alcohol abuse)
Urinary retention

## 2018-08-06 VITALS
SYSTOLIC BLOOD PRESSURE: 105 MMHG | RESPIRATION RATE: 18 BRPM | DIASTOLIC BLOOD PRESSURE: 76 MMHG | OXYGEN SATURATION: 100 % | TEMPERATURE: 98 F | HEART RATE: 78 BPM

## 2018-08-06 LAB
ANION GAP SERPL CALC-SCNC: 9 MMOL/L — SIGNIFICANT CHANGE UP (ref 5–17)
BUN SERPL-MCNC: 4 MG/DL — LOW (ref 7–23)
CALCIUM SERPL-MCNC: 8.3 MG/DL — LOW (ref 8.5–10.1)
CHLORIDE SERPL-SCNC: 103 MMOL/L — SIGNIFICANT CHANGE UP (ref 96–108)
CO2 SERPL-SCNC: 27 MMOL/L — SIGNIFICANT CHANGE UP (ref 22–31)
CREAT SERPL-MCNC: 0.41 MG/DL — LOW (ref 0.5–1.3)
GLUCOSE SERPL-MCNC: 66 MG/DL — LOW (ref 70–99)
HCT VFR BLD CALC: 30.1 % — LOW (ref 34.5–45)
HGB BLD-MCNC: 10 G/DL — LOW (ref 11.5–15.5)
MAGNESIUM SERPL-MCNC: 2 MG/DL — SIGNIFICANT CHANGE UP (ref 1.6–2.6)
MCHC RBC-ENTMCNC: 32.2 PG — SIGNIFICANT CHANGE UP (ref 27–34)
MCHC RBC-ENTMCNC: 33.2 GM/DL — SIGNIFICANT CHANGE UP (ref 32–36)
MCV RBC AUTO: 96.8 FL — SIGNIFICANT CHANGE UP (ref 80–100)
NRBC # BLD: 0 /100 WBCS — SIGNIFICANT CHANGE UP (ref 0–0)
PHOSPHATE SERPL-MCNC: 3.2 MG/DL — SIGNIFICANT CHANGE UP (ref 2.5–4.5)
PLATELET # BLD AUTO: 262 K/UL — SIGNIFICANT CHANGE UP (ref 150–400)
POTASSIUM SERPL-MCNC: 3.9 MMOL/L — SIGNIFICANT CHANGE UP (ref 3.5–5.3)
POTASSIUM SERPL-SCNC: 3.9 MMOL/L — SIGNIFICANT CHANGE UP (ref 3.5–5.3)
RBC # BLD: 3.11 M/UL — LOW (ref 3.8–5.2)
RBC # FLD: 15.8 % — HIGH (ref 10.3–14.5)
SODIUM SERPL-SCNC: 139 MMOL/L — SIGNIFICANT CHANGE UP (ref 135–145)
WBC # BLD: 7.26 K/UL — SIGNIFICANT CHANGE UP (ref 3.8–10.5)
WBC # FLD AUTO: 7.26 K/UL — SIGNIFICANT CHANGE UP (ref 3.8–10.5)

## 2018-08-06 RX ORDER — PROPRANOLOL HCL 160 MG
1 CAPSULE, EXTENDED RELEASE 24HR ORAL
Qty: 30 | Refills: 0
Start: 2018-08-06 | End: 2018-09-04

## 2018-08-06 RX ORDER — POLYETHYLENE GLYCOL 3350 17 G/17G
17 POWDER, FOR SOLUTION ORAL
Qty: 510 | Refills: 0
Start: 2018-08-06 | End: 2018-09-04

## 2018-08-06 RX ORDER — BETHANECHOL CHLORIDE 25 MG
1 TABLET ORAL
Qty: 90 | Refills: 0 | OUTPATIENT
Start: 2018-08-06 | End: 2018-09-04

## 2018-08-06 RX ORDER — PROPRANOLOL HCL 160 MG
1 CAPSULE, EXTENDED RELEASE 24HR ORAL
Qty: 30 | Refills: 0 | OUTPATIENT
Start: 2018-08-06 | End: 2018-09-04

## 2018-08-06 RX ORDER — THIAMINE MONONITRATE (VIT B1) 100 MG
1 TABLET ORAL
Qty: 30 | Refills: 0
Start: 2018-08-06 | End: 2018-09-04

## 2018-08-06 RX ORDER — FOLIC ACID 0.8 MG
1 TABLET ORAL
Qty: 30 | Refills: 0
Start: 2018-08-06 | End: 2018-09-04

## 2018-08-06 RX ORDER — PANTOPRAZOLE SODIUM 20 MG/1
1 TABLET, DELAYED RELEASE ORAL
Qty: 0 | Refills: 0 | DISCHARGE
Start: 2018-08-06

## 2018-08-06 RX ORDER — BETHANECHOL CHLORIDE 25 MG
1 TABLET ORAL
Qty: 90 | Refills: 0
Start: 2018-08-06 | End: 2018-09-04

## 2018-08-06 RX ADMIN — Medication 120 MILLIGRAM(S): at 05:40

## 2018-08-06 RX ADMIN — PANTOPRAZOLE SODIUM 40 MILLIGRAM(S): 20 TABLET, DELAYED RELEASE ORAL at 05:40

## 2018-08-06 RX ADMIN — MAGNESIUM OXIDE 400 MG ORAL TABLET 400 MILLIGRAM(S): 241.3 TABLET ORAL at 10:10

## 2018-08-06 RX ADMIN — MAGNESIUM OXIDE 400 MG ORAL TABLET 400 MILLIGRAM(S): 241.3 TABLET ORAL at 11:34

## 2018-08-06 RX ADMIN — Medication 1 TABLET(S): at 11:34

## 2018-08-06 RX ADMIN — Medication 25 MILLIGRAM(S): at 05:40

## 2018-08-06 RX ADMIN — BUDESONIDE AND FORMOTEROL FUMARATE DIHYDRATE 2 PUFF(S): 160; 4.5 AEROSOL RESPIRATORY (INHALATION) at 08:02

## 2018-08-06 RX ADMIN — POLYETHYLENE GLYCOL 3350 17 GRAM(S): 17 POWDER, FOR SOLUTION ORAL at 05:40

## 2018-08-06 RX ADMIN — Medication 1 MILLIGRAM(S): at 11:34

## 2018-08-06 RX ADMIN — Medication 25 MILLIGRAM(S): at 13:22

## 2018-08-06 RX ADMIN — Medication 10 MILLIGRAM(S): at 05:40

## 2018-08-06 NOTE — PROGRESS NOTE ADULT - SUBJECTIVE AND OBJECTIVE BOX
Pt has been seen and examined with FP resident, resident supervised agree with a/p       Patient is a 53y old  Female who presents with a chief complaint of altered mental status (27 Jul 2018 06:45)      general- comfortable   -rs-aeeb,cta  -cvs-s1s2 normal   -p/a-soft,bs+  -extremity- no asymmetrical swelling noted             A/P    #d/c today, time spent 65 minutes

## 2018-08-06 NOTE — DIETITIAN INITIAL EVALUATION ADULT. - OTHER INFO
Pt is  53 yr old female w alcohol abuse hx, anemia, asthma/COPD, chronic renal insufficiency, GERD, GI bleed, Hepatitic steatosis who presented to  ED after unwitnessed fall and prior day of confusion. ED obtained info from significant other.  Last drink was Sunday 07-22.  Patient became confused  1 day prior to ED presentation.  Then boyfriend found her on the floor. Problems, as per EMR:  unwitnessed fall, ETOH, AMS, abdominal distension, anemia. Pt is  53 yr old female w alcohol abuse hx, anemia, asthma/COPD, chronic renal insufficiency, GERD, GI bleed, Hepatitic steatosis who presented to  ED after unwitnessed fall and prior day of confusion. ED obtained info from significant other.  Last drink was Sunday 07-22.  Patient became confused  1 day prior to ED presentation.  Then boyfriend found her on the floor. Problems, as per EMR:  unwitnessed fall, ETOH, AMS, abdominal distension, anemia. No edema noted.  Kade 18.  Skin intact.  No issues with chew/swallow.  No GI issues.  Pt is somewhat confused.  Pt on regular diet.  Pt meets criteria for severe protein-calorie malnutrition in context of chronic disease.  NFPE reveals severe muscle wasting (generalized) and severe fat depletion (generalized).  Pt reports height at 5', putting BMI at 19.5.  Pt reports PO intake < 75% nutritional needs for 3 months, Wt loss of 20% in 3 months.  Pt does not readily answer questions, says some food at  "tastes like crap."  Suggest maintain regular diet to maximize caloric and nutritional intake, add Ensure Enlive 8 oz tid.  Pt receives thiamine, MVI and folic acid.  Suggest weekly weights,  record PO intake in EMR after each meal.  Will monitor PO intake, tolerance, labs and weight.

## 2018-08-06 NOTE — DIETITIAN INITIAL EVALUATION ADULT. - ENERGY NEEDS
Ht.      "        Wt.     40   kg               BMI                  IBW       kg               Pt is at    %  IBW Ht.    60  "        Wt.     40   kg               BMI     19.5             IBW       kg               Pt is at  88  %  IBW

## 2018-08-06 NOTE — PROGRESS NOTE ADULT - PROVIDER SPECIALTY LIST ADULT
Family Medicine
Gastroenterology
Hospitalist
Neurology
Hospitalist
Family Medicine

## 2018-08-10 DIAGNOSIS — D63.8 ANEMIA IN OTHER CHRONIC DISEASES CLASSIFIED ELSEWHERE: ICD-10-CM

## 2018-08-10 DIAGNOSIS — E88.09 OTHER DISORDERS OF PLASMA-PROTEIN METABOLISM, NOT ELSEWHERE CLASSIFIED: ICD-10-CM

## 2018-08-10 DIAGNOSIS — K31.89 OTHER DISEASES OF STOMACH AND DUODENUM: ICD-10-CM

## 2018-08-10 DIAGNOSIS — Z79.899 OTHER LONG TERM (CURRENT) DRUG THERAPY: ICD-10-CM

## 2018-08-10 DIAGNOSIS — R74.0 NONSPECIFIC ELEVATION OF LEVELS OF TRANSAMINASE AND LACTIC ACID DEHYDROGENASE [LDH]: ICD-10-CM

## 2018-08-10 DIAGNOSIS — N28.9 DISORDER OF KIDNEY AND URETER, UNSPECIFIED: ICD-10-CM

## 2018-08-10 DIAGNOSIS — R33.9 RETENTION OF URINE, UNSPECIFIED: ICD-10-CM

## 2018-08-10 DIAGNOSIS — Z88.8 ALLERGY STATUS TO OTHER DRUGS, MEDICAMENTS AND BIOLOGICAL SUBSTANCES: ICD-10-CM

## 2018-08-10 DIAGNOSIS — E43 UNSPECIFIED SEVERE PROTEIN-CALORIE MALNUTRITION: ICD-10-CM

## 2018-08-10 DIAGNOSIS — E83.42 HYPOMAGNESEMIA: ICD-10-CM

## 2018-08-10 DIAGNOSIS — K76.6 PORTAL HYPERTENSION: ICD-10-CM

## 2018-08-10 DIAGNOSIS — E87.6 HYPOKALEMIA: ICD-10-CM

## 2018-08-10 DIAGNOSIS — K21.9 GASTRO-ESOPHAGEAL REFLUX DISEASE WITHOUT ESOPHAGITIS: ICD-10-CM

## 2018-08-10 DIAGNOSIS — R41.82 ALTERED MENTAL STATUS, UNSPECIFIED: ICD-10-CM

## 2018-08-10 DIAGNOSIS — F10.26 ALCOHOL DEPENDENCE WITH ALCOHOL-INDUCED PERSISTING AMNESTIC DISORDER: ICD-10-CM

## 2018-08-10 DIAGNOSIS — D50.9 IRON DEFICIENCY ANEMIA, UNSPECIFIED: ICD-10-CM

## 2018-08-10 DIAGNOSIS — E83.39 OTHER DISORDERS OF PHOSPHORUS METABOLISM: ICD-10-CM

## 2018-08-10 DIAGNOSIS — E51.2 WERNICKE'S ENCEPHALOPATHY: ICD-10-CM

## 2018-08-10 DIAGNOSIS — J44.9 CHRONIC OBSTRUCTIVE PULMONARY DISEASE, UNSPECIFIED: ICD-10-CM

## 2018-08-10 DIAGNOSIS — Z79.82 LONG TERM (CURRENT) USE OF ASPIRIN: ICD-10-CM

## 2018-08-10 DIAGNOSIS — K44.9 DIAPHRAGMATIC HERNIA WITHOUT OBSTRUCTION OR GANGRENE: ICD-10-CM

## 2018-09-02 NOTE — ED ADULT TRIAGE NOTE - NS ED TRIAGE AVPU SCALE
Alert-The patient is alert, awake and responds to voice. The patient is oriented to time, place, and person. The triage nurse is able to obtain subjective information.
normal

## 2018-12-19 ENCOUNTER — APPOINTMENT (OUTPATIENT)
Dept: NEUROLOGY | Facility: CLINIC | Age: 53
End: 2018-12-19
Payer: MEDICAID

## 2018-12-19 VITALS
OXYGEN SATURATION: 98 % | HEIGHT: 60 IN | RESPIRATION RATE: 18 BRPM | DIASTOLIC BLOOD PRESSURE: 58 MMHG | HEART RATE: 81 BPM | BODY MASS INDEX: 20.42 KG/M2 | SYSTOLIC BLOOD PRESSURE: 100 MMHG | WEIGHT: 104 LBS | TEMPERATURE: 98.5 F

## 2018-12-19 DIAGNOSIS — R27.0 ATAXIA, UNSPECIFIED: ICD-10-CM

## 2018-12-19 PROCEDURE — 99214 OFFICE O/P EST MOD 30 MIN: CPT

## 2018-12-19 RX ORDER — ASPIRIN 81 MG/1
81 TABLET ORAL
Refills: 0 | Status: DISCONTINUED | COMMUNITY
Start: 2017-06-05 | End: 2018-12-19

## 2018-12-19 RX ORDER — PANTOPRAZOLE 40 MG/1
40 TABLET, DELAYED RELEASE ORAL DAILY
Qty: 30 | Refills: 1 | Status: DISCONTINUED | COMMUNITY
Start: 2017-06-05 | End: 2018-12-19

## 2018-12-19 RX ORDER — DICYCLOMINE HYDROCHLORIDE 20 MG/1
20 TABLET ORAL
Qty: 120 | Refills: 0 | Status: DISCONTINUED | COMMUNITY
Start: 2017-01-19 | End: 2018-12-19

## 2018-12-19 RX ORDER — DIPYRIDAMOLE 25 MG/1
25 TABLET, FILM COATED ORAL
Qty: 90 | Refills: 0 | Status: DISCONTINUED | COMMUNITY
Start: 2017-01-19 | End: 2018-12-19

## 2018-12-19 RX ORDER — FOLIC ACID 1 MG/1
1 TABLET ORAL
Qty: 1 | Refills: 5 | Status: DISCONTINUED | COMMUNITY
Start: 2017-06-05 | End: 2018-12-19

## 2018-12-19 RX ORDER — LEVOFLOXACIN 500 MG/1
500 TABLET, FILM COATED ORAL DAILY
Qty: 7 | Refills: 0 | Status: DISCONTINUED | COMMUNITY
Start: 2017-10-31 | End: 2018-12-19

## 2018-12-19 RX ORDER — HYDROXYZINE HYDROCHLORIDE 25 MG/1
25 TABLET ORAL TWICE DAILY
Qty: 30 | Refills: 1 | Status: DISCONTINUED | COMMUNITY
Start: 2017-06-12 | End: 2018-12-19

## 2018-12-19 RX ORDER — NICOTINE 7 MG/24H
7 PATCH, EXTENDED RELEASE TRANSDERMAL
Qty: 28 | Refills: 0 | Status: DISCONTINUED | COMMUNITY
Start: 2017-05-26 | End: 2018-12-19

## 2018-12-19 RX ORDER — METOPROLOL TARTRATE 25 MG/1
25 TABLET, FILM COATED ORAL TWICE DAILY
Qty: 60 | Refills: 2 | Status: DISCONTINUED | COMMUNITY
Start: 2017-06-05 | End: 2018-12-19

## 2018-12-19 RX ORDER — TAMSULOSIN HYDROCHLORIDE 0.4 MG/1
0.4 CAPSULE ORAL
Qty: 30 | Refills: 0 | Status: DISCONTINUED | COMMUNITY
Start: 2017-12-13 | End: 2018-12-19

## 2018-12-19 RX ORDER — FUROSEMIDE 20 MG/1
20 TABLET ORAL
Qty: 30 | Refills: 0 | Status: DISCONTINUED | COMMUNITY
Start: 2017-01-19 | End: 2018-12-19

## 2018-12-19 RX ORDER — LORAZEPAM 1 MG/1
1 TABLET ORAL
Qty: 20 | Refills: 0 | Status: DISCONTINUED | COMMUNITY
Start: 2017-06-05 | End: 2018-12-19

## 2018-12-19 RX ORDER — POTASSIUM CHLORIDE 750 MG/1
10 TABLET, FILM COATED, EXTENDED RELEASE ORAL
Qty: 30 | Refills: 0 | Status: DISCONTINUED | COMMUNITY
Start: 2017-02-02 | End: 2018-12-19

## 2018-12-19 RX ORDER — ALBUTEROL SULFATE 90 UG/1
108 (90 BASE) AEROSOL, METERED RESPIRATORY (INHALATION) 4 TIMES DAILY
Qty: 1 | Refills: 3 | Status: DISCONTINUED | COMMUNITY
Start: 2017-06-05 | End: 2018-12-19

## 2019-02-13 NOTE — DIETITIAN INITIAL EVALUATION ADULT. - EST PROTEIN NEEDS8
Post-Partum Day Number 2 Progress/Discharge Note Berenice Hassan [id number] Patient doing well post-partum without significant complaint. Voiding without difficulty, normal lochia, positive flatus. Vitals:   
Patient Vitals for the past 8 hrs: 
 BP Temp Pulse Resp SpO2  
19 0740 158/48 98.9 °F (37.2 °C) (!) 105 18 98 % 19 0408 122/67 98.4 °F (36.9 °C) 94 19 97 % Temp (24hrs), Av.5 °F (36.9 °C), Min:97.9 °F (36.6 °C), Max:99 °F (37.2 °C) Vital signs stable, afebrile. Exam:  Patient without distress. Abdomen soft, fundus firm at level of umbilicus, nontender No peripheral edema Labs:  
Recent Results (from the past 24 hour(s)) CBC W/O DIFF Collection Time: 19  6:00 AM  
Result Value Ref Range WBC 9.1 4.3 - 11.1 K/uL  
 RBC 3.98 (L) 4.05 - 5.2 M/uL  
 HGB 10.0 (L) 11.7 - 15.4 g/dL HCT 31.6 (L) 35.8 - 46.3 % MCV 79.4 (L) 79.6 - 97.8 FL  
 MCH 25.1 (L) 26.1 - 32.9 PG  
 MCHC 31.6 31.4 - 35.0 g/dL  
 RDW 14.5 11.9 - 14.6 % PLATELET 318 801 - 097 K/uL MPV 12.1 9.4 - 12.3 FL ABSOLUTE NRBC 0.00 0.0 - 0.2 K/uL Assessment and Plan:  Patient appears to be having uncomplicated post-partum course. Continue routine perineal care and maternal education. Plan discharge for today with follow up in our office in 6 weeks. Will have pt also come to office next wk for bp ck. Most have been fine, but an occ one here elevated. plts are wnl Pt sts she's having to take 2 narc at a time for pain. Not getting motrin. I think she's fine to take some motrin and will prescribe for home- to help decrease narc use. 51.8

## 2019-03-08 ENCOUNTER — EMERGENCY (EMERGENCY)
Facility: HOSPITAL | Age: 54
LOS: 1 days | Discharge: HOME | End: 2019-03-08
Attending: EMERGENCY MEDICINE | Admitting: EMERGENCY MEDICINE
Payer: MEDICAID

## 2019-03-08 VITALS
DIASTOLIC BLOOD PRESSURE: 68 MMHG | OXYGEN SATURATION: 100 % | HEART RATE: 75 BPM | SYSTOLIC BLOOD PRESSURE: 120 MMHG | RESPIRATION RATE: 16 BRPM

## 2019-03-08 VITALS
SYSTOLIC BLOOD PRESSURE: 112 MMHG | TEMPERATURE: 98 F | HEART RATE: 84 BPM | RESPIRATION RATE: 16 BRPM | HEIGHT: 60 IN | OXYGEN SATURATION: 99 % | WEIGHT: 102.07 LBS | DIASTOLIC BLOOD PRESSURE: 75 MMHG

## 2019-03-08 DIAGNOSIS — Z90.710 ACQUIRED ABSENCE OF BOTH CERVIX AND UTERUS: Chronic | ICD-10-CM

## 2019-03-08 DIAGNOSIS — Z98.891 HISTORY OF UTERINE SCAR FROM PREVIOUS SURGERY: Chronic | ICD-10-CM

## 2019-03-08 LAB
ALBUMIN SERPL ELPH-MCNC: 3.9 G/DL — SIGNIFICANT CHANGE UP (ref 3.3–5)
ALP SERPL-CCNC: 124 U/L — HIGH (ref 40–120)
ALT FLD-CCNC: 15 U/L DA — SIGNIFICANT CHANGE UP (ref 10–45)
ANION GAP SERPL CALC-SCNC: 7 MMOL/L — SIGNIFICANT CHANGE UP (ref 5–17)
AST SERPL-CCNC: 20 U/L — SIGNIFICANT CHANGE UP (ref 10–40)
BILIRUB SERPL-MCNC: 0.2 MG/DL — SIGNIFICANT CHANGE UP (ref 0.2–1.2)
BUN SERPL-MCNC: 18 MG/DL — SIGNIFICANT CHANGE UP (ref 7–23)
CALCIUM SERPL-MCNC: 10 MG/DL — SIGNIFICANT CHANGE UP (ref 8.4–10.5)
CHLORIDE SERPL-SCNC: 102 MMOL/L — SIGNIFICANT CHANGE UP (ref 96–108)
CO2 SERPL-SCNC: 30 MMOL/L — SIGNIFICANT CHANGE UP (ref 22–31)
CREAT SERPL-MCNC: 0.7 MG/DL — SIGNIFICANT CHANGE UP (ref 0.5–1.3)
GLUCOSE SERPL-MCNC: 105 MG/DL — HIGH (ref 70–99)
HCT VFR BLD CALC: 40.6 % — SIGNIFICANT CHANGE UP (ref 34.5–45)
HGB BLD-MCNC: 13.2 G/DL — SIGNIFICANT CHANGE UP (ref 11.5–15.5)
MCHC RBC-ENTMCNC: 31.2 PG — SIGNIFICANT CHANGE UP (ref 27–34)
MCHC RBC-ENTMCNC: 32.6 GM/DL — SIGNIFICANT CHANGE UP (ref 32–36)
MCV RBC AUTO: 95.6 FL — SIGNIFICANT CHANGE UP (ref 80–100)
PLATELET # BLD AUTO: 261 K/UL — SIGNIFICANT CHANGE UP (ref 150–400)
POTASSIUM SERPL-MCNC: 4.2 MMOL/L — SIGNIFICANT CHANGE UP (ref 3.5–5.3)
POTASSIUM SERPL-SCNC: 4.2 MMOL/L — SIGNIFICANT CHANGE UP (ref 3.5–5.3)
PROT SERPL-MCNC: 7.7 G/DL — SIGNIFICANT CHANGE UP (ref 6–8.3)
RBC # BLD: 4.25 M/UL — SIGNIFICANT CHANGE UP (ref 3.8–5.2)
RBC # FLD: 12.7 % — SIGNIFICANT CHANGE UP (ref 10.3–14.5)
SODIUM SERPL-SCNC: 139 MMOL/L — SIGNIFICANT CHANGE UP (ref 135–145)
TROPONIN I SERPL-MCNC: <.017 NG/ML — LOW (ref 0.02–0.06)
TROPONIN I SERPL-MCNC: <.017 NG/ML — LOW (ref 0.02–0.06)
WBC # BLD: 7.6 K/UL — SIGNIFICANT CHANGE UP (ref 3.8–10.5)
WBC # FLD AUTO: 7.6 K/UL — SIGNIFICANT CHANGE UP (ref 3.8–10.5)

## 2019-03-08 PROCEDURE — 99284 EMERGENCY DEPT VISIT MOD MDM: CPT | Mod: 25

## 2019-03-08 PROCEDURE — 84484 ASSAY OF TROPONIN QUANT: CPT

## 2019-03-08 PROCEDURE — 93005 ELECTROCARDIOGRAM TRACING: CPT

## 2019-03-08 PROCEDURE — 99285 EMERGENCY DEPT VISIT HI MDM: CPT | Mod: 25

## 2019-03-08 PROCEDURE — 36415 COLL VENOUS BLD VENIPUNCTURE: CPT

## 2019-03-08 PROCEDURE — 80053 COMPREHEN METABOLIC PANEL: CPT

## 2019-03-08 PROCEDURE — 71045 X-RAY EXAM CHEST 1 VIEW: CPT

## 2019-03-08 PROCEDURE — 93010 ELECTROCARDIOGRAM REPORT: CPT

## 2019-03-08 PROCEDURE — 85027 COMPLETE CBC AUTOMATED: CPT

## 2019-03-08 PROCEDURE — 71045 X-RAY EXAM CHEST 1 VIEW: CPT | Mod: 26

## 2019-03-08 NOTE — ED ADULT NURSE NOTE - OBJECTIVE STATEMENT
Pt presents to ED with c/o chest pain that started 40-45 mins ago. SHe has baseline tachycardia however started experiencing chest pain & called EMS. Pt denies headache, dizziness, N/V/D.

## 2019-03-08 NOTE — ED PROVIDER NOTE - OBJECTIVE STATEMENT
pt sent from [facility] for complaint of chest pain, pt states that her heart was pounding and had chest tightness but now all symptoms have resolved and has no complaints. pt sent from [facility] for complaint of chest pain, pt states that her heart was pounding and had chest tightness but now all symptoms have resolved and has no complaints.  pt states she gets "tachycardia" sometimes.  pt with h/o SVT in the past.

## 2019-03-08 NOTE — ED ADULT NURSE NOTE - NSIMPLEMENTINTERV_GEN_ALL_ED
Implemented All Fall Risk Interventions:  [place] to call system. Call bell, personal items and telephone within reach. Instruct patient to call for assistance. Room bathroom lighting operational. Non-slip footwear when patient is off stretcher. Physically safe environment: no spills, clutter or unnecessary equipment. Stretcher in lowest position, wheels locked, appropriate side rails in place. Provide visual cue, wrist band, yellow gown, etc. Monitor gait and stability. Monitor for mental status changes and reorient to person, place, and time. Review medications for side effects contributing to fall risk. Reinforce activity limits and safety measures with patient and family.

## 2019-03-08 NOTE — ED ADULT TRIAGE NOTE - CHIEF COMPLAINT QUOTE
She is from [facility] and she came for chest pain that started 45 mins ago, the nurse gave her Aspirin 2 tabs"

## 2019-03-08 NOTE — ED ADULT NURSE NOTE - CHIEF COMPLAINT QUOTE
She is from [facility] and she came for chest pain that started 45 mins ago, the nurse gave her Aspirin 2 tabs"

## 2019-03-08 NOTE — ED ADULT NURSE REASSESSMENT NOTE - NS ED NURSE REASSESS COMMENT FT1
Pt received AAOx4 in stretcher, denies chest pain, pressure and discomfort at this time. Pt is resting in stretcher, no s/s of distress observed, will continue to monitor patient safety maintained.

## 2019-03-08 NOTE — ED ADULT NURSE REASSESSMENT NOTE - NS ED NURSE REASSESS COMMENT FT1
Pt currently resting comfortably in bed. Denies chest pain at this moment. Call bell in place. Safety maintained.

## 2019-03-08 NOTE — ED PROVIDER NOTE - PRIOR HOSPITAL/ED VISITS SUMMARY FREE TEXT FOR MDM OBTAINED AND REVIEWED OLD RECORDS QUESTION
reviewed hospitalization while at [facility] Aug 2018, admission was for AMS, pt was chronic drinker and was diagnosed with wernicke's encephalopathy and since that time has had difficulty walking, standing and continues to be in the same state today.

## 2019-03-20 ENCOUNTER — APPOINTMENT (OUTPATIENT)
Dept: NEUROLOGY | Facility: CLINIC | Age: 54
End: 2019-03-20

## 2019-05-01 NOTE — PROGRESS NOTE ADULT - SUBJECTIVE AND OBJECTIVE BOX
Do you perform monthly self-breast exam ? Yes  Do you exercise regularly ? No  Do you or your partner use birth control? Yes  If yes, what forms of birth control are you using? ocp   Are you done having children ? Yes  Do you drink caffeinated beverages ? No   Have your periods been normal ? yes  Do you feel like your periods are heavy ? no  Do you have any current breast problems ? No  Do you experience hot flashes or other menopause symptoms?  No  Do you have any symptoms of anxiety or depression ? No  Do you leak urine with coughing, sneezing or laughing ? Yes  Are you having any problems with your bowel movements ? No  Any change in partners since last visit ? No  Have you had an abnormal pap smear in the last 3 years ? Yes   Are there any new life changes that you would like to tell us about ? No     If your visit includes an exam today, would you like an assistant to be present in the room during that time? No     Patient would like communication of their results via:      Cell Phone:   Telephone Information:   Mobile [phone]     Okay to leave a message containing results? Yes   Reviewed overdue health maintenance topics with patient.              MEDICINE ACCEPT NOTE    CC: Patient is a 52y old  Female who presents with a chief complaint of alcohol withdrawal and vomiting (14 Sep 2017 18:10)      HPI:    Allergies    amoxicillin (Other)  Ceclor (Other)    Intolerances    	    PAST MEDICAL & SURGICAL HISTORY:  Asthma  Cholelithiasis  Renal calculi  Lyme disease  Tachycardia  S/P hysterectomy: 2007  S/P :       FAMILY HISTORY:  No pertinent family history in first degree relatives      SOCIAL HISTORY:  OCCUPATION:  TOBACCO USE:  ALCOHOL USE:  RECREATIONAL DRUG USE:  HIGH RISK SEXUAL ACTIVITY:    MEDICATIONS:  enoxaparin Injectable 40 milliGRAM(s) SubCutaneous daily  aspirin enteric coated 81 milliGRAM(s) Oral daily  diltiazem    milliGRAM(s) Oral daily    levoFLOXacin  Tablet 750 milliGRAM(s) Oral every 24 hours    buDESOnide 160 MICROgram(s)/formoterol 4.5 MICROgram(s) Inhaler 2 Puff(s) Inhalation two times a day  ALBUTerol/ipratropium for Nebulization 3 milliLiter(s) Nebulizer every 6 hours PRN    LORazepam   Injectable   IV Push   LORazepam   Injectable 2 milliGRAM(s) IV Push every 4 hours  LORazepam   Injectable 1.5 milliGRAM(s) IV Push every 4 hours  ondansetron Injectable 4 milliGRAM(s) IV Push every 8 hours PRN    pantoprazole    Tablet 40 milliGRAM(s) Oral before breakfast      multivitamin/thiamine/folic acid in sodium chloride 0.9% 1000 milliLiter(s) IV Continuous <Continuous>  folic acid 1 milliGRAM(s) Oral daily  thiamine 100 milliGRAM(s) Oral daily  influenza   Vaccine 0.5 milliLiter(s) IntraMuscular once  potassium chloride    Tablet ER 40 milliEquivalent(s) Oral every 2 hours      PHYSICAL EXAM:  T(C): 36.8 (09-15-17 @ 13:01), Max: 37 (17 @ 13:49)  HR: 99 (09-15-17 @ 13:01) (83 - 116)  BP: 109/77 (09-15-17 @ 13:01) (99/63 - 147/90)  RR: 99 (09-15-17 @ 13:01) (16 - 99)  SpO2: 100% (09-15-17 @ 13:01) (95% - 100%)  Wt(kg): --  Daily Height in cm: 152.4 (14 Sep 2017 19:19)    Daily   I&O's Summary    14 Sep 2017 07:01  -  15 Sep 2017 07:00  --------------------------------------------------------  IN: 240 mL / OUT: 625 mL / NET: -385 mL    15 Sep 2017 07:  -  15 Sep 2017 13:39  --------------------------------------------------------  IN: 50 mL / OUT: 0 mL / NET: 50 mL      TELEMETRY:     Appearance: NAD	  HEENT:   Normal oral mucosa, PERRL, EOMI	  Lymphatic: Supple, No lymphadenopathy,  Cardiovascular: Normal S1 S2, No JVD, No murmurs, No edema  Respiratory: Lungs clear to auscultation b/l, no wheezing	  Gastrointestinal:  Soft, Non-tender, + BS	  Skin: No rashes, No ecchymoses, No cyanosis	  Neurologic: Non-focal  Psych: AO3, appropriate mood and affect  MSK/Extremities: Normal range of motion, No clubbing, cyanosis or edema  Vascular: Peripheral pulses palpable 2+ bilaterally    LABS:	 	                        10.4   3.5   )-----------( 186      ( 15 Sep 2017 07:13 )             29.6     09-15    139  |  101  |  4<L>  ----------------------------<  92  3.3<L>   |  22  |  0.64  09-15    139  |  102  |  6<L>  ----------------------------<  103<H>  3.3<L>   |  22  |  0.58    Ca    8.1<L>      15 Sep 2017 07:13  Ca    7.5<L>      15 Sep 2017 01:14  Phos  3.2     -15  Phos  3.3     09-14  Mg     1.4     09-15  Mg     1.6     09-14    TPro  6.0  /  Alb  3.2<L>  /  TBili  1.0  /  DBili  x   /  AST  150<H>  /  ALT  55<H>  /  AlkPhos  115  09-15  TPro  7.5  /  Alb  3.9  /  TBili  0.7  /  DBili  x   /  AST  200<H>  /  ALT  63<H>  /  AlkPhos  136<H>        proBNP:   Lipid Profile:   HgA1c:   TSH:   FS: CAPILLARY BLOOD GLUCOSE        BCX/UCX:     CARDIAC MARKERS:       COAGS:    	    ECG:  	  RADIOLOGY: MEDICINE ACCEPT NOTE    CC: Patient is a 52y old  Female who presents with a chief complaint of alcohol withdrawal and vomiting (14 Sep 2017 18:10)      HPI: Pt is a 53 yo F w/ PMH significant for alcohol use disorder with h/o withdrawal seizure x1, SVT, asthma/copd current smoker, tobacco use, nephrolithiasis, gallstones, presents with 1 day of alcohol withdrawal abdominal pain and vomiting. Her last drink was 11pm last night and she could not drink this morning due to vomiting. Since this she has been very shaky, with tremors and anxiety. No hallucination's Hx of seizure x 1 in past, denies MICU stay or intubation from withdrawals in past. Had suffered from SVT due to withdrawal. Drinks about a pint of southern comfort and 5-6 beers daily for >20 years. Patient endorses both RUQ pain, 7/10 in severity, sharp, no alleviating or exacerbating factors and also Left flank pain which she believes in from a large kidney stone she had previously. +dysuria, no urgency or frequency. No fever or back pain.  +also endorses watery diarrhea for past 1 weeks anytime she eats anything. Denies any blood in stool, melena. No recent antibiotics or sick contacts. Denies chest pain, sob, palpitations, syncope, cough.    ED vitals: 98.4, 98, 141/99, 16, 100% on RA  She was given valium 5mg x2, zofran, 1L NS bolus    REVIEW OF SYSTEMS:    CONSTITUTIONAL: No fevers, no chills  EYES: No visual changes; No double vision,  No vertigo, eye pain  Ears: no otalgia, no otorhea, no hearing loss, tinnitus  Nose: no epistaxis, rhinorrhea, post-discharge, sinus pressure  Throat: no throat pain, no oral lesions, tooth pain   NECK: No pain or stiffness  RESPIRATORY: No cough (productive or dry), wheezing, hemoptysis; No shortness of breath, orthnopnea, TALBOT  CARDIOVASCULAR: No chest pain or palpitations, no leg edema, no claudication    GASTROINTESTINAL: +melena, RUQ and L flank pain. +nausea, +vomiting. +diarrhea. No hematemesis, no constipation, no hematochezia.  GENITOURINARY: +dysuria; No frequency, no urgency, no hematuria, no pelvic pain, no urinary incontinence  Muscloskeletal: no joints or muscle pain or edema.  NEUROLOGICAL: +headache, +b/l UE tremors, no ataxia, no confusion  SKIN: No pruritis, rashes, lesions, no stigmata of liver disease.  Psych: +sadness, +anxiety, no SI/HI  Endocrine: No Heat or Cold intolerance, polydipsia, polyphagia  Heme/Lymph: no LN enlargement, no easy bruising or bleeding       Allergies    amoxicillin (Other) - Urticaria  Ceclor (Other) - Urticaria    Intolerances    	    PAST MEDICAL & SURGICAL HISTORY:  Asthma  Cholelithiasis  Renal calculi  Lyme disease  Tachycardia  S/P hysterectomy:   S/P :       FAMILY HISTORY:  No pertinent family history in first degree relatives      SOCIAL HISTORY:  OCCUPATION:  TOBACCO USE:  ALCOHOL USE:  RECREATIONAL DRUG USE:  HIGH RISK SEXUAL ACTIVITY:    MEDICATIONS:  enoxaparin Injectable 40 milliGRAM(s) SubCutaneous daily  aspirin enteric coated 81 milliGRAM(s) Oral daily  diltiazem    milliGRAM(s) Oral daily    levoFLOXacin  Tablet 750 milliGRAM(s) Oral every 24 hours    buDESOnide 160 MICROgram(s)/formoterol 4.5 MICROgram(s) Inhaler 2 Puff(s) Inhalation two times a day  ALBUTerol/ipratropium for Nebulization 3 milliLiter(s) Nebulizer every 6 hours PRN    LORazepam   Injectable   IV Push   LORazepam   Injectable 2 milliGRAM(s) IV Push every 4 hours  LORazepam   Injectable 1.5 milliGRAM(s) IV Push every 4 hours  ondansetron Injectable 4 milliGRAM(s) IV Push every 8 hours PRN    pantoprazole    Tablet 40 milliGRAM(s) Oral before breakfast      multivitamin/thiamine/folic acid in sodium chloride 0.9% 1000 milliLiter(s) IV Continuous <Continuous>  folic acid 1 milliGRAM(s) Oral daily  thiamine 100 milliGRAM(s) Oral daily  influenza   Vaccine 0.5 milliLiter(s) IntraMuscular once  potassium chloride    Tablet ER 40 milliEquivalent(s) Oral every 2 hours      PHYSICAL EXAM:  T(C): 36.8 (09-15-17 @ 13:01), Max: 37 (17 @ 13:49)  HR: 99 (09-15-17 @ 13:01) (83 - 116)  BP: 109/77 (09-15-17 @ 13:01) (99/63 - 147/90)  RR: 99 (09-15-17 @ 13:01) (16 - 99)  SpO2: 100% (09-15-17 @ 13:01) (95% - 100%)  Wt(kg): --  Daily Height in cm: 152.4 (14 Sep 2017 19:19)    Daily   I&O's Summary    14 Sep 2017 07:  -  15 Sep 2017 07:00  --------------------------------------------------------  IN: 240 mL / OUT: 625 mL / NET: -385 mL    15 Sep 2017 07:  -  15 Sep 2017 13:39  --------------------------------------------------------  IN: 50 mL / OUT: 0 mL / NET: 50 mL      TELEMETRY:     Appearance: NAD, cachectic	  HEENT:   Poor dentition, Normal oral mucosa, PERRL, EOMI	  Lymphatic: Supple, No lymphadenopathy,  Cardiovascular: Normal S1 S2, No JVD, No murmurs, No edema  Respiratory: Lungs clear to auscultation b/l, no wheezing	  Gastrointestinal:  RUQ tenderness, L CVA tenderness, Hepatomegaly, Soft, Non-distended, +BS	  Skin: No rashes, No ecchymoses, No cyanosis  Neurologic: Non-focal, CN II-XII intact  Psych: AO3, sad mood  MSK/Extremities: Normal range of motion, No clubbing, cyanosis or edema  Vascular: Peripheral pulses palpable 2+ bilaterally    LABS:	 	                        10.4   3.5   )-----------( 186      ( 15 Sep 2017 07:13 )             29.6     09-15    139  |  101  |  4<L>  ----------------------------<  92  3.3<L>   |  22  |  0.64  09-15    139  |  102  |  6<L>  ----------------------------<  103<H>  3.3<L>   |  22  |  0.58    Ca    8.1<L>      15 Sep 2017 07:13  Ca    7.5<L>      15 Sep 2017 01:14  Phos  3.2     09-15  Phos  3.3       Mg     1.4     09-15  Mg     1.6         TPro  6.0  /  Alb  3.2<L>  /  TBili  1.0  /  DBili  x   /  AST  150<H>  /  ALT  55<H>  /  AlkPhos  115  09-15  TPro  7.5  /  Alb  3.9  /  TBili  0.7  /  DBili  x   /  AST  200<H>  /  ALT  63<H>  /  AlkPhos  136<H>        proBNP:   Lipid Profile:   HgA1c:   TSH:   FS: CAPILLARY BLOOD GLUCOSE        BCX/UCX:     CARDIAC MARKERS:       COAGS:    	    ECG:  	  < from: 12 Lead ECG (17 @ 13:21) >    Ventricular Rate 98 BPM    Atrial Rate 98 BPM    P-R Interval 114 ms    QRS Duration 62 ms     ms    QTc 439 ms    P Axis 77 degrees    R Axis 77 degrees    T Axis 77 degrees    Diagnosis Line NORMAL SINUS RHYTHM  NONSPECIFIC ST ABNORMALITY    < end of copied text >    RADIOLOGY:	  < from: CT Abdomen and Pelvis w/ IV Cont (17 @ 16:39) >  IMPRESSION: The etiology of the abdominal pain is not elucidated.  Nonobstructing left renal calculus. Gallstones.    < end of copied text >    < from: US Abdomen Upper Quadrant Right (09.15.17 @ 10:07) >  Hepatomegaly, with diffusely increased echogenicity, consistent with   hepatic steatosis as was noted on recent CT. This limits evaluation for   focal lesions.    Cholelithiasis, without secondary evidence of cholecystitis.    < end of copied text >

## 2020-07-21 NOTE — PROVIDER CONTACT NOTE (OTHER) - SITUATION
July 29, 2020      David Juarez  [address]  [place] [address]        Dear David,    We are sending this letter as a reminder that you are due for follow up appointment with Dr. Peyman Jaems at [facility] in [facility]. Please call [facility] at [phone].     If you would like more assistance with coordinating the appointment or to discuss barriers to attending, you can contact the clinic , [facility]. See her contact information below.       Take Care,    MARKO Chaudhari  Social Work Care Coordinator  Phone: [phone]          
Service called and made aware of morning consult.  Spoke with Veronica

## 2020-08-04 ENCOUNTER — RESULT REVIEW (OUTPATIENT)
Age: 55
End: 2020-08-04

## 2020-08-04 ENCOUNTER — OUTPATIENT (OUTPATIENT)
Dept: OUTPATIENT SERVICES | Facility: HOSPITAL | Age: 55
LOS: 1 days | End: 2020-08-04
Payer: MEDICAID

## 2020-08-04 ENCOUNTER — APPOINTMENT (OUTPATIENT)
Dept: MAMMOGRAPHY | Facility: HOSPITAL | Age: 55
End: 2020-08-04
Payer: MEDICAID

## 2020-08-04 DIAGNOSIS — Z90.710 ACQUIRED ABSENCE OF BOTH CERVIX AND UTERUS: Chronic | ICD-10-CM

## 2020-08-04 DIAGNOSIS — Z00.8 ENCOUNTER FOR OTHER GENERAL EXAMINATION: ICD-10-CM

## 2020-08-04 DIAGNOSIS — Z98.891 HISTORY OF UTERINE SCAR FROM PREVIOUS SURGERY: Chronic | ICD-10-CM

## 2020-08-04 PROCEDURE — 77063 BREAST TOMOSYNTHESIS BI: CPT | Mod: 26

## 2020-08-04 PROCEDURE — 77067 SCR MAMMO BI INCL CAD: CPT

## 2020-08-04 PROCEDURE — 77063 BREAST TOMOSYNTHESIS BI: CPT

## 2020-08-04 PROCEDURE — 77067 SCR MAMMO BI INCL CAD: CPT | Mod: 26

## 2020-12-15 PROBLEM — Z87.440 HISTORY OF URINARY TRACT INFECTION: Status: RESOLVED | Noted: 2017-04-14 | Resolved: 2020-12-15

## 2020-12-21 NOTE — H&P PST ADULT - CONSTITUTIONAL DETAILS
Pt was woken up at the beginning of shift for breakfast.  He ate well.  His speech is clear, gait steady and has fair eye contact.  Pt denies suicidal and homicidal ideation.  Pt shared when he is triggered with thoughts of his ex girl friend he becomes anxious. Pt denies depression, racing thoughts, mood swings, hallucinations and paranoia.  Pt was given an assignment to come up with coping skills when he is triggered by others.  See his list in chart.      Med review was done and pt took his meds without incident.  He denies any side effects from his meds.  Pt is eating well at meal times.  Pt has been attending groups and participating.  His mood and behavior continue to be monitored.  Covid screening has been completed and no concerns for covid at this time. Patient wearing face mask per Covid protocol.  Social distancing followed on unit.     no distress

## 2020-12-27 NOTE — DIETITIAN INITIAL EVALUATION ADULT. - DOB: +DATEOFBIRTH
[FreeTextEntry8] : 47 y/o female who presents for evaluation of a constant headache, with unilateral pain localized to her right eye with associated warmth, sensitivity to light, and occasional nausea. She has used a nasal spray that has previously worked, but it did not provide sufficient relief. She also states that these symptoms are negatively affecting her sleep habits for the past year; only 4-5 hours nightly and difficulty initiating sleep & waking up. She denies fever, chills, cough, SOB, CP, n/v/d, abd pain.\par Works at [employer] and states it is an environment with lots of steam and smoke. Has seasonal allergies. Currently not taking mediations\par \par  Statement Selected

## 2021-09-02 NOTE — H&P ADULT - NECK DETAILS
Robb from [facility] called in requesting return call back with directions for medication Risperidone 0.5 mg [phone]   normal/supple/no JVD no JVD/supple

## 2022-01-17 NOTE — ED PROVIDER NOTE - GENITOURINARY NEGATIVE STATEMENT, MLM
Patient needs paracentesis per Dr. Alvarado. Order is placed. Patient scheduled for 1/19. Patient needs covid test today, scheduled for 3. Patient informed and stated understanding.    no dysuria, no frequency, and no hematuria.

## 2022-04-04 NOTE — PATIENT PROFILE ADULT. - NSALCOHOLLASTUSE_GEN_A_CORE_DT
Body Location Override (Optional - Billing Will Still Be Based On Selected Body Map Location If Applicable): Right Plantar Forefoot
Detail Level: Zone
Add 16525 Cpt? (Important Note: In 2017 The Use Of 13361 Is Being Tracked By Cms To Determine Future Global Period Reimbursement For Global Periods): no
14-Apr-2018

## 2022-11-04 NOTE — ED ADULT NURSE REASSESSMENT NOTE - NS ED NURSE REASSESS COMMENT FT1
Initial Anesthesia Post-op Note    Patient: Marybel Garcia  Procedure(s) Performed: RIGHT TOTAL KNEE ARTHROPLASTY - RIGHT  Anesthesia type: General    Vitals Value Taken Time   Temp 97 11/04/22 0955   Pulse 78 11/04/22 0953   Resp 10 11/04/22 0953   SpO2 98 % 11/04/22 0953   /68 11/04/22 0951   Vitals shown include unvalidated device data.      Patient Location: PACU Phase 1  Post-op Vital Signs:stable  Level of Consciousness: awake and alert  Respiratory Status: spontaneous ventilation and face mask  Cardiovascular stable  Hydration: euvolemic  Pain Management: adequately controlled  Handoff: Handoff to receiving clinician was performed and questions were answered  Vomiting: none  Nausea: None  Airway Patency:patent  Post-op Assessment: no complications and patient tolerated procedure well with no complications      There were no known complications for this encounter.   Patient received in bed A&OX1. Vital signs stable, patient remains afebrile. No acute distress noted. Patient awaiting bed. Will continue to monitor.

## 2022-11-26 ENCOUNTER — EMERGENCY (EMERGENCY)
Facility: HOSPITAL | Age: 57
LOS: 1 days | Discharge: HOME | End: 2022-11-26
Attending: EMERGENCY MEDICINE | Admitting: EMERGENCY MEDICINE
Payer: MEDICAID

## 2022-11-26 VITALS
DIASTOLIC BLOOD PRESSURE: 66 MMHG | HEART RATE: 83 BPM | OXYGEN SATURATION: 98 % | SYSTOLIC BLOOD PRESSURE: 100 MMHG | TEMPERATURE: 97 F | WEIGHT: 110.89 LBS | RESPIRATION RATE: 16 BRPM

## 2022-11-26 VITALS
HEART RATE: 88 BPM | RESPIRATION RATE: 16 BRPM | DIASTOLIC BLOOD PRESSURE: 67 MMHG | SYSTOLIC BLOOD PRESSURE: 104 MMHG | OXYGEN SATURATION: 100 %

## 2022-11-26 DIAGNOSIS — Z90.710 ACQUIRED ABSENCE OF BOTH CERVIX AND UTERUS: Chronic | ICD-10-CM

## 2022-11-26 DIAGNOSIS — Z98.891 HISTORY OF UTERINE SCAR FROM PREVIOUS SURGERY: Chronic | ICD-10-CM

## 2022-11-26 PROCEDURE — 43762 RPLC GTUBE NO REVJ TRC: CPT

## 2022-11-26 PROCEDURE — 49465 FLUORO EXAM OF G/COLON TUBE: CPT

## 2022-11-26 PROCEDURE — 99283 EMERGENCY DEPT VISIT LOW MDM: CPT

## 2022-11-26 PROCEDURE — 99284 EMERGENCY DEPT VISIT MOD MDM: CPT | Mod: 25

## 2022-11-26 PROCEDURE — L8699: CPT

## 2022-11-26 NOTE — ED PROVIDER NOTE - CLINICAL SUMMARY MEDICAL DECISION MAKING FREE TEXT BOX
57-year-old female presents to the ED for PEG tube placement.  Last PEG tube was dislodged.  Cordero was put in its place.  No other complaints.  No fever no vomiting.  Patient unable to provide history.  Per EMS.8Size 18 Vietnamese PEG placed.

## 2022-11-26 NOTE — ED ADULT NURSE NOTE - MODE OF DISCHARGE
Render Note In Bullet Format When Appropriate: No Duration Of Freeze Thaw-Cycle (Seconds): 3 Show Applicator Variable?: Yes Detail Level: Detailed Consent: The patient's consent was obtained including but not limited to risks of crusting, scabbing, blistering, scarring, darker or lighter pigmentary change, recurrence, incomplete removal and infection. Number Of Freeze-Thaw Cycles: 1 freeze-thaw cycle Post-Care Instructions: I reviewed with the patient in detail post-care instructions. Patient is to wear sunprotection, and avoid picking at any of the treated lesions. Pt may apply Vaseline to crusted or scabbing areas. Spray Paint Text: The liquid nitrogen was applied to the skin utilizing a spray paint frosting technique. Medical Necessity Information: It is in your best interest to select a reason for this procedure from the list below. All of these items fulfill various CMS LCD requirements except the new and changing color options. Medical Necessity Clause: This procedure was medically necessary because the lesions that were treated were: Stretcher

## 2022-11-26 NOTE — ED PROVIDER NOTE - NSICDXPASTMEDICALHX_GEN_ALL_CORE_FT
PAST MEDICAL HISTORY:  AA (alcohol abuse) sober since September 2017    Alcohol withdrawal seizure without complication x 1  patient unable to recall date    Alcohol-induced anxiety disorder     Anemia h/o blood transfusion 6 months ago    Asthma with COPD last PFT within the year  no recent respiratory infection   on symbicort daily  have not used ventolin for months    Cholelithiasis     GERD (gastroesophageal reflux disease)     Lyme disease     Renal calculi     Tachycardia

## 2022-11-26 NOTE — ED PROVIDER NOTE - OBJECTIVE STATEMENT
57-year-old female presents to the ED for PEG tube placement.  Last PEG tube was dislodged.  Cordero was put in its place.  No other complaints.  No fever no vomiting.  Patient unable to provide history.  Per EMS.

## 2022-11-26 NOTE — ED PROCEDURE NOTE - CPROC ED POST RADIOGRAPHY1
post-procedure radiography performed/feeding tube in correct gastric position Bcc Micronodular Histology Text: Emanating from the epidermis and\\ninfiltrating the dermis are irregularly shaped islands of basaloid keratinocytes. The cells\\nhave scant cytoplasm and round dark nuclei. Mitotic figures and apoptotic bodies are\\nevident. The nuclei at the periphery of the islands have a palisaded arrangement. The\\nislands are associated with a fibromyxoid stroma and there is cleft formation between\\nsome of the islands and stroma. In areas the tumor breaks up into a small,\\nmicronodular, infiltrative growth pattern with deeper extension into the.)

## 2022-11-26 NOTE — ED PROVIDER NOTE - NSFOLLOWUPINSTRUCTIONS_ED_ALL_ED_FT
PEG tube size 18French placed.   XRay confirmed.   Worsening, continued or ANY new concerning symptoms return to the emergency department.

## 2022-11-26 NOTE — ED PROVIDER NOTE - CARE PLAN
1 Principal Discharge DX:	PEG (percutaneous endoscopic gastrostomy) adjustment/replacement/removal

## 2022-11-26 NOTE — ED ADULT NURSE NOTE - NSIMPLEMENTINTERV_GEN_ALL_ED
Implemented All Fall with Harm Risk Interventions:  Mart to call system. Call bell, personal items and telephone within reach. Instruct patient to call for assistance. Room bathroom lighting operational. Non-slip footwear when patient is off stretcher. Physically safe environment: no spills, clutter or unnecessary equipment. Stretcher in lowest position, wheels locked, appropriate side rails in place. Provide visual cue, wrist band, yellow gown, etc. Monitor gait and stability. Monitor for mental status changes and reorient to person, place, and time. Review medications for side effects contributing to fall risk. Reinforce activity limits and safety measures with patient and family. Provide visual clues: red socks.

## 2022-11-26 NOTE — ED PROVIDER NOTE - PATIENT PORTAL LINK FT
You can access the FollowMyHealth Patient Portal offered by [facility] by registering at the following website: [email or web address]. By joining [facility]’s FollowMyHealth portal, you will also be able to view your health information using other applications (apps) compatible with our system.

## 2022-11-26 NOTE — ED PROVIDER NOTE - PHYSICAL EXAMINATION
General:     NAD, well-nourished, well-appearing  Eyes: PERRL  Head:     NC/AT, EOMI, oral mucosa moist  Neck:     trachea midline  Lungs:     CTA b/l  CVS:     RRR  Abd:     +BS, s/nt/nd, PEG tube not in place.  Cordero size 16 Azerbaijani in place.  Nontender abdomen.  Ext:   no deformities   Skin: No rash or injuries noted

## 2022-12-28 NOTE — DISCHARGE NOTE ADULT - NS MD DC PLAN IMMU FLU PROVIDE INFO
[facility]  Brief Operative Note     SUMMARY     Surgery Date: 12/28/2022     Surgeon(s) and Role:     * Edith Glasgow MD - Primary     * Ivette Galvan MD - Resident - Assisting        Pre-op Diagnosis:  Recurrent acute suppurative otitis media without spontaneous rupture of tympanic membrane of both sides [H66.006]  Nasal congestion [R09.81]  Restless sleeper [G47.9]    Post-op Diagnosis:  Post-Op Diagnosis Codes:     * Recurrent acute suppurative otitis media without spontaneous rupture of tympanic membrane of both sides [H66.006]     * Nasal congestion [R09.81]     * Restless sleeper [G47.9]    Procedure(s) (LRB):  MYRINGOTOMY, WITH TYMPANOSTOMY TUBE INSERTION (Bilateral)  ADENOIDECTOMY (N/A)    Anesthesia: General    Findings/Key Components:  See op note    Estimated Blood Loss: minimal         Specimens:   Specimen (24h ago, onward)      None            Discharge Note    SUMMARY     Admit Date: 12/28/2022    Discharge Date and Time: No discharge date for patient encounter.    Attending Physician: Edith Glasgow MD     Discharge Provider: Edith Glasgow    Final Diagnosis: Post-Op Diagnosis Codes:     * Recurrent acute suppurative otitis media without spontaneous rupture of tympanic membrane of both sides [H66.006]     * Nasal congestion [R09.81]     * Restless sleeper [G47.9]    Disposition: Home or Self Care, discharged in good condition    Follow Up/Patient Instructions:    Follow-up Information       Orin Fuentes NP. Schedule an appointment as soon as possible for a visit in 3 week(s).    Specialty: Pediatric Otolaryngology  Why: post op with audiogram  Contact information:  [facility] [address]  [phone]                             Medications:  Reconciled Home Medications:   Current Discharge Medication List        START taking these medications    Details   acetaminophen (TYLENOL) 160 mg/5 mL (5 mL) Soln Take 9.38 mLs (300.16 mg total) by mouth every 6 (six)  hours as needed (pain.).  Qty: 200 mL, Refills: 0      ciprofloxacin-dexAMETHasone 0.3-0.1% (CIPRODEX) 0.3-0.1 % DrpS Place 4 drops into both ears 2 (two) times daily. Use for 3 days if no infection; 7 days if infection noted; and in future, as needed for ear drainage.      ibuprofen (ADVIL,MOTRIN) 100 mg/5 mL suspension Take 10 mLs (200 mg total) by mouth every 8 (eight) hours as needed for Pain or Temperature greater than (100.4; alternate with tylenol).  Qty: 200 mL, Refills: 0           CONTINUE these medications which have NOT CHANGED    Details   amoxicillin (AMOXIL) 250 mg/5 mL suspension Take by mouth 2 (two) times daily.           Discharge Procedure Orders   Diet Regular        Risks/benefits discussed with patient or patient surrogate

## 2023-01-01 NOTE — ED ADULT NURSE NOTE - NS_ED_NURSE_TEACHING_TOPIC_ED_A_ED
Problem: Potential for Hypothermia Related to Thermoregulation  Goal: [place] will maintain body temperature between 97.6 degrees axillary F and 99.6 degrees axillary F in an open crib  Outcome: Progressing     Problem: Potential for Alteration Related to Poor Oral Intake or  Complications  Goal: [place] will maintain 90% of birthweight and optimal level of hydration  Outcome: Progressing   The patient is Stable - Low risk of patient condition declining or worsening    Shift Goals: maintain stable temperature  Clinical Goals: maintain stable vital signs    Progress made toward(s) clinical / shift goals:  clinically stable    Patient is not progressing towards the following goals:       Medications

## 2023-03-28 NOTE — PATIENT PROFILE ADULT. - AS SC BRADEN SENSORY
Patient is interested in Nexplanon and would like to know if this will alter her cycles    (4) no impairment

## 2023-04-24 ENCOUNTER — TRANSCRIPTION ENCOUNTER (OUTPATIENT)
Age: 58
End: 2023-04-24

## 2023-04-25 ENCOUNTER — INPATIENT (INPATIENT)
Facility: HOSPITAL | Age: 58
LOS: 1 days | Discharge: NURSING, REHAB OR LONG-TERM CARE | DRG: 395 | End: 2023-04-27
Attending: FAMILY MEDICINE | Admitting: INTERNAL MEDICINE
Payer: MEDICAID

## 2023-04-25 VITALS
RESPIRATION RATE: 18 BRPM | DIASTOLIC BLOOD PRESSURE: 56 MMHG | HEART RATE: 83 BPM | HEIGHT: 62 IN | OXYGEN SATURATION: 98 % | SYSTOLIC BLOOD PRESSURE: 127 MMHG | WEIGHT: 115.08 LBS | TEMPERATURE: 98 F

## 2023-04-25 DIAGNOSIS — Z43.1 ENCOUNTER FOR ATTENTION TO GASTROSTOMY: ICD-10-CM

## 2023-04-25 DIAGNOSIS — Z90.710 ACQUIRED ABSENCE OF BOTH CERVIX AND UTERUS: Chronic | ICD-10-CM

## 2023-04-25 DIAGNOSIS — Z98.891 HISTORY OF UTERINE SCAR FROM PREVIOUS SURGERY: Chronic | ICD-10-CM

## 2023-04-25 LAB
ALBUMIN SERPL ELPH-MCNC: 3.8 G/DL — SIGNIFICANT CHANGE UP (ref 3.3–5)
ALP SERPL-CCNC: 95 U/L — SIGNIFICANT CHANGE UP (ref 40–120)
ALT FLD-CCNC: 25 U/L — SIGNIFICANT CHANGE UP (ref 10–45)
ANION GAP SERPL CALC-SCNC: 8 MMOL/L — SIGNIFICANT CHANGE UP (ref 5–17)
APTT BLD: 32.5 SEC — SIGNIFICANT CHANGE UP (ref 27.5–35.5)
AST SERPL-CCNC: 20 U/L — SIGNIFICANT CHANGE UP (ref 10–40)
BASOPHILS # BLD AUTO: 0.04 K/UL — SIGNIFICANT CHANGE UP (ref 0–0.2)
BASOPHILS NFR BLD AUTO: 0.5 % — SIGNIFICANT CHANGE UP (ref 0–2)
BILIRUB SERPL-MCNC: 0.4 MG/DL — SIGNIFICANT CHANGE UP (ref 0.2–1.2)
BLD GP AB SCN SERPL QL: SIGNIFICANT CHANGE UP
BUN SERPL-MCNC: 25 MG/DL — HIGH (ref 7–23)
CALCIUM SERPL-MCNC: 9.8 MG/DL — SIGNIFICANT CHANGE UP (ref 8.4–10.5)
CHLORIDE SERPL-SCNC: 103 MMOL/L — SIGNIFICANT CHANGE UP (ref 96–108)
CO2 SERPL-SCNC: 29 MMOL/L — SIGNIFICANT CHANGE UP (ref 22–31)
CREAT SERPL-MCNC: 0.87 MG/DL — SIGNIFICANT CHANGE UP (ref 0.5–1.3)
EGFR: 78 ML/MIN/1.73M2 — SIGNIFICANT CHANGE UP
EOSINOPHIL # BLD AUTO: 0.11 K/UL — SIGNIFICANT CHANGE UP (ref 0–0.5)
EOSINOPHIL NFR BLD AUTO: 1.3 % — SIGNIFICANT CHANGE UP (ref 0–6)
GLUCOSE SERPL-MCNC: 135 MG/DL — HIGH (ref 70–99)
HCT VFR BLD CALC: 39 % — SIGNIFICANT CHANGE UP (ref 34.5–45)
HGB BLD-MCNC: 13 G/DL — SIGNIFICANT CHANGE UP (ref 11.5–15.5)
IMM GRANULOCYTES NFR BLD AUTO: 0.2 % — SIGNIFICANT CHANGE UP (ref 0–0.9)
INR BLD: 1.11 RATIO — SIGNIFICANT CHANGE UP (ref 0.88–1.16)
LYMPHOCYTES # BLD AUTO: 2.35 K/UL — SIGNIFICANT CHANGE UP (ref 1–3.3)
LYMPHOCYTES # BLD AUTO: 28.5 % — SIGNIFICANT CHANGE UP (ref 13–44)
MCHC RBC-ENTMCNC: 31.6 PG — SIGNIFICANT CHANGE UP (ref 27–34)
MCHC RBC-ENTMCNC: 33.3 GM/DL — SIGNIFICANT CHANGE UP (ref 32–36)
MCV RBC AUTO: 94.9 FL — SIGNIFICANT CHANGE UP (ref 80–100)
MONOCYTES # BLD AUTO: 0.57 K/UL — SIGNIFICANT CHANGE UP (ref 0–0.9)
MONOCYTES NFR BLD AUTO: 6.9 % — SIGNIFICANT CHANGE UP (ref 2–14)
NEUTROPHILS # BLD AUTO: 5.17 K/UL — SIGNIFICANT CHANGE UP (ref 1.8–7.4)
NEUTROPHILS NFR BLD AUTO: 62.6 % — SIGNIFICANT CHANGE UP (ref 43–77)
NRBC # BLD: 0 /100 WBCS — SIGNIFICANT CHANGE UP (ref 0–0)
PLATELET # BLD AUTO: 252 K/UL — SIGNIFICANT CHANGE UP (ref 150–400)
POTASSIUM SERPL-MCNC: 4.1 MMOL/L — SIGNIFICANT CHANGE UP (ref 3.5–5.3)
POTASSIUM SERPL-SCNC: 4.1 MMOL/L — SIGNIFICANT CHANGE UP (ref 3.5–5.3)
PROT SERPL-MCNC: 8.4 G/DL — HIGH (ref 6–8.3)
PROTHROM AB SERPL-ACNC: 12.9 SEC — SIGNIFICANT CHANGE UP (ref 10.5–13.4)
RBC # BLD: 4.11 M/UL — SIGNIFICANT CHANGE UP (ref 3.8–5.2)
RBC # FLD: 13.1 % — SIGNIFICANT CHANGE UP (ref 10.3–14.5)
SARS-COV-2 RNA SPEC QL NAA+PROBE: SIGNIFICANT CHANGE UP
SODIUM SERPL-SCNC: 140 MMOL/L — SIGNIFICANT CHANGE UP (ref 135–145)
WBC # BLD: 8.26 K/UL — SIGNIFICANT CHANGE UP (ref 3.8–10.5)
WBC # FLD AUTO: 8.26 K/UL — SIGNIFICANT CHANGE UP (ref 3.8–10.5)

## 2023-04-25 PROCEDURE — 99223 1ST HOSP IP/OBS HIGH 75: CPT

## 2023-04-25 PROCEDURE — 99285 EMERGENCY DEPT VISIT HI MDM: CPT

## 2023-04-25 PROCEDURE — 43246 EGD PLACE GASTROSTOMY TUBE: CPT | Mod: 62

## 2023-04-25 PROCEDURE — 93010 ELECTROCARDIOGRAM REPORT: CPT

## 2023-04-25 PROCEDURE — 71045 X-RAY EXAM CHEST 1 VIEW: CPT | Mod: 26

## 2023-04-25 RX ORDER — VANCOMYCIN HCL 1 G
500 VIAL (EA) INTRAVENOUS ONCE
Refills: 0 | Status: DISCONTINUED | OUTPATIENT
Start: 2023-04-25 | End: 2023-04-25

## 2023-04-25 RX ORDER — THIAMINE MONONITRATE (VIT B1) 100 MG
100 TABLET ORAL DAILY
Refills: 0 | Status: DISCONTINUED | OUTPATIENT
Start: 2023-04-25 | End: 2023-04-27

## 2023-04-25 RX ORDER — ARIPIPRAZOLE 15 MG/1
1 TABLET ORAL
Refills: 0 | DISCHARGE

## 2023-04-25 RX ORDER — TRAZODONE HCL 50 MG
1 TABLET ORAL
Refills: 0 | DISCHARGE

## 2023-04-25 RX ORDER — FOLIC ACID 0.8 MG
1 TABLET ORAL DAILY
Refills: 0 | Status: DISCONTINUED | OUTPATIENT
Start: 2023-04-25 | End: 2023-04-27

## 2023-04-25 RX ORDER — IPRATROPIUM/ALBUTEROL SULFATE 18-103MCG
3 AEROSOL WITH ADAPTER (GRAM) INHALATION EVERY 6 HOURS
Refills: 0 | Status: DISCONTINUED | OUTPATIENT
Start: 2023-04-25 | End: 2023-04-27

## 2023-04-25 RX ORDER — TRAZODONE HCL 50 MG
100 TABLET ORAL AT BEDTIME
Refills: 0 | Status: DISCONTINUED | OUTPATIENT
Start: 2023-04-25 | End: 2023-04-27

## 2023-04-25 RX ORDER — VANCOMYCIN HCL 1 G
750 VIAL (EA) INTRAVENOUS ONCE
Refills: 0 | Status: COMPLETED | OUTPATIENT
Start: 2023-04-25 | End: 2023-04-25

## 2023-04-25 RX ORDER — CHLORHEXIDINE GLUCONATE 213 G/1000ML
1 SOLUTION TOPICAL ONCE
Refills: 0 | Status: COMPLETED | OUTPATIENT
Start: 2023-04-25 | End: 2023-04-25

## 2023-04-25 RX ORDER — SODIUM CHLORIDE 9 MG/ML
1000 INJECTION, SOLUTION INTRAVENOUS
Refills: 0 | Status: COMPLETED | OUTPATIENT
Start: 2023-04-25 | End: 2023-04-25

## 2023-04-25 RX ORDER — POLYETHYLENE GLYCOL 3350 17 G/17G
17 POWDER, FOR SOLUTION ORAL DAILY
Refills: 0 | Status: DISCONTINUED | OUTPATIENT
Start: 2023-04-25 | End: 2023-04-27

## 2023-04-25 RX ORDER — ARIPIPRAZOLE 15 MG/1
2 TABLET ORAL DAILY
Refills: 0 | Status: DISCONTINUED | OUTPATIENT
Start: 2023-04-25 | End: 2023-04-27

## 2023-04-25 RX ORDER — BETHANECHOL CHLORIDE 25 MG
25 TABLET ORAL THREE TIMES A DAY
Refills: 0 | Status: DISCONTINUED | OUTPATIENT
Start: 2023-04-25 | End: 2023-04-27

## 2023-04-25 RX ADMIN — CHLORHEXIDINE GLUCONATE 1 APPLICATION(S): 213 SOLUTION TOPICAL at 11:26

## 2023-04-25 RX ADMIN — SODIUM CHLORIDE 60 MILLILITER(S): 9 INJECTION, SOLUTION INTRAVENOUS at 11:23

## 2023-04-25 NOTE — H&P ADULT - NSHPLABSRESULTS_GEN_ALL_CORE
13.0   8.26  )-----------( 252      ( 25 Apr 2023 09:45 )             39.0       04-25    140  |  103  |  25<H>  ----------------------------<  135<H>  4.1   |  29  |  0.87    Ca    9.8      25 Apr 2023 09:45    TPro  8.4<H>  /  Alb  3.8  /  TBili  0.4  /  DBili  x   /  AST  20  /  ALT  25  /  AlkPhos  95  04-25    PT/INR - ( 25 Apr 2023 09:45 )   PT: 12.9 sec;   INR: 1.11 ratio         PTT - ( 25 Apr 2023 09:45 )  PTT:32.5 sec    EKG: reviewed by me. NSR.normal EKG  CXR: reviewed by me: looks clear

## 2023-04-25 NOTE — CONSULT NOTE ADULT - ASSESSMENT
58 yr old female w alcohol abuse hx, anemia, asthma/COPD, chronic renal insufficiency, GERD, GI bleed, Hepatitic steatosis need PEG tube replacement.  58 yr old female w alcohol abuse hx, anemia, asthma/COPD, chronic renal insufficiency, GERD, GI bleed, Hepatic steatosis,  need PEG tube replacement.

## 2023-04-25 NOTE — ED ADULT TRIAGE NOTE - CHIEF COMPLAINT QUOTE
Pt BIB EMS from [facility] to replace [facility].

## 2023-04-25 NOTE — H&P ADULT - NSHPPHYSICALEXAM_GEN_ALL_CORE
Vital Signs Last 24 Hrs  T(C): 36.9 (25 Apr 2023 08:57), Max: 36.9 (25 Apr 2023 08:57)  T(F): 98.4 (25 Apr 2023 08:57), Max: 98.4 (25 Apr 2023 08:57)  HR: 83 (25 Apr 2023 08:57) (83 - 83)  BP: 127/56 (25 Apr 2023 08:57) (127/56 - 127/56)  BP(mean): --  RR: 18 (25 Apr 2023 08:57) (18 - 18)  SpO2: 98% (25 Apr 2023 08:57) (98% - 98%)    Parameters below as of 25 Apr 2023 08:57  Patient On (Oxygen Delivery Method): room air    GENERAL: Not in distress. Alert    HEENT: clear conjuctiva, MMM. no pallor or icterus  CARDIOVASCULAR: RRR S1, S2. No murmur/rubs/gallop  LUNGS: BLAE+, no rales, no wheezing, no rhonchi.    ABDOMEN: ND. Soft,  NT, no guarding / rebound / rigidity. BS normoactive  BACK: No spine tenderness.  EXTREMITIES: no edema. no leg or calf TP.  SKIN: warm and dry. PEG site on dressing. PEG removed  PSYCHIATRIC: Calm.  No agitation.  CNS:Alert, awake, non-verbal. following simple commands. moving limbs

## 2023-04-25 NOTE — ED ADULT NURSE REASSESSMENT NOTE - NS ED NURSE REASSESS COMMENT FT1
Pt pulled out IV line, IV placed to right hand #20g. Pt placed next to nurse's station for enhanced supervision.
Pt sent to endo with endo RN.
Gave report to HEIDI Wilson on Tele. Pt going to room 227. Pt kept clean and dry and turned and repositioned.
Rec'd pt from endoscopy nurse awake, VSS abd binder in place.

## 2023-04-25 NOTE — ED ADULT NURSE NOTE - OBJECTIVE STATEMENT
Pt presents to ED from [facility] for PEG tube replacement. Pt unable to provide history, mostly nonverbal but able to state name, A&Ox1. No bleeding at PEG tube site. Afebrile upon arrival.

## 2023-04-25 NOTE — ED PROVIDER NOTE - CLINICAL SUMMARY MEDICAL DECISION MAKING FREE TEXT BOX
58-year-old female presents by EMS from facility for PEG tube replacement.  Apparently has been out since last night.  Here for replacement.  18 Sami last placed at this facility.  No other reported issues.  Patient unable to provide history.  Exam with small opening to area of PEG.   Unable to pass 18Fr PEG.   Called GI consult. Unable to pass. Recc admission for PEG replacement. 58-year-old female presents by EMS from facility for PEG tube replacement.  Apparently has been out since last night.  Here for replacement.  18 German last placed at this facility.  No other reported issues.  Patient unable to provide history.  Exam with small opening to area of PEG.   Unable to pass 18Fr PEG.   Called GI consult. Unable to pass. Recc admission for PEG replacement.  d/w Dr Peterson.

## 2023-04-25 NOTE — ED PROVIDER NOTE - OBJECTIVE STATEMENT
58-year-old female presents by EMS from facility for PEG tube replacement.  Apparently has been out since last night.  Here for replacement.  18 Latvian last placed at this facility.  No other reported issues.  Patient unable to provide history.

## 2023-04-25 NOTE — ED PROVIDER NOTE - PHYSICAL EXAMINATION
Patient is well-appearing.  Lumen to PEG tube is very small.  Almost closed.Abdomen soft nontender, nondistended.  No acute distress.  Patient awake and alert.

## 2023-04-25 NOTE — H&P ADULT - ASSESSMENT
58 yr old female w alcohol abuse hx, Wernicke-Korsakoff syndrome (alcoholic, dysphagia on PEG, anemia, asthma/COPD, chronic renal insufficiency, GERD, GI bleed, Hepatitic steatosis. depression, anxiety,  was sent from  for dislodged PEG. GI was consulted. faied bedside attempt. plan for endoscopic replacement. medical admission was called. patient non-verbal. pointed towards PEG site repeatedly during ROS.     Dysphagia on PEG  Dislodged PEG    - admit to med/surg  - for endoscopy and PEG placement today  - one dose on Vanco ordered by GI  - NPO  - IVF  - resume PEG feeding once clears by GI after placement  - nutrition cx    BA/COPD  - stable  - on on any iNH at home  - duoneb PRN    Wernicke-Korsakoff syndrome (alcoholic,   Dementia  Depression, anxiety  - per  paper, paitent may try to get out of the facility  - enhanced supervision  - c/w abilify, trazodone, Thiamin  - add MVT, FA    Chronic urinary retention  - on bethanecol  - monitor UO    CKD:   - avoid nephrotoxins  - monitor renal function    DVT-P: start Lovenox after PEG  GOC: Full code. MOLST in chart    Guardian was updated by GI team

## 2023-04-25 NOTE — PATIENT PROFILE ADULT - FALL HARM RISK - HARM RISK INTERVENTIONS

## 2023-04-25 NOTE — PATIENT PROFILE ADULT - FUNCTIONAL ASSESSMENT - BASIC MOBILITY 6.
1-calculated by average/Not able to assess (calculate score using [facility] averaging method)

## 2023-04-25 NOTE — CONSULT NOTE ADULT - PROBLEM SELECTOR RECOMMENDATION 9
Peg tube dislodged   Plan for PEG placement with EGD today  Lab and X-ray result pending.   Spoke with the guardian Nitza Chun  ext 102  She is aware and agree for the procedure will give consent.

## 2023-04-25 NOTE — H&P ADULT - NSICDXPASTMEDICALHX_GEN_ALL_CORE_FT
PAST MEDICAL HISTORY:  AA (alcohol abuse) sober since September 2017    Alcohol withdrawal seizure without complication x 1  patient unable to recall date    Alcohol-induced anxiety disorder     Anemia h/o blood transfusion 6 months ago    Asthma with COPD last PFT within the year  no recent respiratory infection   on symbicort daily  have not used ventolin for months    Cholelithiasis     Dementia     GERD (gastroesophageal reflux disease)     GIB (gastrointestinal bleeding)     Lyme disease     Renal calculi     Tachycardia

## 2023-04-25 NOTE — CONSULT NOTE ADULT - SUBJECTIVE AND OBJECTIVE BOX
INTERVAL HPI/OVERNIGHT EVENTS:  HPI: 58 yr old female w alcohol abuse hx, anemia, asthma/COPD, chronic renal insufficiency, GERD, GI bleed, Hepatitic steatosis who presented to  ED S/P PEG tube dislodge.      GI consultation called for PEG tube replacement. Patient seen and examined at bed side in ER . Patient is confused, information obtained from the chart and ER  provider. Attempted PEG placement at bed side unsuccessful. Planning PEG placement with EGD today. As per chart Peg tube was replaced last year in ER.           MEDICATIONS  (STANDING):    MEDICATIONS  (PRN):      Allergies    amoxicillin (Other)  Ceclor (Other)    Intolerances        PAST MEDICAL & SURGICAL HISTORY:  Tachycardia      Lyme disease      Renal calculi      Cholelithiasis      Asthma with COPD  last PFT within the year  no recent respiratory infection   on symbicort daily  have not used ventolin for months      GERD (gastroesophageal reflux disease)      AA (alcohol abuse)  sober since 2017      Alcohol withdrawal seizure without complication  x 1  patient unable to recall date      Alcohol-induced anxiety disorder      Anemia  h/o blood transfusion 6 months ago      S/P         S/P hysterectomy          Allergic/Immunologic:	    PHYSICAL EXAM:   Vital Signs:  Vital Signs Last 24 Hrs  T(C): 36.9 (2023 08:57), Max: 36.9 (2023 08:57)  T(F): 98.4 (2023 08:57), Max: 98.4 (2023 08:57)  HR: 83 (2023 08:57) (83 - 83)  BP: 127/56 (2023 08:57) (127/56 - 127/56)  BP(mean): --  RR: 18 (2023 08:57) (18 - 18)  SpO2: 98% (2023 08:57) (98% - 98%)    Parameters below as of 2023 08:57  Patient On (Oxygen Delivery Method): room air      Daily Height in cm: 157.48 (2023 08:57)    Daily I&O's Summary      GENERAL:  Appears stated age  HEENT:  NC/AT,  conjunctivae clear and pink  CHEST:  Full & symmetric excursion  HEART:  Regular rhythm, S1, S2  ABDOMEN:  Soft, non-tender, non-distended, normoactive bowel sounds, Peg site stoma closed   EXTEREMITIES:  no cyanosis,clubbing or edema  SKIN:  No rash/warm/dry  NEURO:  Awake ,confused     LABS:                        13.0   8.26  )-----------( 252      ( 2023 09:45 )             39.0     -    140  |  103  |  25<H>  ----------------------------<  135<H>  4.1   |  29  |  0.87    Ca    9.8      2023 09:45    TPro  8.4<H>  /  Alb  3.8  /  TBili  0.4  /  DBili  x   /  AST  20  /  ALT  25  /  AlkPhos  95  04-25        amylase   lipase  RADIOLOGY & ADDITIONAL TESTS:    58 yr old female w alcohol abuse hx, anemia, asthma/COPD, chronic renal insufficiency, GERD, GI bleed, Hepatic steatosis who presented to  ED S/P PEG tube dislodge.    GI consultation called for PEG tube replacement. Patient seen and examined at bed side in ER . Patient is confused, information obtained from the chart and ER  provider. Attempted PEG replacement at bed side unsuccessful. Planning PEG placement with EGD today. As per chart Peg tube was replaced last year in ER.           MEDICATIONS  (STANDING):    MEDICATIONS  (PRN):      Allergies    amoxicillin (Other)  Ceclor (Other)    Intolerances        PAST MEDICAL & SURGICAL HISTORY:  Tachycardia      Lyme disease      Renal calculi      Cholelithiasis      Asthma with COPD  last PFT within the year  no recent respiratory infection   on symbicort daily  have not used ventolin for months      GERD (gastroesophageal reflux disease)      AA (alcohol abuse)  sober since 2017      Alcohol withdrawal seizure without complication  x 1  patient unable to recall date      Alcohol-induced anxiety disorder      Anemia  h/o blood transfusion 6 months ago      S/P         S/P hysterectomy          Allergic/Immunologic:	    PHYSICAL EXAM:   Vital Signs:  Vital Signs Last 24 Hrs  T(C): 36.9 (2023 08:57), Max: 36.9 (2023 08:57)  T(F): 98.4 (2023 08:57), Max: 98.4 (2023 08:57)  HR: 83 (2023 08:57) (83 - 83)  BP: 127/56 (2023 08:57) (127/56 - 127/56)  BP(mean): --  RR: 18 (2023 08:57) (18 - 18)  SpO2: 98% (2023 08:57) (98% - 98%)    Parameters below as of 2023 08:57  Patient On (Oxygen Delivery Method): room air      Daily Height in cm: 157.48 (2023 08:57)    Daily I&O's Summary      GENERAL:  Appears stated age  HEENT:  NC/AT,  conjunctivae clear and pink  CHEST:  Full & symmetric excursion  HEART:  Regular rhythm, S1, S2  ABDOMEN:  Soft, non-tender, non-distended, normoactive bowel sounds, Peg site stoma closed   EXTEREMITIES:  no cyanosis,clubbing or edema  SKIN:  No rash/warm/dry  NEURO:  Awake ,confused     LABS:                        13.0   8.26  )-----------( 252      ( 2023 09:45 )             39.0         140  |  103  |  25<H>  ----------------------------<  135<H>  4.1   |  29  |  0.87    Ca    9.8      2023 09:45    TPro  8.4<H>  /  Alb  3.8  /  TBili  0.4  /  DBili  x   /  AST  20  /  ALT  25  /  AlkPhos  95

## 2023-04-25 NOTE — PATIENT PROFILE ADULT - PATIENT'S PREFERRED PRONOUN
[facility] Radiation Treatment Management Note 1-5    Patient:  Blue Carmona  Age:  62year old  Visit Diagnosis:  Liver met  Primary Rad/Onc:  Dr. Vero Valenzuela    Site Delivered Dose (cGy) Prescribed Dose (cGy) Fraction #   Liver 1 Her/She

## 2023-04-25 NOTE — ED PROVIDER NOTE - CONSTITUTIONAL, MLM
Detail Level: Detailed normal... Well appearing, awake, alert, oriented to person, place, time/situation and in no apparent distress.

## 2023-04-25 NOTE — H&P ADULT - HISTORY OF PRESENT ILLNESS
58 yr old female w alcohol abuse hx, Wernicke-Korsakoff syndrome (alcoholic, dysphagia on PEG, anemia, asthma/COPD, chronic renal insufficiency, GERD, GI bleed, Hepatitic steatosis. depression, anxiety,  was sent from  for dislodged PEG. GI was consulted. faied bedside attempt. plan for endoscopic replacement. medical admission was called. patient non-verbal. pointed towards PEG site repeatedly during ROS.

## 2023-04-26 ENCOUNTER — TRANSCRIPTION ENCOUNTER (OUTPATIENT)
Age: 58
End: 2023-04-26

## 2023-04-26 LAB
A1C WITH ESTIMATED AVERAGE GLUCOSE RESULT: 5.9 % — HIGH (ref 4–5.6)
ANION GAP SERPL CALC-SCNC: 8 MMOL/L — SIGNIFICANT CHANGE UP (ref 5–17)
BUN SERPL-MCNC: 19 MG/DL — SIGNIFICANT CHANGE UP (ref 7–23)
CALCIUM SERPL-MCNC: 10 MG/DL — SIGNIFICANT CHANGE UP (ref 8.4–10.5)
CHLORIDE SERPL-SCNC: 105 MMOL/L — SIGNIFICANT CHANGE UP (ref 96–108)
CO2 SERPL-SCNC: 27 MMOL/L — SIGNIFICANT CHANGE UP (ref 22–31)
CREAT SERPL-MCNC: 0.82 MG/DL — SIGNIFICANT CHANGE UP (ref 0.5–1.3)
EGFR: 83 ML/MIN/1.73M2 — SIGNIFICANT CHANGE UP
ESTIMATED AVERAGE GLUCOSE: 123 MG/DL — HIGH (ref 68–114)
GLUCOSE BLDC GLUCOMTR-MCNC: 114 MG/DL — HIGH (ref 70–99)
GLUCOSE BLDC GLUCOMTR-MCNC: 116 MG/DL — HIGH (ref 70–99)
GLUCOSE BLDC GLUCOMTR-MCNC: 98 MG/DL — SIGNIFICANT CHANGE UP (ref 70–99)
GLUCOSE SERPL-MCNC: 121 MG/DL — HIGH (ref 70–99)
HCT VFR BLD CALC: 39.7 % — SIGNIFICANT CHANGE UP (ref 34.5–45)
HGB BLD-MCNC: 12.8 G/DL — SIGNIFICANT CHANGE UP (ref 11.5–15.5)
MAGNESIUM SERPL-MCNC: 2.1 MG/DL — SIGNIFICANT CHANGE UP (ref 1.6–2.6)
MCHC RBC-ENTMCNC: 30.8 PG — SIGNIFICANT CHANGE UP (ref 27–34)
MCHC RBC-ENTMCNC: 32.2 GM/DL — SIGNIFICANT CHANGE UP (ref 32–36)
MCV RBC AUTO: 95.4 FL — SIGNIFICANT CHANGE UP (ref 80–100)
NRBC # BLD: 0 /100 WBCS — SIGNIFICANT CHANGE UP (ref 0–0)
PLATELET # BLD AUTO: 265 K/UL — SIGNIFICANT CHANGE UP (ref 150–400)
POTASSIUM SERPL-MCNC: 4.5 MMOL/L — SIGNIFICANT CHANGE UP (ref 3.5–5.3)
POTASSIUM SERPL-SCNC: 4.5 MMOL/L — SIGNIFICANT CHANGE UP (ref 3.5–5.3)
RBC # BLD: 4.16 M/UL — SIGNIFICANT CHANGE UP (ref 3.8–5.2)
RBC # FLD: 13 % — SIGNIFICANT CHANGE UP (ref 10.3–14.5)
SODIUM SERPL-SCNC: 140 MMOL/L — SIGNIFICANT CHANGE UP (ref 135–145)
WBC # BLD: 11.77 K/UL — HIGH (ref 3.8–10.5)
WBC # FLD AUTO: 11.77 K/UL — HIGH (ref 3.8–10.5)

## 2023-04-26 PROCEDURE — 99233 SBSQ HOSP IP/OBS HIGH 50: CPT

## 2023-04-26 PROCEDURE — 99232 SBSQ HOSP IP/OBS MODERATE 35: CPT

## 2023-04-26 RX ORDER — SODIUM CHLORIDE 9 MG/ML
1000 INJECTION, SOLUTION INTRAVENOUS
Refills: 0 | Status: DISCONTINUED | OUTPATIENT
Start: 2023-04-26 | End: 2023-04-26

## 2023-04-26 RX ORDER — ENOXAPARIN SODIUM 100 MG/ML
40 INJECTION SUBCUTANEOUS EVERY 24 HOURS
Refills: 0 | Status: DISCONTINUED | OUTPATIENT
Start: 2023-04-26 | End: 2023-04-27

## 2023-04-26 RX ADMIN — POLYETHYLENE GLYCOL 3350 17 GRAM(S): 17 POWDER, FOR SOLUTION ORAL at 13:02

## 2023-04-26 RX ADMIN — Medication 100 MILLIGRAM(S): at 21:33

## 2023-04-26 RX ADMIN — ARIPIPRAZOLE 2 MILLIGRAM(S): 15 TABLET ORAL at 13:01

## 2023-04-26 RX ADMIN — Medication 1 TABLET(S): at 13:02

## 2023-04-26 RX ADMIN — Medication 25 MILLIGRAM(S): at 13:02

## 2023-04-26 RX ADMIN — Medication 100 MILLIGRAM(S): at 13:02

## 2023-04-26 RX ADMIN — Medication 1 MILLIGRAM(S): at 13:01

## 2023-04-26 RX ADMIN — ENOXAPARIN SODIUM 40 MILLIGRAM(S): 100 INJECTION SUBCUTANEOUS at 13:02

## 2023-04-26 RX ADMIN — SODIUM CHLORIDE 50 MILLILITER(S): 9 INJECTION, SOLUTION INTRAVENOUS at 06:28

## 2023-04-26 RX ADMIN — Medication 25 MILLIGRAM(S): at 21:33

## 2023-04-26 NOTE — PROGRESS NOTE ADULT - ASSESSMENT
58 yr old female w hx alcohol abuse, Wernicke-Korsakoff syndrome, dysphagia on PEG, anemia, asthma/COPD, chronic renal insufficiency, GERD, GI bleed, Hepatitic steatosis, depression, anxiety, was sent from  for dislodged PEG. GI was consulted. Failed bedside attempt. Plan for endoscopic replacement.    #Dysphagia on PEG  #Dislodged PEG  - PEG replaced on 4/26 by GI  - Currently NPO, tube feeds have not been started  - Resume tube feeds when cleared by GI  - GI following    #Asthma/COPD  - Stable  - Not on any inhalers at home  - Duoneb PRN    #Wernicke-Korsakoff syndrome   #Dementia  #Depression, anxiety  - Per  paper, patient may try to get out of the facility  - Enhanced supervision  - c/w abilify, trazodone, Thiamin  - Added MVT, FA    #Chronic urinary retention  - On bethanecol  - Monitor UO    #CKD   - Avoid nephrotoxins  - Monitor renal function    DVT-P: start Lovenox after PEG    GOC: Full code. MOLST in chart    Guardian: Nitza Chun  ext 102         58 yr old female w hx alcohol abuse, Wernicke-Korsakoff syndrome, dysphagia on PEG, anemia, asthma/COPD, chronic renal insufficiency, GERD, GI bleed, Hepatitic steatosis, depression, anxiety, was sent from  for dislodged PEG. GI was consulted. Failed bedside attempt. Plan for endoscopic replacement.    #Dysphagia on PEG  #Dislodged PEG  - PEG replaced on 4/26 by GI  - Resume tube feeds today, d/w GI  - GI following    #Asthma/COPD  - Stable  - Not on any inhalers at home  - Duoneb PRN    #Wernicke-Korsakoff syndrome   #Dementia  #Depression, anxiety  - Per  paper, patient may try to get out of the facility  - Enhanced supervision  - c/w abilify, trazodone, Thiamin  - Added MVT, FA    #Chronic urinary retention  - On bethanecol  - Monitor UO    #CKD   - Avoid nephrotoxins  - Monitor renal function    #DVT-P  - Will start SCDs  - If staying will start Lovenox     GOC: Full code. MOLST in chart    Dispo: Anticipate DC home today once tolerating tube feeds     Guardian: Nitza Chun  ext 102         58 yr old female w hx alcohol abuse, Wernicke-Korsakoff syndrome, dysphagia on PEG, anemia, asthma/COPD, chronic renal insufficiency, GERD, GI bleed, Hepatitic steatosis, depression, anxiety, was sent from  for dislodged PEG. GI was consulted. Failed bedside attempt. Plan for endoscopic replacement.    #Dysphagia on PEG  #Dislodged PEG  - PEG replaced on 4/26 by GI  - Resume tube feeds today, d/w GI  - GI following    #Asthma/COPD  - Stable  - Not on any inhalers at home  - Duoneb PRN    #Wernicke-Korsakoff syndrome   #Dementia  #Depression, anxiety  - Per  paper, patient may try to get out of the facility  - Enhanced supervision  - c/w abilify, trazodone, Thiamin  - Added MVT, FA    #Chronic urinary retention  - On bethanecol  - Monitor UO    #CKD   - Avoid nephrotoxins  - Monitor renal function    #DVT-P  - Lovenox    GOC: Full code. MOLST in chart    Dispo: Anticipate DC home tomorrow if tolerating tube feeds     Guardian: Nitza Chun  ext 102

## 2023-04-26 NOTE — PROGRESS NOTE ADULT - SUBJECTIVE AND OBJECTIVE BOX
Patient is a 58y old  Female who presents with a chief complaint of PEG dislodged. failed to fix at ER (26 Apr 2023 07:19)    Patient seen and examined at bedside. No overnight events reported.     ALLERGIES:  amoxicillin (Other)  Ceclor (Other)    MEDICATIONS  (STANDING):  ARIPiprazole 2 milliGRAM(s) Oral daily  bethanechol 25 milliGRAM(s) Oral three times a day  dextrose 5% + sodium chloride 0.9%. 1000 milliLiter(s) (50 mL/Hr) IV Continuous <Continuous>  folic acid 1 milliGRAM(s) Oral daily  multivitamin 1 Tablet(s) Oral daily  polyethylene glycol 3350 17 Gram(s) Oral daily  thiamine 100 milliGRAM(s) Oral daily  traZODone 100 milliGRAM(s) Oral at bedtime    MEDICATIONS  (PRN):  albuterol/ipratropium for Nebulization 3 milliLiter(s) Nebulizer every 6 hours PRN Shortness of Breath and/or Wheezing    Vital Signs Last 24 Hrs  T(F): 97.8 (26 Apr 2023 06:02), Max: 98.4 (25 Apr 2023 12:50)  HR: 94 (26 Apr 2023 06:02) (74 - 100)  BP: 119/74 (26 Apr 2023 06:02) (119/74 - 130/77)  RR: 18 (26 Apr 2023 06:02) (17 - 18)  SpO2: 96% (26 Apr 2023 06:02) (96% - 100%)    PHYSICAL EXAM:  General: NAD, alert, nonverbal, contracted   ENT: No gross hearing impairment, moist mucous membranes, no thrush  Neck: Supple, No JVD  Lungs: Clear to auscultation bilaterally, good air entry, non-labored breathing  Cardio: RRR, S1/S2, No murmur  Abdomen: Soft, Nontender, Nondistended; PEG in place   Extremities: No calf tenderness, No cyanosis, No pitting edema  Psych: Calm, nonverbal    LABS:                        12.8   11.77 )-----------( 265      ( 26 Apr 2023 06:15 )             39.7     04-26    140  |  105  |  19  ----------------------------<  121  4.5   |  27  |  0.82    Ca    10.0      26 Apr 2023 06:15  Mg     2.1     04-26    TPro  8.4  /  Alb  3.8  /  TBili  0.4  /  DBili  x   /  AST  20  /  ALT  25  /  AlkPhos  95  04-25    PT/INR - ( 25 Apr 2023 09:45 )   PT: 12.9 sec;   INR: 1.11 ratio      PTT - ( 25 Apr 2023 09:45 )  PTT:32.5 sec    POCT Blood Glucose.: 116 mg/dL (26 Apr 2023 06:32)    COVID-19 PCR: NotDetec (04-25-23 @ 09:45)    RADIOLOGY & ADDITIONAL TESTS:    Care Discussed with Consultants/Other Providers:    Patient is a 58y old  Female who presents with a chief complaint of PEG dislodged. failed to fix at ER (26 Apr 2023 07:19)    Patient seen and examined at bedside. No overnight events reported. Patient points at her PEG tube when asked if she is having any pain. Abdomen is soft, nondistended. PEG in place with abdominal binder.     ALLERGIES:  amoxicillin (Other)  Ceclor (Other)    MEDICATIONS  (STANDING):  ARIPiprazole 2 milliGRAM(s) Oral daily  bethanechol 25 milliGRAM(s) Oral three times a day  dextrose 5% + sodium chloride 0.9%. 1000 milliLiter(s) (50 mL/Hr) IV Continuous <Continuous>  folic acid 1 milliGRAM(s) Oral daily  multivitamin 1 Tablet(s) Oral daily  polyethylene glycol 3350 17 Gram(s) Oral daily  thiamine 100 milliGRAM(s) Oral daily  traZODone 100 milliGRAM(s) Oral at bedtime    MEDICATIONS  (PRN):  albuterol/ipratropium for Nebulization 3 milliLiter(s) Nebulizer every 6 hours PRN Shortness of Breath and/or Wheezing    Vital Signs Last 24 Hrs  T(F): 97.8 (26 Apr 2023 06:02), Max: 98.4 (25 Apr 2023 12:50)  HR: 94 (26 Apr 2023 06:02) (74 - 100)  BP: 119/74 (26 Apr 2023 06:02) (119/74 - 130/77)  RR: 18 (26 Apr 2023 06:02) (17 - 18)  SpO2: 96% (26 Apr 2023 06:02) (96% - 100%)    PHYSICAL EXAM:  General: NAD, alert, nonverbal, contracted   ENT: No gross hearing impairment, moist mucous membranes, no thrush  Neck: Supple, No JVD  Lungs: Clear to auscultation bilaterally, good air entry, non-labored breathing  Cardio: RRR, S1/S2, No murmur  Abdomen: Soft, Nontender, Nondistended; PEG in place   Extremities: No calf tenderness, No cyanosis, No pitting edema  Psych: Calm, nonverbal    LABS:                        12.8   11.77 )-----------( 265      ( 26 Apr 2023 06:15 )             39.7     04-26    140  |  105  |  19  ----------------------------<  121  4.5   |  27  |  0.82    Ca    10.0      26 Apr 2023 06:15  Mg     2.1     04-26    TPro  8.4  /  Alb  3.8  /  TBili  0.4  /  DBili  x   /  AST  20  /  ALT  25  /  AlkPhos  95  04-25    PT/INR - ( 25 Apr 2023 09:45 )   PT: 12.9 sec;   INR: 1.11 ratio      PTT - ( 25 Apr 2023 09:45 )  PTT:32.5 sec    POCT Blood Glucose.: 116 mg/dL (26 Apr 2023 06:32)    COVID-19 PCR: NotDetec (04-25-23 @ 09:45)    RADIOLOGY & ADDITIONAL TESTS:    Care Discussed with Consultants/Other Providers:

## 2023-04-26 NOTE — DISCHARGE NOTE PROVIDER - HOSPITAL COURSE
Hospital Course  HPI:   58 yr old female w alcohol abuse hx, Wernicke-Korsakoff syndrome (alcoholic, dysphagia on PEG, anemia, asthma/COPD, chronic renal insufficiency, GERD, GI bleed, Hepatitic steatosis. depression, anxiety,  was sent from  for dislodged PEG. GI was consulted. faied bedside attempt. plan for endoscopic replacement. medical admission was called. patient non-verbal. pointed towards PEG site repeatedly during ROS.  (25 Apr 2023 11:17)    Patient was admitted to medicine. Underwent endoscopy and PEG replaced on 4/25 by GI. Tube feeds resumed on 4/26. Patient tolerating tube feeds, stable for DC back to NH.               You were admitted for   You were diagnosed with   You were treated with   You were prescribed the following new medications:    You will need to follow up with your primary care physician.    Source of Infection:  Antibiotic / Last Day:    Palliative Care / Advanced Care Planning  Code Status:  Patient/Family agreeable to Hospice/Palliative (Y/N)?  Summary of Goals of Care Conversation:    Discharging Provider:    Contact Info: Cell 		Please call with any questions or concerns.    Outpatient Provider:    Signout given to  SNF Provider:   Hospital Course  HPI:   58 yr old female w alcohol abuse hx, Wernicke-Korsakoff syndrome (alcoholic, dysphagia on PEG, anemia, asthma/COPD, chronic renal insufficiency, GERD, GI bleed, Hepatitic steatosis. depression, anxiety,  was sent from  for dislodged PEG. GI was consulted. failed bedside attempt. plan for endoscopic replacement. medical admission was called. patient non-verbal. pointed towards PEG site repeatedly during ROS.  (25 Apr 2023 11:17)    Patient was admitted to medicine. Underwent endoscopy and PEG replaced on 4/25 by GI. Tube feeds resumed on 4/26. Patient tolerating tube feeds, keep HOB elevated, check residuals, PEG site care, keep abdominal binder to protect tube.  Cleared to return to facility.       You were prescribed the following new medications:    You will need to follow up with your primary care physician.    Source of Infection:  Antibiotic / Last Day: Na    Palliative Care / Advanced Care Planning  Code Status: full code  Patient/Family agreeable to Hospice/Palliative (Y/N)?  Summary of Goals of Care Conversation:    Discharging Provider:  Flash Burrows NP  Contact Info: Cell [phone]	Please call with any questions or concerns.    Outpatient Provider: PCP Dr. Jang

## 2023-04-26 NOTE — DISCHARGE NOTE PROVIDER - PROVIDER TOKENS
PROVIDER:[TOKEN:[[id number]]] PROVIDER:[TOKEN:[[id number]],FOLLOWUP:[1 week],ESTABLISHEDPATIENT:[T]],FREE:[LAST:[batash],FIRST:[],PHONE:[(   )    -],FAX:[(   )    -],ADDRESS:[your primary care provider],FOLLOWUP:[1 week],ESTABLISHEDPATIENT:[T]]

## 2023-04-26 NOTE — DIETITIAN INITIAL EVALUATION ADULT - NS FNS DIET ORDER
Diet, NPO with Tube Feed:   Tube Feeding Modality: Gastrostomy  Jevity 1.5 Allan  Total Volume for 24 Hours (mL): 960  Continuous  Starting Tube Feed Rate {mL per Hour}: 20  Increase Tube Feed Rate by (mL): 10     Every hour  Until Goal Tube Feed Rate (mL per Hour): 40  Tube Feed Duration (in Hours): 24  Tube Feed Start Time: 09:00  Free Water Flush  Bolus   Total Volume per Flush (mL): 100   Frequency: Every 6 Hours (04-26-23 @ 08:29)

## 2023-04-26 NOTE — PROGRESS NOTE ADULT - NS ATTEND AMEND GEN_ALL_CORE FT
Agree with the assessment and plan of NP Femi.  S/P PEG replacement.   OK for meds, fluids, feeds.  Abdominal binder to prevent inadvertent removal of tube.  GI will sign off. Please reconsult as needed. Thank you.
s/p peg dislodgement and needing replacement  - watch on refeeding with new tube  - d/c tomorrow if stable overnight

## 2023-04-26 NOTE — DISCHARGE NOTE PROVIDER - NSDCMRMEDTOKEN_GEN_ALL_CORE_FT
Abilify 2 mg oral tablet: 1 orally once a day  bethanechol 25 mg oral tablet: 1 tab(s) orally 3 times a day  polyethylene glycol 3350 oral powder for reconstitution: 17 gram(s) orally once a day as needed  thiamine 100 mg oral tablet: 1 tab(s) orally once a day   traZODone 100 mg oral tablet: 1 tab(s) orally once a day (at bedtime)

## 2023-04-26 NOTE — PROGRESS NOTE ADULT - ASSESSMENT
58 yr old female w alcohol abuse hx, anemia, asthma/COPD, chronic renal insufficiency, GERD, GI bleed, Hepatic steatosis.  S/P PEG tube replacement via endoscopy 4/25/23.  58 yr old female patient w hx alcohol abuse,  anemia, asthma/COPD, chronic renal insufficiency, GERD, GI bleed, Hepatic steatosis, s/p PEG dislodgement, now replaced via EGD 4/25/2023.

## 2023-04-26 NOTE — PROGRESS NOTE ADULT - SUBJECTIVE AND OBJECTIVE BOX
INTERVAL HPI/OVERNIGHT EVENTS:   58 yr old female PEG dislodged and Endoscopic PEG placement done on 23. Patient seen and examined at bed side PEG tube intact, PEG site dry intact. As per RN no event over night.         MEDICATIONS  (STANDING):  ARIPiprazole 2 milliGRAM(s) Oral daily  bethanechol 25 milliGRAM(s) Oral three times a day  folic acid 1 milliGRAM(s) Oral daily  multivitamin 1 Tablet(s) Oral daily  polyethylene glycol 3350 17 Gram(s) Oral daily  thiamine 100 milliGRAM(s) Oral daily  traZODone 100 milliGRAM(s) Oral at bedtime    MEDICATIONS  (PRN):  albuterol/ipratropium for Nebulization 3 milliLiter(s) Nebulizer every 6 hours PRN Shortness of Breath and/or Wheezing      Allergies    amoxicillin (Other)  Ceclor (Other)    Intolerances        PAST MEDICAL & SURGICAL HISTORY:  Tachycardia      Lyme disease      Renal calculi      Cholelithiasis      Asthma with COPD  last PFT within the year  no recent respiratory infection   on symbicort daily  have not used ventolin for months      GERD (gastroesophageal reflux disease)      AA (alcohol abuse)  sober since 2017      Alcohol withdrawal seizure without complication  x 1  patient unable to recall date      Alcohol-induced anxiety disorder      Anemia  h/o blood transfusion 6 months ago      Dementia      GIB (gastrointestinal bleeding)      S/P         S/P hysterectomy        PHYSICAL EXAM:   Vital Signs:  Vital Signs Last 24 Hrs  T(C): 36.6 (2023 06:02), Max: 36.9 (2023 12:50)  T(F): 97.8 (2023 06:02), Max: 98.4 (2023 12:50)  HR: 94 (2023 06:02) (74 - 100)  BP: 119/74 (2023 06:02) (119/74 - 130/77)  BP(mean): --  RR: 18 (2023 06:02) (17 - 18)  SpO2: 96% (2023 06:02) (96% - 100%)    Parameters below as of 2023 06:02  Patient On (Oxygen Delivery Method): room air      Daily Height in cm: 160.02 (2023 22:21)    Daily I&O's Summary      GENERAL:  Appears stated age  HEENT:  NC/AT,  conjunctivae clear and pink,  CHEST:  Full & symmetric excursion,  HEART:  Regular rhythm, S1, S2,  ABDOMEN:  Soft, non-tender, non-distended, normoactive bowel sounds, PEG tube present site dry intact  EXTREMITIES  no cyanosis, clubbing or edema  SKIN:  No rash/warm/dry  NEURO:  Awake , confused      LABS:                        12.8   11.77 )-----------( 265      ( 2023 06:15 )             39.7         140  |  105  |  19  ----------------------------<  121<H>  4.5   |  27  |  0.82    Ca    10.0      2023 06:15  Mg     2.1         TPro  8.4<H>  /  Alb  3.8  /  TBili  0.4  /  DBili  x   /  AST  20  /  ALT  25  /  AlkPhos  95  04-25    PT/INR - ( 2023 09:45 )   PT: 12.9 sec;   INR: 1.11 ratio         PTT - ( 2023 09:45 )  PTT:32.5 sec    amylase   lipase  RADIOLOGY & ADDITIONAL TESTS:   58 yr old female admitted for dislodged PEG.  Endoscopic PEG replacement performed 4/25/2023.  Patient seen and examined at bedside. PEG tube intact, stoma site dry, nonbleeding.  As per RN no event over night.       MEDICATIONS  (STANDING):  ARIPiprazole 2 milliGRAM(s) Oral daily  bethanechol 25 milliGRAM(s) Oral three times a day  folic acid 1 milliGRAM(s) Oral daily  multivitamin 1 Tablet(s) Oral daily  polyethylene glycol 3350 17 Gram(s) Oral daily  thiamine 100 milliGRAM(s) Oral daily  traZODone 100 milliGRAM(s) Oral at bedtime    MEDICATIONS  (PRN):  albuterol/ipratropium for Nebulization 3 milliLiter(s) Nebulizer every 6 hours PRN Shortness of Breath and/or Wheezing      Allergies    amoxicillin (Other)  Ceclor (Other)    Intolerances          PHYSICAL EXAM:   Vital Signs:  Vital Signs Last 24 Hrs  T(C): 36.6 (26 Apr 2023 06:02), Max: 36.9 (25 Apr 2023 12:50)  T(F): 97.8 (26 Apr 2023 06:02), Max: 98.4 (25 Apr 2023 12:50)  HR: 94 (26 Apr 2023 06:02) (74 - 100)  BP: 119/74 (26 Apr 2023 06:02) (119/74 - 130/77)  BP(mean): --  RR: 18 (26 Apr 2023 06:02) (17 - 18)  SpO2: 96% (26 Apr 2023 06:02) (96% - 100%)    Parameters below as of 26 Apr 2023 06:02  Patient On (Oxygen Delivery Method): room air      Daily Height in cm: 160.02 (25 Apr 2023 22:21)    Daily I&O's Summary      GENERAL:  Appears stated age, NAD  HEENT:  NC/AT, anicteric sclera,  CHEST:  Full & symmetric excursion,  HEART:  Regular rhythm, S1, S2,  ABDOMEN:  Soft, non-tender, non-distended, normoactive bowel sounds, PEG tube present, site dry, nonbleeding  EXTREMITIES  no cyanosis, clubbing or edema  SKIN:  No rash/warm/dry  NEURO:  Awake.       LABS:                        12.8   11.77 )-----------( 265      ( 26 Apr 2023 06:15 )             39.7     04-26    140  |  105  |  19  ----------------------------<  121<H>  4.5   |  27  |  0.82    Ca    10.0      26 Apr 2023 06:15  Mg     2.1     04-26    TPro  8.4<H>  /  Alb  3.8  /  TBili  0.4  /  DBili  x   /  AST  20  /  ALT  25  /  AlkPhos  95  04-25

## 2023-04-26 NOTE — DIETITIAN INITIAL EVALUATION ADULT - PERTINENT MEDS FT
MEDICATIONS  (STANDING):  ARIPiprazole 2 milliGRAM(s) Oral daily  bethanechol 25 milliGRAM(s) Oral three times a day  folic acid 1 milliGRAM(s) Oral daily  multivitamin 1 Tablet(s) Oral daily  polyethylene glycol 3350 17 Gram(s) Oral daily  thiamine 100 milliGRAM(s) Oral daily  traZODone 100 milliGRAM(s) Oral at bedtime    MEDICATIONS  (PRN):  albuterol/ipratropium for Nebulization 3 milliLiter(s) Nebulizer every 6 hours PRN Shortness of Breath and/or Wheezing

## 2023-04-26 NOTE — DISCHARGE NOTE PROVIDER - CARE PROVIDER_API CALL
Ryan Petersen (DO)  Gastroenterology  [address], [address]  [place] [address]  Phone: [phone]  Fax: [phone]  Follow Up Time:    Ryan Petersen (DO)  Gastroenterology  [address], [address]  [place] [address]  Phone: [phone]  Fax: [phone]  Established Patient  Follow Up Time: 1 week    batash, Dr  your primary care provider  Phone: (   )    -  Fax: (   )    -  Established Patient  Follow Up Time: 1 week

## 2023-04-26 NOTE — PROGRESS NOTE ADULT - PROBLEM SELECTOR PLAN 1
S/P PEG replacement 4/25/23   Restart the PEG feed  Keep HOB elevated  Check residual  PEG site care as routine   Keep abdominal binder to protect the tube S/P PEG replacement 4/25/2023   Restart the PEG feeds  Keep HOB elevated  Check residual  PEG site care per routine   Keep abdominal binder to protect the tube

## 2023-04-26 NOTE — DISCHARGE NOTE PROVIDER - ATTENDING DISCHARGE PHYSICAL EXAMINATION:
General: NAD, Alert, appears stated age  ENT: MMM, no thrush  Neck: Supple, No JVD  Lungs: Clear to auscultation bilaterally, non labored, good air entry  Cardio: S1S2 regular  Abdomen: Soft, Nontender, Nondistended; PEG tube present, site dry and intact  Extremities: No cyanosis, No edema

## 2023-04-26 NOTE — DIETITIAN INITIAL EVALUATION ADULT - PERTINENT LABORATORY DATA
04-26    140  |  105  |  19  ----------------------------<  121<H>  4.5   |  27  |  0.82    Ca    10.0      26 Apr 2023 06:15  Mg     2.1     04-26    TPro  8.4<H>  /  Alb  3.8  /  TBili  0.4  /  DBili  x   /  AST  20  /  ALT  25  /  AlkPhos  95  04-25  POCT Blood Glucose.: 116 mg/dL (04-26-23 @ 06:32)  A1C with Estimated Average Glucose Result: 5.9 % (04-26-23 @ 06:15)

## 2023-04-26 NOTE — DIETITIAN INITIAL EVALUATION ADULT - OTHER INFO
58 yr old female w alcohol abuse hx, Wernicke-Korsakoff syndrome (alcoholic, dysphagia on PEG, anemia, asthma/COPD, chronic renal insufficiency, GERD, GI bleed, Hepatitic steatosis. depression, anxiety,  was sent from  for dislodged PEG. Patient s/p  PEG placement , EN feeds to resume , Patient noted to receive nocturnal feeds at nursing facility . No BM noted since admission , Skin intact , No edema .  Current EN feeds are providing is providing 1440kcals, 61gms [id number] suggest adding 250ml h20 q 6 hrs

## 2023-04-26 NOTE — DISCHARGE NOTE PROVIDER - NSDCCPCAREPLAN_GEN_ALL_CORE_FT
PRINCIPAL DISCHARGE DIAGNOSIS  Diagnosis: PEG (percutaneous endoscopic gastrostomy) adjustment/replacement/removal  Assessment and Plan of Treatment: you were admitted with a dislodged peg tube.  GI re  inserted another, you are tolerating tube feeds.  cleared to return to skilled nursing facility.

## 2023-04-27 ENCOUNTER — TRANSCRIPTION ENCOUNTER (OUTPATIENT)
Age: 58
End: 2023-04-27

## 2023-04-27 VITALS
TEMPERATURE: 98 F | OXYGEN SATURATION: 99 % | RESPIRATION RATE: 18 BRPM | DIASTOLIC BLOOD PRESSURE: 73 MMHG | SYSTOLIC BLOOD PRESSURE: 146 MMHG | HEART RATE: 97 BPM

## 2023-04-27 LAB
ANION GAP SERPL CALC-SCNC: 11 MMOL/L — SIGNIFICANT CHANGE UP (ref 5–17)
BUN SERPL-MCNC: 24 MG/DL — HIGH (ref 7–23)
CALCIUM SERPL-MCNC: 10.2 MG/DL — SIGNIFICANT CHANGE UP (ref 8.4–10.5)
CHLORIDE SERPL-SCNC: 105 MMOL/L — SIGNIFICANT CHANGE UP (ref 96–108)
CO2 SERPL-SCNC: 26 MMOL/L — SIGNIFICANT CHANGE UP (ref 22–31)
CREAT SERPL-MCNC: 0.79 MG/DL — SIGNIFICANT CHANGE UP (ref 0.5–1.3)
EGFR: 87 ML/MIN/1.73M2 — SIGNIFICANT CHANGE UP
GLUCOSE SERPL-MCNC: 128 MG/DL — HIGH (ref 70–99)
HCT VFR BLD CALC: 39.5 % — SIGNIFICANT CHANGE UP (ref 34.5–45)
HGB BLD-MCNC: 13.1 G/DL — SIGNIFICANT CHANGE UP (ref 11.5–15.5)
MCHC RBC-ENTMCNC: 31.1 PG — SIGNIFICANT CHANGE UP (ref 27–34)
MCHC RBC-ENTMCNC: 33.2 GM/DL — SIGNIFICANT CHANGE UP (ref 32–36)
MCV RBC AUTO: 93.8 FL — SIGNIFICANT CHANGE UP (ref 80–100)
NRBC # BLD: 0 /100 WBCS — SIGNIFICANT CHANGE UP (ref 0–0)
PLATELET # BLD AUTO: 269 K/UL — SIGNIFICANT CHANGE UP (ref 150–400)
POTASSIUM SERPL-MCNC: 3.8 MMOL/L — SIGNIFICANT CHANGE UP (ref 3.5–5.3)
POTASSIUM SERPL-SCNC: 3.8 MMOL/L — SIGNIFICANT CHANGE UP (ref 3.5–5.3)
RBC # BLD: 4.21 M/UL — SIGNIFICANT CHANGE UP (ref 3.8–5.2)
RBC # FLD: 13.2 % — SIGNIFICANT CHANGE UP (ref 10.3–14.5)
SODIUM SERPL-SCNC: 142 MMOL/L — SIGNIFICANT CHANGE UP (ref 135–145)
WBC # BLD: 11.1 K/UL — HIGH (ref 3.8–10.5)
WBC # FLD AUTO: 11.1 K/UL — HIGH (ref 3.8–10.5)

## 2023-04-27 PROCEDURE — 93005 ELECTROCARDIOGRAM TRACING: CPT

## 2023-04-27 PROCEDURE — 86901 BLOOD TYPING SEROLOGIC RH(D): CPT

## 2023-04-27 PROCEDURE — 99285 EMERGENCY DEPT VISIT HI MDM: CPT | Mod: 25

## 2023-04-27 PROCEDURE — 85610 PROTHROMBIN TIME: CPT

## 2023-04-27 PROCEDURE — 82962 GLUCOSE BLOOD TEST: CPT

## 2023-04-27 PROCEDURE — 86850 RBC ANTIBODY SCREEN: CPT

## 2023-04-27 PROCEDURE — 80053 COMPREHEN METABOLIC PANEL: CPT

## 2023-04-27 PROCEDURE — 36415 COLL VENOUS BLD VENIPUNCTURE: CPT

## 2023-04-27 PROCEDURE — 86900 BLOOD TYPING SEROLOGIC ABO: CPT

## 2023-04-27 PROCEDURE — 85027 COMPLETE CBC AUTOMATED: CPT

## 2023-04-27 PROCEDURE — 71045 X-RAY EXAM CHEST 1 VIEW: CPT

## 2023-04-27 PROCEDURE — 80048 BASIC METABOLIC PNL TOTAL CA: CPT

## 2023-04-27 PROCEDURE — 85730 THROMBOPLASTIN TIME PARTIAL: CPT

## 2023-04-27 PROCEDURE — 83036 HEMOGLOBIN GLYCOSYLATED A1C: CPT

## 2023-04-27 PROCEDURE — 83735 ASSAY OF MAGNESIUM: CPT

## 2023-04-27 PROCEDURE — 99239 HOSP IP/OBS DSCHRG MGMT >30: CPT

## 2023-04-27 PROCEDURE — 85025 COMPLETE CBC W/AUTO DIFF WBC: CPT

## 2023-04-27 PROCEDURE — 87635 SARS-COV-2 COVID-19 AMP PRB: CPT

## 2023-04-27 RX ADMIN — ARIPIPRAZOLE 2 MILLIGRAM(S): 15 TABLET ORAL at 13:21

## 2023-04-27 RX ADMIN — POLYETHYLENE GLYCOL 3350 17 GRAM(S): 17 POWDER, FOR SOLUTION ORAL at 13:20

## 2023-04-27 RX ADMIN — Medication 25 MILLIGRAM(S): at 05:11

## 2023-04-27 RX ADMIN — Medication 1 TABLET(S): at 13:20

## 2023-04-27 RX ADMIN — Medication 100 MILLIGRAM(S): at 13:20

## 2023-04-27 RX ADMIN — Medication 1 MILLIGRAM(S): at 13:21

## 2023-04-27 NOTE — DISCHARGE NOTE NURSING/CASE MANAGEMENT/SOCIAL WORK - PATIENT PORTAL LINK FT
You can access the FollowMyHealth Patient Portal offered by [facility] by registering at the following website: [email or web address]. By joining [employer]’s FollowMyHealth portal, you will also be able to view your health information using other applications (apps) compatible with our system.

## 2023-04-27 NOTE — PROGRESS NOTE ADULT - ASSESSMENT
58 yr old female w hx alcohol abuse, Wernicke-Korsakoff syndrome, dysphagia on PEG, anemia, asthma/COPD, chronic renal insufficiency, GERD, GI bleed, Hepatitic steatosis, depression, anxiety, was sent from  for dislodged PEG. GI was consulted. Failed bedside attempt. Plan for endoscopic replacement.    #Dysphagia on PEG  #Dislodged PEG  - pt admitted for dislodged PEG  -Endoscopic PEG replacement performed by GI on 4/25/23  Tube feeds resumed, PEG tube site intact, tolerating feeds  Keep HOB elevated, check residual, PEG site care, keep abdominal binder to protect tube  Anticipate probable discharge back to facility    #Asthma/COPD  - Stable  - Not on any inhalers at home  - Duoneb PRN    #Wernicke-Korsakoff syndrome   #Dementia  #Depression, anxiety  - Per  paper, patient may try to get out of the facility  - Enhanced supervision  - c/w abilify, trazodone, Thiamin  - Added MVT, FA    #Chronic urinary retention  - On bethanecol  - Monitor UO    #CKD   - Avoid nephrotoxins  - Monitor renal function    #DVT-P  - Lovenox    GOC: Full code. MOLST in chart    Dispo: Anticipate DC home today if tolerating TF    Guardian: Nitza hCun  ext 102

## 2023-04-27 NOTE — PROGRESS NOTE ADULT - SUBJECTIVE AND OBJECTIVE BOX
Patient is a 58y old  Female who presents with a chief complaint of PEG dislodged. failed to fix at ER (26 Apr 2023 15:27)    Patient seen and examined at bedside.  no acute overnight events    ALLERGIES:  amoxicillin (Other)  Ceclor (Other)        Vital Signs Last 24 Hrs  T(F): 97.7 (27 Apr 2023 05:11), Max: 98.2 (26 Apr 2023 20:40)  HR: 94 (27 Apr 2023 05:11) (62 - 99)  BP: 110/66 (27 Apr 2023 05:11) (109/59 - 118/64)  RR: 18 (27 Apr 2023 05:11) (18 - 18)  SpO2: 97% (27 Apr 2023 05:11) (96% - 97%)  I&O's Summary    MEDICATIONS:  albuterol/ipratropium for Nebulization 3 milliLiter(s) Nebulizer every 6 hours PRN  ARIPiprazole 2 milliGRAM(s) Oral daily  bethanechol 25 milliGRAM(s) Oral three times a day  enoxaparin Injectable 40 milliGRAM(s) SubCutaneous every 24 hours  folic acid 1 milliGRAM(s) Oral daily  multivitamin 1 Tablet(s) Oral daily  polyethylene glycol 3350 17 Gram(s) Oral daily  thiamine 100 milliGRAM(s) Oral daily  traZODone 100 milliGRAM(s) Oral at bedtime      PHYSICAL EXAM:  General: NAD, Alert, appears stated age  ENT: MMM, no thrush  Neck: Supple, No JVD  Lungs: Clear to auscultation bilaterally, non labored, good air entry  Cardio: S1S2 regular  Abdomen: Soft, Nontender, Nondistended; PEG tube present, site dry and intact  Extremities: No cyanosis, No edema    LABS:                        12.8   11.77 )-----------( 265      ( 26 Apr 2023 06:15 )             39.7     04-26    140  |  105  |  19  ----------------------------<  121  4.5   |  27  |  0.82    Ca    10.0      26 Apr 2023 06:15  Mg     2.1     04-26    TPro  8.4  /  Alb  3.8  /  TBili  0.4  /  DBili  x   /  AST  20  /  ALT  25  /  AlkPhos  95  04-25      PT/INR - ( 25 Apr 2023 09:45 )   PT: 12.9 sec;   INR: 1.11 ratio         PTT - ( 25 Apr 2023 09:45 )  PTT:32.5 sec                      POCT Blood Glucose.: 114 mg/dL (26 Apr 2023 18:23)  POCT Blood Glucose.: 98 mg/dL (26 Apr 2023 12:46)          COVID-19 PCR: NotDetec (04-25-23 @ 09:45)      RADIOLOGY & ADDITIONAL TESTS:    Care Discussed with Consultants/Other Providers:

## 2023-04-27 NOTE — DISCHARGE NOTE NURSING/CASE MANAGEMENT/SOCIAL WORK - NSTOBACCONEVERSMOKERY/N_GEN_A
Pt here for CADD unhook and onpro neulasta. Pt also to receive 1 liter IVFs. CADD unhooked and IVFs started. Pt stated she only had one episode of diarrhea in past 48 hours and took two immodium. Pt with mild nausea in past 48 hours and took oral antiemetics X 2. Pt states she is eating and drinking, Pt received one liter IVfs and discharged stable. Pt denied need for 2 liters IVFs or additional IV antiemetics. Pt to return tomorrow and understands to call office with any questions or concerns.  
Yes

## 2023-04-27 NOTE — PROGRESS NOTE ADULT - REASON FOR ADMISSION
PEG dislodged. failed to fix at ER

## 2023-05-30 NOTE — PATIENT PROFILE ADULT. - EXTENSIONS OF SELF_ADULT
Patient: Melanie Dumont Date of Service: 2023   : 1936 MRN: [id number]     Chief Complaint   Patient presents with   • Nursing Home       SUBJECTIVE:   HISTORY OF PRESENT ILLNESS:  Melanie Dumont is a 87 year old female who was seen for her routine follow-up. History was obtained from patient, V staff, and her charts.    She was seen in the skilled nursing section of [facility] in [place], [place].      No new concerns per patient, staff, or chart.  Please see the \"Problem List\" about her chronic condition(s).        PAST MEDICAL HISTORY:  Past Medical History:   Diagnosis Date   • GIB (gastrointestinal bleeding) 2021   • History of DVT (deep vein thrombosis) 2021   • Presence of IVC filter 2021   • Stroke (cerebrum) (CMD) 2020       MEDICATIONS:  Current Outpatient Medications   Medication Sig   • biotin 10 MG tablet Take 5 mg by mouth.   • triamterene-hydroCHLOROthiazide (MAXZIDE-25) 37.5-25 MG per tablet Take 1 tablet by mouth daily.   • ergocalciferol (DRISDOL) 1.25 mg (50,000 units) capsule Take 1 capsule by mouth 1 day a week.   • amLODIPine (NORVASC) 5 MG tablet Take 5 mg by mouth every morning.   • WARFARIN SODIUM PO Take 6 mg by mouth every evening.    • acetaminophen (TYLENOL) 325 MG tablet Take 1 tablet by mouth every 6 hours as needed for Pain. (Patient taking differently: Take 650 mg by mouth every 6 hours as needed for Pain. )   • atorvastatin (LIPITOR) 20 MG tablet Take 1 tablet by mouth daily.   • Cyanocobalamin (Vitamin B 12) 500 MCG Tab Take 1 tablet by mouth every morning.   • ferrous sulfate 325 (65 FE) MG tablet Take 1 tablet by mouth 3 times daily (with meals).   • memantine (NAMENDA) 5 MG tablet Take 1 tablet by mouth 2 times daily.     No current facility-administered medications for this visit.       ALLERGIES:  No Known Allergies    PAST SURGICAL HISTORY:  Past Surgical History:   Procedure Laterality Date   • Removal gallbladder          FAMILY HISTORY:  Family History   Family history unknown: Yes       SOCIAL HISTORY:  Social History     Tobacco Use   • Smoking status: Former     Current packs/day: 0.00   • Smokeless tobacco: Never   Vaping Use   • Vaping Use: never used   Substance Use Topics   • Alcohol use: Not Currently   • Drug use: Never       Review of Systems   Constitutional: Negative for fever.   HENT: Negative for nosebleeds.    Eyes: Negative for discharge.   Respiratory: Negative for cough, wheezing and stridor.    Cardiovascular: Negative for orthopnea.   Gastrointestinal: Negative for blood in stool, diarrhea and vomiting.   Genitourinary: Negative for hematuria.   Musculoskeletal: Negative for falls.   Skin: Negative for rash.   Neurological: Negative for tremors and seizures.   Endo/Heme/Allergies: Does not bruise/bleed easily.         OBJECTIVE:     Visit Vitals  /73   Pulse 70   Temp 98 °F (36.7 °C)   Resp 18   Ht 5' 8\" (1.727 m)   Wt 96.3 kg (212 lb 6.4 oz)   LMP  (LMP Unknown)   SpO2 95%   BMI 32.30 kg/m²       Physical Exam  Vitals and nursing note reviewed.   Constitutional:       General: She is not in acute distress.     Appearance: She is not diaphoretic.   HENT:      Head: Normocephalic and atraumatic.      Right Ear: External ear normal.      Left Ear: External ear normal.      Nose: Nose normal.   Eyes:      General:         Right eye: No discharge.         Left eye: No discharge.      Conjunctiva/sclera: Conjunctivae normal.   Cardiovascular:      Rate and Rhythm: Normal rate and regular rhythm.      Heart sounds: Normal heart sounds. No murmur heard.  Pulmonary:      Effort: Pulmonary effort is normal. No respiratory distress.      Breath sounds: Normal breath sounds. No wheezing or rales.   Skin:     General: Skin is warm and dry.      Findings: No erythema or rash.   Neurological:      Mental Status: She is alert. Mental status is at baseline.   Psychiatric:         Attention and Perception: Attention  normal.         Mood and Affect: Mood and affect normal.         Speech: Speech normal.         Behavior: Behavior normal. Behavior is cooperative.       ASSESSMENT AND PLAN:     Problem List Items Addressed This Visit        Cardiac and Vasculature    Primary hypertension     No issues today.  BP well controlled.  Tolerating med(s).  Continuing present management plan.            Coag and Thromboembolic    Long term (current) use of anticoagulants     No issues today.  Continuing present management plan.            Endocrine and Metabolic    Type 2 diabetes mellitus without complication, without long-term current use of insulin (CMD)     Monitor: The problem is stable.  Evaluation: Labs/tests ordered, see encounter summary.  Assessment/Treatment:  Continue current treatment/monitoring regimen.            Genitourinary and Reproductive    Stage 3a chronic kidney disease (CMD)     Monitor: The problem is stable.  Evaluation: Labs/tests ordered, see encounter summary.  Assessment/Treatment:  Continue current treatment/monitoring regimen.            Neuro    Dementia without behavioral disturbance (CMD) - Primary     Monitor: The problem is stable.  Evaluation: No labs/tests required today.  Assessment/Treatment:  Continue current treatment/monitoring regimen.            Patient and [facility] staff indicated an understanding of the diagnosis, and agreed with the plan of care.     I will see her in approximately 8 weeks for routine follow-up, and sooner as needed.   Dentures

## 2023-06-21 ENCOUNTER — EMERGENCY (EMERGENCY)
Facility: HOSPITAL | Age: 58
LOS: 1 days | Discharge: HOME | End: 2023-06-21
Attending: INTERNAL MEDICINE | Admitting: INTERNAL MEDICINE
Payer: MEDICAID

## 2023-06-21 VITALS
DIASTOLIC BLOOD PRESSURE: 67 MMHG | SYSTOLIC BLOOD PRESSURE: 103 MMHG | TEMPERATURE: 98 F | OXYGEN SATURATION: 99 % | HEART RATE: 85 BPM | RESPIRATION RATE: 17 BRPM

## 2023-06-21 VITALS
TEMPERATURE: 98 F | DIASTOLIC BLOOD PRESSURE: 65 MMHG | HEART RATE: 96 BPM | SYSTOLIC BLOOD PRESSURE: 93 MMHG | WEIGHT: 110.01 LBS | RESPIRATION RATE: 18 BRPM | OXYGEN SATURATION: 97 % | HEIGHT: 63 IN

## 2023-06-21 DIAGNOSIS — Z90.710 ACQUIRED ABSENCE OF BOTH CERVIX AND UTERUS: Chronic | ICD-10-CM

## 2023-06-21 DIAGNOSIS — Z98.891 HISTORY OF UTERINE SCAR FROM PREVIOUS SURGERY: Chronic | ICD-10-CM

## 2023-06-21 PROBLEM — F03.90 UNSPECIFIED DEMENTIA, UNSPECIFIED SEVERITY, WITHOUT BEHAVIORAL DISTURBANCE, PSYCHOTIC DISTURBANCE, MOOD DISTURBANCE, AND ANXIETY: Chronic | Status: ACTIVE | Noted: 2023-04-25

## 2023-06-21 PROBLEM — K92.2 GASTROINTESTINAL HEMORRHAGE, UNSPECIFIED: Chronic | Status: ACTIVE | Noted: 2023-04-25

## 2023-06-21 PROBLEM — F03.90 UNSPECIFIED DEMENTIA WITHOUT BEHAVIORAL DISTURBANCE: Chronic | Status: ACTIVE | Noted: 2023-04-25

## 2023-06-21 PROCEDURE — 99283 EMERGENCY DEPT VISIT LOW MDM: CPT

## 2023-06-21 PROCEDURE — 99282 EMERGENCY DEPT VISIT SF MDM: CPT

## 2023-06-21 NOTE — ED PROVIDER NOTE - PHYSICAL EXAMINATION
General:     NAD, well-nourished, well-appearing  Head:     NC/AT, EOMI, oral mucosa moist  Neck:     trachea midline  Lungs:     CTA b/l, no w/r/r  CVS:     S1S2, RRR, no m/g/r  Abd:     +BS, s/nt/nd, no organomegaly PEG tube in place leaking at the tip resolved after the clip was repaired last multiple times  Ext:    2+ radial and pedal pulses, no c/c/e  Neuro: Alert awake  no sensory/motor deficits

## 2023-06-21 NOTE — ED ADULT NURSE NOTE - NSFALLHARMRISKINTERV_ED_ALL_ED
Assistance OOB with selected safe patient handling equipment if applicable/Assistance with ambulation/Communicate risk of Fall with Harm to all staff, patient, and family/Monitor gait and stability/Monitor for mental status changes and reorient to person, place, and time, as needed/Provide visual cue: red socks, yellow wristband, yellow gown, etc/Reinforce activity limits and safety measures with patient and family/Toileting schedule using arm’s reach rule for commode and bathroom/Use of alarms - bed, stretcher, chair and/or video monitoring/Bed in lowest position, wheels locked, appropriate side rails in place/Call bell, personal items and telephone in reach/Instruct patient to call for assistance before getting out of bed/chair/stretcher/Non-slip footwear applied when patient is off stretcher/[place] to call system/Physically safe environment - no spills, clutter or unnecessary equipment/Purposeful Proactive Rounding/Room/bathroom lighting operational, light cord in reach

## 2023-06-21 NOTE — ED PROVIDER NOTE - CLINICAL SUMMARY MEDICAL DECISION MAKING FREE TEXT BOX
PEG tube leaking at the tip the tip was the rubber was cut at the tip and the plastic  Connection was reattached and flushed multiple times and no leakage seen subsequently

## 2023-06-21 NOTE — ED PROVIDER NOTE - PATIENT PORTAL LINK FT
You can access the FollowMyHealth Patient Portal offered by [facility] by registering at the following website: [email or web address]. By joining [email or web address]’s FollowMyHealth portal, you will also be able to view your health information using other applications (apps) compatible with our system.

## 2023-06-21 NOTE — ED PROVIDER NOTE - OBJECTIVE STATEMENT
58-year-old female fall at nursing facility history of mental retardation came to the emergency room chief complaint of leaking PEG tube at the tip of the PEG tube he was evaluated and there was a leak at the tip which was repaired in the emergency room and flushed multiple times with normal

## 2023-07-28 ENCOUNTER — EMERGENCY (EMERGENCY)
Facility: HOSPITAL | Age: 58
LOS: 1 days | Discharge: HOME | End: 2023-07-28
Attending: INTERNAL MEDICINE | Admitting: INTERNAL MEDICINE
Payer: MEDICAID

## 2023-07-28 VITALS
TEMPERATURE: 98 F | RESPIRATION RATE: 18 BRPM | WEIGHT: 119.93 LBS | DIASTOLIC BLOOD PRESSURE: 66 MMHG | SYSTOLIC BLOOD PRESSURE: 97 MMHG | HEART RATE: 79 BPM | OXYGEN SATURATION: 97 % | HEIGHT: 63 IN

## 2023-07-28 VITALS
RESPIRATION RATE: 18 BRPM | OXYGEN SATURATION: 98 % | DIASTOLIC BLOOD PRESSURE: 65 MMHG | HEART RATE: 89 BPM | SYSTOLIC BLOOD PRESSURE: 102 MMHG

## 2023-07-28 DIAGNOSIS — Z90.710 ACQUIRED ABSENCE OF BOTH CERVIX AND UTERUS: Chronic | ICD-10-CM

## 2023-07-28 DIAGNOSIS — Z98.891 HISTORY OF UTERINE SCAR FROM PREVIOUS SURGERY: Chronic | ICD-10-CM

## 2023-07-28 PROCEDURE — 43762 RPLC GTUBE NO REVJ TRC: CPT

## 2023-07-28 PROCEDURE — 49465 FLUORO EXAM OF G/COLON TUBE: CPT

## 2023-07-28 PROCEDURE — L8699: CPT

## 2023-07-28 PROCEDURE — 99284 EMERGENCY DEPT VISIT MOD MDM: CPT | Mod: 25

## 2023-07-28 NOTE — ED PROVIDER NOTE - PATIENT PORTAL LINK FT
You can access the FollowMyHealth Patient Portal offered by [facility] by registering at the following website: [email or web address]. By joining [employer]’s FollowMyHealth portal, you will also be able to view your health information using other applications (apps) compatible with our system.

## 2023-07-28 NOTE — ED ADULT NURSE NOTE - OBJECTIVE STATEMENT
Patient came from EMS from [facility] with complaint of PEG tube dislodged. Denies any pain.

## 2023-07-28 NOTE — ED PROVIDER NOTE - PHYSICAL EXAMINATION
Vitals  T(F): 97.9 (07-28-23 @ 14:05), Max: 97.9 (07-28-23 @ 14:05)  HR: 79 (07-28-23 @ 14:05) (79 - 79)  BP: 97/66 (07-28-23 @ 14:05) (97/66 - 97/66)  RR: 18 (07-28-23 @ 14:05) (18 - 18)  SpO2: 97% (07-28-23 @ 14:05) (97% - 97%)    PHYSICAL EXAM   Gen: NAD, comfortable, AA&Ox1. nonverbal  HEENT: head atrumatic and normocephalic, PEERLA, EOMI,  no gross abnormalities of ears, mucous membranes moist, no oral lesions, neck supple without masses/goiter/lymphadenopathy, no JVD  CVS: +s1, s2, regular rate and rhythm, no murmurs, rubs or gallops, no thrill, normal PMI  Pulmonary: normal respiratory effort, clear to auscultation b/l, no wheezes/crackles/rhonchi  Abdomen: soft, non-tender, non-distended, +bowel sounds in all 4 quadrants, no masses noted, no guarding or rigidity. +tube placed in left lower quadrant of abdomen  Back: no scoliosis, lordosis, or kyphosis, no point tenderness, no CVA tenderness   Extremities: no pedal edema, pedal pulses palpable, <2 sec. cap refill   Skin: warm and dry, no wounds   Neuro:  face symmetric, sensation equal bilaterally in face, tongue midline, no dysarthria, 5/5 strength in upper and lower extremities bilaterally, sensation intact in upper and lower extremities bilaterally,

## 2023-07-28 NOTE — ED PROVIDER NOTE - OBJECTIVE STATEMENT
58F PMHx dementia, GIB presents for dislodged peg tube. 58F PMHx dementia, GIB presents for  PEG tube evaluation the tip of the peg tube disconnected

## 2023-07-28 NOTE — ED PROCEDURE NOTE - NS ED ATTENDING STATEMENT MOD
I have seen and examined this patient and fully participated in the care of this patient as the teaching attending.  The service was shared with the SHANE.  I reviewed and verified the documentation and independently performed the documented:

## 2023-07-28 NOTE — ED ADULT NURSE NOTE - NSFALLHARMRISKINTERV_ED_ALL_ED

## 2023-07-28 NOTE — ED PROCEDURE NOTE - NS ED PROC PERFORMED BY1 FT
20 inch gj tube placement Tazorac Pregnancy And Lactation Text: This medication is not safe during pregnancy. It is unknown if this medication is excreted in breast milk.

## 2023-07-28 NOTE — ED PROVIDER NOTE - ATTENDING CONTRIBUTION TO CARE
PEG malfunction G-tube replaced x-ray is consistent with G-tube in the stomach  Dr. Figueroa:  I have reviewed and discussed with the PA/ resident the case specifics, including the history, physical assessment, evaluation, conclusion, laboratory results, and medical plan. I agree with the contents, and conclusions. I have personally examined, and interviewed the patient.

## 2023-07-28 NOTE — ED ADULT NURSE NOTE - NS ED NOTE  TALK SOMEONE YN
Spoke with dialysis unit who called to ask if PRA samples are needed. Explained not needed because pt needs to complete her evaluation before she can be ACTIVE on the list. Dialysis also asking about pt's approved live donor because pt told then she has one - informed her that pt does not have an approved live donor at this time.    No

## 2023-07-28 NOTE — ED PROVIDER NOTE - NS ED ATTENDING STATEMENT MOD
Attending Only I have seen and examined this patient and fully participated in the care of this patient as the teaching attending.  The service was shared with the SHANE.  I reviewed and verified the documentation and independently performed the documented:

## 2023-07-29 ENCOUNTER — EMERGENCY (EMERGENCY)
Facility: HOSPITAL | Age: 58
LOS: 1 days | Discharge: HOME | End: 2023-07-29
Attending: INTERNAL MEDICINE | Admitting: EMERGENCY MEDICINE
Payer: MEDICAID

## 2023-07-29 VITALS
TEMPERATURE: 98 F | SYSTOLIC BLOOD PRESSURE: 113 MMHG | HEART RATE: 90 BPM | OXYGEN SATURATION: 98 % | DIASTOLIC BLOOD PRESSURE: 75 MMHG | RESPIRATION RATE: 18 BRPM

## 2023-07-29 VITALS
DIASTOLIC BLOOD PRESSURE: 66 MMHG | TEMPERATURE: 98 F | HEIGHT: 63 IN | OXYGEN SATURATION: 96 % | SYSTOLIC BLOOD PRESSURE: 100 MMHG | RESPIRATION RATE: 18 BRPM | HEART RATE: 81 BPM | WEIGHT: 110.01 LBS

## 2023-07-29 DIAGNOSIS — Z90.710 ACQUIRED ABSENCE OF BOTH CERVIX AND UTERUS: Chronic | ICD-10-CM

## 2023-07-29 DIAGNOSIS — Z98.891 HISTORY OF UTERINE SCAR FROM PREVIOUS SURGERY: Chronic | ICD-10-CM

## 2023-07-29 PROCEDURE — 99284 EMERGENCY DEPT VISIT MOD MDM: CPT

## 2023-07-29 PROCEDURE — 49465 FLUORO EXAM OF G/COLON TUBE: CPT

## 2023-07-29 PROCEDURE — 99283 EMERGENCY DEPT VISIT LOW MDM: CPT | Mod: 25

## 2023-07-29 PROCEDURE — L8699: CPT

## 2023-07-29 PROCEDURE — 43762 RPLC GTUBE NO REVJ TRC: CPT

## 2023-07-29 NOTE — ED PROVIDER NOTE - OBJECTIVE STATEMENT
58-year-old female past history of dementia was seen yesterday for the malfunction of PEG tube which was removed and a gastrostomy tube was placed the nursing home called stating that G-tube is missing and had to be replaced

## 2023-07-29 NOTE — ED PROVIDER NOTE - PATIENT PORTAL LINK FT
You can access the FollowMyHealth Patient Portal offered by [facility] by registering at the following website: [email or web address]. By joining [employer]’s FollowMyHealth portal, you will also be able to view your health information using other applications (apps) compatible with our system.

## 2023-07-29 NOTE — ED ADULT NURSE NOTE - NSFALLRISKINTERV_ED_ALL_ED

## 2023-07-29 NOTE — ED PROVIDER NOTE - PHYSICAL EXAMINATION
General:     NAD, well-nourished,   Head:     NC/AT, EOMI, oral mucosa moist  Neck:     trachea midline  Lungs:     CTA b/l, no w/r/r  CVS:     S1S2, RRR, no m/g/r  Abd:     +BS, s/nt/nd, no organomegaly Positive G-tube stoma G tube missing  Ext:    2+ radial and pedal pulses, no c/c/e  Neuro: Alert awake nonverbal no sensory/motor deficits

## 2023-08-06 ENCOUNTER — EMERGENCY (EMERGENCY)
Facility: HOSPITAL | Age: 58
LOS: 1 days | Discharge: ANOTHER HOSPITAL | End: 2023-08-06
Attending: EMERGENCY MEDICINE | Admitting: EMERGENCY MEDICINE
Payer: MEDICAID

## 2023-08-06 VITALS
HEIGHT: 63 IN | HEART RATE: 96 BPM | RESPIRATION RATE: 18 BRPM | SYSTOLIC BLOOD PRESSURE: 113 MMHG | DIASTOLIC BLOOD PRESSURE: 75 MMHG | OXYGEN SATURATION: 99 % | TEMPERATURE: 98 F | WEIGHT: 134.92 LBS

## 2023-08-06 VITALS
RESPIRATION RATE: 20 BRPM | OXYGEN SATURATION: 98 % | DIASTOLIC BLOOD PRESSURE: 71 MMHG | TEMPERATURE: 98 F | SYSTOLIC BLOOD PRESSURE: 113 MMHG | HEART RATE: 98 BPM

## 2023-08-06 DIAGNOSIS — Z98.891 HISTORY OF UTERINE SCAR FROM PREVIOUS SURGERY: Chronic | ICD-10-CM

## 2023-08-06 DIAGNOSIS — Z90.710 ACQUIRED ABSENCE OF BOTH CERVIX AND UTERUS: Chronic | ICD-10-CM

## 2023-08-06 PROCEDURE — 43762 RPLC GTUBE NO REVJ TRC: CPT

## 2023-08-06 PROCEDURE — 49465 FLUORO EXAM OF G/COLON TUBE: CPT

## 2023-08-06 PROCEDURE — 99284 EMERGENCY DEPT VISIT MOD MDM: CPT | Mod: 25

## 2023-08-06 PROCEDURE — L8699: CPT

## 2023-08-06 NOTE — ED ADULT NURSE NOTE - NSFALLHARMRISKINTERV_ED_ALL_ED
Assistance OOB with selected safe patient handling equipment if applicable/Assistance with ambulation/Communicate risk of Fall with Harm to all staff, patient, and family/Monitor gait and stability/Provide visual cue: red socks, yellow wristband, yellow gown, etc/Reinforce activity limits and safety measures with patient and family/Bed in lowest position, wheels locked, appropriate side rails in place/Call bell, personal items and telephone in reach/Instruct patient to call for assistance before getting out of bed/chair/stretcher/Non-slip footwear applied when patient is off stretcher/[place] to call system/Physically safe environment - no spills, clutter or unnecessary equipment/Purposeful Proactive Rounding/Room/bathroom lighting operational, light cord in reach

## 2023-08-06 NOTE — ED PROVIDER NOTE - PATIENT PORTAL LINK FT
You can access the FollowMyHealth Patient Portal offered by [facility] by registering at the following website: [email or web address]. By joining [employer]’s FollowMyHealth portal, you will also be able to view your health information using other applications (apps) compatible with our system.

## 2023-08-06 NOTE — ED PROVIDER NOTE - NSFOLLOWUPINSTRUCTIONS_ED_ALL_ED_FT
-- You should update your primary care physician on your Emergency Department visit and follow up with them.  If you do not have a physician or have difficulty following up, please call: [phone] to obtain a [facility] doctor or specialist who can provide follow up.    -- Return to the ER for worsening or persistent symptoms, and/or ANY NEW OR CONCERNING SYMPTOMS.

## 2023-08-06 NOTE — ED PROVIDER NOTE - PHYSICAL EXAMINATION
Gen: alert, NAD  Abd: s/nt/nd, morel catheter in place of PEG tube  MSK: moving all extremities  Skin:  visualized areas intact

## 2024-03-11 NOTE — H&P PST ADULT - GUM GEN PE MLT EXAM PC
No. BRIANNA screening performed.  STOP BANG Legend: 0-2 = LOW Risk; 3-4 = INTERMEDIATE Risk; 5-8 = HIGH Risk
not examined

## 2024-05-01 ENCOUNTER — EMERGENCY (EMERGENCY)
Facility: HOSPITAL | Age: 59
LOS: 1 days | Discharge: ANOTHER HOSPITAL | End: 2024-05-01
Attending: EMERGENCY MEDICINE | Admitting: EMERGENCY MEDICINE
Payer: MEDICAID

## 2024-05-01 VITALS
HEART RATE: 78 BPM | OXYGEN SATURATION: 96 % | RESPIRATION RATE: 18 BRPM | SYSTOLIC BLOOD PRESSURE: 125 MMHG | HEIGHT: 64 IN | TEMPERATURE: 99 F | DIASTOLIC BLOOD PRESSURE: 78 MMHG | WEIGHT: 139.99 LBS

## 2024-05-01 VITALS
TEMPERATURE: 98 F | OXYGEN SATURATION: 98 % | RESPIRATION RATE: 18 BRPM | HEART RATE: 76 BPM | SYSTOLIC BLOOD PRESSURE: 124 MMHG | DIASTOLIC BLOOD PRESSURE: 71 MMHG

## 2024-05-01 DIAGNOSIS — Z98.891 HISTORY OF UTERINE SCAR FROM PREVIOUS SURGERY: Chronic | ICD-10-CM

## 2024-05-01 DIAGNOSIS — Z90.710 ACQUIRED ABSENCE OF BOTH CERVIX AND UTERUS: Chronic | ICD-10-CM

## 2024-05-01 PROCEDURE — 49465 FLUORO EXAM OF G/COLON TUBE: CPT

## 2024-05-01 PROCEDURE — 99283 EMERGENCY DEPT VISIT LOW MDM: CPT

## 2024-05-01 PROCEDURE — 99284 EMERGENCY DEPT VISIT MOD MDM: CPT

## 2024-05-01 NOTE — ED PROVIDER NOTE - OBJECTIVE STATEMENT
59-year-old female with chronic PEG tube presenting with reported malfunctioning PEG.  Patient is from [facility].  She is unable to provide any further history at this time.

## 2024-05-01 NOTE — ED PROVIDER NOTE - CLINICAL SUMMARY MEDICAL DECISION MAKING FREE TEXT BOX
Patient with G-tube malfunction.  Rather than try to troubleshoot a leaking G-tube I will simply replace G-tube and do x-ray for confirmation.

## 2024-05-01 NOTE — ED ADULT TRIAGE NOTE - CHIEF COMPLAINT QUOTE
Patient brought in by EMS from [facility] with complaint of GT tube being pulled out.

## 2024-05-01 NOTE — ED ADULT NURSE NOTE - TEMPLATE
Abdominal Pain, N/V/D Bexarotene Pregnancy And Lactation Text: This medication is Pregnancy Category X and should not be given to women who are pregnant or may become pregnant. This medication should not be used if you are breast feeding.

## 2024-05-01 NOTE — ED PROVIDER NOTE - PATIENT PORTAL LINK FT
You can access the FollowMyHealth Patient Portal offered by [facility] by registering at the following website: [email or web address]. By joining [employer]’s FollowMyHealth portal, you will also be able to view your health information using other applications (apps) compatible with our system.

## 2024-05-01 NOTE — ED ADULT NURSE NOTE - OBJECTIVE STATEMENT
Pt. from [facility] alert and oriented times 1 which is baseline.  Pt. sent for pulling out G tube but arrived with one in place.  Pt. fidgeting and not following commands.

## 2024-05-01 NOTE — ED PROVIDER NOTE - PHYSICAL EXAMINATION
Vitals: I have reviewed the patients vital signs  General: nontoxic appearing  HEENT: Atraumatic, normocephalic, airway patent  Eyes: EOMI, tracking appropriately  Neck: no tracheal deviation  Chest/Lungs: no trauma, symmetric chest rise, speaking in complete sentences,  no resp distress  Heart: skin and extremities well perfused, regular rate and rhythm  Neuro: alert, appears non focal  MSK: no deformities  Skin: no cyanosis, no jaundice   Abdomen: Soft nontender PEG tube in place with the top taped

## 2024-05-01 NOTE — ED PROVIDER NOTE - NSFOLLOWUPINSTRUCTIONS_ED_ALL_ED_FT
The G-tube has been replaced and confirmed in the proper location.  It is okay to use again at this time.  Resume Previous medications.  Return the emergency department with any new concerns.

## 2024-05-01 NOTE — ED ADULT NURSE NOTE - NSFALLRISKINTERV_ED_ALL_ED
Assistance OOB with selected safe patient handling equipment if applicable/Assistance with ambulation/Communicate fall risk and risk factors to all staff, patient, and family/Monitor gait and stability/Monitor for mental status changes and reorient to person, place, and time, as needed/Move patient closer to nursing station/within visual sight of ED staff/Provide patient with walking aids/Provide visual cue: yellow wristband, yellow gown, etc/Reinforce activity limits and safety measures with patient and family/Toileting schedule using arm’s reach rule for commode and bathroom/Use of alarms - bed, stretcher, chair and/or video monitoring/Call bell, personal items and telephone in reach/Instruct patient to call for assistance before getting out of bed/chair/stretcher/Non-slip footwear applied when patient is off stretcher/[place] to call system/Physically safe environment - no spills, clutter or unnecessary equipment/Purposeful Proactive Rounding/Room/bathroom lighting operational, light cord in reach

## 2024-05-01 NOTE — ED ADULT NURSE NOTE - CHIEF COMPLAINT QUOTE
Patient brought in by EMS from [facility] with complaint of GT tube being pulled out.

## 2024-06-12 NOTE — CHART NOTE - NSCHARTNOTEFT_GEN_A_CORE
53 yr old female w alcohol abuse hx, anemia, asthma/COPD, chronic renal insufficiency, GERD, GI bleed, Hepatitic steatosis who presented to  ED after unwitnessed fall and prior day of confusion. Pt hemoglobin dropped to 6.9. The boyfriend Sylvester Lal was called with nurse Viktor Busby for consent of transfusion over the phone. Explained the risks and informed him that we will search for the cause of the drop in Hgb. He stated that he will be here tomorrow morning to follow up.    Vital Signs Last 24 Hrs  T(C): 36.8 (29 Jul 2018 23:16), Max: 37.1 (29 Jul 2018 10:05)  T(F): 98.2 (29 Jul 2018 23:16), Max: 98.7 (29 Jul 2018 10:05)  HR: 89 (29 Jul 2018 23:16) (72 - 97)  BP: 103/62 (29 Jul 2018 23:16) (92/61 - 105/78)  BP(mean): --  RR: 18 (29 Jul 2018 23:16) (18 - 18)  SpO2: 100% (29 Jul 2018 23:16) (100% - 100%)    A/P  54 yo f w/ alcohol abuse hx who has anemia  -1 unit PRBCs  -fecal occult blood test pending    Discussed w/ Dr. Herrera
HPI:  53 yr old female w alcohol abuse hx, anemia, asthma/COPD, chronic renal insufficiency, GERD, GI bleed, Hepatitic steatosis who presented to  ED after unwitnessed fall and prior day of confusion. Nurse called because pt retained 1L of urine.    Vital Signs Last 24 Hrs  T(C): 37.1 (31 Jul 2018 05:41), Max: 37.1 (31 Jul 2018 05:41)  T(F): 98.8 (31 Jul 2018 05:41), Max: 98.8 (31 Jul 2018 05:41)  HR: 105 (31 Jul 2018 05:41) (97 - 105)  BP: 101/61 (31 Jul 2018 05:41) (101/61 - 119/82)  BP(mean): --  RR: 18 (30 Jul 2018 17:10) (17 - 18)  SpO2: 100% (31 Jul 2018 05:41) (100% - 100%)    A/P  52 yo f w/ urine retention  -informed the nurse to straight cath the pt.
Upon Nutritional Assessment by the Registered Dietitian your patient was determined to meet criteria / has evidence of the following diagnosis/diagnoses:          [ ]  Mild Protein Calorie Malnutrition        [ ]  Moderate Protein Calorie Malnutrition        [x ] Severe Protein Calorie Malnutrition        [ ] Unspecified Protein Calorie Malnutrition        [ ] Underweight / BMI <19        [ ] Morbid Obesity / BMI > 40      Findings as based on:  •  Comprehensive nutrition assessment and consultation  •  Calorie counts (nutrient intake analysis)  •  Food acceptance and intake status from observations by staff  •  Follow up  •  Patient education  •  Intervention secondary to interdisciplinary rounds  •   concerns    Pt meets criteria for severe protein-calorie malnutrition in context of chronic disease.    NFPE reveals severe muscle wasting (generalized)   severe fat depletion (generalized).   Pt reports PO intake < 75% nutritional needs for 3 months,  Wt loss of 20% in 3 months.         Treatment:    The following diet has been recommended:    Suggest maintain regular diet to maximize caloric and nutritional intake  Add Ensure Enlive 8 oz tid.    Weekly weights  Record PO intake in EMR after each meal.     PROVIDER Section:     By signing this assessment you are acknowledging and agree with the diagnosis/diagnoses assigned by the Registered Dietitian    Comments:
DISCHARGE

## 2024-06-19 NOTE — H&P PST ADULT - LAST ECHOCARDIOGRAM
3/8/24 57 yo lady pt of  Dr Niraj Fischer. Referred for abd discomfort.  Appt was made few weeks ago for post prandial abd pain and some post prandial nausea. Says it did nof affect BMs - unchanged.  Because she wasnt eating much she was some constipated. Says since then friends recommended probiotics/prebiotics and kombucha and says they have helped.  She was then able to start eating her regular diet again 3 days ago developed chills and bad diarrhea. Even water would cause nausea and fatigue and diarrhea. Having at least one BM during waking hours. No nocturnal stools.  Still has diarrhea. Chills have resolved. Nausea is now very subtle. Most of the fatigue is gone.  Some periumbilical rumblining no pain.  No nsaid use regularly.  Normally her BMs are once a day.  Every now and then will have a little pink or red in stool  No fhx of colon CA Has not had a colonoscopy. Grandmother used to have polyps.  no recent travel. no sick contacts. Had a baby shower 3 weeks ago and sick babies there - lots of potato salad and fruid.  04/05/2024 here as a follow-up. She states her diarrhea and nausea has resolved. Has some reported sharp epigastric, LUQ and RUQ pain with eating. No known food triggers. She denies NSAID use.  04/25/2024 Pt presents to discuss recurrence of abdominal pain. Feels like it is in a similar place as before, but more intense and lasting longer. Previously when she would eat, she would have sharp pains in epigastric area that would subside after 1 hour. This episode has been more severe pain, since 5 days ago. Symptoms has improved 3 days ago but then worsened againt the next day. Accompanying sensation of "pressure" at the base of her throat after eating. With belching there would be some regurgitation, but this was just over the weekend. Denies dysphagia and heartburn. She has had constipation and diarrhea. She had not had BM for 2 days so she took gummy laxative and then had vomiting in the middle of that night. She then had a normal BM the next day. Then 1 episode of diarrhea 2 days ago. She has not had BM since 2 days ago, but notes she has not been eating much. Denies rectal bleeding or melena. She has had vomiting x1 of brown material. Denies hematemesis. She did not have much appetite for the first couple of days, but it has improved some.  She has been doing a food diary with this episode. She noticed that on the days she had carrots, the pain was the worst. She is wondering whether the batch of carrots she has been eating is bad. She denies past surgical history. She did not complete H pylori breath test ordered at last visit.  05/22/2024 Pt presents for f/u. At last visit, labs (with H pylori breath test) and RUQ US ordered. RUQ US 5/21/24: normal liver and gallbladder, subcentimeter L renal cyst. States that her symptoms still occur randomly. She has been taking pantoprazole 40 mg daily but she doesn't think there has been a difference. She will still get sharp pains across her upper abdomen. Not occurring daily. Feels like there is something hard sitting in the epigastric area. Notes that it is not tender in that area when it occurs. She has not had the same episode as last visit since she stopped eating the carrots; she believes that there was something wrong with them. Denies current N/V.  She notes one episode of dark stool that looked like little tyrone, but doesn't think they were black. Otherwise bowel habits have gone back to normal.   06/19/2024 Pt presents for f/u after EGD + colonoscopy. She stopped pantoprazole 40 mg because she forgot to take it after the procedures. She has not noticed any worsening of her symptoms with being off PPI. States that since the procedure, she has been trying to eat more "digestively calming foods" - low in acid, neutral, easily digested. She thinks she has noticed a difference with this. She is going to try to start eating regular foods again to see if they trigger symptoms.  Pt notes that she went through the entire prep and she was only having yellow/clear coming out the night before. The next morning, she did have some more brown stool. States she was on clear liquids entire day before and did not have any solids.  Labs  4/25/24 - CBC, CMP, lipase normal. H pylori negative.
3/2017

## 2024-07-25 ENCOUNTER — TRANSCRIPTION ENCOUNTER (OUTPATIENT)
Age: 59
End: 2024-07-25

## 2024-10-10 ENCOUNTER — EMERGENCY (EMERGENCY)
Facility: HOSPITAL | Age: 59
LOS: 1 days | Discharge: HOME | End: 2024-10-10
Attending: EMERGENCY MEDICINE | Admitting: EMERGENCY MEDICINE
Payer: SELF-PAY

## 2024-10-10 VITALS
DIASTOLIC BLOOD PRESSURE: 74 MMHG | TEMPERATURE: 98 F | HEART RATE: 96 BPM | RESPIRATION RATE: 18 BRPM | SYSTOLIC BLOOD PRESSURE: 108 MMHG | OXYGEN SATURATION: 98 % | HEIGHT: 62 IN

## 2024-10-10 DIAGNOSIS — Z98.891 HISTORY OF UTERINE SCAR FROM PREVIOUS SURGERY: Chronic | ICD-10-CM

## 2024-10-10 DIAGNOSIS — Z90.710 ACQUIRED ABSENCE OF BOTH CERVIX AND UTERUS: Chronic | ICD-10-CM

## 2024-10-10 PROCEDURE — 43762 RPLC GTUBE NO REVJ TRC: CPT

## 2024-10-10 PROCEDURE — 49465 FLUORO EXAM OF G/COLON TUBE: CPT

## 2024-10-10 PROCEDURE — 99284 EMERGENCY DEPT VISIT MOD MDM: CPT | Mod: 25

## 2024-10-10 PROCEDURE — L8699: CPT

## 2024-10-10 PROCEDURE — 99284 EMERGENCY DEPT VISIT MOD MDM: CPT

## 2024-10-10 NOTE — ED PROVIDER NOTE - OBJECTIVE STATEMENT
59-year-old female presents to the ED for PEG tube change.  Unclear what size is currently in.  No one at bedside.  Patient unable to provide history.  Appears comfortable.

## 2024-10-10 NOTE — ED ADULT NURSE REASSESSMENT NOTE - NS ED NURSE REASSESS COMMENT FT1
Received report from Wendy GORDON. Patient received A&Ox0, non verbal at baseline. Pt offering no complaints and is in no acute distress at this time.  Respirations even and unlabored.  Stretcher in lowest position, wheels locked, side rails up and fall alarm in place. Vital signs as per flowsheet. Awaiting safe transport/discharge to facility.

## 2024-10-10 NOTE — ED ADULT NURSE NOTE - CHIEF COMPLAINT QUOTE
pt axo1, BIBA from [facility], per EMS pt removed her PEG tube and need a replacement.

## 2024-10-10 NOTE — ED PROVIDER NOTE - CLINICAL SUMMARY MEDICAL DECISION MAKING FREE TEXT BOX
59-year-old female presents to the ED for PEG tube change.  Unclear what size is currently in.  No one at bedside.  Patient unable to provide history.  Appears comfortable.  16 Fr PEG placed. Confirmed on Xray.   1) Follow up with your doctor in 1-2 days  2) Return to the ER for worsening or concerning symptoms

## 2024-10-10 NOTE — ED PROVIDER NOTE - PATIENT PORTAL LINK FT
You can access the FollowMyHealth Patient Portal offered by [facility] by registering at the following website: [email or web address]. By joining [employer]’s FollowMyHealth portal, you will also be able to view your health information using other applications (apps) compatible with our system.

## 2024-10-10 NOTE — ED PROVIDER NOTE - PHYSICAL EXAMINATION
PEG tube in place . No size rating on tube.   Abd soft NT ND  Pt awake and alert. + eye contact. Non verbal  No distress.

## 2024-10-10 NOTE — ED ADULT NURSE NOTE - OBJECTIVE STATEMENT
pt axo1, BIBA from [facility], per EMS pt removed her PEG tube and need a replacement.

## 2024-10-10 NOTE — ED ADULT NURSE NOTE - NSFALLRISKINTERV_ED_ALL_ED
Assistance OOB with selected safe patient handling equipment if applicable/Assistance with ambulation/Communicate fall risk and risk factors to all staff, patient, and family/Monitor gait and stability/Provide visual cue: yellow wristband, yellow gown, etc/Reinforce activity limits and safety measures with patient and family/Call bell, personal items and telephone in reach/Instruct patient to call for assistance before getting out of bed/chair/stretcher/Non-slip footwear applied when patient is off stretcher/[place] to call system/Physically safe environment - no spills, clutter or unnecessary equipment/Purposeful Proactive Rounding/Room/bathroom lighting operational, light cord in reach

## 2024-10-11 VITALS
OXYGEN SATURATION: 97 % | TEMPERATURE: 98 F | SYSTOLIC BLOOD PRESSURE: 110 MMHG | DIASTOLIC BLOOD PRESSURE: 68 MMHG | RESPIRATION RATE: 18 BRPM | HEART RATE: 78 BPM

## 2024-12-24 NOTE — ED PROVIDER NOTE - CPE EDP ENMT NORM
"Principal Problem:Primary osteoarthritis of left knee    Principal Orthopedic Problem: s/p L TKA 12/23/24    Interval History: NAEON. VSS. AF. Patient states that pain is well controlled. Voiding spontaneously. Dressing CDI. Pt walked 80' w PT. They recommend a RW and BC.     Plan for discharge today.       Review of patient's allergies indicates:  No Known Allergies    Current Facility-Administered Medications   Medication    acetaminophen tablet 1,000 mg    aspirin EC tablet 81 mg    bisacodyL suppository 10 mg    cetirizine tablet 10 mg    electrolytes-dextrose (Pedialyte) oral solution 400 mL    famotidine tablet 20 mg    methocarbamoL tablet 750 mg    mupirocin 2 % ointment 1 g    naloxone 0.4 mg/mL injection 0.02 mg    ondansetron injection 4 mg    oxyCODONE immediate release tablet 5 mg    polyethylene glycol packet 17 g    pregabalin capsule 75 mg    prochlorperazine injection Soln 5 mg    senna-docusate 8.6-50 mg per tablet 1 tablet    traZODone tablet 100 mg    valsartan tablet 320 mg    zolpidem tablet 5 mg     Objective:     Vital Signs (Most Recent):  Temp: 97.8 °F (36.6 °C) (12/24/24 0457)  Pulse: 65 (12/24/24 0700)  Resp: 16 (12/24/24 0700)  BP: (!) 136/56 (12/24/24 0457)  SpO2: 95 % (12/24/24 0700) Vital Signs (24h Range):  Temp:  [97.3 °F (36.3 °C)-98.4 °F (36.9 °C)] 97.8 °F (36.6 °C)  Pulse:  [57-78] 65  Resp:  [16-22] 16  SpO2:  [93 %-100 %] 95 %  BP: (117-148)/(56-86) 136/56     Weight: 116.6 kg (257 lb)  Height: 5' 10" (177.8 cm)  Body mass index is 36.88 kg/m².      Intake/Output Summary (Last 24 hours) at 12/24/2024 0720  Last data filed at 12/24/2024 0457  Gross per 24 hour   Intake 2560 ml   Output 1250 ml   Net 1310 ml        Ortho/SPM Exam     General appearance - no acute distress, conversant  Musculoskeletal -  Dressing C/D/I  Fires quad/TA/EHL/GSC  SILT SP/DP/TN  WWP distally  Calves soft, nontender bilateral      Significant Labs: All pertinent labs within the past 24 hours have been " reviewed.    Significant Imaging: I have reviewed and interpreted all pertinent imaging results/findings.   normal...

## 2024-12-28 ENCOUNTER — INPATIENT (INPATIENT)
Facility: HOSPITAL | Age: 59
LOS: 2 days | Discharge: NURSING, REHAB OR LONG-TERM CARE | DRG: 921 | End: 2024-12-31
Attending: STUDENT IN AN ORGANIZED HEALTH CARE EDUCATION/TRAINING PROGRAM | Admitting: STUDENT IN AN ORGANIZED HEALTH CARE EDUCATION/TRAINING PROGRAM
Payer: SELF-PAY

## 2024-12-28 VITALS
HEIGHT: 67 IN | RESPIRATION RATE: 17 BRPM | HEART RATE: 85 BPM | TEMPERATURE: 98 F | DIASTOLIC BLOOD PRESSURE: 73 MMHG | OXYGEN SATURATION: 99 % | WEIGHT: 110.01 LBS | SYSTOLIC BLOOD PRESSURE: 117 MMHG

## 2024-12-28 DIAGNOSIS — Z98.891 HISTORY OF UTERINE SCAR FROM PREVIOUS SURGERY: Chronic | ICD-10-CM

## 2024-12-28 DIAGNOSIS — Z90.710 ACQUIRED ABSENCE OF BOTH CERVIX AND UTERUS: Chronic | ICD-10-CM

## 2024-12-28 LAB
ALBUMIN SERPL ELPH-MCNC: 3.7 G/DL — SIGNIFICANT CHANGE UP (ref 3.3–5)
ALP SERPL-CCNC: 87 U/L — SIGNIFICANT CHANGE UP (ref 40–120)
ALT FLD-CCNC: 15 U/L — SIGNIFICANT CHANGE UP (ref 10–45)
ANION GAP SERPL CALC-SCNC: 6 MMOL/L — SIGNIFICANT CHANGE UP (ref 5–17)
AST SERPL-CCNC: 21 U/L — SIGNIFICANT CHANGE UP (ref 10–40)
BASOPHILS # BLD AUTO: 0.04 K/UL — SIGNIFICANT CHANGE UP (ref 0–0.2)
BASOPHILS NFR BLD AUTO: 0.5 % — SIGNIFICANT CHANGE UP (ref 0–2)
BILIRUB SERPL-MCNC: 0.5 MG/DL — SIGNIFICANT CHANGE UP (ref 0.2–1.2)
BUN SERPL-MCNC: 18 MG/DL — SIGNIFICANT CHANGE UP (ref 7–23)
CALCIUM SERPL-MCNC: 9.8 MG/DL — SIGNIFICANT CHANGE UP (ref 8.4–10.5)
CHLORIDE SERPL-SCNC: 106 MMOL/L — SIGNIFICANT CHANGE UP (ref 96–108)
CO2 SERPL-SCNC: 28 MMOL/L — SIGNIFICANT CHANGE UP (ref 22–31)
CREAT SERPL-MCNC: 0.68 MG/DL — SIGNIFICANT CHANGE UP (ref 0.5–1.3)
EGFR: 101 ML/MIN/1.73M2 — SIGNIFICANT CHANGE UP
EOSINOPHIL # BLD AUTO: 0.13 K/UL — SIGNIFICANT CHANGE UP (ref 0–0.5)
EOSINOPHIL NFR BLD AUTO: 1.7 % — SIGNIFICANT CHANGE UP (ref 0–6)
GLUCOSE SERPL-MCNC: 97 MG/DL — SIGNIFICANT CHANGE UP (ref 70–99)
HCG SERPL-ACNC: 2 MIU/ML — SIGNIFICANT CHANGE UP
HCT VFR BLD CALC: 35.7 % — SIGNIFICANT CHANGE UP (ref 34.5–45)
HGB BLD-MCNC: 12.1 G/DL — SIGNIFICANT CHANGE UP (ref 11.5–15.5)
IMM GRANULOCYTES NFR BLD AUTO: 0.1 % — SIGNIFICANT CHANGE UP (ref 0–0.9)
LIDOCAIN IGE QN: 28 U/L — SIGNIFICANT CHANGE UP (ref 16–77)
LYMPHOCYTES # BLD AUTO: 1.89 K/UL — SIGNIFICANT CHANGE UP (ref 1–3.3)
LYMPHOCYTES # BLD AUTO: 24.7 % — SIGNIFICANT CHANGE UP (ref 13–44)
MCHC RBC-ENTMCNC: 31.1 PG — SIGNIFICANT CHANGE UP (ref 27–34)
MCHC RBC-ENTMCNC: 33.9 G/DL — SIGNIFICANT CHANGE UP (ref 32–36)
MCV RBC AUTO: 91.8 FL — SIGNIFICANT CHANGE UP (ref 80–100)
MONOCYTES # BLD AUTO: 0.78 K/UL — SIGNIFICANT CHANGE UP (ref 0–0.9)
MONOCYTES NFR BLD AUTO: 10.2 % — SIGNIFICANT CHANGE UP (ref 2–14)
NEUTROPHILS # BLD AUTO: 4.79 K/UL — SIGNIFICANT CHANGE UP (ref 1.8–7.4)
NEUTROPHILS NFR BLD AUTO: 62.8 % — SIGNIFICANT CHANGE UP (ref 43–77)
NRBC # BLD: 0 /100 WBCS — SIGNIFICANT CHANGE UP (ref 0–0)
PLATELET # BLD AUTO: 264 K/UL — SIGNIFICANT CHANGE UP (ref 150–400)
POTASSIUM SERPL-MCNC: 4.5 MMOL/L — SIGNIFICANT CHANGE UP (ref 3.5–5.3)
POTASSIUM SERPL-SCNC: 4.5 MMOL/L — SIGNIFICANT CHANGE UP (ref 3.5–5.3)
PROT SERPL-MCNC: 8.3 G/DL — SIGNIFICANT CHANGE UP (ref 6–8.3)
RBC # BLD: 3.89 M/UL — SIGNIFICANT CHANGE UP (ref 3.8–5.2)
RBC # FLD: 13.1 % — SIGNIFICANT CHANGE UP (ref 10.3–14.5)
SODIUM SERPL-SCNC: 140 MMOL/L — SIGNIFICANT CHANGE UP (ref 135–145)
WBC # BLD: 7.64 K/UL — SIGNIFICANT CHANGE UP (ref 3.8–10.5)
WBC # FLD AUTO: 7.64 K/UL — SIGNIFICANT CHANGE UP (ref 3.8–10.5)

## 2024-12-28 PROCEDURE — 74177 CT ABD & PELVIS W/CONTRAST: CPT | Mod: 26,MC

## 2024-12-28 PROCEDURE — 99285 EMERGENCY DEPT VISIT HI MDM: CPT

## 2024-12-28 NOTE — ED PROVIDER NOTE - PRINCIPAL DIAGNOSIS
Gastrojejunostomy tube dislodgement Doxepin Counseling:  Patient advised that the medication is sedating and not to drive a car after taking this medication. Patient informed of potential adverse effects including but not limited to dry mouth, urinary retention, and blurry vision.  The patient verbalized understanding of the proper use and possible adverse effects of doxepin.  All of the patient's questions and concerns were addressed.

## 2024-12-28 NOTE — ED ADULT NURSE NOTE - NSFALLRISKINTERV_ED_ALL_ED
Assistance OOB with selected safe patient handling equipment if applicable/Assistance with ambulation/Communicate fall risk and risk factors to all staff, patient, and family/Monitor gait and stability/Provide visual cue: yellow wristband, yellow gown, etc/Reinforce activity limits and safety measures with patient and family/Call bell, personal items and telephone in reach/Instruct patient to call for assistance before getting out of bed/chair/stretcher/Non-slip footwear applied when patient is off stretcher/[place] to call system/Physically safe environment - no spills, clutter or unnecessary equipment/Purposeful Proactive Rounding/Room/bathroom lighting operational, light cord in reach

## 2024-12-28 NOTE — ED PROVIDER NOTE - CLINICAL SUMMARY MEDICAL DECISION MAKING FREE TEXT BOX
Will obtain labs, CT abdomen, likely admission given G-tube site has closed and unable to pass G-tube, will need surgery evaluation.  CT abdomen read went to [facility].  No acute findings.    Case discussed with hospitalist, admitted to their service.

## 2024-12-28 NOTE — ED PROVIDER NOTE - PHYSICAL EXAMINATION
Constitutional: NAD   Eyes: EOMI, pupils equal  Head: Normocephalic atraumatic  Mouth: no airway obstruction  Cardiac: regular rate   Resp: Lungs CTAB  GI: G tube site is closed, unable to pass G tube d/t epithelialization. site is c/d/i, ab nontender  Neuro: CN2-12 intact  Skin: No rashes

## 2024-12-28 NOTE — ED PROVIDER NOTE - OBJECTIVE STATEMENT
HPI obtained from nursing home facility RN manager:  59-year-old female with dementia, G-tube, nonverbal, coming from facility for G-tube dislodged which occurred today.  No fevers.  No diarrhea.  No vomiting.  Otherwise in normal state of health.

## 2024-12-29 DIAGNOSIS — T85.528A DISPLACEMENT OF OTHER GASTROINTESTINAL PROSTHETIC DEVICES, IMPLANTS AND GRAFTS, INITIAL ENCOUNTER: ICD-10-CM

## 2024-12-29 DIAGNOSIS — K94.23 GASTROSTOMY MALFUNCTION: ICD-10-CM

## 2024-12-29 LAB
APTT BLD: 28.7 SEC — SIGNIFICANT CHANGE UP (ref 24.5–35.6)
BLD GP AB SCN SERPL QL: SIGNIFICANT CHANGE UP
INR BLD: 1.04 RATIO — SIGNIFICANT CHANGE UP (ref 0.85–1.16)
PROTHROM AB SERPL-ACNC: 12.3 SEC — SIGNIFICANT CHANGE UP (ref 9.9–13.4)

## 2024-12-29 PROCEDURE — 99223 1ST HOSP IP/OBS HIGH 75: CPT

## 2024-12-29 PROCEDURE — 71045 X-RAY EXAM CHEST 1 VIEW: CPT | Mod: 26

## 2024-12-29 PROCEDURE — 99223 1ST HOSP IP/OBS HIGH 75: CPT | Mod: GC

## 2024-12-29 RX ORDER — SENNOSIDES 8.6 MG/1
15 TABLET, FILM COATED ORAL DAILY
Refills: 0 | Status: DISCONTINUED | OUTPATIENT
Start: 2024-12-29 | End: 2024-12-31

## 2024-12-29 RX ORDER — TRAZODONE HYDROCHLORIDE 150 MG/1
50 TABLET ORAL AT BEDTIME
Refills: 0 | Status: DISCONTINUED | OUTPATIENT
Start: 2024-12-29 | End: 2024-12-31

## 2024-12-29 RX ORDER — SODIUM CHLORIDE 9 MG/ML
250 INJECTION, SOLUTION INTRAMUSCULAR; INTRAVENOUS; SUBCUTANEOUS ONCE
Refills: 0 | Status: COMPLETED | OUTPATIENT
Start: 2024-12-29 | End: 2024-12-29

## 2024-12-29 RX ORDER — SENNOSIDES 8.6 MG/1
1 TABLET, FILM COATED ORAL DAILY
Refills: 0 | Status: DISCONTINUED | OUTPATIENT
Start: 2024-12-29 | End: 2024-12-29

## 2024-12-29 RX ORDER — SODIUM CHLORIDE 9 MG/ML
1000 INJECTION, SOLUTION INTRAVENOUS
Refills: 0 | Status: DISCONTINUED | OUTPATIENT
Start: 2024-12-29 | End: 2024-12-31

## 2024-12-29 RX ORDER — ACETAMINOPHEN 80 MG/.8ML
750 SOLUTION/ DROPS ORAL ONCE
Refills: 0 | Status: COMPLETED | OUTPATIENT
Start: 2024-12-29 | End: 2024-12-29

## 2024-12-29 RX ORDER — ARIPIPRAZOLE 10 MG/1
2 TABLET ORAL DAILY
Refills: 0 | Status: DISCONTINUED | OUTPATIENT
Start: 2024-12-29 | End: 2024-12-31

## 2024-12-29 RX ADMIN — SODIUM CHLORIDE 250 MILLILITER(S): 9 INJECTION, SOLUTION INTRAMUSCULAR; INTRAVENOUS; SUBCUTANEOUS at 22:21

## 2024-12-29 RX ADMIN — ACETAMINOPHEN 300 MILLIGRAM(S): 80 SOLUTION/ DROPS ORAL at 06:28

## 2024-12-29 RX ADMIN — SODIUM CHLORIDE 75 MILLILITER(S): 9 INJECTION, SOLUTION INTRAVENOUS at 06:17

## 2024-12-29 NOTE — DIETITIAN INITIAL EVALUATION ADULT - PERTINENT LABORATORY DATA
12-28    140  |  106  |  18  ----------------------------<  97  4.5   |  28  |  0.68    Ca    9.8      28 Dec 2024 22:05    TPro  8.3  /  Alb  3.7  /  TBili  0.5  /  DBili  x   /  AST  21  /  ALT  15  /  AlkPhos  87  12-28

## 2024-12-29 NOTE — PROGRESS NOTE ADULT - ASSESSMENT
59 yr old female w alcohol abuse hx, Wernicke-Korsakoff syndrome (alcoholic, dysphagia on PEG, anemia, asthma/COPD, chronic renal insufficiency, GERD, GI bleed, Hepatitic steatosis. depression, anxiety,  was sent from  for dislodged PEG tube.     #Dislodged PEG tube  - T+S, coags  - GI consult, nutrition consult  - CT abd pending  - D5LR 75cc/hr    #Depression  - Aripiprazole  - trazodone     Full code  SCDs  NPO 59 yr old female w alcohol abuse hx, Wernicke-Korsakoff syndrome (alcoholic, dysphagia on PEG, anemia, asthma/COPD, chronic renal insufficiency, GERD, GI bleed, Hepatitic steatosis. depression, anxiety,  was sent from  for dislodged PEG tube.     #Dislodged PEG tube  - T+S, coags  - GI consult noted  - CT abd reviewed  - D5LR 75cc/hr  - will attempt NGT    #Depression  - Aripiprazole  - trazodone     Full code  SCDs  NPO    Left VM for Dassy. No answer on 12/29

## 2024-12-29 NOTE — PATIENT PROFILE ADULT - FUNCTIONAL ASSESSMENT - BASIC MOBILITY 6.
1-calculated by average/Not able to assess (calculate score using [facility] averaging method)

## 2024-12-29 NOTE — DIETITIAN INITIAL EVALUATION ADULT - ENTERAL
When PEG is replaced and ok to use, suggest Jevity 1.5 @ 25 ml/hr increase by 10 ml q 2 hours to reach goal of 75 ml/hr x 18 hours, will provide 2025 kcal, 86 g protein, 1026 ml water.

## 2024-12-29 NOTE — DIETITIAN INITIAL EVALUATION ADULT - ORAL INTAKE PTA/DIET HISTORY
Pt nonverbal, not able to answer nutrition related questions. Per documentation from leelee maya, pt was on nocturnal feeds of Jevity 1.5 83 ml/hr ran until completion of 1000 ml (provided 1500 kcal, 64 g protein), plus pleasure feeds of puree + thins, Ensure Plus High Protein (provides 350 kcal, 20 g protein/serving) daily, LPS 1x/day (provided additional 60 kcal, 15 g protein.

## 2024-12-29 NOTE — CONSULT NOTE ADULT - PROBLEM SELECTOR RECOMMENDATION 9
- Will have to place PEG endoscopically, plan for placement this coming week  - Recommend NG tube placement if able for nutrition   - CT scan reviewed with prior ostomy site appearing closed off  - Physical exam showing well healed ostomy site

## 2024-12-29 NOTE — DIETITIAN INITIAL EVALUATION ADULT - REASON FOR ADMISSION
Displacement of other gastrointestinal prosthetic devices, implants and grafts, initial encounter

## 2024-12-29 NOTE — PATIENT PROFILE ADULT - FALL HARM RISK - HARM RISK INTERVENTIONS

## 2024-12-29 NOTE — CONSULT NOTE ADULT - SUBJECTIVE AND OBJECTIVE BOX
59 yr old female w alcohol abuse hx, Wernicke-Korsakoff syndrome (alcoholic, dysphagia on PEG, anemia, asthma/COPD, chronic renal insufficiency, GERD, GI bleed, Hepatitic steatosis. depression, anxiety, was sent from  for dislodged PEG tube. Patient had the PEG placed in April 2023 for dysphagia. Currently does not take in anything by mouth. Patient does not appear to be in any acute distress when seen at bedside. Does not verbalized responses but does nod head appropriately in response to questions.

## 2024-12-29 NOTE — H&P ADULT - NSHPLABSRESULTS_GEN_ALL_CORE
12.1   7.64  )-----------( 264      ( 28 Dec 2024 22:05 )             35.7       12-28    140  |  106  |  18  ----------------------------<  97  4.5   |  28  |  0.68    Ca    9.8      28 Dec 2024 22:05    TPro  8.3  /  Alb  3.7  /  TBili  0.5  /  DBili  x   /  AST  21  /  ALT  15  /  AlkPhos  87  12-28         LIVER FUNCTIONS - ( 28 Dec 2024 22:05 )  Alb: 3.7 g/dL / Pro: 8.3 g/dL / ALK PHOS: 87 U/L / ALT: 15 U/L / AST: 21 U/L / GGT: x             Lipase: 28 U/L (12-28-24 @ 22:05)              Urinalysis Basic - ( 28 Dec 2024 22:05 )    Color: x / Appearance: x / SG: x / pH: x  Gluc: 97 mg/dL / Ketone: x  / Bili: x / Urobili: x   Blood: x / Protein: x / Nitrite: x   Leuk Esterase: x / RBC: x / WBC x   Sq Epi: x / Non Sq Epi: x / Bacteria: x        CAPILLARY BLOOD GLUCOSE

## 2024-12-29 NOTE — DIETITIAN INITIAL EVALUATION ADULT - PERTINENT MEDS FT
MEDICATIONS  (STANDING):  ARIPiprazole 2 milliGRAM(s) Oral daily  dextrose 5% + lactated ringers. 1000 milliLiter(s) (75 mL/Hr) IV Continuous <Continuous>  senna Syrup 15 milliLiter(s) Oral daily  traZODone 50 milliGRAM(s) Oral at bedtime    MEDICATIONS  (PRN):

## 2024-12-29 NOTE — CONSULT NOTE ADULT - GASTROINTESTINAL
Prior ostomy seen, healed well and closed off/normal/soft/nontender/nondistended/normal active bowel sounds negative

## 2024-12-29 NOTE — PROGRESS NOTE ADULT - SUBJECTIVE AND OBJECTIVE BOX
CC: Patient is a 59y old  Female who presents with a chief complaint of PEG tube placement (29 Dec 2024 11:57)      Interval History:  Patient seen and examined at bedside.  No overnight events  Pt nonverbal but denies pain    ALLERGIES:  amoxicillin (Other)  Ceclor (Other)    MEDICATIONS  (STANDING):  ARIPiprazole 2 milliGRAM(s) Oral daily  dextrose 5% + lactated ringers. 1000 milliLiter(s) (75 mL/Hr) IV Continuous <Continuous>  senna Syrup 15 milliLiter(s) Oral daily  traZODone 50 milliGRAM(s) Oral at bedtime    MEDICATIONS  (PRN):    Vital Signs Last 24 Hrs  T(F): 98.2 (29 Dec 2024 07:51), Max: 98.2 (28 Dec 2024 18:37)  HR: 103 (29 Dec 2024 07:51) (85 - 103)  BP: 110/68 (29 Dec 2024 07:51) (110/68 - 118/74)  RR: 18 (29 Dec 2024 07:51) (17 - 18)  SpO2: 99% (29 Dec 2024 07:51) (99% - 99%)  I&O's Summary    BMI (kg/m2): 17.2 (12-28-24 @ 18:37)    PHYSICAL EXAM:  GENERAL: NAD  CHEST/LUNG: Clear to percussion bilaterally; No rales, rhonchi, wheezing, or rubs; normal respiratory effort, no intercostal retractions  HEART: Regular rate and rhythm; No murmurs, rubs, or gallops; No pitting edema  ABDOMEN: Soft, Nontender, PEG location noted  MUSCULOSKELETAL/EXTREMITIES:  2+ Peripheral Pulses, No clubbing or digital cyanosis  PSYCH: Appropriate affect, Alert    LABS:                        12.1   7.64  )-----------( 264      ( 28 Dec 2024 22:05 )             35.7       12-28    140  |  106  |  18  ----------------------------<  97  4.5   |  28  |  0.68    Ca    9.8      28 Dec 2024 22:05    TPro  8.3  /  Alb  3.7  /  TBili  0.5  /  DBili  x   /  AST  21  /  ALT  15  /  AlkPhos  87  12-28       PT/INR - ( 29 Dec 2024 03:20 )   PT: 12.3 sec;   INR: 1.04 ratio         PTT - ( 29 Dec 2024 03:20 )  PTT:28.7 sec     Urinalysis Basic - ( 28 Dec 2024 22:05 )  Color: x / Appearance: x / SG: x / pH: x  Gluc: 97 mg/dL / Ketone: x  / Bili: x / Urobili: x   Blood: x / Protein: x / Nitrite: x   Leuk Esterase: x / RBC: x / WBC x   Sq Epi: x / Non Sq Epi: x / Bacteria: x    Care Discussed with Consultants/Other Providers: Yes

## 2024-12-29 NOTE — H&P ADULT - HISTORY OF PRESENT ILLNESS
59 yr old female w alcohol abuse hx, Wernicke-Korsakoff syndrome (alcoholic, dysphagia on PEG, anemia, asthma/COPD, chronic renal insufficiency, GERD, GI bleed, Hepatitic steatosis. depression, anxiety,  was sent from  for dislodged PEG tube. In the ED patient appears well in no acute distress. Non verbal at baseline.

## 2024-12-29 NOTE — DIETITIAN INITIAL EVALUATION ADULT - SIGNS/SYMPTOMS
as evidenced by NPO/without source of enteral nutrition as evidenced by moderate loss of muscle and subcutaneous fat

## 2024-12-29 NOTE — CONSULT NOTE ADULT - CONSTITUTIONAL
Chart reviewed. Last Colonoscopy  on 3/30/09 by Dr. COSMO Monteiro. Recommend repeat in 10 years.     Please call pt to schedule Colonoscopy  with Dr. COSMO Monteiro.      Schedule Procedure:   Please Schedule Routine (next available or patient preference)  Procedure: Colonoscopy (16636) with MD preference for bowel prep.   Diagnosis: Colon Cancer Screening Z12.11  Is patient:    Diabetic? No   ANTIPLATELET / ANTICOAGULATION: MEDICATION:  None  Latex allergy: No  Sleep apnea: No  Location: Hospital (Localization-related partial epilepsy with complex partial seizures (CMS/HCC) Last Neuro OV 10/2017. Unknown last seizure.)  Special Instructions:   MAC Anesthesia    normal/well-groomed/no distress

## 2024-12-29 NOTE — CONSULT NOTE ADULT - ASSESSMENT
58 y/o F with medical history as documented above who presents from [facility] after her PEG tube became dislodged.

## 2024-12-29 NOTE — H&P ADULT - NSHPPHYSICALEXAM_GEN_ALL_CORE
Vital Signs Last 24 Hrs  T(C): 36.8 (28 Dec 2024 18:37), Max: 36.8 (28 Dec 2024 18:37)  T(F): 98.2 (28 Dec 2024 18:37), Max: 98.2 (28 Dec 2024 18:37)  HR: 85 (28 Dec 2024 18:37) (85 - 85)  BP: 117/73 (28 Dec 2024 18:37) (117/73 - 117/73)  BP(mean): --  RR: 17 (28 Dec 2024 18:37) (17 - 17)  SpO2: 99% (28 Dec 2024 18:37) (99% - 99%)    Parameters below as of 28 Dec 2024 18:37  Patient On (Oxygen Delivery Method): room air      Daily Height in cm: 170.18 (28 Dec 2024 18:37)    Daily   CAPILLARY BLOOD GLUCOSE        I&O's Summary      GENERAL: NAD  HEAD:  Normocephalic  EYES: EOMI, PERRLA, conjunctiva and sclera clear  ENMT: No tonsillar erythema, exudates, or enlargement; Moist mucous membranes, No lesions  NECK: Supple, No JVD, no bruit, normal thyroid  NERVOUS SYSTEM:   Good concentration; grossly  Motor Strength 5/5 B/L upper and lower extremities; DTRs 2+ intact and symmetric  CHEST/LUNG: Clear to auscultation bilaterally; No rales, rhonchi, wheezing, or rubs  HEART: Regular rate and rhythm; No murmurs, rubs, or gallops  ABDOMEN: Soft, Nontender, Nondistended; Bowel sounds present  EXTREMITIES:  2+ Peripheral Pulses, No clubbing, cyanosis, or edema  LYMPH: No lymphadenopathy noted  SKIN: No rashes or lesions

## 2024-12-29 NOTE — H&P ADULT - ASSESSMENT
59 yr old female w alcohol abuse hx, Wernicke-Korsakoff syndrome (alcoholic, dysphagia on PEG, anemia, asthma/COPD, chronic renal insufficiency, GERD, GI bleed, Hepatitic steatosis. depression, anxiety,  was sent from  for dislodged PEG tube.     #Dislodged PEG tube  - T+S, coags  - GI consult, nutrition consult  - CT abd pending    #Depression  - Aripiprazole  - trazodone     Full code  SCDs  NPO 59 yr old female w alcohol abuse hx, Wernicke-Korsakoff syndrome (alcoholic, dysphagia on PEG, anemia, asthma/COPD, chronic renal insufficiency, GERD, GI bleed, Hepatitic steatosis. depression, anxiety,  was sent from  for dislodged PEG tube.     #Dislodged PEG tube  - T+S, coags  - GI consult, nutrition consult  - CT abd pending  - D5LR 75cc/hr    #Depression  - Aripiprazole  - trazodone     Full code  SCDs  NPO

## 2024-12-29 NOTE — DIETITIAN INITIAL EVALUATION ADULT - ADD RECOMMEND
-monitor tolerance of tube feeding when initiated  -suggest SLP consult to determine if pt can receive pleasure feeds

## 2024-12-29 NOTE — CHART NOTE - NSCHARTNOTEFT_GEN_A_CORE
NGT size 14 [place] was placed for nutrition and medication administration purposes. F/U CXR results for placement. NGT size 14 Citizen of [place] was placed for nutrition and medication administration purposes. F/U CXR results for placement.    Addendum:  CXR reviewed and NGT in place. Communication place to use for meds

## 2024-12-29 NOTE — DIETITIAN INITIAL EVALUATION ADULT - OTHER INFO
59 yr old female w alcohol abuse hx, Wernicke-Korsakoff syndrome (alcoholic, dysphagia on PEG, anemia, asthma/COPD, chronic renal insufficiency, GERD, GI bleed, Hepatitic steatosis. depression, anxiety,  was sent from  for dislodged PEG tube. In the ED patient appears well in no acute distress. Non verbal at baseline.     RD consulted for enteral nutrition recommendations. Pt is admitted for PEG replacement. When PEG is replaced and ok to use, suggest Jevity 1.5 @ 25 ml/hr increase by 10 ml q 2 hours to reach goal of 75 ml/hr x 18 hours, will provide 2025 kcal, 86 g protein, 1026 ml water. Recommend monitoring tolerance of tube feeding. If pt also a candidate for oral diet, will adjust tube feeding rate; however, at present pt is NPO. No speech consult pending.  No vomiting, diarrhea, constipation noted.

## 2024-12-29 NOTE — DIETITIAN NUTRITION RISK NOTIFICATION - TREATMENT: THE FOLLOWING DIET HAS BEEN RECOMMENDED
Schedule follow-up with NURSE PRACTITIONER in 3 months for GERD and constipation.     Diet, TRICIA (12-29-24 @ 01:29) [Active]

## 2024-12-30 ENCOUNTER — TRANSCRIPTION ENCOUNTER (OUTPATIENT)
Age: 59
End: 2024-12-30

## 2024-12-30 LAB
ANION GAP SERPL CALC-SCNC: 12 MMOL/L — SIGNIFICANT CHANGE UP (ref 5–17)
BUN SERPL-MCNC: 14 MG/DL — SIGNIFICANT CHANGE UP (ref 7–23)
CALCIUM SERPL-MCNC: 9.8 MG/DL — SIGNIFICANT CHANGE UP (ref 8.4–10.5)
CHLORIDE SERPL-SCNC: 105 MMOL/L — SIGNIFICANT CHANGE UP (ref 96–108)
CO2 SERPL-SCNC: 24 MMOL/L — SIGNIFICANT CHANGE UP (ref 22–31)
CREAT SERPL-MCNC: 0.62 MG/DL — SIGNIFICANT CHANGE UP (ref 0.5–1.3)
EGFR: 103 ML/MIN/1.73M2 — SIGNIFICANT CHANGE UP
FLUAV AG NPH QL: SIGNIFICANT CHANGE UP
FLUBV AG NPH QL: SIGNIFICANT CHANGE UP
GLUCOSE SERPL-MCNC: 104 MG/DL — HIGH (ref 70–99)
HCT VFR BLD CALC: 37.7 % — SIGNIFICANT CHANGE UP (ref 34.5–45)
HGB BLD-MCNC: 12.5 G/DL — SIGNIFICANT CHANGE UP (ref 11.5–15.5)
MCHC RBC-ENTMCNC: 30.7 PG — SIGNIFICANT CHANGE UP (ref 27–34)
MCHC RBC-ENTMCNC: 33.2 G/DL — SIGNIFICANT CHANGE UP (ref 32–36)
MCV RBC AUTO: 92.6 FL — SIGNIFICANT CHANGE UP (ref 80–100)
MRSA PCR RESULT.: SIGNIFICANT CHANGE UP
NRBC # BLD: 0 /100 WBCS — SIGNIFICANT CHANGE UP (ref 0–0)
PLATELET # BLD AUTO: 257 K/UL — SIGNIFICANT CHANGE UP (ref 150–400)
POTASSIUM SERPL-MCNC: 3.9 MMOL/L — SIGNIFICANT CHANGE UP (ref 3.5–5.3)
POTASSIUM SERPL-SCNC: 3.9 MMOL/L — SIGNIFICANT CHANGE UP (ref 3.5–5.3)
RBC # BLD: 4.07 M/UL — SIGNIFICANT CHANGE UP (ref 3.8–5.2)
RBC # FLD: 13.1 % — SIGNIFICANT CHANGE UP (ref 10.3–14.5)
RSV RNA NPH QL NAA+NON-PROBE: SIGNIFICANT CHANGE UP
S AUREUS DNA NOSE QL NAA+PROBE: DETECTED
SARS-COV-2 RNA SPEC QL NAA+PROBE: SIGNIFICANT CHANGE UP
SODIUM SERPL-SCNC: 141 MMOL/L — SIGNIFICANT CHANGE UP (ref 135–145)
WBC # BLD: 6.66 K/UL — SIGNIFICANT CHANGE UP (ref 3.8–10.5)
WBC # FLD AUTO: 6.66 K/UL — SIGNIFICANT CHANGE UP (ref 3.8–10.5)

## 2024-12-30 PROCEDURE — 99233 SBSQ HOSP IP/OBS HIGH 50: CPT

## 2024-12-30 PROCEDURE — 43246 EGD PLACE GASTROSTOMY TUBE: CPT | Mod: 62

## 2024-12-30 PROCEDURE — 99233 SBSQ HOSP IP/OBS HIGH 50: CPT | Mod: 25

## 2024-12-30 PROCEDURE — 43246 EGD PLACE GASTROSTOMY TUBE: CPT | Mod: 59,62

## 2024-12-30 DEVICE — FEEDING TUBE PEG KIT MIC 20FR PULL: Type: IMPLANTABLE DEVICE | Status: FUNCTIONAL

## 2024-12-30 RX ORDER — CHLORHEXIDINE GLUCONATE 1.2 MG/ML
1 RINSE ORAL ONCE
Refills: 0 | Status: COMPLETED | OUTPATIENT
Start: 2024-12-30 | End: 2024-12-30

## 2024-12-30 RX ORDER — MUPIROCIN 2 %
1 OINTMENT (GRAM) TOPICAL
Refills: 0 | Status: DISCONTINUED | OUTPATIENT
Start: 2024-12-30 | End: 2024-12-31

## 2024-12-30 RX ORDER — VANCOMYCIN HYDROCHLORIDE 5 G/100ML
1000 INJECTION, POWDER, LYOPHILIZED, FOR SOLUTION INTRAVENOUS ONCE
Refills: 0 | Status: COMPLETED | OUTPATIENT
Start: 2024-12-30 | End: 2024-12-30

## 2024-12-30 RX ADMIN — VANCOMYCIN HYDROCHLORIDE 250 MILLIGRAM(S): 5 INJECTION, POWDER, LYOPHILIZED, FOR SOLUTION INTRAVENOUS at 17:00

## 2024-12-30 RX ADMIN — Medication 1 APPLICATION(S): at 13:53

## 2024-12-30 RX ADMIN — SODIUM CHLORIDE 75 MILLILITER(S): 9 INJECTION, SOLUTION INTRAVENOUS at 12:13

## 2024-12-30 RX ADMIN — SODIUM CHLORIDE 75 MILLILITER(S): 9 INJECTION, SOLUTION INTRAVENOUS at 21:48

## 2024-12-30 RX ADMIN — TRAZODONE HYDROCHLORIDE 50 MILLIGRAM(S): 150 TABLET ORAL at 21:49

## 2024-12-30 NOTE — PROGRESS NOTE ADULT - TIME BILLING
Time spent for extensive review of the physical chart, electronic medical record, and documentation to obtain collateral information including but not limited to:  [x] Inpatient records (ED, H&P, primary team, and consultants if applicable, care coordination)  [x] Inpatient values/results (biomarkers, immunoassays, imaging, and microbiology results)  [x] Current or proposed treatment plans  [x] Pharmacotherapy review  [x] Discussion and coordinating care with primary team and interdisciplinary staff and floor staff  [x] Discussion including counseling/ education with the guardian

## 2024-12-30 NOTE — CHART NOTE - NSCHARTNOTEFT_GEN_A_CORE
PEG ok to use as per GI. Will start with free water flushes at 150 for now. If no issues with PEG can start feeds in AM. Can give meds through PEG. Pt to have abdominal binder over peg site.

## 2024-12-30 NOTE — PROGRESS NOTE ADULT - SUBJECTIVE AND OBJECTIVE BOX
Chief Complaint:  Patient is a 59y old  Female who presents with a chief complaint of PEG tube placement (30 Dec 2024 09:32)    Patient seen and examined at bedside. As per RN no event over night, she is NPO.    MEDICATIONS:   MEDICATIONS  (STANDING):  ARIPiprazole 2 milliGRAM(s) Oral daily  chlorhexidine 4% Liquid 1 Application(s) Topical once  dextrose 5% + lactated ringers. 1000 milliLiter(s) (75 mL/Hr) IV Continuous <Continuous>  senna Syrup 15 milliLiter(s) Oral daily  traZODone 50 milliGRAM(s) Oral at bedtime  vancomycin  IVPB 1000 milliGRAM(s) IV Intermittent once    MEDICATIONS  (PRN):            DIET:  Diet, NPO:   NPO for Procedure/Test     NPO Start Date: 30-Dec-2024,   NPO Start Time: 01:00 (12-30-24 @ 10:06) [Active]  Diet, NPO (12-29-24 @ 01:29) [Active]          ALLERGIES:   Allergies    amoxicillin (Other)  Ceclor (Other)    Intolerances        VITAL SIGNS:   Vital Signs Last 24 Hrs  T(C): 36.8 (30 Dec 2024 05:29), Max: 36.9 (29 Dec 2024 23:00)  T(F): 98.3 (30 Dec 2024 05:29), Max: 98.5 (29 Dec 2024 23:00)  HR: 71 (30 Dec 2024 05:29) (71 - 87)  BP: 125/70 (30 Dec 2024 05:29) (116/72 - 125/70)  BP(mean): --  RR: 18 (30 Dec 2024 05:29) (16 - 18)  SpO2: 98% (30 Dec 2024 05:29) (98% - 98%)    Parameters below as of 30 Dec 2024 05:29  Patient On (Oxygen Delivery Method): room air      I&O's Summary      PHYSICAL EXAM:   GENERAL:  No acute distress  HEENT:  NC/AT, conjunctiva clear, sclera anicteric  CHEST:  No increased effort  HEART:  Regular rate  ABDOMEN:  Soft, non-tender, non-distended, positive bowel sounds  EXTREMITIES: No edema  SKIN:  Warm, dry  NEURO:  Calm, Awake nonverbal     LABS:                        12.5   6.66  )-----------( 257      ( 30 Dec 2024 06:49 )             37.7     Hemoglobin: 12.5 g/dL (12-30-24 @ 06:49)  Hemoglobin: 12.1 g/dL (12-28-24 @ 22:05)    12-30    141  |  105  |  14  ----------------------------<  104[H]  3.9   |  24  |  0.62    Ca    9.8      30 Dec 2024 06:49    TPro  8.3  /  Alb  3.7  /  TBili  0.5  /  DBili  x   /  AST  21  /  ALT  15  /  AlkPhos  87  12-28    LIVER FUNCTIONS - ( 28 Dec 2024 22:05 )  Alb: 3.7 g/dL / Pro: 8.3 g/dL / ALK PHOS: 87 U/L / ALT: 15 U/L / AST: 21 U/L / GGT: x             PT/INR - ( 29 Dec 2024 03:20 )   PT: 12.3 sec;   INR: 1.04 ratio         PTT - ( 29 Dec 2024 03:20 )  PTT:28.7 sec      RADIOLOGY & ADDITIONAL STUDIES:      ACC: [id number] EXAM:  CT ABDOMEN AND PELVIS IC   ORDERED BY:  THAI FOX     PROCEDURE DATE:  12/28/2024          INTERPRETATION:  FINAL REPORT:    CLINICAL INFORMATION: 59 years  Female with g tube was removed, but G   tube site is now closed.    COMPARISON: Noncontrast CT abdomen and pelvis 7/25/2024    CONTRAST/COMPLICATIONS:  IV Contrast: Omnipaque 350  90 cc administered   0 cc discarded  Oral Contrast: NONE  .    PROCEDURE:  CT of the Abdomen and Pelvis was performed.  Sagittal and coronal reformats were performed.    LIMITATION: Absence of enteric contrast limits evaluation of the GI tract.    FINDINGS:  LOWER CHEST: Within normal limits.    LIVER: Within normal limits.  BILE DUCTS: Normal caliber.  GALLBLADDER: Cholelithiasis.  SPLEEN: Within normal limits.  PANCREAS: Within normal limits.  ADRENALS: Within normal limits.  KIDNEYS/URETERS: Markedly atrophic left kidney redemonstrated with areas   of cortical scarring. Bilateral cysts. No hydroureteronephrosis.   Symmetrical nephrograms.    BLADDER: Distended measuring 16.2 cm sagittal extending to 2 cm below the   umbilicus..  REPRODUCTIVE ORGANS: Hysterectomy.    BOWEL: No bowel obstruction. Appendix is normal.  PERITONEUM/RETROPERITONEUM: Within normal limits.  VESSELS: Atherosclerotic changes.  LYMPH NODES: No lymphadenopathy.  ABDOMINAL WALL: Tract from previous percutaneous gastrostomy tube present   without air or fluid collection  BONES: Degenerative changes.    IMPRESSION:  Distended urinary bladder.    Cholelithiasis.    PEG tube site without air or fluid collection.    A preliminary report was provided by Dr. Mary Penaloza of [facility]. I agree with   the interpretation.        --- End of Report ---            BOOM ELY MD; Attending Radiologist  This document has been electronically signed. Dec 29 2024  8:50AM  12-28-24 @ 23:46       Chief Complaint:  Patient is a 59y old  Female who presents with a chief complaint of PEG tube placement (30 Dec 2024 09:32)    Patient seen and examined at bedside. As per RN no event over night, she is NPO. Called her emergency contact Lay Goddard left message on VM to call me back, when she calls will discuss the plan for PEG Tube placement today.     MEDICATIONS:   MEDICATIONS  (STANDING):  ARIPiprazole 2 milliGRAM(s) Oral daily  chlorhexidine 4% Liquid 1 Application(s) Topical once  dextrose 5% + lactated ringers. 1000 milliLiter(s) (75 mL/Hr) IV Continuous <Continuous>  senna Syrup 15 milliLiter(s) Oral daily  traZODone 50 milliGRAM(s) Oral at bedtime  vancomycin  IVPB 1000 milliGRAM(s) IV Intermittent once    MEDICATIONS  (PRN):            DIET:  Diet, NPO:   NPO for Procedure/Test     NPO Start Date: 30-Dec-2024,   NPO Start Time: 01:00 (12-30-24 @ 10:06) [Active]  Diet, NPO (12-29-24 @ 01:29) [Active]          ALLERGIES:   Allergies    amoxicillin (Other)  Ceclor (Other)    Intolerances        VITAL SIGNS:   Vital Signs Last 24 Hrs  T(C): 36.8 (30 Dec 2024 05:29), Max: 36.9 (29 Dec 2024 23:00)  T(F): 98.3 (30 Dec 2024 05:29), Max: 98.5 (29 Dec 2024 23:00)  HR: 71 (30 Dec 2024 05:29) (71 - 87)  BP: 125/70 (30 Dec 2024 05:29) (116/72 - 125/70)  BP(mean): --  RR: 18 (30 Dec 2024 05:29) (16 - 18)  SpO2: 98% (30 Dec 2024 05:29) (98% - 98%)    Parameters below as of 30 Dec 2024 05:29  Patient On (Oxygen Delivery Method): room air      I&O's Summary      PHYSICAL EXAM:   GENERAL:  No acute distress  HEENT:  NC/AT, conjunctiva clear, sclera anicteric  CHEST:  No increased effort  HEART:  Regular rate  ABDOMEN:  Soft, non-tender, non-distended, positive bowel sounds  EXTREMITIES: No edema  SKIN:  Warm, dry  NEURO:  Calm, Awake nonverbal     LABS:                        12.5   6.66  )-----------( 257      ( 30 Dec 2024 06:49 )             37.7     Hemoglobin: 12.5 g/dL (12-30-24 @ 06:49)  Hemoglobin: 12.1 g/dL (12-28-24 @ 22:05)    12-30    141  |  105  |  14  ----------------------------<  104[H]  3.9   |  24  |  0.62    Ca    9.8      30 Dec 2024 06:49    TPro  8.3  /  Alb  3.7  /  TBili  0.5  /  DBili  x   /  AST  21  /  ALT  15  /  AlkPhos  87  12-28    LIVER FUNCTIONS - ( 28 Dec 2024 22:05 )  Alb: 3.7 g/dL / Pro: 8.3 g/dL / ALK PHOS: 87 U/L / ALT: 15 U/L / AST: 21 U/L / GGT: x             PT/INR - ( 29 Dec 2024 03:20 )   PT: 12.3 sec;   INR: 1.04 ratio         PTT - ( 29 Dec 2024 03:20 )  PTT:28.7 sec      RADIOLOGY & ADDITIONAL STUDIES:      ACC: [id number] EXAM:  CT ABDOMEN AND PELVIS IC   ORDERED BY:  THAI FOX     PROCEDURE DATE:  12/28/2024          INTERPRETATION:  FINAL REPORT:    CLINICAL INFORMATION: 59 years  Female with g tube was removed, but G   tube site is now closed.    COMPARISON: Noncontrast CT abdomen and pelvis 7/25/2024    CONTRAST/COMPLICATIONS:  IV Contrast: Omnipaque 350  90 cc administered   0 cc discarded  Oral Contrast: NONE  .    PROCEDURE:  CT of the Abdomen and Pelvis was performed.  Sagittal and coronal reformats were performed.    LIMITATION: Absence of enteric contrast limits evaluation of the GI tract.    FINDINGS:  LOWER CHEST: Within normal limits.    LIVER: Within normal limits.  BILE DUCTS: Normal caliber.  GALLBLADDER: Cholelithiasis.  SPLEEN: Within normal limits.  PANCREAS: Within normal limits.  ADRENALS: Within normal limits.  KIDNEYS/URETERS: Markedly atrophic left kidney redemonstrated with areas   of cortical scarring. Bilateral cysts. No hydroureteronephrosis.   Symmetrical nephrograms.    BLADDER: Distended measuring 16.2 cm sagittal extending to 2 cm below the   umbilicus..  REPRODUCTIVE ORGANS: Hysterectomy.    BOWEL: No bowel obstruction. Appendix is normal.  PERITONEUM/RETROPERITONEUM: Within normal limits.  VESSELS: Atherosclerotic changes.  LYMPH NODES: No lymphadenopathy.  ABDOMINAL WALL: Tract from previous percutaneous gastrostomy tube present   without air or fluid collection  BONES: Degenerative changes.    IMPRESSION:  Distended urinary bladder.    Cholelithiasis.    PEG tube site without air or fluid collection.    A preliminary report was provided by Dr. Mary Penaloza of [facility]. I agree with   the interpretation.        --- End of Report ---            BOOM ELY MD; Attending Radiologist  This document has been electronically signed. Dec 29 2024  8:50AM  12-28-24 @ 23:46

## 2024-12-30 NOTE — PROGRESS NOTE ADULT - NUTRITIONAL ASSESSMENT
This patient has been assessed with a concern for Malnutrition and has been determined to have a diagnosis/diagnoses of Moderate protein-calorie malnutrition and Underweight (BMI < 19).    This patient is being managed with:   Diet NPO-  Entered: Dec 29 2024  1:28AM

## 2024-12-30 NOTE — PROGRESS NOTE ADULT - SUBJECTIVE AND OBJECTIVE BOX
CC: Patient is a 59y old  Female who presents with a chief complaint of PEG tube placement (29 Dec 2024 11:57)      Interval History:  Patient seen and examined at bedside.  Pt removed NGT that was placed yesterday  No complaints this morning.    ALLERGIES:  amoxicillin (Other)  Ceclor (Other)    MEDICATIONS  (STANDING):  ARIPiprazole 2 milliGRAM(s) Oral daily  dextrose 5% + lactated ringers. 1000 milliLiter(s) (75 mL/Hr) IV Continuous <Continuous>  senna Syrup 15 milliLiter(s) Oral daily  traZODone 50 milliGRAM(s) Oral at bedtime    MEDICATIONS  (PRN):    Vital Signs Last 24 Hrs  T(F): 98.3 (30 Dec 2024 05:29), Max: 98.5 (29 Dec 2024 23:00)  HR: 71 (30 Dec 2024 05:29) (71 - 87)  BP: 125/70 (30 Dec 2024 05:29) (116/72 - 125/70)  RR: 18 (30 Dec 2024 05:29) (16 - 18)  SpO2: 98% (30 Dec 2024 05:29) (98% - 98%)  I&O's Summary    BMI (kg/m2): 17.2 (12-28-24 @ 18:37)    PHYSICAL EXAM:  GENERAL: NAD  CHEST/LUNG: Clear to percussion bilaterally; No rales, rhonchi, wheezing, or rubs; normal respiratory effort, no intercostal retractions  HEART: Regular rate and rhythm; No murmurs, rubs, or gallops; No pitting edema  ABDOMEN: Soft, Nontender, Nondistended; Bowel sounds present; No HSM or masses  MUSCULOSKELETAL/EXTREMITIES:  2+ Peripheral Pulses, No clubbing or digital cyanosis  PSYCH: Appropriate affect, Alert & Oriented    LABS:                        12.5   6.66  )-----------( 257      ( 30 Dec 2024 06:49 )             37.7       12-30    141  |  105  |  14  ----------------------------<  104  3.9   |  24  |  0.62    Ca    9.8      30 Dec 2024 06:49    TPro  8.3  /  Alb  3.7  /  TBili  0.5  /  DBili  x   /  AST  21  /  ALT  15  /  AlkPhos  87  12-28       PT/INR - ( 29 Dec 2024 03:20 )   PT: 12.3 sec;   INR: 1.04 ratio         PTT - ( 29 Dec 2024 03:20 )  PTT:28.7 sec     Urinalysis Basic - ( 30 Dec 2024 06:49 )  Color: x / Appearance: x / SG: x / pH: x  Gluc: 104 mg/dL / Ketone: x  / Bili: x / Urobili: x   Blood: x / Protein: x / Nitrite: x   Leuk Esterase: x / RBC: x / WBC x   Sq Epi: x / Non Sq Epi: x / Bacteria: x    Care Discussed with Consultants/Other Providers: Yes

## 2024-12-30 NOTE — PROGRESS NOTE ADULT - ASSESSMENT
59 yr old female w alcohol abuse hx, Wernicke-Korsakoff syndrome (alcoholic, dysphagia on PEG, anemia, asthma/COPD, chronic renal insufficiency, GERD, GI bleed, Hepatitic steatosis. depression, anxiety,  was sent from  for dislodged PEG tube.     #Dislodged PEG tube  - T+S, coags  - GI consult noted. Awaiting to see if PEG can be placed today  - CT abd reviewed  - D5LR 75cc/hr  - NGT was placed yesterday and she removed it last night    #Depression  - Aripiprazole  - trazodone     Full code  SCDs  NPO for now    Left  for [place]. No answer on 12/29 59 yr old female w alcohol abuse hx, Wernicke-Korsakoff syndrome (alcoholic, dysphagia on PEG, anemia, asthma/COPD, chronic renal insufficiency, GERD, GI bleed, Hepatitic steatosis. depression, anxiety,  was sent from  for dislodged PEG tube.     #Dislodged PEG tube  - T+S, coags  - GI consult noted. Awaiting to see if PEG can be placed today  - CT abd reviewed  - D5LR 75cc/hr  - NGT was placed yesterday and she removed it last night    #Depression  - Aripiprazole  - trazodone     #PCR + for staph  - starting mupirocin 2% in both nostrils x 5 days      Full code  SCDs  NPO for now    Spoke to Lay and consent obtained for PEG. 12/30

## 2024-12-30 NOTE — PROGRESS NOTE ADULT - ASSESSMENT
58 y/o F with medical history as documented above who presents from [facility] after her PEG tube became dislodged.   59 yr old female w alcohol abuse hx, Wernicke-Korsakoff syndrome (alcoholic, dysphagia on PEG, anemia, asthma/COPD, chronic renal insufficiency, GERD, GI bleed, Hepatitic steatosis. depression, anxiety, was sent from  for dislodged PEG tube. Patient had the PEG placed in April 2023 for dysphagia. Currently does not take in anything by mouth. Patient does not appear to be in any acute distress when seen at bedside. Does not verbalized responses but does nod head appropriately in response to questions.    GI consult for PEG placement

## 2024-12-30 NOTE — PROGRESS NOTE ADULT - PROBLEM SELECTOR PLAN 1
Will have to place PEG endoscopically, plan for placement this coming week  - Recommend NG tube placement if able for nutrition   - CT scan reviewed with prior ostomy site appearing closed off  - Physical exam showing well healed ostomy site. Will need PEG endoscopically, plan for placement today   - CT scan reviewed with prior ostomy site appearing closed off  -Continue NPO  -Chlorhexidine on Abdomen   - Abx ordered pre procedure

## 2024-12-30 NOTE — PROGRESS NOTE ADULT - NS ATTEND AMEND GEN_ALL_CORE FT
Agree with assessment and plan as above. I attest my time as attending was greater than 50% of the total time of 50 min on patient care (ACP <= 25 mins) on this DOS. Time spent includes review of hospital course, labs, vitals, medical records, patient evaluation, contact with family (when present), discussion of plan with the primary team, coordinating services and documenting encounter.     s/p uncomplicated PEG placement today. Can use PEG for water and meds tonight. If no issues with tube/ostomy site tomorrow AM, can use for feeds.

## 2024-12-31 ENCOUNTER — TRANSCRIPTION ENCOUNTER (OUTPATIENT)
Age: 59
End: 2024-12-31

## 2024-12-31 VITALS
HEART RATE: 96 BPM | TEMPERATURE: 98 F | DIASTOLIC BLOOD PRESSURE: 67 MMHG | OXYGEN SATURATION: 97 % | SYSTOLIC BLOOD PRESSURE: 102 MMHG | RESPIRATION RATE: 18 BRPM

## 2024-12-31 DIAGNOSIS — K29.60 OTHER GASTRITIS WITHOUT BLEEDING: ICD-10-CM

## 2024-12-31 LAB
ANION GAP SERPL CALC-SCNC: 10 MMOL/L — SIGNIFICANT CHANGE UP (ref 5–17)
BUN SERPL-MCNC: 8 MG/DL — SIGNIFICANT CHANGE UP (ref 7–23)
CALCIUM SERPL-MCNC: 9.5 MG/DL — SIGNIFICANT CHANGE UP (ref 8.4–10.5)
CHLORIDE SERPL-SCNC: 105 MMOL/L — SIGNIFICANT CHANGE UP (ref 96–108)
CO2 SERPL-SCNC: 26 MMOL/L — SIGNIFICANT CHANGE UP (ref 22–31)
CREAT SERPL-MCNC: 0.62 MG/DL — SIGNIFICANT CHANGE UP (ref 0.5–1.3)
EGFR: 103 ML/MIN/1.73M2 — SIGNIFICANT CHANGE UP
GLUCOSE SERPL-MCNC: 113 MG/DL — HIGH (ref 70–99)
HCT VFR BLD CALC: 37.7 % — SIGNIFICANT CHANGE UP (ref 34.5–45)
HGB BLD-MCNC: 12.4 G/DL — SIGNIFICANT CHANGE UP (ref 11.5–15.5)
MCHC RBC-ENTMCNC: 30.5 PG — SIGNIFICANT CHANGE UP (ref 27–34)
MCHC RBC-ENTMCNC: 32.9 G/DL — SIGNIFICANT CHANGE UP (ref 32–36)
MCV RBC AUTO: 92.6 FL — SIGNIFICANT CHANGE UP (ref 80–100)
NRBC # BLD: 0 /100 WBCS — SIGNIFICANT CHANGE UP (ref 0–0)
PLATELET # BLD AUTO: 248 K/UL — SIGNIFICANT CHANGE UP (ref 150–400)
POTASSIUM SERPL-MCNC: 3.5 MMOL/L — SIGNIFICANT CHANGE UP (ref 3.5–5.3)
POTASSIUM SERPL-SCNC: 3.5 MMOL/L — SIGNIFICANT CHANGE UP (ref 3.5–5.3)
RBC # BLD: 4.07 M/UL — SIGNIFICANT CHANGE UP (ref 3.8–5.2)
RBC # FLD: 13 % — SIGNIFICANT CHANGE UP (ref 10.3–14.5)
SODIUM SERPL-SCNC: 141 MMOL/L — SIGNIFICANT CHANGE UP (ref 135–145)
WBC # BLD: 8.34 K/UL — SIGNIFICANT CHANGE UP (ref 3.8–10.5)
WBC # FLD AUTO: 8.34 K/UL — SIGNIFICANT CHANGE UP (ref 3.8–10.5)

## 2024-12-31 PROCEDURE — 71045 X-RAY EXAM CHEST 1 VIEW: CPT

## 2024-12-31 PROCEDURE — L8699: CPT

## 2024-12-31 PROCEDURE — 87641 MR-STAPH DNA AMP PROBE: CPT

## 2024-12-31 PROCEDURE — 74177 CT ABD & PELVIS W/CONTRAST: CPT | Mod: MC

## 2024-12-31 PROCEDURE — 85027 COMPLETE CBC AUTOMATED: CPT

## 2024-12-31 PROCEDURE — 87637 SARSCOV2&INF A&B&RSV AMP PRB: CPT

## 2024-12-31 PROCEDURE — 85730 THROMBOPLASTIN TIME PARTIAL: CPT

## 2024-12-31 PROCEDURE — 87640 STAPH A DNA AMP PROBE: CPT

## 2024-12-31 PROCEDURE — 85025 COMPLETE CBC W/AUTO DIFF WBC: CPT

## 2024-12-31 PROCEDURE — 80053 COMPREHEN METABOLIC PANEL: CPT

## 2024-12-31 PROCEDURE — 83690 ASSAY OF LIPASE: CPT

## 2024-12-31 PROCEDURE — 99285 EMERGENCY DEPT VISIT HI MDM: CPT | Mod: 25

## 2024-12-31 PROCEDURE — 99233 SBSQ HOSP IP/OBS HIGH 50: CPT

## 2024-12-31 PROCEDURE — 86901 BLOOD TYPING SEROLOGIC RH(D): CPT

## 2024-12-31 PROCEDURE — 84702 CHORIONIC GONADOTROPIN TEST: CPT

## 2024-12-31 PROCEDURE — 80048 BASIC METABOLIC PNL TOTAL CA: CPT

## 2024-12-31 PROCEDURE — 36415 COLL VENOUS BLD VENIPUNCTURE: CPT

## 2024-12-31 PROCEDURE — 86900 BLOOD TYPING SEROLOGIC ABO: CPT

## 2024-12-31 PROCEDURE — 85610 PROTHROMBIN TIME: CPT

## 2024-12-31 PROCEDURE — 99232 SBSQ HOSP IP/OBS MODERATE 35: CPT

## 2024-12-31 PROCEDURE — 86850 RBC ANTIBODY SCREEN: CPT

## 2024-12-31 RX ORDER — PANTOPRAZOLE 40 MG/1
40 TABLET, DELAYED RELEASE ORAL DAILY
Refills: 0 | Status: DISCONTINUED | OUTPATIENT
Start: 2024-12-31 | End: 2024-12-31

## 2024-12-31 RX ORDER — SENNOSIDES 8.6 MG/1
15 TABLET, FILM COATED ORAL
Qty: 0 | Refills: 0 | DISCHARGE
Start: 2024-12-31

## 2024-12-31 RX ORDER — PANTOPRAZOLE 40 MG/1
40 TABLET, DELAYED RELEASE ORAL
Qty: 30 | Refills: 0
Start: 2024-12-31 | End: 2025-01-29

## 2024-12-31 RX ADMIN — ARIPIPRAZOLE 2 MILLIGRAM(S): 10 TABLET ORAL at 13:17

## 2024-12-31 RX ADMIN — Medication 1 APPLICATION(S): at 17:22

## 2024-12-31 RX ADMIN — SODIUM CHLORIDE 75 MILLILITER(S): 9 INJECTION, SOLUTION INTRAVENOUS at 06:20

## 2024-12-31 RX ADMIN — PANTOPRAZOLE 40 MILLIGRAM(S): 40 TABLET, DELAYED RELEASE ORAL at 13:23

## 2024-12-31 RX ADMIN — Medication 1 APPLICATION(S): at 06:21

## 2024-12-31 RX ADMIN — SENNOSIDES 15 MILLILITER(S): 8.6 TABLET, FILM COATED ORAL at 13:16

## 2024-12-31 NOTE — DISCHARGE NOTE PROVIDER - CARE PROVIDER_API CALL
Reyes, John Anthony  Internal Medicine  [address] [place] [id number]  Phone: [phone]  Fax: [phone]  Follow Up Time:

## 2024-12-31 NOTE — PROGRESS NOTE ADULT - SUBJECTIVE AND OBJECTIVE BOX
Chief Complaint:  Patient is a 59y old  Female who presents with a chief complaint of PEG tube placement (31 Dec 2024 09:14)     Patient seen and examined at bedside. No event over night.       MEDICATIONS:   MEDICATIONS  (STANDING):  ARIPiprazole 2 milliGRAM(s) Oral daily  dextrose 5% + lactated ringers. 1000 milliLiter(s) (75 mL/Hr) IV Continuous <Continuous>  mupirocin 2% Ointment 1 Application(s) Topical two times a day  senna Syrup 15 milliLiter(s) Oral daily  traZODone 50 milliGRAM(s) Oral at bedtime    MEDICATIONS  (PRN):            DIET:  Diet, NPO with Tube Feed:   Tube Feeding Modality: Gastrostomy  Jevity 1.5 Allan  Total Volume for 24 Hours (mL): 1650  Bolus  Total Volume of Bolus (mL):  275  Total # of Feeds: 4  Tube Feed Frequency: Every 4 hours   Tube Feed Start Time: 08:00  Bolus Feed Rate (mL per Hour): 275   Bolus Feed Duration (in Hours): 0.5  Bolus Feed Instructions:  275 mL Bolus feeds q4h starting at 8am, 12pm, 4pm & 8pm  Free Water Flush  Bolus   Total Volume per Flush (mL): 150   Frequency: Every 6 Hours   Total Daily Volume of Flush (mL): 600    Start Time: 09:00  Free Water Flush Instructions:  Free water flush one hour before or after bolus feeds (12-31-24 @ 11:31) [Active]          ALLERGIES:   Allergies    amoxicillin (Other)  Ceclor (Other)    Intolerances        VITAL SIGNS:   Vital Signs Last 24 Hrs  T(C): 36.7 (31 Dec 2024 05:30), Max: 37 (30 Dec 2024 12:20)  T(F): 98 (31 Dec 2024 05:30), Max: 98.6 (30 Dec 2024 12:20)  HR: 88 (31 Dec 2024 05:30) (73 - 89)  BP: 121/78 (31 Dec 2024 05:30) (119/75 - 145/78)  BP(mean): --  RR: 16 (31 Dec 2024 05:30) (15 - 18)  SpO2: 97% (31 Dec 2024 05:30) (97% - 98%)    Parameters below as of 31 Dec 2024 05:30  Patient On (Oxygen Delivery Method): room air      I&O's Summary      PHYSICAL EXAM:   GENERAL:  No acute distress  HEENT:  NC/AT, conjunctiva clear, sclera anicteric  CHEST:  No increased effort  HEART:  Regular rate  ABDOMEN:  Soft, non-tender, non-distended, positive bowel sounds, no rebound or guarding  EXTREMITIES: No edema  SKIN:  Warm, dry  NEURO:  Calm, cooperative    LABS:                        12.4   8.34  )-----------( 248      ( 31 Dec 2024 07:50 )             37.7     Hemoglobin: 12.4 g/dL (12-31-24 @ 07:50)  Hemoglobin: 12.5 g/dL (12-30-24 @ 06:49)  Hemoglobin: 12.1 g/dL (12-28-24 @ 22:05)    12-31    141  |  105  |  8   ----------------------------<  113[H]  3.5   |  26  |  0.62    Ca    9.5      31 Dec 2024 07:50        RADIOLOGY & ADDITIONAL STUDIES:      ACC: [id number] EXAM:  CT ABDOMEN AND PELVIS IC   ORDERED BY:  THAI FOX     PROCEDURE DATE:  12/28/2024          INTERPRETATION:  FINAL REPORT:    CLINICAL INFORMATION: 59 years  Female with g tube was removed, but G   tube site is now closed.    COMPARISON: Noncontrast CT abdomen and pelvis 7/25/2024    CONTRAST/COMPLICATIONS:  IV Contrast: Omnipaque 350  90 cc administered   0 cc discarded  Oral Contrast: NONE  .    PROCEDURE:  CT of the Abdomen and Pelvis was performed.  Sagittal and coronal reformats were performed.    LIMITATION: Absence of enteric contrast limits evaluation of the GI tract.    FINDINGS:  LOWER CHEST: Within normal limits.    LIVER: Within normal limits.  BILE DUCTS: Normal caliber.  GALLBLADDER: Cholelithiasis.  SPLEEN: Within normal limits.  PANCREAS: Within normal limits.  ADRENALS: Within normal limits.  KIDNEYS/URETERS: Markedly atrophic left kidney redemonstrated with areas   of cortical scarring. Bilateral cysts. No hydroureteronephrosis.   Symmetrical nephrograms.    BLADDER: Distended measuring 16.2 cm sagittal extending to 2 cm below the   umbilicus..  REPRODUCTIVE ORGANS: Hysterectomy.    BOWEL: No bowel obstruction. Appendix is normal.  PERITONEUM/RETROPERITONEUM: Within normal limits.  VESSELS: Atherosclerotic changes.  LYMPH NODES: No lymphadenopathy.  ABDOMINAL WALL: Tract from previous percutaneous gastrostomy tube present   without air or fluid collection  BONES: Degenerative changes.    IMPRESSION:  Distended urinary bladder.    Cholelithiasis.    PEG tube site without air or fluid collection.    A preliminary report was provided by Dr. Mary Penaloza of [facility]. I agree with   the interpretation.        --- End of Report ---            BOOM ELY MD; Attending Radiologist  This document has been electronically signed. Dec 29 2024  8:50AM  12-28-24 @ 23:46       GI Follow up    58 yo woman s/p PEG placement .  Patient seen and examined at bedside. No event over night.       MEDICATIONS:   MEDICATIONS  (STANDING):  ARIPiprazole 2 milliGRAM(s) Oral daily  dextrose 5% + lactated ringers. 1000 milliLiter(s) (75 mL/Hr) IV Continuous <Continuous>  mupirocin 2% Ointment 1 Application(s) Topical two times a day  senna Syrup 15 milliLiter(s) Oral daily  traZODone 50 milliGRAM(s) Oral at bedtime    MEDICATIONS  (PRN):            DIET:  Diet, NPO with Tube Feed:   Tube Feeding Modality: Gastrostomy  Jevity 1.5 Allan  Total Volume for 24 Hours (mL): 1650  Bolus  Total Volume of Bolus (mL):  275  Total # of Feeds: 4  Tube Feed Frequency: Every 4 hours   Tube Feed Start Time: 08:00  Bolus Feed Rate (mL per Hour): 275   Bolus Feed Duration (in Hours): 0.5  Bolus Feed Instructions:  275 mL Bolus feeds q4h starting at 8am, 12pm, 4pm & 8pm  Free Water Flush  Bolus   Total Volume per Flush (mL): 150   Frequency: Every 6 Hours   Total Daily Volume of Flush (mL): 600    Start Time: 09:00  Free Water Flush Instructions:  Free water flush one hour before or after bolus feeds (24 @ 11:31) [Active]          ALLERGIES:   Allergies    amoxicillin (Other)  Ceclor (Other)    Intolerances        VITAL SIGNS:   Vital Signs Last 24 Hrs  T(C): 36.7 (31 Dec 2024 05:30), Max: 37 (30 Dec 2024 12:20)  T(F): 98 (31 Dec 2024 05:30), Max: 98.6 (30 Dec 2024 12:20)  HR: 88 (31 Dec 2024 05:30) (73 - 89)  BP: 121/78 (31 Dec 2024 05:30) (119/75 - 145/78)  BP(mean): --  RR: 16 (31 Dec 2024 05:30) (15 - 18)  SpO2: 97% (31 Dec 2024 05:30) (97% - 98%)    Parameters below as of 31 Dec 2024 05:30  Patient On (Oxygen Delivery Method): room air      I&O's Summary      PHYSICAL EXAM:   GENERAL:  No acute distress  HEENT:  NC/AT, conjunctiva clear, sclera anicteric  CHEST:  No increased effort  HEART:  Regular rate  ABDOMEN:  Soft, non-tender, non-distended, positive bowel sounds, no rebound or guarding, PEG in place  EXTREMITIES: No edema  SKIN:  Warm, dry  NEURO:  Calm, cooperative    LABS:                        12.4   8.34  )-----------( 248      ( 31 Dec 2024 07:50 )             37.7     Hemoglobin: 12.4 g/dL (24 @ 07:50)  Hemoglobin: 12.5 g/dL (24 @ 06:49)  Hemoglobin: 12.1 g/dL (24 @ 22:05)        141  |  105  |  8   ----------------------------<  113[H]  3.5   |  26  |  0.62    Ca    9.5      31 Dec 2024 07:50        RADIOLOGY & ADDITIONAL STUDIES:    PEG Placement Report    Date: 2024 2:15 PM  Patient Name: LARRY GARCIA  MRN: [id number]  Account Number:[id number]  Gender: Female   (age): 1965 (59)        Indications:  PEG tube malfunction - K94.23    PEG Placement:    Percutaneous endoscopic gastrostomy: In a darkened room, the abdominal wall was  transilluminated and a site was selected. Indentation of the gastric wall by  external finger pressure was demonstrated. The skin was surgically prepared and  anesthetized with xylocaine. A small incision was made in the abdominal wall  using a surgical blade. A 25-gauge needle with cannula was inserted through the  abdominal wall and into the gastric lumen. A guidewire was passed through the  cannula, was caught by the snare passed through the endoscope and brought out  through the mouth. An Avanos 24 Fr PEG tube was secured to the guidewire and  pulled through the abdominal wall. A satisfactory final position was confirmed  endoscopically. The gastrostomy tube was secured with the outer flange  positioned at 2.5 cm. The post procedure appearances were satisfactory.    Limitations/Complications:  There were no apparent limitations or complications    Findings:    Esophagus Mucosa Esophagitis with contact bleeding was seen in the  gastroesophageal junction, compatible with nonspecific erosive  esophagitis.Epinephrine 1/73641 injection was successfully applied for  hemostasis.    Stomach Mucosa Diffuse erosions of the mucosa with no bleeding was noted in the  wholestomach. These findings are compatible with erosive gastritis.    Duodenum Mucosa Normal mucosa was noted in the whole examined duodenum.    Impressions:  Erosions in the whole stomach compatible with erosive gastritis.    Esophagitis in the gastroesophageal junction compatible with nonspecific  erosive esophagitis. (Injection).    Normal mucosa in the whole examined duodenum.    Plan:  Return to floor for further management  PEG may be used for Medications today and Feedings in 24 h. Please no dressings  under external bumper.      Rolando Wesley MD    Version 1, Electronically signed on 2024 4:45:02 PM by Rolando Wesley MD

## 2024-12-31 NOTE — PROGRESS NOTE ADULT - NS ATTEND AMEND GEN_ALL_CORE FT
Agree with the assessment and plan of MANISH Kahn.  PEG placed, OK for feeds and meds as above. Monitor residuals per protocol.  Abdominal binder to prevent dislodgement.  GI will sign off. Please reconsult as needed.  T>35min

## 2024-12-31 NOTE — DISCHARGE NOTE NURSING/CASE MANAGEMENT/SOCIAL WORK - FINANCIAL ASSISTANCE
[facility] provides services at a reduced cost to those who are determined to be eligible through [facility]’s financial assistance program. Information regarding [facility]’s financial assistance program can be found by going to [email or web address] or by calling [phone].

## 2024-12-31 NOTE — DISCHARGE NOTE PROVIDER - NSDCCAREPROVSEEN_GEN_ALL_CORE_FT
Lisa, Delia Bullock, Demarcus Little, Simeon Ramos, Key Washington, Louise Lucas, Livia Wesley, Rolando Tate, Yfn De Jesus, Rae Martinez, Shanita Kahn, Hollie Johnson

## 2024-12-31 NOTE — DISCHARGE NOTE PROVIDER - DETAILS OF MALNUTRITION DIAGNOSIS/DIAGNOSES
This patient has been assessed with a concern for Malnutrition and was treated during this hospitalization for the following Nutrition diagnosis/diagnoses:     -  12/29/2024: Moderate protein-calorie malnutrition   -  12/29/2024: Underweight (BMI < 19)

## 2024-12-31 NOTE — PROGRESS NOTE ADULT - PROBLEM SELECTOR PLAN 1
12/30 S/P EGD with PEG placement   Peg site check & care as routine   AVOID gauze under the PEG tube bumper   Keep HOB elevated while feeding  Flush Peg tube after medication and feed   Monitor residual   Can use abdominal binder to keep PEG tube in place 12/30 S/P EGD with PEG placement   PEG site check & care as routine   AVOID gauze under the PEG tube bumper   Keep HOB elevated while feeding  Flush PEG tube after medication and feed   Monitor residual   Use abdominal binder to keep PEG tube in place

## 2024-12-31 NOTE — DISCHARGE NOTE PROVIDER - NSDCMRMEDTOKEN_GEN_ALL_CORE_FT
Abilify 2 mg oral tablet: 1 orally once a day  polyethylene glycol 3350 oral powder for reconstitution: 17 gram(s) orally once a day as needed  silodosin 8 mg oral capsule: 1 cap(s) by gastrostomy tube once a day (at bedtime)  traZODone 50 mg oral tablet: orally once a day (at bedtime)   Abilify 2 mg oral tablet: 1 orally once a day  pantoprazole 40 mg oral granule, delayed release: 40 milligram(s) by gastrostomy tube once a day  polyethylene glycol 3350 oral powder for reconstitution: 17 gram(s) orally once a day as needed  senna (sennosides) 8.8 mg/5 mL oral syrup: 15 milliliter(s) orally once a day  silodosin 8 mg oral capsule: 1 cap(s) by gastrostomy tube once a day (at bedtime)  traZODone 50 mg oral tablet: orally once a day (at bedtime)

## 2024-12-31 NOTE — DISCHARGE NOTE PROVIDER - NSDCCPCAREPLAN_GEN_ALL_CORE_FT
PRINCIPAL DISCHARGE DIAGNOSIS  Diagnosis: Gastrojejunostomy tube dislodgement  Assessment and Plan of Treatment: You came to the hospital due to PEG tube dislodgement. Your PEG tube was placed by GI. You were tolerating feedings well.   You will need to follow up with your primary care physician for further management.  Discharging Provider:  Yfn Tate MD   Contact Info: [phone] - Please call with any questions or concerns.       PRINCIPAL DISCHARGE DIAGNOSIS  Diagnosis: Gastrojejunostomy tube dislodgement  Assessment and Plan of Treatment: You came to the hospital due to PEG tube dislodgement. Your PEG tube was placed by GI. You were tolerating feedings well.   Please continue taking Pantoprazole 40mg daily via your peg tube.   You will need to follow up with your primary care physician for further management.  Discharging Provider:  Yfn Tate MD   Contact Info: [phone] - Please call with any questions or concerns.

## 2024-12-31 NOTE — DISCHARGE NOTE NURSING/CASE MANAGEMENT/SOCIAL WORK - NSFLUVACAGEDISCH_IMM_ALL_CORE
Inhalation Treatment:  Pre treatment vitals: Temp: (!) 102 °F (38.9 °C) (11/23/21 1549)  Heart Rate: (!) 147 (11/23/21 1549)  Resp: 24 (11/23/21 1549)  SpO2: 99 % (11/23/21 1549)   Administered Hand held inhalation treatment with Albuterol Multi-dose Inhaler - NDC [id number], Lot [id number]    Medication Supply: Stock Medication used    Treatment vitals and times recorded in vitals section  Patient tolerated the procedure without difficulty.    Adult

## 2024-12-31 NOTE — PROGRESS NOTE ADULT - ASSESSMENT
59 yr old female w alcohol abuse hx, Wernicke-Korsakoff syndrome (alcoholic, dysphagia on PEG, anemia, asthma/COPD, chronic renal insufficiency, GERD, GI bleed, Hepatitic steatosis. depression, anxiety, was sent from  for dislodged PEG tube. Patient had the PEG placed in April 2023 for dysphagia. Currently does not take in anything by mouth. Patient does not appear to be in any acute distress when seen at bedside. Does not verbalized responses but does nod head appropriately in response to questions.    GI consult for PEG placement   S/P EGD with PEG placement 12/30   Today Peg bumper tight, I loosened to level 4.5 Easily movable now, site clean & dry.   Start PEG feed, continue current management, we can sign off Please reconsult as needed.  59 yr old female w alcohol abuse hx, Wernicke-Korsakoff syndrome (alcoholic, dysphagia on PEG, anemia, asthma/COPD, chronic renal insufficiency, GERD, GI bleed, Hepatitic steatosis. depression, anxiety, was sent from  for dislodged PEG tube. Patient had the PEG placed in April 2023 for dysphagia. Currently does not take in anything by mouth. Patient does not appear to be in any acute distress when seen at bedside. Does not verbalized responses but does nod head appropriately in response to questions.    GI consult for PEG placement   S/P EGD with PEG placement 12/30   Today Peg bumper tight, I loosened to level 4.5cm Easily movable now, site clean & dry.   Start PEG feed, continue current management, we can sign off. Please reconsult as needed.

## 2024-12-31 NOTE — CHART NOTE - NSCHARTNOTEFT_GEN_A_CORE
NUTRITION Follow Up Note    Recommended   Tube Feeding Modality: Gastrostomy  Jevity 1.5 Allan  Total Volume for 24 Hours (mL): 1650  Bolus  Total Volume of Bolus (mL):  275  Total # of Feeds: 4  Tube Feed Frequency: Every 4 hours   Tube Feed Start Time: 08:00  Bolus Feed Rate (mL per Hour): 275   Bolus Feed Duration (in Hours): 0.5  Bolus Feed Instructions:  275 mL Bolus feeds q4h starting at 8am, 12pm, 4pm & 8pm  Free Water Flush  Bolus   Total Volume per Flush (mL): 150   Frequency: Every 6 Hours   Total Daily Volume of Flush (mL): 600    Start Time: 09:00  Free Water Flush Instructions:  Free water flush one hour before or after bolus feeds (12-31-24 @ 11:31) [Active]          MONITORING & EVALUATION:   1. Weights   2. PO intakes   3. Skin integrity   4. Tolerance to diet prescription   5. Labs & POCT  6. Follow up (per protocol)    Registered Dietitian/Nutritionist Remains Available.  Cruz Young RD    Contact: [phone] or via [place] TEAMS

## 2024-12-31 NOTE — DISCHARGE NOTE NURSING/CASE MANAGEMENT/SOCIAL WORK - PATIENT PORTAL LINK FT
You can access the FollowMyHealth Patient Portal offered by [facility] by registering at the following website: [email or web address]. By joining [employer]’s FollowMyHealth portal, you will also be able to view your health information using other applications (apps) compatible with our system.

## 2024-12-31 NOTE — DISCHARGE NOTE NURSING/CASE MANAGEMENT/SOCIAL WORK - NSDCPEFALRISK_GEN_ALL_CORE
For information on Fall & Injury Prevention, visit: [email or web address] OR  [email or web address] OR  https://www.cdc.gov/steadi/patient.html

## 2024-12-31 NOTE — DISCHARGE NOTE PROVIDER - HOSPITAL COURSE
HPI:   59 yr old female w alcohol abuse hx, Wernicke-Korsakoff syndrome (alcoholic, dysphagia on PEG, anemia, asthma/COPD, chronic renal insufficiency, GERD, GI bleed, Hepatitic steatosis. depression, anxiety,  was sent from  for dislodged PEG tube. In the ED patient appears well in no acute distress. Non verbal at baseline.  (29 Dec 2024 02:10)      Hospital Course Summary: Provide a brief overview of the patient’s hospital stay, highlighting major treatments, complications, and any significant clinical events.    ---  VITALS:   Vital Signs Last 24 Hrs  T(F): 98 (31 Dec 2024 05:30), Max: 98.6 (30 Dec 2024 12:20)  HR: 88 (31 Dec 2024 05:30) (73 - 89)  BP: 121/78 (31 Dec 2024 05:30) (119/75 - 145/78)  RR: 16 (31 Dec 2024 05:30) (15 - 18)  SpO2: 97% (31 Dec 2024 05:30) (97% - 98%)  ---  PHYSICAL EXAM:   General: Awake and alert, cooperative with exam. No acute distress.   Cardiology: Normal S1, S2. No murmurs. Regular rate and rhythm.   Respiratory: Lungs clear to ascultation bilaterally. No wheezes, rales, or rhonchi.   Gastrointestinal: Positive bowel sounds. Soft. Non-tender. Non-distended. No guarding, rigidity, or rebound tenderness.  Extremities: No peripheral edema bilaterally.  Neurological: A+Ox3. CN 2-12 intact. No focal neurological deficits. Normal speech. No facial droop.  ---    Indicate ongoing risks or concerns:    [employer] through [employer] to Beds: Completed or not. If not, please provide reason why.     GOC:   • Code Status   • Summary of Goals of Care Conversation/ what matters most    Source of Infection:  Antibiotic / Last Day:    Discharging Provider:  Yfn Tate MD   Contact Info: [phone]    Outpatient Provider: Sign out given?  SNF Provider: Sign-out given?    PLEASE COPY AND PASTE INTO CARE PLAN USING CTRL V  You came to the hospital due to:   You were diagnosed with:   You were treated with:   You were prescribed the following new medications:    You will need to follow up with your primary care physician for further management.    Discharging Provider:  Yfn Tate MD   Contact Info: [phone] - Please call with any questions or concerns.          HPI:   59 yr old female w alcohol abuse hx, Wernicke-Korsakoff syndrome (alcoholic, dysphagia on PEG, anemia, asthma/COPD, chronic renal insufficiency, GERD, GI bleed, Hepatitic steatosis. depression, anxiety,  was sent from  for dislodged PEG tube. In the ED patient appears well in no acute distress. Non verbal at baseline.  (29 Dec 2024 02:10)      Hospital Course Summary: Patient presented with dislodged peg. Patient was seen by Gastroenterology. Patient had an NGT placed in the interim of peg tube replacement. Patient had a PEG placed by GI endoscopically. Patient tolerated peg tube feedings. Patient is being discharged to [facility].     ---  VITALS:   Vital Signs Last 24 Hrs  T(F): 98 (31 Dec 2024 05:30), Max: 98.6 (30 Dec 2024 12:20)  HR: 88 (31 Dec 2024 05:30) (73 - 89)  BP: 121/78 (31 Dec 2024 05:30) (119/75 - 145/78)  RR: 16 (31 Dec 2024 05:30) (15 - 18)  SpO2: 97% (31 Dec 2024 05:30) (97% - 98%)  ---  PHYSICAL EXAM:   General: Awake and alert, Nonverbal, cooperative with exam. No acute distress.   Cardiology: Normal S1, S2. No murmurs. Regular rate and rhythm.   Respiratory: Lungs clear to ascultation bilaterally. No wheezes, rales, or rhonchi.   Gastrointestinal: Soft. Non-tender. Non-distended. Peg tube placement with abdominal binder in place   Extremities: No peripheral edema bilaterally.  Neurological: Nonverbal at baseline. No facial droop.   ---    Indicate ongoing risks or concerns: PEG Tube feedings     Discharging Provider:  Yfn Tate MD   Contact Info: [phone]    Outpatient Provider: Sign out given?  SNF Provider: Sign-out given?    PLEASE COPY AND PASTE INTO CARE PLAN USING CTRL V  You came to the hospital due to:   You were diagnosed with:   You were treated with:   You were prescribed the following new medications:    You will need to follow up with your primary care physician for further management.    Discharging Provider:  Yfn Tate MD   Contact Info: [phone] - Please call with any questions or concerns.          HPI:   59 yr old female w alcohol abuse hx, Wernicke-Korsakoff syndrome (alcoholic, dysphagia on PEG, anemia, asthma/COPD, chronic renal insufficiency, GERD, GI bleed, Hepatitic steatosis. depression, anxiety,  was sent from  for dislodged PEG tube. In the ED patient appears well in no acute distress. Non verbal at baseline.  (29 Dec 2024 02:10)      Hospital Course Summary: Patient presented with dislodged peg. Patient was seen by Gastroenterology. Patient had an NGT placed in the interim of peg tube replacement. Patient had a PEG placed by GI endoscopically. Patient tolerated peg tube feedings. Patient is being discharged to [facility].     ---  VITALS:   Vital Signs Last 24 Hrs  T(F): 98 (31 Dec 2024 05:30), Max: 98.6 (30 Dec 2024 12:20)  HR: 88 (31 Dec 2024 05:30) (73 - 89)  BP: 121/78 (31 Dec 2024 05:30) (119/75 - 145/78)  RR: 16 (31 Dec 2024 05:30) (15 - 18)  SpO2: 97% (31 Dec 2024 05:30) (97% - 98%)  ---  PHYSICAL EXAM:   General: Awake and alert, Nonverbal, cooperative with exam. No acute distress.   Cardiology: Normal S1, S2. No murmurs. Regular rate and rhythm.   Respiratory: Lungs clear to ascultation bilaterally. No wheezes, rales, or rhonchi.   Gastrointestinal: Soft. Non-tender. Non-distended. Peg tube placement with abdominal binder in place   Extremities: No peripheral edema bilaterally.  Neurological: Nonverbal at baseline. No facial droop.   ---    Indicate ongoing risks or concerns: PEG Tube feedings     Discharging Provider:  Yfn Tate MD   Contact Info: [phone]    Outpatient Provider: Dr. John A Reyes ([phone])   [facility] Provider: Sign-out given?     HPI:   59 yr old female w alcohol abuse hx, Wernicke-Korsakoff syndrome (alcoholic, dysphagia on PEG, anemia, asthma/COPD, chronic renal insufficiency, GERD, GI bleed, Hepatitic steatosis. depression, anxiety,  was sent from  for dislodged PEG tube. In the ED patient appears well in no acute distress. Non verbal at baseline.  (29 Dec 2024 02:10)      Hospital Course Summary: Patient presented with dislodged peg. Patient was seen by Gastroenterology. Patient had an NGT placed in the interim of peg tube replacement. Patient had a PEG placed by GI endoscopically. Patient tolerated peg tube feedings. Patient is being discharged to [facility].   Patient's EGD showed erosive gastritis. Patient will be discharged with daily PPI.   ---  VITALS:   Vital Signs Last 24 Hrs  T(F): 98 (31 Dec 2024 05:30), Max: 98.6 (30 Dec 2024 12:20)  HR: 88 (31 Dec 2024 05:30) (73 - 89)  BP: 121/78 (31 Dec 2024 05:30) (119/75 - 145/78)  RR: 16 (31 Dec 2024 05:30) (15 - 18)  SpO2: 97% (31 Dec 2024 05:30) (97% - 98%)  ---  PHYSICAL EXAM:   General: Awake and alert, Nonverbal, cooperative with exam. No acute distress.   Cardiology: Normal S1, S2. No murmurs. Regular rate and rhythm.   Respiratory: Lungs clear to ascultation bilaterally. No wheezes, rales, or rhonchi.   Gastrointestinal: Soft. Non-tender. Non-distended. Peg tube placement with abdominal binder in place   Extremities: No peripheral edema bilaterally.  Neurological: Nonverbal at baseline. No facial droop.   ---    Indicate ongoing risks or concerns: PEG Tube feedings     Discharging Provider:  Yfn Tate MD   Contact Info: [phone]    Outpatient Provider: Dr. John A Reyes ([phone])   [facility] Provider: Sign-out given?     HPI:   59 yr old female w alcohol abuse hx, Wernicke-Korsakoff syndrome (alcoholic, dysphagia on PEG, anemia, asthma/COPD, chronic renal insufficiency, GERD, GI bleed, Hepatitic steatosis. depression, anxiety,  was sent from  for dislodged PEG tube. In the ED patient appears well in no acute distress. Non verbal at baseline.  (29 Dec 2024 02:10)      Hospital Course Summary: Patient presented with dislodged peg. Patient was seen by Gastroenterology. Patient had an NGT placed in the interim of peg tube replacement. Patient had a PEG placed by GI endoscopically. Patient tolerated peg tube feedings. Patient is being discharged to [facility].   Patient's EGD showed erosive gastritis. Patient will be discharged with daily PPI.   ---  VITALS:   Vital Signs Last 24 Hrs  T(F): 98 (31 Dec 2024 05:30), Max: 98.6 (30 Dec 2024 12:20)  HR: 88 (31 Dec 2024 05:30) (73 - 89)  BP: 121/78 (31 Dec 2024 05:30) (119/75 - 145/78)  RR: 16 (31 Dec 2024 05:30) (15 - 18)  SpO2: 97% (31 Dec 2024 05:30) (97% - 98%)  ---  PHYSICAL EXAM:   General: Awake and alert, Nonverbal, cooperative with exam. No acute distress.   Cardiology: Normal S1, S2. No murmurs. Regular rate and rhythm.   Respiratory: Lungs clear to ascultation bilaterally. No wheezes, rales, or rhonchi.   Gastrointestinal: Soft. Non-tender. Non-distended. Peg tube placement with abdominal binder in place   Extremities: No peripheral edema bilaterally.  Neurological: Nonverbal at baseline. No facial droop.   ---    Indicate ongoing risks or concerns: PEG Tube feedings     Discharging Provider:  Yfn Tate MD   Contact Info: [phone]    Outpatient Provider: Dr. John A Reyes ([phone])      HPI:   59 yr old female w alcohol abuse hx, Wernicke-Korsakoff syndrome (alcoholic, dysphagia on PEG, anemia, asthma/COPD, chronic renal insufficiency, GERD, GI bleed, Hepatitic steatosis. depression, anxiety,  was sent from  for dislodged PEG tube. In the ED patient appears well in no acute distress. Non verbal at baseline.  (29 Dec 2024 02:10)      Hospital Course Summary: Patient presented with dislodged peg. Patient was seen by Gastroenterology. Patient had an NGT placed in the interim of peg tube replacement. Patient had a PEG placed by GI endoscopically. Patient tolerated peg tube feedings. Patient is being discharged to [facility].   Patient's EGD showed erosive gastritis. Patient will be discharged with daily PPI.   ---  VITALS:   Vital Signs Last 24 Hrs  T(F): 98 (31 Dec 2024 05:30), Max: 98.6 (30 Dec 2024 12:20)  HR: 88 (31 Dec 2024 05:30) (73 - 89)  BP: 121/78 (31 Dec 2024 05:30) (119/75 - 145/78)  RR: 16 (31 Dec 2024 05:30) (15 - 18)  SpO2: 97% (31 Dec 2024 05:30) (97% - 98%)  ---  PHYSICAL EXAM:   General: Awake and alert, Nonverbal, cooperative with exam. No acute distress.   Cardiology: Normal S1, S2. No murmurs. Regular rate and rhythm.   Respiratory: Lungs clear to ascultation bilaterally. No wheezes, rales, or rhonchi.   Gastrointestinal: Soft. Non-tender. Non-distended. Peg tube placement with abdominal binder in place   Extremities: No peripheral edema bilaterally.  Neurological: Nonverbal at baseline. No facial droop.   ---    Indicate ongoing risks or concerns: PEG Tube feedings     Discharging Provider:  Yfn Tate MD   Contact Info: [phone]    Outpatient Provider: Dr. John A Reyes ([phone]), notified

## 2024-12-31 NOTE — DISCHARGE NOTE NURSING/CASE MANAGEMENT/SOCIAL WORK - MODE OF TRANSPORTATION
Date: 4/9/2021    Patient Name: Danette Henderson          To Whom it may concern: This letter has been written at the patient's request. The above patient was treated at the [facility] for a medical condition.     This patient should be exc
Ambulance
- - -

## 2025-02-27 NOTE — ED PROVIDER NOTE - PRINCIPAL DIAGNOSIS
Aparna Benton RN  Diamond Waite MA    Patient of Dr. Salgado who is currently inpatient with CHF/post TAVR.  Can you please schedule hospital follow up? Thank you!    Patient is scheduled    PEG (percutaneous endoscopic gastrostomy) adjustment/replacement/removal

## 2025-04-28 NOTE — DIETITIAN INITIAL EVALUATION ADULT - ENTER TO (G/KG)
Pt's mother called. States the last shots didn't work and pt is having a lot of pain. She is requesting pain medication. Pt is already taking ibuprofen 800 mg.  Informed pt's mother that we are unable to prescribe narcotic pain medication for osteoarthritis. States she was unaware. They will continue to use OTC meds for pain relief.   1.2

## 2025-06-11 NOTE — DIETITIAN INITIAL EVALUATION ADULT - LOSS OF SUBCUTANEOUS FAT
Caller: Shweta Mathew    Relationship: Self    Best call back number: [phone]    What is the best time to reach you: ANY    Who are you requesting to speak with (clinical staff, provider,  specific staff member): MICHELLE    What was the call regarding: GEL INJECTIONS    Is it okay if the provider responds through MyChart: CALL    
Spoke with patient, she would like to proceed with Supartz for right knee as selfpay, she will have the first Supartz injection in the right knee same time as appt for left knee cortisone  
Orbital.../Buccal...

## (undated) DEVICE — TUBING CAP SET ERBEFLO CLEVERCAP HYBRID CO2 FOR OLYMPUS SCOPES AND UCR

## (undated) DEVICE — CONTAINER FORMALIN BUFF 10% 60ML

## (undated) DEVICE — FORCEP RADIAL JAW 4 W NDL 2.4MM 2.8MM 240CM ORANGE DISP

## (undated) DEVICE — KIT ENDO PROCEDURE CUST W/VLV

## (undated) DEVICE — SOL IRR POUR H2O 1000ML

## (undated) DEVICE — Device

## (undated) DEVICE — SNARE POLYP HEXAGONAL MED 27X2.4X240